# Patient Record
Sex: FEMALE | Race: BLACK OR AFRICAN AMERICAN | NOT HISPANIC OR LATINO
[De-identification: names, ages, dates, MRNs, and addresses within clinical notes are randomized per-mention and may not be internally consistent; named-entity substitution may affect disease eponyms.]

---

## 2017-01-03 ENCOUNTER — APPOINTMENT (OUTPATIENT)
Dept: INTERNAL MEDICINE | Facility: CLINIC | Age: 42
End: 2017-01-03

## 2017-01-03 VITALS
SYSTOLIC BLOOD PRESSURE: 104 MMHG | OXYGEN SATURATION: 97 % | TEMPERATURE: 98.2 F | HEART RATE: 88 BPM | WEIGHT: 293 LBS | HEIGHT: 68 IN | BODY MASS INDEX: 44.41 KG/M2 | DIASTOLIC BLOOD PRESSURE: 72 MMHG

## 2017-01-12 ENCOUNTER — APPOINTMENT (OUTPATIENT)
Dept: PULMONOLOGY | Facility: CLINIC | Age: 42
End: 2017-01-12

## 2017-01-30 ENCOUNTER — APPOINTMENT (OUTPATIENT)
Dept: INTERNAL MEDICINE | Facility: CLINIC | Age: 42
End: 2017-01-30

## 2017-01-31 ENCOUNTER — APPOINTMENT (OUTPATIENT)
Dept: SURGERY | Facility: CLINIC | Age: 42
End: 2017-01-31

## 2017-01-31 VITALS
WEIGHT: 293 LBS | TEMPERATURE: 98.4 F | DIASTOLIC BLOOD PRESSURE: 82 MMHG | BODY MASS INDEX: 46.53 KG/M2 | HEIGHT: 66.5 IN | OXYGEN SATURATION: 89 % | SYSTOLIC BLOOD PRESSURE: 140 MMHG | HEART RATE: 102 BPM

## 2017-02-08 ENCOUNTER — EMERGENCY (EMERGENCY)
Facility: HOSPITAL | Age: 42
LOS: 1 days | Discharge: HOME | End: 2017-02-08
Attending: EMERGENCY MEDICINE | Admitting: EMERGENCY MEDICINE
Payer: COMMERCIAL

## 2017-02-08 VITALS
OXYGEN SATURATION: 95 % | SYSTOLIC BLOOD PRESSURE: 147 MMHG | HEIGHT: 66 IN | HEART RATE: 97 BPM | DIASTOLIC BLOOD PRESSURE: 87 MMHG | RESPIRATION RATE: 20 BRPM | WEIGHT: 293 LBS | TEMPERATURE: 98 F

## 2017-02-08 VITALS
DIASTOLIC BLOOD PRESSURE: 79 MMHG | SYSTOLIC BLOOD PRESSURE: 147 MMHG | RESPIRATION RATE: 18 BRPM | OXYGEN SATURATION: 95 % | HEART RATE: 96 BPM

## 2017-02-08 DIAGNOSIS — J45.909 UNSPECIFIED ASTHMA, UNCOMPLICATED: ICD-10-CM

## 2017-02-08 DIAGNOSIS — L30.9 DERMATITIS, UNSPECIFIED: ICD-10-CM

## 2017-02-08 DIAGNOSIS — R06.02 SHORTNESS OF BREATH: ICD-10-CM

## 2017-02-08 LAB
ALBUMIN SERPL ELPH-MCNC: 3.1 G/DL — LOW (ref 3.4–5)
ALP SERPL-CCNC: 76 U/L — SIGNIFICANT CHANGE UP (ref 40–120)
ALT FLD-CCNC: 21 U/L — SIGNIFICANT CHANGE UP (ref 12–42)
ANION GAP SERPL CALC-SCNC: 9 MMOL/L — SIGNIFICANT CHANGE UP (ref 9–16)
AST SERPL-CCNC: 25 U/L — SIGNIFICANT CHANGE UP (ref 15–37)
BASOPHILS NFR BLD AUTO: 0.1 % — SIGNIFICANT CHANGE UP (ref 0–2)
BILIRUB SERPL-MCNC: 0.5 MG/DL — SIGNIFICANT CHANGE UP (ref 0.2–1.2)
BUN SERPL-MCNC: 7 MG/DL — SIGNIFICANT CHANGE UP (ref 7–23)
CALCIUM SERPL-MCNC: 8.6 MG/DL — SIGNIFICANT CHANGE UP (ref 8.5–10.5)
CHLORIDE SERPL-SCNC: 98 MMOL/L — SIGNIFICANT CHANGE UP (ref 96–108)
CK MB CFR SERPL CALC: <1 NG/ML — SIGNIFICANT CHANGE UP (ref 0.5–3.6)
CK SERPL-CCNC: 45 U/L — SIGNIFICANT CHANGE UP (ref 26–192)
CO2 SERPL-SCNC: 33 MMOL/L — HIGH (ref 22–31)
CREAT SERPL-MCNC: 0.54 MG/DL — SIGNIFICANT CHANGE UP (ref 0.5–1.3)
EOSINOPHIL NFR BLD AUTO: 10.3 % — HIGH (ref 0–6)
GLUCOSE SERPL-MCNC: 136 MG/DL — HIGH (ref 70–99)
HCT VFR BLD CALC: 37 % — SIGNIFICANT CHANGE UP (ref 34.5–45)
HGB BLD-MCNC: 11.2 G/DL — LOW (ref 11.5–15.5)
LYMPHOCYTES # BLD AUTO: 20.6 % — SIGNIFICANT CHANGE UP (ref 13–44)
MCHC RBC-ENTMCNC: 27.4 PG — SIGNIFICANT CHANGE UP (ref 27–34)
MCHC RBC-ENTMCNC: 30.3 G/DL — LOW (ref 32–36)
MCV RBC AUTO: 90.5 FL — SIGNIFICANT CHANGE UP (ref 80–100)
MONOCYTES NFR BLD AUTO: 6.5 % — SIGNIFICANT CHANGE UP (ref 2–14)
NEUTROPHILS NFR BLD AUTO: 62.5 % — SIGNIFICANT CHANGE UP (ref 43–77)
NT-PROBNP SERPL-SCNC: 71 PG/ML — SIGNIFICANT CHANGE UP
PLATELET # BLD AUTO: 294 K/UL — SIGNIFICANT CHANGE UP (ref 150–400)
POTASSIUM SERPL-MCNC: 4 MMOL/L — SIGNIFICANT CHANGE UP (ref 3.5–5.3)
POTASSIUM SERPL-SCNC: 4 MMOL/L — SIGNIFICANT CHANGE UP (ref 3.5–5.3)
PROT SERPL-MCNC: 7.7 G/DL — SIGNIFICANT CHANGE UP (ref 6.4–8.2)
RBC # BLD: 4.09 M/UL — SIGNIFICANT CHANGE UP (ref 3.8–5.2)
RBC # FLD: 14.6 % — SIGNIFICANT CHANGE UP (ref 10.3–16.9)
SODIUM SERPL-SCNC: 140 MMOL/L — SIGNIFICANT CHANGE UP (ref 135–145)
TROPONIN I SERPL-MCNC: <0.015 NG/ML — SIGNIFICANT CHANGE UP (ref 0.01–0.04)
WBC # BLD: 9 K/UL — SIGNIFICANT CHANGE UP (ref 3.8–10.5)
WBC # FLD AUTO: 9 K/UL — SIGNIFICANT CHANGE UP (ref 3.8–10.5)

## 2017-02-08 PROCEDURE — 82550 ASSAY OF CK (CPK): CPT

## 2017-02-08 PROCEDURE — 71046 X-RAY EXAM CHEST 2 VIEWS: CPT

## 2017-02-08 PROCEDURE — 82553 CREATINE MB FRACTION: CPT

## 2017-02-08 PROCEDURE — 71020: CPT | Mod: NC

## 2017-02-08 PROCEDURE — 93010 ELECTROCARDIOGRAM REPORT: CPT | Mod: NC

## 2017-02-08 PROCEDURE — 36415 COLL VENOUS BLD VENIPUNCTURE: CPT

## 2017-02-08 PROCEDURE — 80053 COMPREHEN METABOLIC PANEL: CPT

## 2017-02-08 PROCEDURE — 71020: CPT | Mod: 26

## 2017-02-08 PROCEDURE — 93005 ELECTROCARDIOGRAM TRACING: CPT

## 2017-02-08 PROCEDURE — 85025 COMPLETE CBC W/AUTO DIFF WBC: CPT

## 2017-02-08 PROCEDURE — 99283 EMERGENCY DEPT VISIT LOW MDM: CPT | Mod: 25

## 2017-02-08 PROCEDURE — 83880 ASSAY OF NATRIURETIC PEPTIDE: CPT

## 2017-02-08 PROCEDURE — 84484 ASSAY OF TROPONIN QUANT: CPT

## 2017-02-08 PROCEDURE — 99285 EMERGENCY DEPT VISIT HI MDM: CPT | Mod: 25

## 2017-02-08 RX ORDER — DIPHENHYDRAMINE HCL 50 MG
25 CAPSULE ORAL ONCE
Qty: 0 | Refills: 0 | Status: COMPLETED | OUTPATIENT
Start: 2017-02-08 | End: 2017-02-08

## 2017-02-08 RX ORDER — DIPHENHYDRAMINE HCL 50 MG
1 CAPSULE ORAL
Qty: 20 | Refills: 0 | OUTPATIENT
Start: 2017-02-08

## 2017-02-08 RX ADMIN — Medication 25 MILLIGRAM(S): at 15:09

## 2017-02-08 RX ADMIN — Medication 60 MILLIGRAM(S): at 15:09

## 2017-02-08 NOTE — ED PROVIDER NOTE - CONSTITUTIONAL, MLM
normal... Well appearing, obese, awake, alert, oriented to person, place, time/situation and in no apparent distress.  Speaking full sentences.

## 2017-02-08 NOTE — ED PROVIDER NOTE - OBJECTIVE STATEMENT
Pt is 40yo F with a h/o diastolic CHF, HTN, morbid obesity, MANUEL, and DM2.  She presents today with rash and intermittent chest tightening.  She reports chronic SOB and chronic home O2 use 2/2 her CHF.  She reports that since yesterday she notes intermittent chest tightness and worsening of her CHF.  She also notes that sxs only come on after walking into her bedroom and then quickly resolve once she leaves her bedroom.  She thinks she may have mold in her bedroom that may be causing her sxs.  Rash is scaly and itchy and located over lower back, buttocks, and LEs - notes first noted about 1 week ago.

## 2017-02-08 NOTE — ED ADULT NURSE NOTE - DISCHARGE TEACHING
followup with primary doctor, take medications as prescribed. Return to ER immediately if symptoms continue or worsen

## 2017-02-08 NOTE — ED ADULT NURSE NOTE - CHPI ED SYMPTOMS NEG
no inflammation/no fever/no bruising/no vomiting/no pain/no petechia/no chills/no decreased eating/drinking

## 2017-02-08 NOTE — ED ADULT TRIAGE NOTE - CHIEF COMPLAINT QUOTE
Pt c/o shortness of breath since last night, worsens in her bedroom, feels ok in the living room, states "maybe there's mold". Pt also c/o generalized itchiness for the past week. Pt is on 2L-4L O2 via NC 24 hours for CHF. Pt speaking clearly, not in distress.

## 2017-02-08 NOTE — ED PROVIDER NOTE - CARE PLAN
Principal Discharge DX:	Shortness of breath Principal Discharge DX:	Shortness of breath  Secondary Diagnosis:	Reactive airway disease, unspecified asthma severity, uncomplicated  Secondary Diagnosis:	Eczema, unspecified type

## 2017-02-08 NOTE — ED PROVIDER NOTE - PROGRESS NOTE DETAILS
Case discussed with pt's pulmonologist, Dr. Luo.  He agrees with plan and will f/u with pt this week to re-evaluate her.

## 2017-02-08 NOTE — ED PROVIDER NOTE - MEDICAL DECISION MAKING DETAILS
Given hx and w/u pt is likely having a reaction to environmental allergy - possibly mold.  Pt will contact her landlord to evaluate her apartment.  Will give steroids and benadryl prn.  Will f/u with Dr. Luo tomorrow to set up a f/u evaluation.      I have discussed the discharge plan with the patient. The patient agrees with the plan, as discussed.  The patient understands Emergency Department diagnosis is a preliminary diagnosis often based on limited information and that the patient must adhere to the follow-up plan as discussed.  The patient understands that if the symptoms worsen or if prescribed medications do not have the desired/planned effect that the patient may return to the Emergency Department at any time for further evaluation and treatment.

## 2017-02-08 NOTE — ED PROVIDER NOTE - PMH
Chronic diastolic heart failure  Chronic diastolic CHF (congestive heart failure)  Disease of pericardium  Pericardial effusion  Edema  Anasarca  Essential hypertension  HTN (hypertension)  Morbid obesity  Morbid obesity  Obstructive sleep apnea syndrome  MANUEL on CPAP  Type 2 diabetes mellitus  Insulin Requiring

## 2017-02-08 NOTE — ED ADULT NURSE NOTE - OBJECTIVE STATEMENT
pt is a 42 y/o female c/o chronic SOB d/t CHF and rash for one week. pt on 2-4L O2 at all times at home. pt states SOB gets worse when she is in her bedroom " I think there is mold." denies any chest pain, numbness/tingling, headache, fever, chills, n/v/d. PMH HTN, CHF, DM type II. no acute distress, ambulatory, on 2L O2, RODRIGUEZ, VS stable, EKG done. resting comfortably in stretcher awaiting CXR. will continue to monitor.

## 2017-03-06 ENCOUNTER — APPOINTMENT (OUTPATIENT)
Dept: HEART AND VASCULAR | Facility: CLINIC | Age: 42
End: 2017-03-06

## 2017-03-10 ENCOUNTER — APPOINTMENT (OUTPATIENT)
Dept: INTERNAL MEDICINE | Facility: CLINIC | Age: 42
End: 2017-03-10

## 2017-03-14 ENCOUNTER — APPOINTMENT (OUTPATIENT)
Dept: PULMONOLOGY | Facility: CLINIC | Age: 42
End: 2017-03-14

## 2017-03-20 ENCOUNTER — APPOINTMENT (OUTPATIENT)
Dept: SURGERY | Facility: CLINIC | Age: 42
End: 2017-03-20

## 2017-03-21 ENCOUNTER — APPOINTMENT (OUTPATIENT)
Dept: SURGERY | Facility: CLINIC | Age: 42
End: 2017-03-21

## 2017-03-23 ENCOUNTER — APPOINTMENT (OUTPATIENT)
Dept: PULMONOLOGY | Facility: CLINIC | Age: 42
End: 2017-03-23

## 2017-03-24 ENCOUNTER — APPOINTMENT (OUTPATIENT)
Dept: INTERNAL MEDICINE | Facility: CLINIC | Age: 42
End: 2017-03-24

## 2017-04-18 ENCOUNTER — MEDICATION RENEWAL (OUTPATIENT)
Age: 42
End: 2017-04-18

## 2017-04-20 ENCOUNTER — APPOINTMENT (OUTPATIENT)
Dept: SURGERY | Facility: CLINIC | Age: 42
End: 2017-04-20

## 2017-04-21 ENCOUNTER — TRANSCRIPTION ENCOUNTER (OUTPATIENT)
Age: 42
End: 2017-04-21

## 2017-04-21 ENCOUNTER — INPATIENT (INPATIENT)
Facility: HOSPITAL | Age: 42
LOS: 3 days | Discharge: HOME | DRG: 292 | End: 2017-04-25
Attending: INTERNAL MEDICINE | Admitting: INTERNAL MEDICINE
Payer: COMMERCIAL

## 2017-04-21 VITALS
WEIGHT: 293 LBS | SYSTOLIC BLOOD PRESSURE: 121 MMHG | TEMPERATURE: 98 F | OXYGEN SATURATION: 100 % | HEART RATE: 97 BPM | HEIGHT: 67 IN | DIASTOLIC BLOOD PRESSURE: 95 MMHG | RESPIRATION RATE: 24 BRPM

## 2017-04-21 DIAGNOSIS — I50.32 CHRONIC DIASTOLIC (CONGESTIVE) HEART FAILURE: ICD-10-CM

## 2017-04-21 DIAGNOSIS — E11.9 TYPE 2 DIABETES MELLITUS WITHOUT COMPLICATIONS: ICD-10-CM

## 2017-04-21 DIAGNOSIS — R06.02 SHORTNESS OF BREATH: ICD-10-CM

## 2017-04-21 DIAGNOSIS — I10 ESSENTIAL (PRIMARY) HYPERTENSION: ICD-10-CM

## 2017-04-21 DIAGNOSIS — E66.01 MORBID (SEVERE) OBESITY DUE TO EXCESS CALORIES: ICD-10-CM

## 2017-04-21 DIAGNOSIS — Z29.9 ENCOUNTER FOR PROPHYLACTIC MEASURES, UNSPECIFIED: ICD-10-CM

## 2017-04-21 DIAGNOSIS — R63.8 OTHER SYMPTOMS AND SIGNS CONCERNING FOOD AND FLUID INTAKE: ICD-10-CM

## 2017-04-21 DIAGNOSIS — G47.33 OBSTRUCTIVE SLEEP APNEA (ADULT) (PEDIATRIC): ICD-10-CM

## 2017-04-21 LAB
ALBUMIN SERPL ELPH-MCNC: 3.2 G/DL — LOW (ref 3.4–5)
ALP SERPL-CCNC: 74 U/L — SIGNIFICANT CHANGE UP (ref 40–120)
ALT FLD-CCNC: 27 U/L — SIGNIFICANT CHANGE UP (ref 12–42)
ANION GAP SERPL CALC-SCNC: 2 MMOL/L — LOW (ref 9–16)
AST SERPL-CCNC: 45 U/L — HIGH (ref 15–37)
BASE EXCESS BLDV CALC-SCNC: 19.1 MMOL/L — SIGNIFICANT CHANGE UP
BASOPHILS NFR BLD AUTO: 0.2 % — SIGNIFICANT CHANGE UP (ref 0–2)
BILIRUB SERPL-MCNC: 0.8 MG/DL — SIGNIFICANT CHANGE UP (ref 0.2–1.2)
BUN SERPL-MCNC: 10 MG/DL — SIGNIFICANT CHANGE UP (ref 7–23)
CA-I SERPL-SCNC: 1.12 MMOL/L — SIGNIFICANT CHANGE UP (ref 1.1–1.3)
CALCIUM SERPL-MCNC: 8.8 MG/DL — SIGNIFICANT CHANGE UP (ref 8.5–10.5)
CHLORIDE SERPL-SCNC: 89 MMOL/L — LOW (ref 96–108)
CK MB CFR SERPL CALC: <1 NG/ML — SIGNIFICANT CHANGE UP (ref 0.5–3.6)
CK SERPL-CCNC: 99 U/L — SIGNIFICANT CHANGE UP (ref 26–192)
CO2 SERPL-SCNC: 43 MMOL/L — HIGH (ref 22–31)
CREAT SERPL-MCNC: 0.48 MG/DL — LOW (ref 0.5–1.3)
EOSINOPHIL NFR BLD AUTO: 1.5 % — SIGNIFICANT CHANGE UP (ref 0–6)
GAS PNL BLDV: 140 MMOL/L — SIGNIFICANT CHANGE UP (ref 138–146)
GAS PNL BLDV: SIGNIFICANT CHANGE UP
GAS PNL BLDV: SIGNIFICANT CHANGE UP
GLUCOSE SERPL-MCNC: 144 MG/DL — HIGH (ref 70–99)
HCO3 BLDV-SCNC: 50 MMOL/L — CRITICAL HIGH (ref 20–27)
HCT VFR BLD CALC: 40.9 % — SIGNIFICANT CHANGE UP (ref 34.5–45)
HGB BLD-MCNC: 11.8 G/DL — SIGNIFICANT CHANGE UP (ref 11.5–15.5)
LYMPHOCYTES # BLD AUTO: 19.8 % — SIGNIFICANT CHANGE UP (ref 13–44)
MCHC RBC-ENTMCNC: 26.5 PG — LOW (ref 27–34)
MCHC RBC-ENTMCNC: 28.9 G/DL — LOW (ref 32–36)
MCV RBC AUTO: 91.7 FL — SIGNIFICANT CHANGE UP (ref 80–100)
MONOCYTES NFR BLD AUTO: 8.4 % — SIGNIFICANT CHANGE UP (ref 2–14)
NEUTROPHILS NFR BLD AUTO: 70.1 % — SIGNIFICANT CHANGE UP (ref 43–77)
NT-PROBNP SERPL-SCNC: 60 PG/ML — SIGNIFICANT CHANGE UP
PCO2 BLDV: 94 MMHG — HIGH (ref 41–51)
PH BLDV: 7.35 — SIGNIFICANT CHANGE UP (ref 7.32–7.43)
PLATELET # BLD AUTO: 255 K/UL — SIGNIFICANT CHANGE UP (ref 150–400)
PO2 BLDV: 30 MMHG — SIGNIFICANT CHANGE UP
POTASSIUM BLDV-SCNC: 5.7 MMOL/L — HIGH (ref 3.5–4.9)
POTASSIUM SERPL-MCNC: 5.3 MMOL/L — SIGNIFICANT CHANGE UP (ref 3.5–5.3)
POTASSIUM SERPL-SCNC: 5.3 MMOL/L — SIGNIFICANT CHANGE UP (ref 3.5–5.3)
PROT SERPL-MCNC: 8.5 G/DL — HIGH (ref 6.4–8.2)
RBC # BLD: 4.46 M/UL — SIGNIFICANT CHANGE UP (ref 3.8–5.2)
RBC # FLD: 15.9 % — SIGNIFICANT CHANGE UP (ref 10.3–16.9)
SAO2 % BLDV: 50 % — SIGNIFICANT CHANGE UP
SODIUM SERPL-SCNC: 134 MMOL/L — LOW (ref 135–145)
TROPONIN I SERPL-MCNC: <0.015 NG/ML — SIGNIFICANT CHANGE UP (ref 0.01–0.04)
WBC # BLD: 8.6 K/UL — SIGNIFICANT CHANGE UP (ref 3.8–10.5)
WBC # FLD AUTO: 8.6 K/UL — SIGNIFICANT CHANGE UP (ref 3.8–10.5)

## 2017-04-21 PROCEDURE — 93010 ELECTROCARDIOGRAM REPORT: CPT | Mod: NC

## 2017-04-21 PROCEDURE — 71020: CPT | Mod: NC

## 2017-04-21 PROCEDURE — 99222 1ST HOSP IP/OBS MODERATE 55: CPT | Mod: GC

## 2017-04-21 PROCEDURE — 71020: CPT | Mod: 26

## 2017-04-21 PROCEDURE — 99285 EMERGENCY DEPT VISIT HI MDM: CPT | Mod: 25

## 2017-04-21 PROCEDURE — 99223 1ST HOSP IP/OBS HIGH 75: CPT

## 2017-04-21 PROCEDURE — 93306 TTE W/DOPPLER COMPLETE: CPT | Mod: 26

## 2017-04-21 RX ORDER — SODIUM CHLORIDE 9 MG/ML
3 INJECTION INTRAMUSCULAR; INTRAVENOUS; SUBCUTANEOUS ONCE
Qty: 0 | Refills: 0 | Status: COMPLETED | OUTPATIENT
Start: 2017-04-21 | End: 2017-04-21

## 2017-04-21 RX ORDER — SODIUM CHLORIDE 9 MG/ML
1000 INJECTION, SOLUTION INTRAVENOUS
Qty: 0 | Refills: 0 | Status: DISCONTINUED | OUTPATIENT
Start: 2017-04-21 | End: 2017-04-25

## 2017-04-21 RX ORDER — IPRATROPIUM/ALBUTEROL SULFATE 18-103MCG
3 AEROSOL WITH ADAPTER (GRAM) INHALATION ONCE
Qty: 0 | Refills: 0 | Status: COMPLETED | OUTPATIENT
Start: 2017-04-21 | End: 2017-04-21

## 2017-04-21 RX ORDER — AMLODIPINE BESYLATE 2.5 MG/1
5 TABLET ORAL DAILY
Qty: 0 | Refills: 0 | Status: DISCONTINUED | OUTPATIENT
Start: 2017-04-21 | End: 2017-04-25

## 2017-04-21 RX ORDER — INSULIN LISPRO 100/ML
VIAL (ML) SUBCUTANEOUS
Qty: 0 | Refills: 0 | Status: DISCONTINUED | OUTPATIENT
Start: 2017-04-21 | End: 2017-04-25

## 2017-04-21 RX ORDER — ASPIRIN/CALCIUM CARB/MAGNESIUM 324 MG
325 TABLET ORAL ONCE
Qty: 0 | Refills: 0 | Status: COMPLETED | OUTPATIENT
Start: 2017-04-21 | End: 2017-04-21

## 2017-04-21 RX ORDER — SENNA PLUS 8.6 MG/1
2 TABLET ORAL AT BEDTIME
Qty: 0 | Refills: 0 | Status: DISCONTINUED | OUTPATIENT
Start: 2017-04-21 | End: 2017-04-25

## 2017-04-21 RX ORDER — FUROSEMIDE 40 MG
60 TABLET ORAL
Qty: 0 | Refills: 0 | Status: DISCONTINUED | OUTPATIENT
Start: 2017-04-21 | End: 2017-04-24

## 2017-04-21 RX ORDER — DEXTROSE 50 % IN WATER 50 %
25 SYRINGE (ML) INTRAVENOUS ONCE
Qty: 0 | Refills: 0 | Status: DISCONTINUED | OUTPATIENT
Start: 2017-04-21 | End: 2017-04-25

## 2017-04-21 RX ORDER — IPRATROPIUM/ALBUTEROL SULFATE 18-103MCG
3 AEROSOL WITH ADAPTER (GRAM) INHALATION EVERY 4 HOURS
Qty: 0 | Refills: 0 | Status: DISCONTINUED | OUTPATIENT
Start: 2017-04-21 | End: 2017-04-25

## 2017-04-21 RX ORDER — FAMOTIDINE 10 MG/ML
20 INJECTION INTRAVENOUS
Qty: 0 | Refills: 0 | Status: DISCONTINUED | OUTPATIENT
Start: 2017-04-21 | End: 2017-04-25

## 2017-04-21 RX ORDER — FUROSEMIDE 40 MG
80 TABLET ORAL ONCE
Qty: 0 | Refills: 0 | Status: COMPLETED | OUTPATIENT
Start: 2017-04-21 | End: 2017-04-21

## 2017-04-21 RX ORDER — DEXTROSE 50 % IN WATER 50 %
1 SYRINGE (ML) INTRAVENOUS ONCE
Qty: 0 | Refills: 0 | Status: DISCONTINUED | OUTPATIENT
Start: 2017-04-21 | End: 2017-04-25

## 2017-04-21 RX ORDER — GLUCAGON INJECTION, SOLUTION 0.5 MG/.1ML
1 INJECTION, SOLUTION SUBCUTANEOUS ONCE
Qty: 0 | Refills: 0 | Status: DISCONTINUED | OUTPATIENT
Start: 2017-04-21 | End: 2017-04-25

## 2017-04-21 RX ORDER — HEPARIN SODIUM 5000 [USP'U]/ML
7500 INJECTION INTRAVENOUS; SUBCUTANEOUS EVERY 8 HOURS
Qty: 0 | Refills: 0 | Status: DISCONTINUED | OUTPATIENT
Start: 2017-04-21 | End: 2017-04-25

## 2017-04-21 RX ADMIN — SODIUM CHLORIDE 3 MILLILITER(S): 9 INJECTION INTRAMUSCULAR; INTRAVENOUS; SUBCUTANEOUS at 12:58

## 2017-04-21 RX ADMIN — HEPARIN SODIUM 7500 UNIT(S): 5000 INJECTION INTRAVENOUS; SUBCUTANEOUS at 21:43

## 2017-04-21 RX ADMIN — Medication 80 MILLIGRAM(S): at 13:09

## 2017-04-21 RX ADMIN — Medication 4: at 21:43

## 2017-04-21 RX ADMIN — Medication 3 MILLILITER(S): at 14:40

## 2017-04-21 RX ADMIN — Medication 3 MILLILITER(S): at 13:04

## 2017-04-21 RX ADMIN — Medication 60 MILLIGRAM(S): at 18:01

## 2017-04-21 RX ADMIN — AMLODIPINE BESYLATE 5 MILLIGRAM(S): 2.5 TABLET ORAL at 18:01

## 2017-04-21 RX ADMIN — Medication 325 MILLIGRAM(S): at 13:04

## 2017-04-21 NOTE — H&P ADULT - HISTORY OF PRESENT ILLNESS
41  year-old female PMH of DM2, MANUEL, bipap at night and 4L NC, morbidly obese, dCHF (EF: 55%) who presented to [facility] ED with SOB. Per patient, she has had long standing SOB and has been seeing Dr. Luo for management. Her SOB worsened over last month and she was increased to 4L. She has been eating less in an effort to lose weight and is feeling more fatigued. Patient has 4 pillow orthopnea and walks approximately 10 feet before feeling SOB. She uses a walker at home. Prior admission to [facility] in 8/16 for similar issues and was admitted to the ICU step down unit for dCHF exacerbation secondary to medication non-compliance. Is scheduled to get gastric bypass surgery with Dr. Mcnulty in the future. Currently compliant with her medications but noted lasix only temporarily works to reduce her leg edema. ROS notable for chills and chest tightness. Currently denies fevers, sick contacts, cough, palpitations, abdominal sx.     In ED: T(F): 98.6, Max: 98.9 (04-21 @ 12:15); HR: 92 (88 - 97); BP: 122/88 (121/95 - 128/93); RR: 18 (18 - 24); SpO2: 100% (100% - 100%). VBG: pH=7.35, pCO2=94; HCO3=50Given asa 325 x1, duonebs x2, lasix 80x2, ranitidine 150mg x1,

## 2017-04-21 NOTE — ED PROVIDER NOTE - MEDICAL DECISION MAKING DETAILS
Pt with hx of heart failure presents with increasing edema, orthopnea and RODRIGUEZ X months, but worsening over the past couple of weeks. Labs/ studies noted. Pt given IV lasix and alb nebs. Case discussed with pt's cardiologist and pt admitted for further w/up/ evaluation.

## 2017-04-21 NOTE — DISCHARGE NOTE ADULT - PLAN OF CARE
follow up with Dr. Luo Follow up with Dr. Oscar; continue medications as prescribed maintain normal blood pressure follow up with Dr. Mcnulty Maintain normal blood sugars You have a history of obstructive sleep apnea. Continue to use your bipap at night and follow up with Dr. Luo for continued management. You have a known history of congestive heart failure prior to your admission. To manage this you are on the following medications: ______________.  Congestive heart failure can cause make it harder for your heart to pump blood to the rest of your body. As a result, blood can get backed up into your lungs or into your legs. In order to avoid this, make sure to take all of your medications for heart failure as prescribed. This will keep your heart functioning well. If you notice increased difficulty with breathing, cough with bloody mucous, increased swelling in the legs, or chest pain be sure to contact your PCP or call 911 as you may need more treatment. Additionally be sure to follow up with your PCP on a regular basis to make sure no additional medications or medication changes need to be made. You have a history of high blood pressure. Please continue your medications as previously prescribed and follow up with Dr. Cancino for continued management You have a known history of congestive heart failure prior to your admission. To manage this you are on the following medications: lasix.  Congestive heart failure can cause make it harder for your heart to pump blood to the rest of your body. As a result, blood can get backed up into your lungs or into your legs. In order to avoid this, make sure to take all of your medications for heart failure as prescribed. This will keep your heart functioning well. If you notice increased difficulty with breathing, cough with bloody mucous, increased swelling in the legs, or chest pain be sure to contact your PCP or call 911 as you may need more treatment. Additionally be sure to follow up with your PCP on a regular basis to make sure no additional medications or medication changes need to be made. You have a known history of congestive heart failure prior to your admission. To manage this you are on the following medications: lasix.  Congestive heart failure can cause make it harder for your heart to pump blood to the rest of your body. As a result, blood can get backed up into your lungs or into your legs. In order to avoid this, make sure to take all of your medications for heart failure as prescribed. This will keep your heart functioning well. If you notice increased difficulty with breathing, cough with bloody mucous, increased swelling in the legs, or chest pain be sure to contact your PCP or call 911 as you may need more treatment. Additionally be sure to follow up with your PCP on a regular basis to make sure no additional medications or medication changes need to be made. You have an appointment with Dr. Oscar at 1140AM on Monday, May 1st.

## 2017-04-21 NOTE — H&P ADULT - NSHPLABSRESULTS_GEN_ALL_CORE
.  LABS:                         11.8   8.6   )-----------( 255      ( 21 Apr 2017 12:59 )             40.9     04-21    134<L>  |  89<L>  |  10  ----------------------------<  144<H>  5.3   |  43<H>  |  0.48<L>    Ca    8.8      21 Apr 2017 12:59    TPro  8.5<H>  /  Alb  3.2<L>  /  TBili  0.8  /  DBili  x   /  AST  45<H>  /  ALT  27  /  AlkPhos  74  04-21        CARDIAC MARKERS ( 21 Apr 2017 12:59 )  <0.015 ng/mL / x     / 99 U/L / x     / <1.0 ng/mL      Serum Pro-Brain Natriuretic Peptide: 60 pg/mL (04-21 @ 12:59)        RADIOLOGY, EKG & ADDITIONAL TESTS: Reviewed.

## 2017-04-21 NOTE — H&P ADULT - ATTENDING COMMENTS
History and Physical, History of Present Illness, Allergies/Medications, Patient History, Risk Assessment

## 2017-04-21 NOTE — DISCHARGE NOTE ADULT - CARE PROVIDER_API CALL
Ricardo Oscar), Internal Medicine  [address] [place] [address]  Phone: [phone]  Fax: [phone]    Sheridan Luo), Critical Care Medicine; Pulmonary Disease  [address] [place] [address]  Phone: [phone]  Fax: [phone]    Kang Avalos), Surgery  [address] [place] [address]  Phone: [phone]  Fax: [phone]

## 2017-04-21 NOTE — ED PROVIDER NOTE - OBJECTIVE STATEMENT
bhusri/ hardy / 4 liters nx cpap 42 yo female with hx of CHF, obesity, DM, HTN, sleep apnea, COPD presents with increasing SOB and "edema" X few weeks worsening over the past week. Pt described edema to b/l LE and in abdominal area. No pain. No N/V/D/ abd pain/ cough/ fevers/ chills. Pt is unable to sleep without 4 pillows and has severe RODRIGUEZ and orthopnea over the past several days associated with some chest tightness. Pt is prescribed 120 mg total daily lasix which she takes as prescribed but sxs are not improved (has not taken lasix today and yesterday pt only took 60 mg lasix as she felt "weak"). Pt is on 4 liters NC 24 hours a day (X 1 month per Dr. Luo). Is seen by Dr. Oscar for cardiology and Dr. Luo for pulmonology. Uses CPAP at night to sleep. Is scheduled to get gastric bypass surgery with Dr. Mcnulty in the future.

## 2017-04-21 NOTE — H&P ADULT - NSHPPHYSICALEXAM_GEN_ALL_CORE
.  VITAL SIGNS:  T(C): 37, Max: 37.2 (04-21 @ 12:15)  T(F): 98.6, Max: 98.9 (04-21 @ 12:15)  HR: 92 (88 - 97)  BP: 122/88 (121/95 - 128/93)  BP(mean): --  RR: 18 (18 - 24)  SpO2: 100% (100% - 100%)  Wt(kg): --    PHYSICAL EXAM:    Constitutional: obese female sitting comfortably in stretcher, pleasant, conversive  Head: NC/AT  Eyes: PERRL, EOMI, clear conjunctiva  Neck: supple; no JVD or thyromegaly. acanthosis migrans.  Respiratory: poor air movement. no crackles or wheezing appreciated. no accessory muscle use  Cardiac: +S1/S2; RRR; no M/R/G; PMI non-displaced  Gastrointestinal: obese abdomen. soft, NT/ND; no rebound or guarding; +BSx4  Back: spine midline, no bony tenderness or step-offs; no CVAT B/L  Extremities: WWP, no clubbing or cyanosis; 3+ pitting edema b/l to thighs  Musculoskeletal: NROM x4; no joint swelling, tenderness or erythema  Vascular: 2+ radial, femoral, DP/PT pulses B/L  Neurologic: AAOx3; CNII-XII grossly intact; no focal deficits  Psychiatric: affect and characteristics of appearance, verbalizations, behaviors are appropriate

## 2017-04-21 NOTE — DISCHARGE NOTE ADULT - NS AS ACTIVITY OBS
Bathing allowed/Walking-Outdoors allowed/Stairs allowed/Walking-Indoors allowed/Sex allowed/Return to Work/School allowed/No Heavy lifting/straining/Showering allowed

## 2017-04-21 NOTE — H&P ADULT - PROBLEM SELECTOR PLAN 2
Patient with history of MANUEL on Bipap at night and 4L NC at home.  #continue on BiPAP and 4L NC during day  -abran PRN  #Follows Dr. Luo; will contact for recommendations

## 2017-04-21 NOTE — H&P ADULT - PROBLEM SELECTOR PLAN 3
Patient with h/o dCHF, taking 60mg lasix BID, with subtherapeutic response. BNP=60  -c/w lasix 60 IV BID. Patient with h/o dCHF, taking 60mg lasix BID, with subtherapeutic response. BNP=60  -c/w lasix 60 IV BID.  -Daily weights, strict I&OS

## 2017-04-21 NOTE — DISCHARGE NOTE ADULT - CARE PROVIDERS DIRECT ADDRESSES
[email or web address]

## 2017-04-21 NOTE — CONSULT NOTE ADULT - SUBJECTIVE AND OBJECTIVE BOX
Patient is a 41y old  Female who presents with a chief complaint of SOB (21 Apr 2017 18:23)      HPI:  41  year-old female PMH of DM2, MANUEL, bipap at night and 4L NC, morbidly obese, dCHF (EF: 55%) who presented to [facility] ED with SOB. Per patient, she has had long standing SOB and has been seeing Dr. Luo for management. Her SOB worsened over last month and she was increased to 4L. She has been eating less in an effort to lose weight and is feeling more fatigued. Patient has 4 pillow orthopnea and walks approximately 10 feet before feeling SOB. She uses a walker at home. Prior admission to [facility] in 8/16 for similar issues and was admitted to the ICU step down unit for dCHF exacerbation secondary to medication non-compliance. Is scheduled to get gastric bypass surgery with Dr. Mcnulty in the future. Currently compliant with her medications but noted lasix only temporarily works to reduce her leg edema. ROS notable for chills and chest tightness. Currently denies fevers, sick contacts, cough, palpitations, abdominal sx.     In ED: T(F): 98.6, Max: 98.9 (04-21 @ 12:15); HR: 92 (88 - 97); BP: 122/88 (121/95 - 128/93); RR: 18 (18 - 24); SpO2: 100% (100% - 100%). VBG: pH=7.35, pCO2=94; HCO3=50Given asa 325 x1, duonebs x2, lasix 80x2, ranitidine 150mg x1, (21 Apr 2017 15:08)      PAST MEDICAL & SURGICAL HISTORY:  Edema: Anasarca  Chronic diastolic heart failure: Chronic diastolic CHF (congestive heart failure)  Essential hypertension: HTN (hypertension)  Type 2 diabetes mellitus: Insulin Requiring  Morbid obesity: Morbid obesity  Disease of pericardium: Pericardial effusion  Obstructive sleep apnea syndrome: MANUEL on CPAP  Cytomegalovirus infection not present: No significant past surgical history      FAMILY HISTORY:      SOCIAL HISTORY:  Smoking Status: [ ] Current, [ ] Former, [ ] Never  Pack Years:    MEDICATIONS:  Pulmonary:  ALBUTerol/ipratropium for Nebulization 3milliLiter(s) Nebulizer every 4 hours PRN    Antimicrobials:    Anticoagulants:  heparin  Injectable 7500Unit(s) SubCutaneous every 8 hours    Onc:    GI/:  senna 2Tablet(s) Oral at bedtime PRN  famotidine    Tablet 20milliGRAM(s) Oral two times a day PRN    Endocrine:  insulin lispro (HumaLOG) corrective regimen sliding scale  SubCutaneous Before meals and at bedtime  dextrose Gel 1Dose(s) Oral once PRN  dextrose 50% Injectable 25Gram(s) IV Push once  glucagon  Injectable 1milliGRAM(s) IntraMuscular once PRN    Cardiac:  amLODIPine   Tablet 5milliGRAM(s) Oral daily  furosemide   Injectable 60milliGRAM(s) IV Push two times a day    Other Medications:  dextrose 5%. 1000milliLiter(s) IV Continuous <Continuous>      Allergies    No Known Drug Allergies  shellfish (Anaphylaxis)    Intolerances        Vital Signs Last 24 Hrs  T(C): 36.7, Max: 37.2 (04-21 @ 12:15)  T(F): 98.1, Max: 98.9 (04-21 @ 12:15)  HR: 95 (68 - 100)  BP: 134/63 (87/52 - 174/79)  BP(mean): 88 (63 - 107)  RR: 18 (16 - 24)  SpO2: 93% (93% - 100%)    I & Os for current day (as of 04-21 @ 22:26)  =============================================  IN: 200 ml / OUT: 800 ml / NET: -600 ml        LABS:      CBC Full  -  ( 21 Apr 2017 12:59 )  WBC Count : 8.6 K/uL  Hemoglobin : 11.8 g/dL  Hematocrit : 40.9 %  Platelet Count - Automated : 255 K/uL  Mean Cell Volume : 91.7 fL  Mean Cell Hemoglobin : 26.5 pg  Mean Cell Hemoglobin Concentration : 28.9 g/dL  Auto Neutrophil # : x  Auto Lymphocyte # : x  Auto Monocyte # : x  Auto Eosinophil # : x  Auto Basophil # : x  Auto Neutrophil % : 70.1 %  Auto Lymphocyte % : 19.8 %  Auto Monocyte % : 8.4 %  Auto Eosinophil % : 1.5 %  Auto Basophil % : 0.2 %    04-21    134<L>  |  89<L>  |  10  ----------------------------<  144<H>  5.3   |  43<H>  |  0.48<L>    Ca    8.8      21 Apr 2017 12:59    TPro  8.5<H>  /  Alb  3.2<L>  /  TBili  0.8  /  DBili  x   /  AST  45<H>  /  ALT  27  /  AlkPhos  74  04-21      EXAM:  XR CHEST PA - LAT                          PROCEDURE DATE:  04/21/2017                     INTERPRETATION:  XR CHEST PA - LAT dated 4/21/2017 2:26 PM    CLINICAL INFORMATION: Female, 41 years old.  Chest Pain.    PRIOR STUDIES: 2/8/2017.    FINDINGS: Heart size, mediastinal and hilar contours are unchanged with   cardiomegaly and slight exacerbation pulmonary venous congestion which is   mild in extent. There may be newly developing atelectasis of the left   midlung zone. No jovita consolidation or large pleural effusions. No   pneumothorax. Soft tissues and osseous structures are unchanged.    IMPRESSION:  Cardiomegaly with worsening pulmonary venous congestion.                  RADIOLOGY & ADDITIONAL STUDIES (The following images were personally reviewed):

## 2017-04-21 NOTE — DISCHARGE NOTE ADULT - MEDICATION SUMMARY - MEDICATIONS TO TAKE
I will START or STAY ON the medications listed below when I get home from the hospital:    metFORMIN 1000 mg oral tablet  -- 1 tab(s) by mouth 2 times a day  -- Indication: For Type 2 diabetes mellitus    Levemir FlexPen 100 units/mL subcutaneous solution  -- 15 unit(s) subcutaneous once a day (at bedtime)  -- Indication: For Type 2 diabetes mellitus    amLODIPine 5 mg oral tablet  -- 1 tab(s) by mouth once a day  -- Indication: For Essential hypertension    furosemide 20 mg oral tablet  -- 3 tab(s) by mouth 2 times a day  -- Avoid prolonged or excessive exposure to direct and/or artificial sunlight while taking this medication.  It is very important that you take or use this exactly as directed.  Do not skip doses or discontinue unless directed by your doctor.  It may be advisable to drink a full glass orange juice or eat a banana daily while taking this medication.    -- Indication: For Chronic diastolic heart failure    Zantac 150 150 mg oral tablet  -- 1 tab(s) by mouth 2 times a day, As Needed  -- It is very important that you take or use this exactly as directed.  Do not skip doses or discontinue unless directed by your doctor.  Obtain medical advice before taking any non-prescription drugs as some may affect the action of this medication.    -- Indication: For Prophylactic measure I will START or STAY ON the medications listed below when I get home from the hospital:    metFORMIN 1000 mg oral tablet  -- 1 tab(s) by mouth 2 times a day  -- Indication: For Type 2 diabetes mellitus    Levemir FlexPen 100 units/mL subcutaneous solution  -- 15 unit(s) subcutaneous once a day (at bedtime)  -- Indication: For Type 2 diabetes mellitus    amLODIPine 5 mg oral tablet  -- 1 tab(s) by mouth once a day  -- Indication: For Essential hypertension    Lasix 80 mg oral tablet  -- 1 tab(s) by mouth every 12 hours  -- Indication: For Chronic diastolic heart failure    Zantac 150 150 mg oral tablet  -- 1 tab(s) by mouth 2 times a day, As Needed  -- It is very important that you take or use this exactly as directed.  Do not skip doses or discontinue unless directed by your doctor.  Obtain medical advice before taking any non-prescription drugs as some may affect the action of this medication.    -- Indication: For Prophylactic measure    potassium chloride 20 mEq oral tablet, extended release  -- 1 tab(s) by mouth once a day  -- Indication: For Nutrition, metabolism, and development symptoms

## 2017-04-21 NOTE — H&P ADULT - PROBLEM SELECTOR PLAN 1
Patient with history of MANUEL (on bipap at night of 4L NC during the day), morbid obesity and dCHF presenting with exacerbation of her chronic SOB. Likely 2/2 MANUEL versus acute dCHF exacerbation. CXR without effusions but with decreased aeration. Chest exam noted for decreased air movement.   [facility]   manage MANUEL and dCHF as below

## 2017-04-21 NOTE — ED ADULT TRIAGE NOTE - CHIEF COMPLAINT QUOTE
Pt c/o bilateral leg swelling and SOB since last week, also chest pressure, pt states she's been taking Lasix 120mg daily with no relief. On supplemental O2 4L.

## 2017-04-21 NOTE — CONSULT NOTE ADULT - ATTENDING COMMENTS
Temperature more consistent with by ventricular failure. Patient has the and water retention. The chest x-ray was consistent CHF. The echocardiogram was reviewed. Increase diuresis. Will follow

## 2017-04-21 NOTE — ED ADULT NURSE NOTE - OBJECTIVE STATEMENT
Pt w/ PMH of CHF, HTN and DM presents to ED today c/o 5 days of increased dyspnea and swelling of lower extremities.  Pt states intermittent nausea, anorexia and constipation with the same timescale.  Pt denies pain.  Pt states the symptoms are consistent with exacerbations of her CHF, but states her regular regimen has lost its usual effect.  Pt states noncompliance with her insulin administration.  Pt denies productive cough, recent subjective illness, CP, vomiting or recent sick contacts or foreign travel.  Pt on monitor awaiting lab results and pending radiology.  Mother at bedside.

## 2017-04-21 NOTE — DISCHARGE NOTE ADULT - MEDICATION SUMMARY - MEDICATIONS TO CHANGE
I will SWITCH the dose or number of times a day I take the medications listed below when I get home from the hospital:  None I will SWITCH the dose or number of times a day I take the medications listed below when I get home from the hospital:    furosemide 20 mg oral tablet  -- 3 tab(s) by mouth 2 times a day  -- Avoid prolonged or excessive exposure to direct and/or artificial sunlight while taking this medication.  It is very important that you take or use this exactly as directed.  Do not skip doses or discontinue unless directed by your doctor.  It may be advisable to drink a full glass orange juice or eat a banana daily while taking this medication.

## 2017-04-21 NOTE — H&P ADULT - ASSESSMENT
41  year-old female PMH of DM2, MANUEL, bipap at night and 4L NC, morbidly obese, dCHF (EF: 55%) who presented to [facility] ED with SOB and in respiratory distress.

## 2017-04-21 NOTE — DISCHARGE NOTE ADULT - CARE PLAN
Principal Discharge DX:	Obstructive sleep apnea syndrome  Goal:	follow up with Dr. Luo  Secondary Diagnosis:	Chronic diastolic heart failure  Goal:	Follow up with Dr. Oscar; continue medications as prescribed  Secondary Diagnosis:	Essential hypertension  Goal:	maintain normal blood pressure  Secondary Diagnosis:	Morbid obesity  Goal:	follow up with Dr. Mcnulty  Secondary Diagnosis:	Type 2 diabetes mellitus  Goal:	Maintain normal blood sugars Principal Discharge DX:	Obstructive sleep apnea syndrome  Goal:	follow up with Dr. Luo  Instructions for follow-up, activity and diet:	You have a history of obstructive sleep apnea. Continue to use your bipap at night and follow up with Dr. Luo for continued management.  Secondary Diagnosis:	Chronic diastolic heart failure  Goal:	Follow up with Dr. Oscar; continue medications as prescribed  Instructions for follow-up, activity and diet:	You have a known history of congestive heart failure prior to your admission. To manage this you are on the following medications: ______________.  Congestive heart failure can cause make it harder for your heart to pump blood to the rest of your body. As a result, blood can get backed up into your lungs or into your legs. In order to avoid this, make sure to take all of your medications for heart failure as prescribed. This will keep your heart functioning well. If you notice increased difficulty with breathing, cough with bloody mucous, increased swelling in the legs, or chest pain be sure to contact your PCP or call 911 as you may need more treatment. Additionally be sure to follow up with your PCP on a regular basis to make sure no additional medications or medication changes need to be made.  Secondary Diagnosis:	Essential hypertension  Goal:	maintain normal blood pressure  Secondary Diagnosis:	Morbid obesity  Goal:	follow up with Dr. Mcnulty  Secondary Diagnosis:	Type 2 diabetes mellitus  Goal:	Maintain normal blood sugars Principal Discharge DX:	Obstructive sleep apnea syndrome  Goal:	follow up with Dr. Luo  Instructions for follow-up, activity and diet:	You have a history of obstructive sleep apnea. Continue to use your bipap at night and follow up with Dr. Luo for continued management.  Secondary Diagnosis:	Chronic diastolic heart failure  Goal:	Follow up with Dr. Oscar; continue medications as prescribed  Instructions for follow-up, activity and diet:	You have a known history of congestive heart failure prior to your admission. To manage this you are on the following medications: lasix.  Congestive heart failure can cause make it harder for your heart to pump blood to the rest of your body. As a result, blood can get backed up into your lungs or into your legs. In order to avoid this, make sure to take all of your medications for heart failure as prescribed. This will keep your heart functioning well. If you notice increased difficulty with breathing, cough with bloody mucous, increased swelling in the legs, or chest pain be sure to contact your PCP or call 911 as you may need more treatment. Additionally be sure to follow up with your PCP on a regular basis to make sure no additional medications or medication changes need to be made.  Secondary Diagnosis:	Essential hypertension  Goal:	maintain normal blood pressure  Instructions for follow-up, activity and diet:	You have a history of high blood pressure. Please continue your medications as previously prescribed and follow up with Dr. Cancino for continued management  Secondary Diagnosis:	Morbid obesity  Goal:	follow up with Dr. Mcnulty  Secondary Diagnosis:	Type 2 diabetes mellitus  Goal:	Maintain normal blood sugars Principal Discharge DX:	Obstructive sleep apnea syndrome  Goal:	follow up with Dr. Luo  Instructions for follow-up, activity and diet:	You have a history of obstructive sleep apnea. Continue to use your bipap at night and follow up with Dr. Luo for continued management.  Secondary Diagnosis:	Chronic diastolic heart failure  Goal:	Follow up with Dr. Oscar; continue medications as prescribed  Instructions for follow-up, activity and diet:	You have a known history of congestive heart failure prior to your admission. To manage this you are on the following medications: lasix.  Congestive heart failure can cause make it harder for your heart to pump blood to the rest of your body. As a result, blood can get backed up into your lungs or into your legs. In order to avoid this, make sure to take all of your medications for heart failure as prescribed. This will keep your heart functioning well. If you notice increased difficulty with breathing, cough with bloody mucous, increased swelling in the legs, or chest pain be sure to contact your PCP or call 911 as you may need more treatment. Additionally be sure to follow up with your PCP on a regular basis to make sure no additional medications or medication changes need to be made. You have an appointment with Dr. Oscar at 1140AM on Monday, May 1st.  Secondary Diagnosis:	Essential hypertension  Goal:	maintain normal blood pressure  Instructions for follow-up, activity and diet:	You have a history of high blood pressure. Please continue your medications as previously prescribed and follow up with Dr. Cancino for continued management  Secondary Diagnosis:	Morbid obesity  Goal:	follow up with Dr. Mcnulty  Secondary Diagnosis:	Type 2 diabetes mellitus  Goal:	Maintain normal blood sugars

## 2017-04-21 NOTE — DISCHARGE NOTE ADULT - HOSPITAL COURSE
41  year-old female PMH of DM2, MANUEL, bipap at night and 4L NC, morbidly obese, dCHF (EF: 55%) who presented to [facility] ED with SOB.Her SOB worsened over last month and she was increased to 4L. ROS notable for chills and chest tightness. Currently denies fevers, sick contacts, cough, palpitations, abdominal sx. In ED: T(F): 98.6, Max: 98.9 (04-21 @ 12:15); HR: 92 (88 - 97); BP: 122/88 (121/95 - 128/93); RR: 18 (18 - 24); SpO2: 100% (100% - 100%). VBG: pH=7.35, pCO2=94; HCO3=50Given asa 325 x1, duonebs x2, lasix 80x2, ranitidine 150mg x1, Echo showing Left ventricular hypertrophy present. EF= 55%. A moderate pericardial effusion noted behind the left heart.  Put on Bipap except for meals. CXR: Cardiomegaly with worsening pulmonary venous congestion. started on IV lasix. 41  year-old female PMH of DM2, MANUEL, bipap at night and 4L NC, morbidly obese, dCHF (EF: 55%) who presented to [facility] ED with SOB.Her SOB worsened over last month and she was increased to 4L. ROS notable for chills and chest tightness. Currently denies fevers, sick contacts, cough, palpitations, abdominal sx. In ED: T(F): 98.6, Max: 98.9 (04-21 @ 12:15); HR: 92 (88 - 97); BP: 122/88 (121/95 - 128/93); RR: 18 (18 - 24); SpO2: 100% (100% - 100%). VBG: pH=7.35, pCO2=94; HCO3=50Given asa 325 x1, duonebs x2, lasix 80x2, ranitidine 150mg x1, Echo showing Left ventricular hypertrophy present. EF= 55%. A moderate pericardial effusion noted behind the left heart.  Put on Bipap except for meals. CXR: Cardiomegaly with worsening pulmonary venous congestion. started on IV lasix and responded well with approximately 6L diuresed and with improvement in breathing. Patient remained otherwise stable and will be discharged home to continue her home 4L NC and will follow up with Dr. Luo, Dr. Oscar and Dr. Minaya as an outpatient. 41  year-old female PMH of DM2, MANUEL, bipap at night and 4L NC, morbidly obese, dCHF (EF: 55%) who presented to [facility] ED with SOB.Her SOB worsened over last month and she was increased to 4L. ROS notable for chills and chest tightness. Currently denies fevers, sick contacts, cough, palpitations, abdominal sx. In ED: T(F): 98.6, Max: 98.9 (04-21 @ 12:15); HR: 92 (88 - 97); BP: 122/88 (121/95 - 128/93); RR: 18 (18 - 24); SpO2: 100% (100% - 100%). VBG: pH=7.35, pCO2=94; HCO3=50Given asa 325 x1, duonebs x2, lasix 80x2, ranitidine 150mg x1, Echo showing Left ventricular hypertrophy present. EF= 55%. A moderate pericardial effusion noted behind the left heart.  Put on Bipap except for meals. CXR: Cardiomegaly with worsening pulmonary venous congestion. started on IV lasix and responded well with approximately 8L diuresed and with improvement in breathing. Patient remained otherwise stable and will be discharged home on 80 PO BID lasix, 20PO daily potassium, will continue her home 4L NC and will follow up with Dr. Luo, Dr. Oscar (in one week) and Dr. Minaya as an outpatient.

## 2017-04-21 NOTE — DISCHARGE NOTE ADULT - PATIENT PORTAL LINK FT
“You can access the [employer] Patient Portal, offered by [facility], by registering with the following website: [email or web address]”

## 2017-04-22 LAB
ANION GAP SERPL CALC-SCNC: 8 MMOL/L — LOW (ref 9–16)
BUN SERPL-MCNC: 11 MG/DL — SIGNIFICANT CHANGE UP (ref 7–23)
CALCIUM SERPL-MCNC: 9.2 MG/DL — SIGNIFICANT CHANGE UP (ref 8.5–10.5)
CHLORIDE SERPL-SCNC: 86 MMOL/L — LOW (ref 96–108)
CO2 SERPL-SCNC: 42 MMOL/L — HIGH (ref 22–31)
CREAT SERPL-MCNC: 0.61 MG/DL — SIGNIFICANT CHANGE UP (ref 0.5–1.3)
GLUCOSE SERPL-MCNC: 143 MG/DL — HIGH (ref 70–99)
HBA1C BLD-MCNC: 8.6 % — HIGH (ref 4.8–5.6)
HCT VFR BLD CALC: 40.9 % — SIGNIFICANT CHANGE UP (ref 34.5–45)
HGB BLD-MCNC: 11.7 G/DL — SIGNIFICANT CHANGE UP (ref 11.5–15.5)
LIDOCAIN IGE QN: 103 U/L — SIGNIFICANT CHANGE UP (ref 73–393)
MAGNESIUM SERPL-MCNC: 1.7 MG/DL — SIGNIFICANT CHANGE UP (ref 1.6–2.4)
MCHC RBC-ENTMCNC: 26.5 PG — LOW (ref 27–34)
MCHC RBC-ENTMCNC: 28.6 G/DL — LOW (ref 32–36)
MCV RBC AUTO: 92.7 FL — SIGNIFICANT CHANGE UP (ref 80–100)
PHOSPHATE SERPL-MCNC: 3.4 MG/DL — SIGNIFICANT CHANGE UP (ref 2.5–4.5)
PLATELET # BLD AUTO: 271 K/UL — SIGNIFICANT CHANGE UP (ref 150–400)
POTASSIUM SERPL-MCNC: 3.2 MMOL/L — LOW (ref 3.5–5.3)
POTASSIUM SERPL-SCNC: 3.2 MMOL/L — LOW (ref 3.5–5.3)
RBC # BLD: 4.41 M/UL — SIGNIFICANT CHANGE UP (ref 3.8–5.2)
RBC # FLD: 15.9 % — SIGNIFICANT CHANGE UP (ref 10.3–16.9)
SODIUM SERPL-SCNC: 136 MMOL/L — SIGNIFICANT CHANGE UP (ref 135–145)
WBC # BLD: 8.3 K/UL — SIGNIFICANT CHANGE UP (ref 3.8–10.5)
WBC # FLD AUTO: 8.3 K/UL — SIGNIFICANT CHANGE UP (ref 3.8–10.5)

## 2017-04-22 RX ORDER — MAGNESIUM SULFATE 500 MG/ML
2 VIAL (ML) INJECTION ONCE
Qty: 0 | Refills: 0 | Status: COMPLETED | OUTPATIENT
Start: 2017-04-22 | End: 2017-04-22

## 2017-04-22 RX ORDER — POTASSIUM CHLORIDE 20 MEQ
40 PACKET (EA) ORAL EVERY 4 HOURS
Qty: 0 | Refills: 0 | Status: COMPLETED | OUTPATIENT
Start: 2017-04-22 | End: 2017-04-22

## 2017-04-22 RX ORDER — BENZOCAINE AND MENTHOL 5; 1 G/100ML; G/100ML
1 LIQUID ORAL THREE TIMES A DAY
Qty: 0 | Refills: 0 | Status: DISCONTINUED | OUTPATIENT
Start: 2017-04-22 | End: 2017-04-25

## 2017-04-22 RX ORDER — ACETAMINOPHEN 500 MG
650 TABLET ORAL EVERY 6 HOURS
Qty: 0 | Refills: 0 | Status: DISCONTINUED | OUTPATIENT
Start: 2017-04-22 | End: 2017-04-25

## 2017-04-22 RX ADMIN — Medication 650 MILLIGRAM(S): at 09:49

## 2017-04-22 RX ADMIN — Medication 50 GRAM(S): at 14:55

## 2017-04-22 RX ADMIN — Medication 40 MILLIEQUIVALENT(S): at 09:49

## 2017-04-22 RX ADMIN — Medication 60 MILLIGRAM(S): at 06:42

## 2017-04-22 RX ADMIN — Medication 2: at 07:47

## 2017-04-22 RX ADMIN — AMLODIPINE BESYLATE 5 MILLIGRAM(S): 2.5 TABLET ORAL at 06:43

## 2017-04-22 RX ADMIN — BENZOCAINE AND MENTHOL 1 LOZENGE: 5; 1 LIQUID ORAL at 21:24

## 2017-04-22 RX ADMIN — Medication 40 MILLIEQUIVALENT(S): at 14:55

## 2017-04-22 RX ADMIN — HEPARIN SODIUM 7500 UNIT(S): 5000 INJECTION INTRAVENOUS; SUBCUTANEOUS at 06:42

## 2017-04-22 RX ADMIN — Medication 60 MILLIGRAM(S): at 18:01

## 2017-04-22 RX ADMIN — HEPARIN SODIUM 7500 UNIT(S): 5000 INJECTION INTRAVENOUS; SUBCUTANEOUS at 21:24

## 2017-04-22 RX ADMIN — Medication 50 GRAM(S): at 09:49

## 2017-04-22 RX ADMIN — Medication 2: at 11:32

## 2017-04-22 RX ADMIN — Medication 650 MILLIGRAM(S): at 10:45

## 2017-04-22 RX ADMIN — HEPARIN SODIUM 7500 UNIT(S): 5000 INJECTION INTRAVENOUS; SUBCUTANEOUS at 14:55

## 2017-04-22 NOTE — PROGRESS NOTE ADULT - PROBLEM SELECTOR PLAN 3
Patient with h/o dCHF, taking 60mg lasix BID, with subtherapeutic response. BNP=60  -c/w lasix 60 IV BID.  -Daily weights, strict I&OS

## 2017-04-22 NOTE — PROGRESS NOTE ADULT - SUBJECTIVE AND OBJECTIVE BOX
CARDIOLOGY PGY-1 PROGRESS NOTE    O/N: Was on BIPAP overnight. Patient lost IV access around 6:10AM. ADDY.   	    Vitals:  T(C): 36.7, Max: 37.2 (04-21 @ 12:15)  HR: 110 (68 - 110)  BP: 137/82 (87/52 - 174/79)  RR: 22 (16 - 29)  SpO2: 100% (93% - 100%)  Wt(kg): --  I&O's Summary    I & Os for current day (as of 22 Apr 2017 06:33)  =============================================  IN: 200 ml / OUT: 800 ml / NET: -600 ml    Height (cm): 170.2 (04-21 @ 15:31)  Weight (kg): 186 (04-21 @ 15:31)  BMI (kg/m2): 64.2 (04-21 @ 15:31)  BSA (m2): 2.74 (04-21 @ 15:31)    PHYSICAL EXAM:  Constitutional: obese female sitting comfortably in stretcher, pleasant, conversive  Head: NC/AT  Eyes: PERRL, EOMI, clear conjunctiva  Neck: supple; no JVD or thyromegaly. acanthosis migrans.  Respiratory: poor air movement. no crackles or wheezing appreciated. no accessory muscle use  Cardiac: +S1/S2; RRR; no M/R/G; PMI non-displaced  Gastrointestinal: obese abdomen. soft, NT/ND; no rebound or guarding; +BSx4  Back: spine midline, no bony tenderness or step-offs; no CVAT B/L  Extremities: WWP, no clubbing or cyanosis; 3+ pitting edema b/l to thighs  Musculoskeletal: NROM x4; no joint swelling, tenderness or erythema  Vascular: 2+ radial, femoral, DP/PT pulses B/L  Neurologic: AAOx3; CNII-XII grossly intact; no focal deficits    TELEMETRY: 	    ECG:  	      DIAGNOSTIC TESTING:  [ ] Echocardiogram:  [ ]  Catheterization:  [ ] Stress Test:    OTHER: 	      CURRENT MEDICATIONS:  amLODIPine   Tablet 5milliGRAM(s) Oral daily  furosemide   Injectable 60milliGRAM(s) IV Push two times a day      ALBUTerol/ipratropium for Nebulization 3milliLiter(s) Nebulizer every 4 hours PRN      senna 2Tablet(s) Oral at bedtime PRN  famotidine    Tablet 20milliGRAM(s) Oral two times a day PRN    insulin lispro (HumaLOG) corrective regimen sliding scale  SubCutaneous Before meals and at bedtime  dextrose Gel 1Dose(s) Oral once PRN  dextrose 50% Injectable 25Gram(s) IV Push once  glucagon  Injectable 1milliGRAM(s) IntraMuscular once PRN    heparin  Injectable 7500Unit(s) SubCutaneous every 8 hours  dextrose 5%. 1000milliLiter(s) IV Continuous <Continuous>      LABS:	 	    CARDIAC MARKERS:  Troponin I, Serum: <0.015 ng/mL (04-21 @ 12:59)                                  11.8   8.6   )-----------( 255      ( 21 Apr 2017 12:59 )             40.9     04-21    134<L>  |  89<L>  |  10  ----------------------------<  144<H>  5.3   |  43<H>  |  0.48<L>    Ca    8.8      21 Apr 2017 12:59    TPro  8.5<H>  /  Alb  3.2<L>  /  TBili  0.8  /  DBili  x   /  AST  45<H>  /  ALT  27  /  AlkPhos  74  04-21    proBNP: Serum Pro-Brain Natriuretic Peptide: 60 pg/mL (04-21 @ 12:59)    Lipid Profile:   HgA1c:   TSH: CARDIOLOGY PGY-1 PROGRESS NOTE    O/N: Was on BIPAP overnight. Patient lost IV access around 6:10AM. ADDY.   	    Vitals:  T(C): 36.7, Max: 37.2 (04-21 @ 12:15)  HR: 110 (68 - 110)  BP: 137/82 (87/52 - 174/79)  RR: 22 (16 - 29)  SpO2: 100% (93% - 100%)  Wt(kg): --  I&O's Summary    I & Os for current day (as of 22 Apr 2017 06:33)  =============================================  IN: 200 ml / OUT: 800 ml / NET: -600 ml    Height (cm): 170.2 (04-21 @ 15:31)  Weight (kg): 186 (04-21 @ 15:31)  BMI (kg/m2): 64.2 (04-21 @ 15:31)  BSA (m2): 2.74 (04-21 @ 15:31)    PHYSICAL EXAM:  Constitutional: obese female sitting comfortably in stretcher, pleasant, conversive  Head: NC/AT  Eyes: PERRL, EOMI, clear conjunctiva  Neck: supple; no JVD or thyromegaly. acanthosis migrans.  Respiratory: CTAB; improved air movement from yesterday   Cardiac: +S1/S2; RRR; no M/R/G; PMI non-displaced  Gastrointestinal: obese abdomen. soft, NT/ND; no rebound or guarding; +BSx4  Back: spine midline, no bony tenderness or step-offs; no CVAT B/L  Extremities: WWP, no clubbing or cyanosis; 3+ pitting edema b/l to thighs  Musculoskeletal: NROM x4; no joint swelling, tenderness or erythema  Vascular: 2+ radial, femoral, DP/PT pulses B/L  Neurologic: AAOx3; CNII-XII grossly intact; no focal deficits    TELEMETRY: 	    ECG:  	      DIAGNOSTIC TESTING:  [ ] Echocardiogram:  [ ]  Catheterization:  [ ] Stress Test:    OTHER: 	      CURRENT MEDICATIONS:  amLODIPine   Tablet 5milliGRAM(s) Oral daily  furosemide   Injectable 60milliGRAM(s) IV Push two times a day      ALBUTerol/ipratropium for Nebulization 3milliLiter(s) Nebulizer every 4 hours PRN      senna 2Tablet(s) Oral at bedtime PRN  famotidine    Tablet 20milliGRAM(s) Oral two times a day PRN    insulin lispro (HumaLOG) corrective regimen sliding scale  SubCutaneous Before meals and at bedtime  dextrose Gel 1Dose(s) Oral once PRN  dextrose 50% Injectable 25Gram(s) IV Push once  glucagon  Injectable 1milliGRAM(s) IntraMuscular once PRN    heparin  Injectable 7500Unit(s) SubCutaneous every 8 hours  dextrose 5%. 1000milliLiter(s) IV Continuous <Continuous>      LABS:	 	    CARDIAC MARKERS:  Troponin I, Serum: <0.015 ng/mL (04-21 @ 12:59)                                  11.8   8.6   )-----------( 255      ( 21 Apr 2017 12:59 )             40.9     04-21    134<L>  |  89<L>  |  10  ----------------------------<  144<H>  5.3   |  43<H>  |  0.48<L>    Ca    8.8      21 Apr 2017 12:59    TPro  8.5<H>  /  Alb  3.2<L>  /  TBili  0.8  /  DBili  x   /  AST  45<H>  /  ALT  27  /  AlkPhos  74  04-21    proBNP: Serum Pro-Brain Natriuretic Peptide: 60 pg/mL (04-21 @ 12:59)    Lipid Profile:   HgA1c:   TSH:

## 2017-04-22 NOTE — PROGRESS NOTE ADULT - PROBLEM SELECTOR PLAN 1
Multifactorial from Acute diastolic CHF with baseline MANUEL/OHS. She is on BIPAP at night and on nasal cannula 4 Liters during the day. CXR showed bilateral congestion suggestive of pulmonary edema.   She is responding well to diuretics. Continue volume optimization with lasix/fluid restriction  -Continue BIPAP at night , and supplemental o2 during the day

## 2017-04-22 NOTE — PHYSICAL THERAPY INITIAL EVALUATION ADULT - PERTINENT HX OF CURRENT PROBLEM, REHAB EVAL
42 yo female admitted for complaints of SOB admitted for respiratory distress requiring BiPAP now on nasal canula seen for PT evaluation hospital day # 2

## 2017-04-22 NOTE — PROGRESS NOTE ADULT - ASSESSMENT
41  year-old female PMH of DM2, MANUEL, bipap at night and 4L NC, morbidly obese, dCHF (EF: 55%) who presented to [facility] ED with SOB and in respiratory distress.

## 2017-04-22 NOTE — PHYSICAL THERAPY INITIAL EVALUATION ADULT - CRITERIA FOR SKILLED THERAPEUTIC INTERVENTIONS
impairments found/therapy frequency/rehab potential/anticipated discharge recommendation/functional limitations in following categories/anticipated equipment needs at discharge

## 2017-04-22 NOTE — PHYSICAL THERAPY INITIAL EVALUATION ADULT - MODALITIES TREATMENT COMMENTS
Breathing exercises: diaphragmatic breathing, inspiration/expiration ratio, sniffs and puffs, pursed lip breathing, nose-mouth sequencing

## 2017-04-22 NOTE — PHYSICAL THERAPY INITIAL EVALUATION ADULT - ADDITIONAL COMMENTS
lives with family has a ramp to enter home no reported falls, no equipment at home tolerates ~10 feet before requiring break, on 4 L nc at home.

## 2017-04-22 NOTE — PHYSICAL THERAPY INITIAL EVALUATION ADULT - DIAGNOSIS, PT EVAL
6E: Impaired Ventilation and Respiration/Gas Exchange Associated with Ventilatory Pump Dysfunction or Failure

## 2017-04-22 NOTE — PROGRESS NOTE ADULT - SUBJECTIVE AND OBJECTIVE BOX
Interval Events:  Patient seen and examined at bedside. Breathing better now and she is on her baseline home O2 requirement. No cough. No chest pain or palpitation. Denies fever or chills.    REVIEW OF SYSTEMS:  Constitutional: No fever, weight loss or fatigue  Respiratory: As per subjective  Cardiovascular: No chest pain, palpitations, dizziness or leg swelling  Gastrointestinal: No abdominal or epigastric pain. No nausea, vomiting or hematemesis; No diarrhea or constipation. No melena or hematochezia.  Neurological: No headaches, memory loss, loss of strength, numbness or tremors  Skin: No itching, burning, rashes or lesions     MEDICATIONS:  Pulmonary:  ALBUTerol/ipratropium for Nebulization 3milliLiter(s) Nebulizer every 4 hours PRN    Antimicrobials:    Anticoagulants:  heparin  Injectable 7500Unit(s) SubCutaneous every 8 hours    Cardiac:  amLODIPine   Tablet 5milliGRAM(s) Oral daily  furosemide   Injectable 60milliGRAM(s) IV Push two times a day      Allergies    No Known Drug Allergies  shellfish (Anaphylaxis)    Intolerances        Vital Signs Last 24 Hrs  T(C): 36.5, Max: 36.7 (04-21 @ 21:12)  T(F): 97.7, Max: 98.1 (04-21 @ 21:12)  HR: 90 (68 - 110)  BP: 102/81 (96/79 - 174/79)  BP(mean): 88 (88 - 107)  RR: 20 (16 - 29)  SpO2: 100% (88% - 100%)  I & Os for 24h ending 04-22 @ 07:00  =============================================  IN: 200 ml / OUT: 800 ml / NET: -600 ml    I & Os for current day (as of 04-22 @ 16:23)  =============================================  IN: 720 ml / OUT: 1050 ml / NET: -330 ml        PHYSICAL EXAM:    General: Morbidly obese lady.  Well developed; well nourished; in no acute distress  Eyes: PERRL, EOM intact; conjunctiva and sclera clear  ENMT: No nasal discharge; airway clear  Neck: Supple; non tender; no masses  Respiratory: Clear to auscultation bilaterally without wheezing, rhonchi or rales  Cardiovascular: Regular rate and rhythm. S1 and S2 Normal; No murmurs, gallops or rubs  Gastrointestinal: Soft non-tender non-distended; Normal bowel sounds  Extremities: Normal range of motion, No clubbing, cyanosis . trace LE edema  Neurological: Alert and oriented x3  Skin: Warm and dry. No obvious rash    LABS:      CBC Full  -  ( 22 Apr 2017 08:50 )  WBC Count : 8.3 K/uL  Hemoglobin : 11.7 g/dL  Hematocrit : 40.9 %  Platelet Count - Automated : 271 K/uL  Mean Cell Volume : 92.7 fL  Mean Cell Hemoglobin : 26.5 pg  Mean Cell Hemoglobin Concentration : 28.6 g/dL      04-22    136  |  86<L>  |  11  ----------------------------<  143<H>  3.2<L>   |  42<H>  |  0.61    Ca    9.2      22 Apr 2017 08:50  Phos  3.4     04-22  Mg     1.7     04-22    TPro  8.5<H>  /  Alb  3.2<L>  /  TBili  0.8  /  DBili  x   /  AST  45<H>  /  ALT  27  /  AlkPhos  74  04-21      RADIOLOGY & ADDITIONAL STUDIES (The following images were personally reviewed):  CXR; 4/24/2017:  Cardiomegaly with worsening pulmonary venous congestion.

## 2017-04-22 NOTE — PHYSICAL THERAPY INITIAL EVALUATION ADULT - ASSISTIVE DEVICE FOR TRANSFER: GAIT, REHAB EVAL
ambulated to bathroom with walker; no assistive device for returning to bedside chair/rolling walker

## 2017-04-22 NOTE — PROGRESS NOTE ADULT - PROBLEM SELECTOR PLAN 1
Patient with history of MANUEL (on bipap at night of 4L NC during the day), morbid obesity and dCHF presenting with exacerbation of her chronic SOB. Likely 2/2 MANUEL versus acute dCHF exacerbation. CXR without effusions but with decreased aeration. Chest exam noted for decreased air movement.   #[facility]   manage MANUEL and dCHF as below

## 2017-04-22 NOTE — PROGRESS NOTE ADULT - PROBLEM SELECTOR PLAN 3
)n Lasix with improved symptoms. She may have pulmonary HTN, but PA pressure couldn't be measured on Echo.  Diuretics, with close monitoring of I/O.

## 2017-04-23 LAB
ANION GAP SERPL CALC-SCNC: 4 MMOL/L — LOW (ref 9–16)
BUN SERPL-MCNC: 15 MG/DL — SIGNIFICANT CHANGE UP (ref 7–23)
CALCIUM SERPL-MCNC: 9 MG/DL — SIGNIFICANT CHANGE UP (ref 8.5–10.5)
CHLORIDE SERPL-SCNC: 90 MMOL/L — LOW (ref 96–108)
CO2 SERPL-SCNC: 45 MMOL/L — CRITICAL HIGH (ref 22–31)
CREAT SERPL-MCNC: 0.55 MG/DL — SIGNIFICANT CHANGE UP (ref 0.5–1.3)
GLUCOSE SERPL-MCNC: 146 MG/DL — HIGH (ref 70–99)
HCT VFR BLD CALC: 38.9 % — SIGNIFICANT CHANGE UP (ref 34.5–45)
HGB BLD-MCNC: 10.7 G/DL — LOW (ref 11.5–15.5)
MAGNESIUM SERPL-MCNC: 2.1 MG/DL — SIGNIFICANT CHANGE UP (ref 1.6–2.4)
MCHC RBC-ENTMCNC: 25.6 PG — LOW (ref 27–34)
MCHC RBC-ENTMCNC: 27.5 G/DL — LOW (ref 32–36)
MCV RBC AUTO: 93.1 FL — SIGNIFICANT CHANGE UP (ref 80–100)
PLATELET # BLD AUTO: 278 K/UL — SIGNIFICANT CHANGE UP (ref 150–400)
POTASSIUM SERPL-MCNC: 3.7 MMOL/L — SIGNIFICANT CHANGE UP (ref 3.5–5.3)
POTASSIUM SERPL-SCNC: 3.7 MMOL/L — SIGNIFICANT CHANGE UP (ref 3.5–5.3)
RBC # BLD: 4.18 M/UL — SIGNIFICANT CHANGE UP (ref 3.8–5.2)
RBC # FLD: 15.9 % — SIGNIFICANT CHANGE UP (ref 10.3–16.9)
SODIUM SERPL-SCNC: 139 MMOL/L — SIGNIFICANT CHANGE UP (ref 135–145)
WBC # BLD: 7 K/UL — SIGNIFICANT CHANGE UP (ref 3.8–10.5)
WBC # FLD AUTO: 7 K/UL — SIGNIFICANT CHANGE UP (ref 3.8–10.5)

## 2017-04-23 PROCEDURE — 99232 SBSQ HOSP IP/OBS MODERATE 35: CPT

## 2017-04-23 PROCEDURE — 71010: CPT | Mod: 26

## 2017-04-23 RX ORDER — POTASSIUM CHLORIDE 20 MEQ
40 PACKET (EA) ORAL ONCE
Qty: 0 | Refills: 0 | Status: COMPLETED | OUTPATIENT
Start: 2017-04-23 | End: 2017-04-23

## 2017-04-23 RX ADMIN — HEPARIN SODIUM 7500 UNIT(S): 5000 INJECTION INTRAVENOUS; SUBCUTANEOUS at 21:22

## 2017-04-23 RX ADMIN — Medication 2: at 11:24

## 2017-04-23 RX ADMIN — HEPARIN SODIUM 7500 UNIT(S): 5000 INJECTION INTRAVENOUS; SUBCUTANEOUS at 13:37

## 2017-04-23 RX ADMIN — Medication 60 MILLIGRAM(S): at 06:24

## 2017-04-23 RX ADMIN — HEPARIN SODIUM 7500 UNIT(S): 5000 INJECTION INTRAVENOUS; SUBCUTANEOUS at 06:24

## 2017-04-23 RX ADMIN — AMLODIPINE BESYLATE 5 MILLIGRAM(S): 2.5 TABLET ORAL at 06:24

## 2017-04-23 RX ADMIN — Medication 2: at 21:22

## 2017-04-23 RX ADMIN — Medication 2: at 07:36

## 2017-04-23 RX ADMIN — Medication 40 MILLIEQUIVALENT(S): at 08:22

## 2017-04-23 RX ADMIN — Medication 60 MILLIGRAM(S): at 18:18

## 2017-04-23 NOTE — PROGRESS NOTE ADULT - PROBLEM SELECTOR PLAN 1
Improved significantly and her O2 requirement is 4 L/min which is her baseline.   Her symptoms are multifactorial, from Acute diastolic CHF with baseline MANUEL/OHS. She is on BIPAP at night and on nasal cannula 4 Liters during the day. Admission CXR showed bilateral congestion suggestive of pulmonary edema.   She is responding well to diuretics.  - Continue volume optimization with lasix/fluid restriction  -Continue BIPAP at night , and supplemental 4 L/min o2 during the day  -OOB, PT/OT, Incentive spirometry, DVT PPX

## 2017-04-23 NOTE — PROGRESS NOTE ADULT - PROBLEM SELECTOR PLAN 3
)n Lasix with improved symptoms. She may have pulmonary HTN, but PA pressure couldn't be measured on Echo.  Diuretics, with close monitoring of I/O. Minimize salt intake

## 2017-04-23 NOTE — PROGRESS NOTE ADULT - PROBLEM SELECTOR PLAN 1
Patient with history of MANUEL (on bipap at night of 4L NC during the day), morbid obesity and dCHF presenting with exacerbation of her chronic SOB. Likely 2/2 MANUEL versus acute dCHF exacerbation. CXR without effusions but with decreased aeration. Chest exam noted for decreased air movement.   manage MANUEL and dCHF as below  -Repeat chest X-Ray

## 2017-04-23 NOTE — PROVIDER CONTACT NOTE (CRITICAL VALUE NOTIFICATION) - ACTION/TREATMENT ORDERED:
Dr. Grace notified pt sitting at edge of bed, self AM care, A & O x4, will consult with day team regarding setting of Bipap

## 2017-04-23 NOTE — PROGRESS NOTE ADULT - SUBJECTIVE AND OBJECTIVE BOX
PGY-3 CARDIOLOGY NOTE    INTERVAL HPI/OVERNIGHT EVENTS: Patient feels overall improved, but continues to have some SOB    VITAL SIGNS:  T(F): 98.7  HR: 86  BP: 111/70  RR: 16  SpO2: 96%  Wt(kg): --    PHYSICAL EXAM:    Constitutional: obese female sitting comfortably in stretcher, pleasant, conversive  Head: NC/AT  Eyes: PERRL, EOMI, clear conjunctiva  Neck: supple; no JVD or thyromegaly. acanthosis migrans.  Respiratory: CTAB; improved air movement from yesterday   Cardiac: +S1/S2; RRR; no M/R/G; PMI non-displaced  Gastrointestinal: obese abdomen. soft, NT/ND; no rebound or guarding; +BSx4  Back: spine midline, no bony tenderness or step-offs; no CVAT B/L  Extremities: WWP, no clubbing or cyanosis; 3+ pitting edema b/l to thighs  Musculoskeletal: NROM x4; no joint swelling, tenderness or erythema  Vascular: 2+ radial, femoral, DP/PT pulses B/L  Neurologic: AAOx3; CNII-XII grossly intact; no focal deficits  MEDICATIONS  (STANDING):  heparin  Injectable 7500Unit(s) SubCutaneous every 8 hours  insulin lispro (HumaLOG) corrective regimen sliding scale  SubCutaneous Before meals and at bedtime  dextrose 5%. 1000milliLiter(s) IV Continuous <Continuous>  dextrose 50% Injectable 25Gram(s) IV Push once  amLODIPine   Tablet 5milliGRAM(s) Oral daily  furosemide   Injectable 60milliGRAM(s) IV Push two times a day    MEDICATIONS  (PRN):  senna 2Tablet(s) Oral at bedtime PRN Constipation  dextrose Gel 1Dose(s) Oral once PRN Blood Glucose LESS THAN 70 milliGRAM(s)/deciliter  glucagon  Injectable 1milliGRAM(s) IntraMuscular once PRN Glucose LESS THAN 70 milligrams/deciliter  famotidine    Tablet 20milliGRAM(s) Oral two times a day PRN GERD symptoms  ALBUTerol/ipratropium for Nebulization 3milliLiter(s) Nebulizer every 4 hours PRN Shortness of Breath and/or Wheezing  acetaminophen   Tablet. 650milliGRAM(s) Oral every 6 hours PRN Moderate Pain (4 - 6)  benzocaine 15 mG/menthol 3.6 mG Lozenge 1Lozenge Oral three times a day PRN Sore Throat      Allergies    No Known Drug Allergies  shellfish (Anaphylaxis)    Intolerances            LABS:                        10.7   7.0   )-----------( 278      ( 23 Apr 2017 06:27 )             38.9     04-23    139  |  90<L>  |  15  ----------------------------<  146<H>  3.7   |  45<HH>  |  0.55    Ca    9.0      23 Apr 2017 06:27  Phos  3.4     04-22  Mg     2.1     04-23    TPro  8.5<H>  /  Alb  3.2<L>  /  TBili  0.8  /  DBili  x   /  AST  45<H>  /  ALT  27  /  AlkPhos  74  04-21          RADIOLOGY & ADDITIONAL TESTS:

## 2017-04-23 NOTE — PROGRESS NOTE ADULT - ASSESSMENT
41  year-old female PMH of DM2, MANUEL, bipap at night and 4L NC, morbidly obese, dCHF (EF: 55%) who presented to [facility] ED with SOB and in respiratory distress.

## 2017-04-23 NOTE — PROGRESS NOTE ADULT - SUBJECTIVE AND OBJECTIVE BOX
Interval Events:  Patient seen and examined at bedside. Breathing much improved and she "feels great" No chest pain or palpitation. No fever, chills, cough or hemoptysis.    REVIEW OF SYSTEMS:  Constitutional: No fever, weight loss or fatigue  Respiratory: As per subjective  Cardiovascular: No chest pain, palpitations, dizziness or leg swelling  Gastrointestinal: No abdominal or epigastric pain. No nausea, vomiting or hematemesis; No diarrhea or constipation. No melena or hematochezia.  Neurological: No headaches, memory loss, loss of strength, numbness or tremors  Skin: No itching, burning, rashes or lesions     MEDICATIONS:  Pulmonary:  ALBUTerol/ipratropium for Nebulization 3milliLiter(s) Nebulizer every 4 hours PRN    Antimicrobials:    Anticoagulants:  heparin  Injectable 7500Unit(s) SubCutaneous every 8 hours    Cardiac:  amLODIPine   Tablet 5milliGRAM(s) Oral daily  furosemide   Injectable 60milliGRAM(s) IV Push two times a day      Allergies    No Known Drug Allergies  shellfish (Anaphylaxis)    Intolerances        Vital Signs Last 24 Hrs  T(C): 37.1, Max: 37.1 (04-22 @ 16:44)  T(F): 98.7, Max: 98.7 (04-22 @ 16:44)  HR: 94 (90 - 104)  BP: 126/65 (101/74 - 146/70)  BP(mean): 92 (87 - 101)  RR: 16 (16 - 26)  SpO2: 94% (88% - 100%)  I & Os for 24h ending 04-23 @ 07:00  =============================================  IN: 1150 ml / OUT: 3925 ml / NET: -2775 ml    I & Os for current day (as of 04-23 @ 11:20)  =============================================  IN: 360 ml / OUT: 350 ml / NET: 10 ml        PHYSICAL EXAM:    General: Morbidly obese; well nourished; in no acute distress  Eyes: PERRL, EOM intact; conjunctiva and sclera clear  ENMT: No nasal discharge; airway clear  Neck: Supple; non tender; no masses  Respiratory: Clear to auscultation bilaterally without wheezing, rhonchi or rales  Cardiovascular: Regular rate and rhythm. S1 and S2 Normal; No murmurs, gallops or rubs  Gastrointestinal: Soft non-tender non-distended; Normal bowel sounds  Extremities: bilateral pitting edema 3+. Normal range of motion, No clubbing, cyanosis   Neurological: Alert and oriented x3  Skin: Warm and dry. No obvious rash    LABS:      CBC Full  -  ( 23 Apr 2017 06:27 )  WBC Count : 7.0 K/uL  Hemoglobin : 10.7 g/dL  Hematocrit : 38.9 %  Platelet Count - Automated : 278 K/uL  Mean Cell Volume : 93.1 fL  Mean Cell Hemoglobin : 25.6 pg  Mean Cell Hemoglobin Concentration : 27.5 g/dL    04-23    139  |  90<L>  |  15  ----------------------------<  146<H>  3.7   |  45<HH>  |  0.55    Ca    9.0      23 Apr 2017 06:27  Phos  3.4     04-22  Mg     2.1     04-23    TPro  8.5<H>  /  Alb  3.2<L>  /  TBili  0.8  /  DBili  x   /  AST  45<H>  /  ALT  27  /  AlkPhos  74  04-21      RADIOLOGY & ADDITIONAL STUDIES (The following images were personally reviewed):CXR: Bilateral congestion.

## 2017-04-23 NOTE — PROGRESS NOTE ADULT - ASSESSMENT
41  year-old female PMH of DM2, MANUEL, bipap at night and 4L NC, morbidly obese, dCHF (EF: 55%) who presented to [facility] ED with SOB and in respiratory distress.

## 2017-04-23 NOTE — PROGRESS NOTE ADULT - PROBLEM SELECTOR PLAN 3
Patient with h/o dCHF, taking 60mg lasix BID, with subtherapeutic response. Patient still appears volume overloaded.  -c/w lasix 60 IV BID.  -Daily weights, strict I&OS

## 2017-04-24 ENCOUNTER — APPOINTMENT (OUTPATIENT)
Dept: PULMONOLOGY | Facility: CLINIC | Age: 42
End: 2017-04-24

## 2017-04-24 LAB
ANION GAP SERPL CALC-SCNC: 3 MMOL/L — LOW (ref 9–16)
BUN SERPL-MCNC: 15 MG/DL — SIGNIFICANT CHANGE UP (ref 7–23)
CALCIUM SERPL-MCNC: 8.9 MG/DL — SIGNIFICANT CHANGE UP (ref 8.5–10.5)
CHLORIDE SERPL-SCNC: 90 MMOL/L — LOW (ref 96–108)
CO2 SERPL-SCNC: 45 MMOL/L — CRITICAL HIGH (ref 22–31)
CREAT SERPL-MCNC: 0.6 MG/DL — SIGNIFICANT CHANGE UP (ref 0.5–1.3)
GLUCOSE SERPL-MCNC: 150 MG/DL — HIGH (ref 70–99)
HCT VFR BLD CALC: 38.9 % — SIGNIFICANT CHANGE UP (ref 34.5–45)
HGB BLD-MCNC: 11 G/DL — LOW (ref 11.5–15.5)
MAGNESIUM SERPL-MCNC: 2.1 MG/DL — SIGNIFICANT CHANGE UP (ref 1.6–2.4)
MCHC RBC-ENTMCNC: 26.3 PG — LOW (ref 27–34)
MCHC RBC-ENTMCNC: 28.3 G/DL — LOW (ref 32–36)
MCV RBC AUTO: 93.1 FL — SIGNIFICANT CHANGE UP (ref 80–100)
PLATELET # BLD AUTO: 249 K/UL — SIGNIFICANT CHANGE UP (ref 150–400)
POTASSIUM SERPL-MCNC: 3.6 MMOL/L — SIGNIFICANT CHANGE UP (ref 3.5–5.3)
POTASSIUM SERPL-SCNC: 3.6 MMOL/L — SIGNIFICANT CHANGE UP (ref 3.5–5.3)
RBC # BLD: 4.18 M/UL — SIGNIFICANT CHANGE UP (ref 3.8–5.2)
RBC # FLD: 16 % — SIGNIFICANT CHANGE UP (ref 10.3–16.9)
SODIUM SERPL-SCNC: 138 MMOL/L — SIGNIFICANT CHANGE UP (ref 135–145)
WBC # BLD: 7.1 K/UL — SIGNIFICANT CHANGE UP (ref 3.8–10.5)
WBC # FLD AUTO: 7.1 K/UL — SIGNIFICANT CHANGE UP (ref 3.8–10.5)

## 2017-04-24 PROCEDURE — 99232 SBSQ HOSP IP/OBS MODERATE 35: CPT | Mod: GC

## 2017-04-24 PROCEDURE — 99233 SBSQ HOSP IP/OBS HIGH 50: CPT

## 2017-04-24 RX ORDER — FUROSEMIDE 40 MG
80 TABLET ORAL EVERY 12 HOURS
Qty: 0 | Refills: 0 | Status: DISCONTINUED | OUTPATIENT
Start: 2017-04-24 | End: 2017-04-25

## 2017-04-24 RX ORDER — POTASSIUM CHLORIDE 20 MEQ
40 PACKET (EA) ORAL ONCE
Qty: 0 | Refills: 0 | Status: COMPLETED | OUTPATIENT
Start: 2017-04-24 | End: 2017-04-24

## 2017-04-24 RX ADMIN — Medication 60 MILLIGRAM(S): at 06:33

## 2017-04-24 RX ADMIN — Medication 40 MILLIEQUIVALENT(S): at 06:48

## 2017-04-24 RX ADMIN — SENNA PLUS 2 TABLET(S): 8.6 TABLET ORAL at 21:42

## 2017-04-24 RX ADMIN — Medication 80 MILLIGRAM(S): at 18:37

## 2017-04-24 RX ADMIN — HEPARIN SODIUM 7500 UNIT(S): 5000 INJECTION INTRAVENOUS; SUBCUTANEOUS at 14:24

## 2017-04-24 RX ADMIN — HEPARIN SODIUM 7500 UNIT(S): 5000 INJECTION INTRAVENOUS; SUBCUTANEOUS at 06:33

## 2017-04-24 RX ADMIN — Medication 2: at 16:30

## 2017-04-24 RX ADMIN — Medication 4: at 11:41

## 2017-04-24 RX ADMIN — AMLODIPINE BESYLATE 5 MILLIGRAM(S): 2.5 TABLET ORAL at 06:33

## 2017-04-24 RX ADMIN — Medication 2: at 21:39

## 2017-04-24 RX ADMIN — Medication 2: at 07:32

## 2017-04-24 RX ADMIN — Medication 3 MILLILITER(S): at 14:29

## 2017-04-24 RX ADMIN — HEPARIN SODIUM 7500 UNIT(S): 5000 INJECTION INTRAVENOUS; SUBCUTANEOUS at 21:39

## 2017-04-24 NOTE — PROGRESS NOTE ADULT - PROBLEM SELECTOR PLAN 1
Patient with history of MANUEL (on bipap at night of 4L NC during the day), morbid obesity and dCHF presenting with exacerbation of her chronic SOB. Likely 2/2 MANUEL versus acute dCHF exacerbation. CXR without effusions but with decreased aeration. Chest exam noted for decreased air movement. currently improving   manage MANUEL and dCHF as below  -Repeat chest X-Ray

## 2017-04-24 NOTE — PROGRESS NOTE ADULT - SUBJECTIVE AND OBJECTIVE BOX
CARDIOLOGY PGY-1 PROGRESS NOTE    O/N: Was on BiPAP overnight. Net negative of over 1L.   	    Vitals:  T(C): 36.6, Max: 37.1 (04-23 @ 09:53)  HR: 96 (86 - 99)  BP: 121/57 (105/64 - 131/79)  RR: 20 (16 - 20)  SpO2: 98% (92% - 98%)  Wt(kg): --  I&O's Summary  I & Os for 24h ending 23 Apr 2017 07:00  =============================================  IN: 1150 ml / OUT: 3925 ml / NET: -2775 ml    I & Os for current day (as of 24 Apr 2017 06:47)  =============================================  IN: 1230 ml / OUT: 2225 ml / NET: -995 ml        PHYSICAL EXAM:  Constitutional: obese female sitting comfortably in stretcher, pleasant, conversive  Head: NC/AT  Eyes: PERRL, EOMI, clear conjunctiva  Neck: supple; no JVD or thyromegaly. acanthosis migrans.  Respiratory: CTAB; improved air movement from yesterday   Cardiac: +S1/S2; RRR; no M/R/G; PMI non-displaced  Gastrointestinal: obese abdomen. soft, NT/ND; no rebound or guarding; +BSx4  Back: spine midline, no bony tenderness or step-offs; no CVAT B/L  Extremities: WWP, no clubbing or cyanosis; 3+ pitting edema b/l to thighs  Musculoskeletal: NROM x4; no joint swelling, tenderness or erythema  Vascular: 2+ radial, femoral, DP/PT pulses B/L  Neurologic: AAOx3; CNII-XII grossly intact; no focal deficits      TELEMETRY: 	    ECG:  	      DIAGNOSTIC TESTING:  [ ] Echocardiogram:  [ ]  Catheterization:  [ ] Stress Test:    OTHER: 	      CURRENT MEDICATIONS:  amLODIPine   Tablet 5milliGRAM(s) Oral daily  furosemide   Injectable 60milliGRAM(s) IV Push two times a day      ALBUTerol/ipratropium for Nebulization 3milliLiter(s) Nebulizer every 4 hours PRN    acetaminophen   Tablet. 650milliGRAM(s) Oral every 6 hours PRN    senna 2Tablet(s) Oral at bedtime PRN  famotidine    Tablet 20milliGRAM(s) Oral two times a day PRN    insulin lispro (HumaLOG) corrective regimen sliding scale  SubCutaneous Before meals and at bedtime  dextrose Gel 1Dose(s) Oral once PRN  dextrose 50% Injectable 25Gram(s) IV Push once  glucagon  Injectable 1milliGRAM(s) IntraMuscular once PRN    heparin  Injectable 7500Unit(s) SubCutaneous every 8 hours  dextrose 5%. 1000milliLiter(s) IV Continuous <Continuous>  benzocaine 15 mG/menthol 3.6 mG Lozenge 1Lozenge Oral three times a day PRN  potassium chloride    Tablet ER 40milliEquivalent(s) Oral once      LABS:	 	    CARDIAC MARKERS:                                  11.0   7.1   )-----------( 249      ( 24 Apr 2017 06:13 )             38.9     04-24    138  |  90<L>  |  15  ----------------------------<  150<H>  3.6   |  45<HH>  |  0.60    Ca    8.9      24 Apr 2017 06:12  Phos  3.4     04-22  Mg     2.1     04-24      proBNP:   Lipid Profile:   HgA1c:   TSH: CARDIOLOGY PGY-1 PROGRESS NOTE    O/N: Was on BiPAP overnight. Net negative of over 1L.   	    Vitals:  T(C): 36.6, Max: 37.1 (04-23 @ 09:53)  HR: 96 (86 - 99)  BP: 121/57 (105/64 - 131/79)  RR: 20 (16 - 20)  SpO2: 98% (92% - 98%)  Wt(kg): --  I&O's Summary  I & Os for 24h ending 23 Apr 2017 07:00  =============================================  IN: 1150 ml / OUT: 3925 ml / NET: -2775 ml    I & Os for current day (as of 24 Apr 2017 06:47)  =============================================  IN: 1230 ml / OUT: 2225 ml / NET: -995 ml        PHYSICAL EXAM:  Constitutional: obese female sitting comfortably in stretcher, pleasant, conversive  Head: NC/AT  Eyes: PERRL, EOMI, clear conjunctiva  Neck: supple; no JVD or thyromegaly. acanthosis migrans.  Respiratory: CTAB; but with decreased breath sounds at bases  Cardiac: +S1/S2; RRR; no M/R/G; PMI non-displaced  Gastrointestinal: obese abdomen. soft, NT/ND; no rebound or guarding; +BSx4  Back: spine midline, no bony tenderness or step-offs; no CVAT B/L  Extremities: WWP, no clubbing or cyanosis; 3+ pitting edema b/l to thighs  Musculoskeletal: NROM x4; no joint swelling, tenderness or erythema  Vascular: 2+ radial, femoral, DP/PT pulses B/L  Neurologic: AAOx3; CNII-XII grossly intact; no focal deficits      TELEMETRY: 	    ECG:  	      DIAGNOSTIC TESTING:  [ ] Echocardiogram:  [ ]  Catheterization:  [ ] Stress Test:    OTHER: 	      CURRENT MEDICATIONS:  amLODIPine   Tablet 5milliGRAM(s) Oral daily  furosemide   Injectable 60milliGRAM(s) IV Push two times a day      ALBUTerol/ipratropium for Nebulization 3milliLiter(s) Nebulizer every 4 hours PRN    acetaminophen   Tablet. 650milliGRAM(s) Oral every 6 hours PRN    senna 2Tablet(s) Oral at bedtime PRN  famotidine    Tablet 20milliGRAM(s) Oral two times a day PRN    insulin lispro (HumaLOG) corrective regimen sliding scale  SubCutaneous Before meals and at bedtime  dextrose Gel 1Dose(s) Oral once PRN  dextrose 50% Injectable 25Gram(s) IV Push once  glucagon  Injectable 1milliGRAM(s) IntraMuscular once PRN    heparin  Injectable 7500Unit(s) SubCutaneous every 8 hours  dextrose 5%. 1000milliLiter(s) IV Continuous <Continuous>  benzocaine 15 mG/menthol 3.6 mG Lozenge 1Lozenge Oral three times a day PRN  potassium chloride    Tablet ER 40milliEquivalent(s) Oral once      LABS:	 	    CARDIAC MARKERS:                                  11.0   7.1   )-----------( 249      ( 24 Apr 2017 06:13 )             38.9     04-24    138  |  90<L>  |  15  ----------------------------<  150<H>  3.6   |  45<HH>  |  0.60    Ca    8.9      24 Apr 2017 06:12  Phos  3.4     04-22  Mg     2.1     04-24      proBNP:   Lipid Profile:   HgA1c:   TSH:

## 2017-04-24 NOTE — PROGRESS NOTE ADULT - SUBJECTIVE AND OBJECTIVE BOX
Interval Events:  Patient seen and examined at bedside. Breathing stable at baseline. No cough, chest pain or palpitation. No fever or chills.    REVIEW OF SYSTEMS:  Constitutional: No fever, weight loss or fatigue  Respiratory: As per subjective  Cardiovascular: No chest pain, palpitations, dizziness or leg swelling  Gastrointestinal: No abdominal or epigastric pain. No nausea, vomiting or hematemesis; No diarrhea or constipation. No melena or hematochezia.  Neurological: No headaches, memory loss, loss of strength, numbness or tremors  Skin: No itching, burning, rashes or lesions     MEDICATIONS:  Pulmonary:  ALBUTerol/ipratropium for Nebulization 3milliLiter(s) Nebulizer every 4 hours PRN    Antimicrobials:    Anticoagulants:  heparin  Injectable 7500Unit(s) SubCutaneous every 8 hours    Cardiac:  amLODIPine   Tablet 5milliGRAM(s) Oral daily  furosemide    Tablet 80milliGRAM(s) Oral every 12 hours      Allergies    No Known Drug Allergies  shellfish (Anaphylaxis)    Intolerances        Vital Signs Last 24 Hrs  T(C): 36.6, Max: 37.1 (04-23 @ 09:53)  T(F): 97.9, Max: 98.8 (04-23 @ 18:00)  HR: 96 (86 - 99)  BP: 121/57 (105/64 - 131/79)  BP(mean): 81 (75 - 97)  RR: 20 (16 - 20)  SpO2: 98% (92% - 98%)  I & Os for 24h ending 04-24 @ 07:00  =============================================  IN: 1230 ml / OUT: 2225 ml / NET: -995 ml    I & Os for current day (as of 04-24 @ 08:51)  =============================================  IN: 0 ml / OUT: 550 ml / NET: -550 ml        PHYSICAL EXAM:    General: Well developed; well nourished; in no acute distress  Eyes: PERRL, EOM intact; conjunctiva and sclera clear  ENMT: No nasal discharge; airway clear  Neck: Supple; non tender; no masses  Respiratory: bibasilar decreased breath sound ,likely from her body habitus. Clear to auscultation bilaterally without wheezing, rhonchi or rales  Cardiovascular: Regular rate and rhythm. S1 and S2 Normal; No murmurs, gallops or rubs  Gastrointestinal: Soft non-tender non-distended; Normal bowel sounds  Extremities: Normal range of motion, No clubbing, cyanosis or edema  Neurological: Alert and oriented x3  Skin: Warm and dry. No obvious rash    LABS:      CBC Full  -  ( 24 Apr 2017 06:13 )  WBC Count : 7.1 K/uL  Hemoglobin : 11.0 g/dL  Hematocrit : 38.9 %  Platelet Count - Automated : 249 K/uL  Mean Cell Volume : 93.1 fL  Mean Cell Hemoglobin : 26.3 pg  Mean Cell Hemoglobin Concentration : 28.3 g/dL    Auto Basophil % : x    04-24    138  |  90<L>  |  15  ----------------------------<  150<H>  3.6   |  45<HH>  |  0.60    Ca    8.9      24 Apr 2017 06:12  Mg     2.1     04-24        RADIOLOGY & ADDITIONAL STUDIES (The following images were personally reviewed):

## 2017-04-24 NOTE — PROGRESS NOTE ADULT - ASSESSMENT
41  year-old female PMH of DM2, MANUEL, bipap at night and 4L NC, morbidly obese, dCHF (EF: 55%) who presented to [facility] ED with SOB and in respiratory distress.

## 2017-04-24 NOTE — PROGRESS NOTE ADULT - ASSESSMENT
41  year-old female PMH of DM2, MANUEL, bipap at night and 4L NC, morbidly obese, dCHF (EF: 55%) who presented to [facility] ED with SOB and in respiratory distress.

## 2017-04-24 NOTE — PROGRESS NOTE ADULT - PROBLEM SELECTOR PLAN 3
Patient with h/o dCHF, taking 60mg lasix BID, with subtherapeutic response. Patient still appears volume overloaded.  -c/w lasix 60 IV BID.  -Daily weights, strict I&OS Patient with h/o dCHF, taking 60mg lasix BID, with subtherapeutic response. Approximately 6L removed during admission.   -c/w lasix 60 IV BID.  -Daily weights, strict I&OS

## 2017-04-24 NOTE — PROGRESS NOTE ADULT - PROBLEM SELECTOR PLAN 1
Improved and at baseline. Symptoms are multifactorial, from Acute diastolic CHF with baseline MANUEL/OHS. She is on BIPAP at night and on nasal cannula 4 Liters during the day. Admission CXR showed bilateral congestion suggestive of pulmonary edema.   She is responding well to diuretics.  - Continue volume optimization with lasix/fluid restriction  -Continue BIPAP at night , and supplemental 4 L/min o2 during the day  -OOB, PT/OT, Incentive spirometry, DVT PPX

## 2017-04-25 VITALS — TEMPERATURE: 98 F

## 2017-04-25 LAB
ANION GAP SERPL CALC-SCNC: 6 MMOL/L — LOW (ref 9–16)
BUN SERPL-MCNC: 12 MG/DL — SIGNIFICANT CHANGE UP (ref 7–23)
CALCIUM SERPL-MCNC: 8.9 MG/DL — SIGNIFICANT CHANGE UP (ref 8.5–10.5)
CHLORIDE SERPL-SCNC: 92 MMOL/L — LOW (ref 96–108)
CO2 SERPL-SCNC: 41 MMOL/L — HIGH (ref 22–31)
CREAT SERPL-MCNC: 0.61 MG/DL — SIGNIFICANT CHANGE UP (ref 0.5–1.3)
GLUCOSE SERPL-MCNC: 159 MG/DL — HIGH (ref 70–99)
HCT VFR BLD CALC: 40.3 % — SIGNIFICANT CHANGE UP (ref 34.5–45)
HGB BLD-MCNC: 11.4 G/DL — LOW (ref 11.5–15.5)
MAGNESIUM SERPL-MCNC: 2.1 MG/DL — SIGNIFICANT CHANGE UP (ref 1.6–2.4)
MCHC RBC-ENTMCNC: 25.9 PG — LOW (ref 27–34)
MCHC RBC-ENTMCNC: 28.3 G/DL — LOW (ref 32–36)
MCV RBC AUTO: 91.6 FL — SIGNIFICANT CHANGE UP (ref 80–100)
PLATELET # BLD AUTO: 247 K/UL — SIGNIFICANT CHANGE UP (ref 150–400)
POTASSIUM SERPL-MCNC: 3.3 MMOL/L — LOW (ref 3.5–5.3)
POTASSIUM SERPL-SCNC: 3.3 MMOL/L — LOW (ref 3.5–5.3)
RBC # BLD: 4.4 M/UL — SIGNIFICANT CHANGE UP (ref 3.8–5.2)
RBC # FLD: 16.1 % — SIGNIFICANT CHANGE UP (ref 10.3–16.9)
SODIUM SERPL-SCNC: 139 MMOL/L — SIGNIFICANT CHANGE UP (ref 135–145)
WBC # BLD: 7.5 K/UL — SIGNIFICANT CHANGE UP (ref 3.8–10.5)
WBC # FLD AUTO: 7.5 K/UL — SIGNIFICANT CHANGE UP (ref 3.8–10.5)

## 2017-04-25 PROCEDURE — 84484 ASSAY OF TROPONIN QUANT: CPT

## 2017-04-25 PROCEDURE — 80053 COMPREHEN METABOLIC PANEL: CPT

## 2017-04-25 PROCEDURE — 83735 ASSAY OF MAGNESIUM: CPT

## 2017-04-25 PROCEDURE — 97116 GAIT TRAINING THERAPY: CPT

## 2017-04-25 PROCEDURE — 84132 ASSAY OF SERUM POTASSIUM: CPT

## 2017-04-25 PROCEDURE — 84295 ASSAY OF SERUM SODIUM: CPT

## 2017-04-25 PROCEDURE — 82330 ASSAY OF CALCIUM: CPT

## 2017-04-25 PROCEDURE — 82803 BLOOD GASES ANY COMBINATION: CPT

## 2017-04-25 PROCEDURE — 99285 EMERGENCY DEPT VISIT HI MDM: CPT | Mod: 25

## 2017-04-25 PROCEDURE — 99238 HOSP IP/OBS DSCHRG MGMT 30/<: CPT

## 2017-04-25 PROCEDURE — 83690 ASSAY OF LIPASE: CPT

## 2017-04-25 PROCEDURE — 71045 X-RAY EXAM CHEST 1 VIEW: CPT

## 2017-04-25 PROCEDURE — 83880 ASSAY OF NATRIURETIC PEPTIDE: CPT

## 2017-04-25 PROCEDURE — 82553 CREATINE MB FRACTION: CPT

## 2017-04-25 PROCEDURE — 93306 TTE W/DOPPLER COMPLETE: CPT

## 2017-04-25 PROCEDURE — 85027 COMPLETE CBC AUTOMATED: CPT

## 2017-04-25 PROCEDURE — 93005 ELECTROCARDIOGRAM TRACING: CPT

## 2017-04-25 PROCEDURE — 84100 ASSAY OF PHOSPHORUS: CPT

## 2017-04-25 PROCEDURE — 94660 CPAP INITIATION&MGMT: CPT

## 2017-04-25 PROCEDURE — 36415 COLL VENOUS BLD VENIPUNCTURE: CPT

## 2017-04-25 PROCEDURE — 97161 PT EVAL LOW COMPLEX 20 MIN: CPT

## 2017-04-25 PROCEDURE — 96374 THER/PROPH/DIAG INJ IV PUSH: CPT

## 2017-04-25 PROCEDURE — 82550 ASSAY OF CK (CPK): CPT

## 2017-04-25 PROCEDURE — 83036 HEMOGLOBIN GLYCOSYLATED A1C: CPT

## 2017-04-25 PROCEDURE — 71046 X-RAY EXAM CHEST 2 VIEWS: CPT

## 2017-04-25 PROCEDURE — 85025 COMPLETE CBC W/AUTO DIFF WBC: CPT

## 2017-04-25 PROCEDURE — 80048 BASIC METABOLIC PNL TOTAL CA: CPT

## 2017-04-25 PROCEDURE — 94640 AIRWAY INHALATION TREATMENT: CPT

## 2017-04-25 RX ORDER — POTASSIUM CHLORIDE 20 MEQ
40 PACKET (EA) ORAL EVERY 4 HOURS
Qty: 0 | Refills: 0 | Status: COMPLETED | OUTPATIENT
Start: 2017-04-25 | End: 2017-04-25

## 2017-04-25 RX ORDER — POTASSIUM CHLORIDE 20 MEQ
1 PACKET (EA) ORAL
Qty: 30 | Refills: 0 | OUTPATIENT
Start: 2017-04-25 | End: 2017-05-25

## 2017-04-25 RX ORDER — FUROSEMIDE 40 MG
1 TABLET ORAL
Qty: 60 | Refills: 0
Start: 2017-04-25 | End: 2017-05-25

## 2017-04-25 RX ADMIN — Medication 3 MILLILITER(S): at 03:12

## 2017-04-25 RX ADMIN — HEPARIN SODIUM 7500 UNIT(S): 5000 INJECTION INTRAVENOUS; SUBCUTANEOUS at 08:01

## 2017-04-25 RX ADMIN — Medication 2: at 08:01

## 2017-04-25 RX ADMIN — Medication 40 MILLIEQUIVALENT(S): at 08:01

## 2017-04-25 RX ADMIN — AMLODIPINE BESYLATE 5 MILLIGRAM(S): 2.5 TABLET ORAL at 08:00

## 2017-04-25 RX ADMIN — Medication 40 MILLIEQUIVALENT(S): at 12:41

## 2017-04-25 RX ADMIN — Medication 80 MILLIGRAM(S): at 08:00

## 2017-04-25 NOTE — PROGRESS NOTE ADULT - ASSESSMENT
41  year-old female PMH of DM2, MANUEL, bipap at night and 4L NC, morbidly obese, dCHF (EF: 55%) who presented to [facility] ED with SOB and in respiratory distress.

## 2017-04-25 NOTE — CONSULT NOTE ADULT - SUBJECTIVE AND OBJECTIVE BOX
HPI:  41  year-old female PMH of DM2, MANUEL, bipap at night and 4L NC, morbidly obese, dCHF (EF: 55%) who presented to [facility] ED with SOB. Per patient, she has had long standing SOB and has been seeing Dr. Luo for management. Her SOB worsened over last month and she was increased to 4L. She has been eating less in an effort to lose weight and is feeling more fatigued. Patient has 4 pillow orthopnea and walks approximately 10 feet before feeling SOB. She uses a walker at home. Prior admission to [facility] in 8/16 for similar issues and was admitted to the ICU step down unit for dCHF exacerbation secondary to medication non-compliance. Is scheduled to get gastric bypass surgery with Dr. Mcnulty in the future. Currently compliant with her medications but noted lasix only temporarily works to reduce her leg edema. ROS notable for chills and chest tightness. Currently denies fevers, sick contacts, cough, palpitations, abdominal sx.     In ED: T(F): 98.6, Max: 98.9 (04-21 @ 12:15); HR: 92 (88 - 97); BP: 122/88 (121/95 - 128/93); RR: 18 (18 - 24); SpO2: 100% (100% - 100%). VBG: pH=7.35, pCO2=94; HCO3=50Given asa 325 x1, duonebs x2, lasix 80x2, ranitidine 150mg x1, (21 Apr 2017 15:08)    Dr Avalos consulted to discuss bariatric surgery plan with patient.       PAST MEDICAL & SURGICAL HISTORY:  Edema: Anasarca  Chronic diastolic heart failure: Chronic diastolic CHF (congestive heart failure)  Essential hypertension: HTN (hypertension)  Type 2 diabetes mellitus: Insulin Requiring  Morbid obesity: Morbid obesity  Disease of pericardium: Pericardial effusion  Obstructive sleep apnea syndrome: MANUEL on CPAP  Cytomegalovirus infection not present: No significant past surgical history      REVIEW OF SYSTEMS    MEDICATIONS  (STANDING):  heparin  Injectable 7500Unit(s) SubCutaneous every 8 hours  insulin lispro (HumaLOG) corrective regimen sliding scale  SubCutaneous Before meals and at bedtime  dextrose 5%. 1000milliLiter(s) IV Continuous <Continuous>  dextrose 50% Injectable 25Gram(s) IV Push once  amLODIPine   Tablet 5milliGRAM(s) Oral daily  furosemide    Tablet 80milliGRAM(s) Oral every 12 hours  potassium chloride    Tablet ER 40milliEquivalent(s) Oral every 4 hours    MEDICATIONS  (PRN):  senna 2Tablet(s) Oral at bedtime PRN Constipation  dextrose Gel 1Dose(s) Oral once PRN Blood Glucose LESS THAN 70 milliGRAM(s)/deciliter  glucagon  Injectable 1milliGRAM(s) IntraMuscular once PRN Glucose LESS THAN 70 milligrams/deciliter  famotidine    Tablet 20milliGRAM(s) Oral two times a day PRN GERD symptoms  ALBUTerol/ipratropium for Nebulization 3milliLiter(s) Nebulizer every 4 hours PRN Shortness of Breath and/or Wheezing  acetaminophen   Tablet. 650milliGRAM(s) Oral every 6 hours PRN Moderate Pain (4 - 6)  benzocaine 15 mG/menthol 3.6 mG Lozenge 1Lozenge Oral three times a day PRN Sore Throat      Allergies    No Known Drug Allergies  shellfish (Anaphylaxis)    Intolerances    Vital Signs Last 24 Hrs  T(C): 36.9, Max: 36.9 (04-25 @ 05:32)  T(F): 98.5, Max: 98.5 (04-25 @ 05:32)  HR: 90 (88 - 98)  BP: 130/66 (96/51 - 130/66)  BP(mean): 92 (62 - 100)  RR: 16 (16 - 27)  SpO2: 95% (93% - 100%)    PHYSICAL EXAM:      Constitutional: AAAOX3, NAD    Respiratory: On 4L NC, no respiratory distress    Cardiovascular: RRR    Gastrointestinal: morbidly obese, soft, NT, ND    Genitourinary: voids    Extremities: +edema          LABS:                        11.4   7.5   )-----------( 247      ( 25 Apr 2017 06:26 )             40.3     04-25    139  |  92<L>  |  12  ----------------------------<  159<H>  3.3<L>   |  41<H>  |  0.61    Ca    8.9      25 Apr 2017 06:30  Mg     2.1     04-25            RADIOLOGY & ADDITIONAL STUDIES:

## 2017-04-25 NOTE — PROGRESS NOTE ADULT - SUBJECTIVE AND OBJECTIVE BOX
CARDIOLOGY PGY-1 PROGRESS NOTE    O/N: ADDY. BIPAP overnight  	  Vitals:  T(C): 36.9, Max: 36.9 (04-25 @ 05:32)  HR: 90 (88 - 98)  BP: 130/66 (96/51 - 130/66)  RR: 16 (16 - 27)  SpO2: 95% (93% - 100%)  Wt(kg): --  I&O's Summary  I & Os for 24h ending 24 Apr 2017 07:00  =============================================  IN: 1230 ml / OUT: 2225 ml / NET: -995 ml    I & Os for current day (as of 25 Apr 2017 06:58)  =============================================  IN: 240 ml / OUT: 2150 ml / NET: -1910 ml        PHYSICAL EXAM:  Constitutional: obese female sitting comfortably in stretcher, pleasant, conversive  Head: NC/AT  Eyes: PERRL, EOMI, clear conjunctiva  Neck: supple; no JVD or thyromegaly. acanthosis migrans.  Respiratory: CTAB; but with decreased breath sounds at bases  Cardiac: +S1/S2; RRR; no M/R/G; PMI non-displaced  Gastrointestinal: obese abdomen. soft, NT/ND; no rebound or guarding; +BSx4  Back: spine midline, no bony tenderness or step-offs; no CVAT B/L  Extremities: WWP, no clubbing or cyanosis; 3+ pitting edema b/l to thighs  Musculoskeletal: NROM x4; no joint swelling, tenderness or erythema  Vascular: 2+ radial, femoral, DP/PT pulses B/L  Neurologic: AAOx3; CNII-XII grossly intact; no focal deficits    TELEMETRY: 	    ECG:  	      DIAGNOSTIC TESTING:  [ ] Echocardiogram:  [ ]  Catheterization:  [ ] Stress Test:    OTHER: 	      CURRENT MEDICATIONS:  amLODIPine   Tablet 5milliGRAM(s) Oral daily  furosemide    Tablet 80milliGRAM(s) Oral every 12 hours      ALBUTerol/ipratropium for Nebulization 3milliLiter(s) Nebulizer every 4 hours PRN    acetaminophen   Tablet. 650milliGRAM(s) Oral every 6 hours PRN    senna 2Tablet(s) Oral at bedtime PRN  famotidine    Tablet 20milliGRAM(s) Oral two times a day PRN    insulin lispro (HumaLOG) corrective regimen sliding scale  SubCutaneous Before meals and at bedtime  dextrose Gel 1Dose(s) Oral once PRN  dextrose 50% Injectable 25Gram(s) IV Push once  glucagon  Injectable 1milliGRAM(s) IntraMuscular once PRN    heparin  Injectable 7500Unit(s) SubCutaneous every 8 hours  dextrose 5%. 1000milliLiter(s) IV Continuous <Continuous>  benzocaine 15 mG/menthol 3.6 mG Lozenge 1Lozenge Oral three times a day PRN      LABS:	 	    CARDIAC MARKERS:                                  11.4   7.5   )-----------( 247      ( 25 Apr 2017 06:26 )             40.3     04-24    138  |  90<L>  |  15  ----------------------------<  150<H>  3.6   |  45<HH>  |  0.60    Ca    8.9      24 Apr 2017 06:12  Mg     2.1     04-24      proBNP:   Lipid Profile:   HgA1c:   TSH:

## 2017-04-25 NOTE — PROGRESS NOTE ADULT - ATTENDING COMMENTS
I discussed the case in details with the patient and the month I discussed the case with the reference. I discussed the case with now. Patient is to see the cardiologist next Monday forpreoperative. The patient phone pulmonary point of view is optimized for surgery.  I discussed with the patient and the mother the pro cons of morbid obesity and effect on morbidity and mortality versus surgery the benefits and risk. Definitely there's a benefit which ways the risk regarding her general and quality-of-life patient would be seen as an outpatient for clearance
Patient seen and examined with house-staff during bedside rounds.  Resident note read, including vitals, physical findings, laboratory data, and radiological reports.   Revisions included below.  Direct personal management at bed side and extensive interpretation of the data.  Plan was outlined and discussed in details with the housestaff.  Decision making of high complexity  I discussed the case with the productive surgery and fellow. Patient slowly improving. The patient is compliant on BiPAP.
Agree with Progress Note, Subjective and Objective, Assessment and Plan
Agree with Progress Note, Subjective and Objective, Assessment and Plan.

## 2017-04-25 NOTE — CONSULT NOTE ADULT - ASSESSMENT
41  year-old female PMH of DM2, MANUEL, bipap at night and 4L NC, morbidly obese, dCHF (EF: 55%) currently admitted to medicine for SOB and in respiratory distress. Patient should be medically optimized and lose about 50 lbs prior to laparoscopic sleeve gastrectomy. Discussed possibility of performing a gastric gastropexy during this admission to aid weight loss. Will discuss with primary team,    Plan  -Continue care as per primary team  -Discuss bariatric surgery plan  - F/u with Dr Luo and Dr Oscar  - Please contact Team 2 [phone] with questions or concerns 41  year-old female PMH of DM2, MANUEL, bipap at night and 4L NC, morbidly obese, dCHF (EF: 55%) currently admitted to medicine for SOB and respiratory distress. Patient should be medically optimized and lose about 50 lbs prior to laparoscopic sleeve gastrectomy. Discussed possibility of performing a laparoscopic gastropexy during this admission to aid weight loss. Will discuss with primary team.    Plan  -Continue care as per primary team  -Discuss bariatric surgery plan  - F/u with Dr Luo and Dr Oscar  - Please contact Team 2 [phone] with questions or concerns

## 2017-04-25 NOTE — PROGRESS NOTE ADULT - ASSESSMENT
41  year-old female PMH of DM2, MANUEL, bipap at night and 4L NC, morbidly obese, dCHF (EF: 55%) who presented to [facility] ED with SOB and in respiratory distress.

## 2017-04-27 DIAGNOSIS — I50.33 ACUTE ON CHRONIC DIASTOLIC (CONGESTIVE) HEART FAILURE: ICD-10-CM

## 2017-04-27 DIAGNOSIS — R06.02 SHORTNESS OF BREATH: ICD-10-CM

## 2017-04-27 DIAGNOSIS — G47.33 OBSTRUCTIVE SLEEP APNEA (ADULT) (PEDIATRIC): ICD-10-CM

## 2017-04-27 DIAGNOSIS — Z99.81 DEPENDENCE ON SUPPLEMENTAL OXYGEN: ICD-10-CM

## 2017-04-27 DIAGNOSIS — I11.0 HYPERTENSIVE HEART DISEASE WITH HEART FAILURE: ICD-10-CM

## 2017-04-27 DIAGNOSIS — Z79.84 LONG TERM (CURRENT) USE OF ORAL HYPOGLYCEMIC DRUGS: ICD-10-CM

## 2017-04-27 DIAGNOSIS — E11.9 TYPE 2 DIABETES MELLITUS WITHOUT COMPLICATIONS: ICD-10-CM

## 2017-04-27 DIAGNOSIS — Z79.4 LONG TERM (CURRENT) USE OF INSULIN: ICD-10-CM

## 2017-04-27 DIAGNOSIS — E66.01 MORBID (SEVERE) OBESITY DUE TO EXCESS CALORIES: ICD-10-CM

## 2017-05-01 ENCOUNTER — APPOINTMENT (OUTPATIENT)
Dept: SURGERY | Facility: CLINIC | Age: 42
End: 2017-05-01

## 2017-05-01 ENCOUNTER — APPOINTMENT (OUTPATIENT)
Dept: HEART AND VASCULAR | Facility: CLINIC | Age: 42
End: 2017-05-01

## 2017-05-01 VITALS — HEIGHT: 66.5 IN | BODY MASS INDEX: 46.53 KG/M2 | WEIGHT: 293 LBS

## 2017-05-01 VITALS
OXYGEN SATURATION: 95 % | HEIGHT: 66.5 IN | HEART RATE: 93 BPM | DIASTOLIC BLOOD PRESSURE: 72 MMHG | SYSTOLIC BLOOD PRESSURE: 131 MMHG | WEIGHT: 293 LBS | BODY MASS INDEX: 46.53 KG/M2

## 2017-05-09 ENCOUNTER — APPOINTMENT (OUTPATIENT)
Dept: INTERNAL MEDICINE | Facility: CLINIC | Age: 42
End: 2017-05-09

## 2017-05-19 ENCOUNTER — APPOINTMENT (OUTPATIENT)
Dept: SURGERY | Facility: CLINIC | Age: 42
End: 2017-05-19

## 2017-05-22 ENCOUNTER — APPOINTMENT (OUTPATIENT)
Dept: INTERNAL MEDICINE | Facility: CLINIC | Age: 42
End: 2017-05-22

## 2017-05-22 VITALS
HEIGHT: 66.5 IN | SYSTOLIC BLOOD PRESSURE: 115 MMHG | OXYGEN SATURATION: 98 % | DIASTOLIC BLOOD PRESSURE: 70 MMHG | HEART RATE: 91 BPM | BODY MASS INDEX: 46.53 KG/M2 | TEMPERATURE: 98.4 F | WEIGHT: 293 LBS

## 2017-05-22 DIAGNOSIS — Z56.0 UNEMPLOYMENT, UNSPECIFIED: ICD-10-CM

## 2017-05-22 SDOH — ECONOMIC STABILITY - INCOME SECURITY: UNEMPLOYMENT, UNSPECIFIED: Z56.0

## 2017-05-23 PROBLEM — Z56.0 UNEMPLOYED: Status: ACTIVE | Noted: 2017-05-23

## 2017-05-23 LAB
ANION GAP SERPL CALC-SCNC: 16 MMOL/L
BUN SERPL-MCNC: 8 MG/DL
CALCIUM SERPL-MCNC: 9.6 MG/DL
CHLORIDE SERPL-SCNC: 98 MMOL/L
CO2 SERPL-SCNC: 30 MMOL/L
CREAT SERPL-MCNC: 0.66 MG/DL
GLUCOSE SERPL-MCNC: 192 MG/DL
HBA1C MFR BLD HPLC: 8.5 %
POTASSIUM SERPL-SCNC: 4.7 MMOL/L
SODIUM SERPL-SCNC: 144 MMOL/L

## 2017-05-31 ENCOUNTER — APPOINTMENT (OUTPATIENT)
Dept: ENDOCRINOLOGY | Facility: CLINIC | Age: 42
End: 2017-05-31

## 2017-06-06 ENCOUNTER — APPOINTMENT (OUTPATIENT)
Dept: SURGERY | Facility: CLINIC | Age: 42
End: 2017-06-06

## 2017-07-18 ENCOUNTER — APPOINTMENT (OUTPATIENT)
Dept: PULMONOLOGY | Facility: CLINIC | Age: 42
End: 2017-07-18
Payer: COMMERCIAL

## 2017-07-18 PROCEDURE — 99214 OFFICE O/P EST MOD 30 MIN: CPT | Mod: 25

## 2017-08-10 ENCOUNTER — RX RENEWAL (OUTPATIENT)
Age: 42
End: 2017-08-10

## 2017-09-01 NOTE — PROGRESS NOTE ADULT - PROBLEM/PLAN-2
Dysfunctional Uterine Bleeding    Dysfunctional uterine bleeding is a condition in which bleeding is abnormal and occurs at unexpected times of the month. This happens due to changes in the hormones that help control a woman’s menstrual cycle each month.  The bleeding may be heavier or lighter than normal. If you have heavy bleeding often, this can lead to a problem called anemia. With anemia, your red blood cell count is too low. Red blood cells are needed because they help carry oxygen throughout your body. Severe anemia may cause you to look pale and feel very weak or tired. You might also become short of breath easily.  To treat dysfunctional uterine bleeding, medicines are often tried first. If these don’t help, further testing and treatments may be needed. Discuss all of your options with your provider.  Home care  Medicines  If you’re prescribed medicines, be sure to take them as directed. Some of the more common medicines you may be prescribed include:  · Hormone therapy (Options include most methods of hormonal birth control such as pills, shots, or a hormone-releasing IUD)  · Nonsteroidal anti-inflammatory drugs (NSAIDs), such as ibuprofen  · Iron supplements, if you have anemia     General care  · Get plenty of rest if you tire easily. Avoid heavy exertion.  · To help relieve pain or cramping that may occur with bleeding, try using a heating pad on the lower belly or back. A warm bath may also help.  Follow-up care  Follow up with your healthcare provider as directed.  When to seek medical advice  Call your healthcare provider right away if:  · Bleeding becomes heavy (soaking 1 pad or tampon every hour for 3 hours)  · Increased abdominal pain  · Irregular bleeding worsens or does not get better even with treatment  · Fever of 100.4ºF (38ºC) or higher, or as directed by your provider  · Signs of anemia, such as pale skin, extreme fatigue or weakness, or shortness of breath  · Dizziness or fainting      ©  3793-1756 [employer]. [address] [place] [address], [place] [address]. All rights reserved. This information is not intended as a substitute for professional medical care. Always follow your healthcare professional's instructions.         DISPLAY PLAN FREE TEXT

## 2017-09-04 ENCOUNTER — INPATIENT (INPATIENT)
Facility: HOSPITAL | Age: 42
LOS: 7 days | Discharge: HOME HEALTH | DRG: 292 | End: 2017-09-12
Payer: COMMERCIAL

## 2017-09-04 VITALS
OXYGEN SATURATION: 92 % | HEART RATE: 101 BPM | SYSTOLIC BLOOD PRESSURE: 150 MMHG | RESPIRATION RATE: 26 BRPM | DIASTOLIC BLOOD PRESSURE: 78 MMHG | TEMPERATURE: 99 F | WEIGHT: 293 LBS

## 2017-09-04 DIAGNOSIS — G47.33 OBSTRUCTIVE SLEEP APNEA (ADULT) (PEDIATRIC): ICD-10-CM

## 2017-09-04 DIAGNOSIS — R63.8 OTHER SYMPTOMS AND SIGNS CONCERNING FOOD AND FLUID INTAKE: ICD-10-CM

## 2017-09-04 DIAGNOSIS — I50.1 LEFT VENTRICULAR FAILURE, UNSPECIFIED: ICD-10-CM

## 2017-09-04 DIAGNOSIS — D64.9 ANEMIA, UNSPECIFIED: ICD-10-CM

## 2017-09-04 DIAGNOSIS — E11.9 TYPE 2 DIABETES MELLITUS WITHOUT COMPLICATIONS: ICD-10-CM

## 2017-09-04 DIAGNOSIS — Z29.9 ENCOUNTER FOR PROPHYLACTIC MEASURES, UNSPECIFIED: ICD-10-CM

## 2017-09-04 DIAGNOSIS — E66.2 MORBID (SEVERE) OBESITY WITH ALVEOLAR HYPOVENTILATION: ICD-10-CM

## 2017-09-04 DIAGNOSIS — I50.32 CHRONIC DIASTOLIC (CONGESTIVE) HEART FAILURE: ICD-10-CM

## 2017-09-04 DIAGNOSIS — I10 ESSENTIAL (PRIMARY) HYPERTENSION: ICD-10-CM

## 2017-09-04 LAB
ALBUMIN SERPL ELPH-MCNC: 3.8 G/DL — SIGNIFICANT CHANGE UP (ref 3.3–5)
ALP SERPL-CCNC: 68 U/L — SIGNIFICANT CHANGE UP (ref 40–120)
ALT FLD-CCNC: 23 U/L — SIGNIFICANT CHANGE UP (ref 10–45)
ANION GAP SERPL CALC-SCNC: 9 MMOL/L — SIGNIFICANT CHANGE UP (ref 5–17)
AST SERPL-CCNC: 16 U/L — SIGNIFICANT CHANGE UP (ref 10–40)
BASE EXCESS BLDV CALC-SCNC: 15 MMOL/L — SIGNIFICANT CHANGE UP
BASOPHILS NFR BLD AUTO: 0.1 % — SIGNIFICANT CHANGE UP (ref 0–2)
BILIRUB SERPL-MCNC: 0.6 MG/DL — SIGNIFICANT CHANGE UP (ref 0.2–1.2)
BUN SERPL-MCNC: 10 MG/DL — SIGNIFICANT CHANGE UP (ref 7–23)
CALCIUM SERPL-MCNC: 9.7 MG/DL — SIGNIFICANT CHANGE UP (ref 8.4–10.5)
CHLORIDE SERPL-SCNC: 95 MMOL/L — LOW (ref 96–108)
CK MB CFR SERPL CALC: <1 NG/ML — SIGNIFICANT CHANGE UP (ref 0–6.7)
CO2 SERPL-SCNC: 39 MMOL/L — HIGH (ref 22–31)
CREAT SERPL-MCNC: 0.6 MG/DL — SIGNIFICANT CHANGE UP (ref 0.5–1.3)
EOSINOPHIL NFR BLD AUTO: 0.3 % — SIGNIFICANT CHANGE UP (ref 0–6)
GAS PNL BLDV: SIGNIFICANT CHANGE UP
GLUCOSE SERPL-MCNC: 141 MG/DL — HIGH (ref 70–99)
HCO3 BLDV-SCNC: 47 MMOL/L — CRITICAL HIGH (ref 20–27)
HCT VFR BLD CALC: 38.2 % — SIGNIFICANT CHANGE UP (ref 34.5–45)
HGB BLD-MCNC: 10.6 G/DL — LOW (ref 11.5–15.5)
LYMPHOCYTES # BLD AUTO: 15.1 % — SIGNIFICANT CHANGE UP (ref 13–44)
MCHC RBC-ENTMCNC: 26.8 PG — LOW (ref 27–34)
MCHC RBC-ENTMCNC: 27.7 G/DL — LOW (ref 32–36)
MCV RBC AUTO: 96.5 FL — SIGNIFICANT CHANGE UP (ref 80–100)
MONOCYTES NFR BLD AUTO: 6.1 % — SIGNIFICANT CHANGE UP (ref 2–14)
NEUTROPHILS NFR BLD AUTO: 78.4 % — HIGH (ref 43–77)
NT-PROBNP SERPL-SCNC: 96 PG/ML — SIGNIFICANT CHANGE UP (ref 0–300)
PCO2 BLDV: 102 MMHG — HIGH (ref 41–51)
PH BLDV: 7.28 — LOW (ref 7.32–7.43)
PLATELET # BLD AUTO: 246 K/UL — SIGNIFICANT CHANGE UP (ref 150–400)
PO2 BLDV: 27 MMHG — SIGNIFICANT CHANGE UP
POTASSIUM SERPL-MCNC: 4.6 MMOL/L — SIGNIFICANT CHANGE UP (ref 3.5–5.3)
POTASSIUM SERPL-SCNC: 4.6 MMOL/L — SIGNIFICANT CHANGE UP (ref 3.5–5.3)
PROT SERPL-MCNC: 8.1 G/DL — SIGNIFICANT CHANGE UP (ref 6–8.3)
RBC # BLD: 3.96 M/UL — SIGNIFICANT CHANGE UP (ref 3.8–5.2)
RBC # FLD: 15.4 % — SIGNIFICANT CHANGE UP (ref 10.3–16.9)
SAO2 % BLDV: 40 % — SIGNIFICANT CHANGE UP
SODIUM SERPL-SCNC: 143 MMOL/L — SIGNIFICANT CHANGE UP (ref 135–145)
TROPONIN T SERPL-MCNC: <0.01 NG/ML — SIGNIFICANT CHANGE UP (ref 0–0.01)
WBC # BLD: 9.2 K/UL — SIGNIFICANT CHANGE UP (ref 3.8–10.5)
WBC # FLD AUTO: 9.2 K/UL — SIGNIFICANT CHANGE UP (ref 3.8–10.5)

## 2017-09-04 PROCEDURE — 99291 CRITICAL CARE FIRST HOUR: CPT | Mod: 25

## 2017-09-04 PROCEDURE — 93010 ELECTROCARDIOGRAM REPORT: CPT

## 2017-09-04 PROCEDURE — 71010: CPT | Mod: 26

## 2017-09-04 RX ORDER — ASPIRIN/CALCIUM CARB/MAGNESIUM 324 MG
325 TABLET ORAL DAILY
Qty: 0 | Refills: 0 | Status: DISCONTINUED | OUTPATIENT
Start: 2017-09-04 | End: 2017-09-04

## 2017-09-04 RX ORDER — INSULIN LISPRO 100/ML
VIAL (ML) SUBCUTANEOUS
Qty: 0 | Refills: 0 | Status: DISCONTINUED | OUTPATIENT
Start: 2017-09-04 | End: 2017-09-12

## 2017-09-04 RX ORDER — FUROSEMIDE 40 MG
80 TABLET ORAL ONCE
Qty: 0 | Refills: 0 | Status: COMPLETED | OUTPATIENT
Start: 2017-09-04 | End: 2017-09-04

## 2017-09-04 RX ORDER — LOSARTAN POTASSIUM 100 MG/1
25 TABLET, FILM COATED ORAL DAILY
Qty: 0 | Refills: 0 | Status: DISCONTINUED | OUTPATIENT
Start: 2017-09-04 | End: 2017-09-12

## 2017-09-04 RX ORDER — SODIUM CHLORIDE 9 MG/ML
1000 INJECTION, SOLUTION INTRAVENOUS
Qty: 0 | Refills: 0 | Status: DISCONTINUED | OUTPATIENT
Start: 2017-09-04 | End: 2017-09-12

## 2017-09-04 RX ORDER — FUROSEMIDE 40 MG
40 TABLET ORAL ONCE
Qty: 0 | Refills: 0 | Status: DISCONTINUED | OUTPATIENT
Start: 2017-09-04 | End: 2017-09-05

## 2017-09-04 RX ORDER — AMLODIPINE BESYLATE 2.5 MG/1
5 TABLET ORAL DAILY
Qty: 0 | Refills: 0 | Status: DISCONTINUED | OUTPATIENT
Start: 2017-09-04 | End: 2017-09-04

## 2017-09-04 RX ORDER — FUROSEMIDE 40 MG
80 TABLET ORAL EVERY 12 HOURS
Qty: 0 | Refills: 0 | Status: DISCONTINUED | OUTPATIENT
Start: 2017-09-05 | End: 2017-09-07

## 2017-09-04 RX ORDER — FUROSEMIDE 40 MG
80 TABLET ORAL EVERY 12 HOURS
Qty: 0 | Refills: 0 | Status: DISCONTINUED | OUTPATIENT
Start: 2017-09-04 | End: 2017-09-04

## 2017-09-04 RX ORDER — GLUCAGON INJECTION, SOLUTION 0.5 MG/.1ML
1 INJECTION, SOLUTION SUBCUTANEOUS ONCE
Qty: 0 | Refills: 0 | Status: DISCONTINUED | OUTPATIENT
Start: 2017-09-04 | End: 2017-09-12

## 2017-09-04 RX ORDER — POTASSIUM CHLORIDE 20 MEQ
20 PACKET (EA) ORAL DAILY
Qty: 0 | Refills: 0 | Status: DISCONTINUED | OUTPATIENT
Start: 2017-09-04 | End: 2017-09-12

## 2017-09-04 RX ORDER — AMLODIPINE BESYLATE 2.5 MG/1
10 TABLET ORAL ONCE
Qty: 0 | Refills: 0 | Status: COMPLETED | OUTPATIENT
Start: 2017-09-04 | End: 2017-09-04

## 2017-09-04 RX ORDER — SODIUM CHLORIDE 9 MG/ML
3 INJECTION INTRAMUSCULAR; INTRAVENOUS; SUBCUTANEOUS ONCE
Qty: 0 | Refills: 0 | Status: COMPLETED | OUTPATIENT
Start: 2017-09-04 | End: 2017-09-04

## 2017-09-04 RX ORDER — DEXTROSE 50 % IN WATER 50 %
1 SYRINGE (ML) INTRAVENOUS ONCE
Qty: 0 | Refills: 0 | Status: DISCONTINUED | OUTPATIENT
Start: 2017-09-04 | End: 2017-09-12

## 2017-09-04 RX ORDER — DEXTROSE 50 % IN WATER 50 %
25 SYRINGE (ML) INTRAVENOUS ONCE
Qty: 0 | Refills: 0 | Status: DISCONTINUED | OUTPATIENT
Start: 2017-09-04 | End: 2017-09-12

## 2017-09-04 RX ORDER — AMLODIPINE BESYLATE 2.5 MG/1
5 TABLET ORAL DAILY
Qty: 0 | Refills: 0 | Status: DISCONTINUED | OUTPATIENT
Start: 2017-09-05 | End: 2017-09-12

## 2017-09-04 RX ORDER — DEXTROSE 50 % IN WATER 50 %
12.5 SYRINGE (ML) INTRAVENOUS ONCE
Qty: 0 | Refills: 0 | Status: DISCONTINUED | OUTPATIENT
Start: 2017-09-04 | End: 2017-09-12

## 2017-09-04 RX ORDER — HEPARIN SODIUM 5000 [USP'U]/ML
7500 INJECTION INTRAVENOUS; SUBCUTANEOUS EVERY 8 HOURS
Qty: 0 | Refills: 0 | Status: DISCONTINUED | OUTPATIENT
Start: 2017-09-04 | End: 2017-09-12

## 2017-09-04 RX ORDER — INSULIN GLARGINE 100 [IU]/ML
10 INJECTION, SOLUTION SUBCUTANEOUS AT BEDTIME
Qty: 0 | Refills: 0 | Status: DISCONTINUED | OUTPATIENT
Start: 2017-09-04 | End: 2017-09-12

## 2017-09-04 RX ADMIN — SODIUM CHLORIDE 3 MILLILITER(S): 9 INJECTION INTRAMUSCULAR; INTRAVENOUS; SUBCUTANEOUS at 11:08

## 2017-09-04 RX ADMIN — Medication 325 MILLIGRAM(S): at 12:06

## 2017-09-04 RX ADMIN — AMLODIPINE BESYLATE 10 MILLIGRAM(S): 2.5 TABLET ORAL at 14:58

## 2017-09-04 RX ADMIN — Medication 80 MILLIGRAM(S): at 12:05

## 2017-09-04 RX ADMIN — INSULIN GLARGINE 10 UNIT(S): 100 INJECTION, SOLUTION SUBCUTANEOUS at 21:59

## 2017-09-04 RX ADMIN — HEPARIN SODIUM 7500 UNIT(S): 5000 INJECTION INTRAVENOUS; SUBCUTANEOUS at 21:59

## 2017-09-04 NOTE — H&P ADULT - PROBLEM SELECTOR PLAN 4
Patient with chronic normocytic anemia, stable from previous admission in April. Hb this admission 10.6  -No signs or symptoms suggestive of bleeding.  -Likely 2/2 anemia of chronic disease vs iron deficiency, f/u iron studies.

## 2017-09-04 NOTE — ED PROVIDER NOTE - PHYSICAL EXAMINATION
CONSTITUTIONAL: Well-appearing; well-nourished; high BMI  HEAD: Normocephalic; atraumatic.   EYES: PERRL; EOM intact; conjunctiva and sclera clear  ENT: normal nose; no rhinorrhea; normal pharynx with no erythema or lesions.   NECK: Supple; non-tender;   CARDIOVASCULAR: Normal S1, S2; no murmurs, rubs, or gallops. Regular rate and rhythm.   RESPIRATORY: breathing non labored, on supplemental oxygen, speaking full sentences.  difficult exam due to habitus, but decreased air entry at bases.  ?rales at bases.   no retractions  GI: Soft; non-distended; non-tender; no palpable organomegaly.   EXT: No cyanosis +4 pitting bilateral edema; N/V intact, no warmth  SKIN: Normal for age and race; warm; dry; good turgor; no apparent lesions or rash.   NEURO: A & O x 3; face symmetric; grossly unremarkable. ambulatory with walker (baseline)  PSYCHOLOGICAL: The patient’s mood and manner are appropriate.

## 2017-09-04 NOTE — H&P ADULT - NSHPLABSRESULTS_GEN_ALL_CORE
.  LABS:                         10.6   9.2   )-----------( 246      ( 04 Sep 2017 11:16 )             38.2     09-04    143  |  95<L>  |  10  ----------------------------<  141<H>  4.6   |  39<H>  |  0.60    Ca    9.7      04 Sep 2017 11:16    TPro  8.1  /  Alb  3.8  /  TBili  0.6  /  DBili  x   /  AST  16  /  ALT  23  /  AlkPhos  68  09-04        CARDIAC MARKERS ( 04 Sep 2017 11:16 )  x     / <0.01 ng/mL / 21 U/L / x     / <1.0 ng/mL      Serum Pro-Brain Natriuretic Peptide: 96 pg/mL (09-04 @ 11:16)    VBG: pH 7.28, PCO2 102, HCO3 47    RADIOLOGY, EKG & ADDITIONAL TESTS: reviewed     CXR PORTABLE IMPRESSION:   Enlarged cardiac silhouette, without evidence of focal airspace disease,   overt congestive failure or sizable pleural effusions.

## 2017-09-04 NOTE — H&P ADULT - PROBLEM SELECTOR PLAN 5
Patient now presenting with pulm edema 2/2 medication and lifestyle non-adherence. LVEF from 4/2017 showed left ventricular hypertrophy, abnormal septal motion, LVEF 55%. Moderate pericardial effusion noted behind the left heart  - f/u repeat echo  - c/w home dose lasix 80 mg PO bid  --Strict I&O's  -Daily weights  -BiPAP for now, then resume home regimen of 4 L NC during the day and BiPAP at night  -EKG, monitoring on telemetry

## 2017-09-04 NOTE — ED PROVIDER NOTE - MEDICAL DECISION MAKING DETAILS
patient given lasix with resultant diuresis, approximately one liter.  she complains of worsening sob, and asking for bipap.  more comfortable on bipap.   case discussed with dr Luo, will admit

## 2017-09-04 NOTE — ED ADULT NURSE NOTE - OBJECTIVE STATEMENT
42 year old female presents for gradually worsening shortness of breath, acutely worsening over the past two days.  no fevers, no chills. +RODRIGUEZ, sleeps with four pillows at night.  she states this is similar to her previous episodes of CHF.  States increased size bilateral LE, denies pain.  Admits to non compliance with her lasix regimen (80 BID) as she has to be in the immediate vicinity of a restroom and therefore cannot leave her home.  Also admits to increased PO intake of fluids. She is oxygen dependant, uses 3L NC 24 hrs per day and Bipap at night.

## 2017-09-04 NOTE — H&P ADULT - PROBLEM SELECTOR PLAN 3
Patient with longstanding history of morbid obesity. On previous admission the possibility of bariatric surgery for weight loss was discussed.   -MANUEL/OHS management as above.  -Discuss options for weight loss.

## 2017-09-04 NOTE — H&P ADULT - NSHPSOCIALHISTORY_GEN_ALL_CORE
Lives at home with two children. Has HHA 7 days a week for 5 hours a day. 5 pack year smoking history, quit >20 years ago. No  current smoking, no alcohol, no drugs.

## 2017-09-04 NOTE — H&P ADULT - HISTORY OF PRESENT ILLNESS
43 yo F w/PMH dCHF (EF 55% 4/2017), on home O2 4L during day and bipap at night, MNAUEL, morbid obesity, HTN, DMII, presented with increasing SOB on minimal exertion progressing over the past month. She states she can no longer bathe by herself without becoming extremely SOB, even with her home NC up to 4 L. This is accompanied by a sizable increase in her abdominal girth and worsening bilateral leg edema over the past 4 weeks. She also has four pillow orthopnea which has increased from her baseline two pillow orthopnea four weeks ago.  The patient states over the past month she has stopped taking her 80 mg PO lasix bid as prescribed since it causes her to urinate so frequently that it interferes with her daily activities. She admits to taking the medication sporadically and never twice a day as prescribed as well as increasing her daily fluid intake since she loves to drink water. She has not been wearing her bipap at night because she falls asleep with her nasal cannula on and does not wake up to put on the bipap. She denies any CP, palpitations, cough or recent upper respiratory symptoms.  In ED vitals: T 98.8 F, , /78, RR 26, O2 92% RA. Given Lasix 80 mg IV and put out 1 L of urine,  mg, amlodipine 10 mg PO once, VBG showed chronic compensated resp acidosis w/ pH, CXR: Enlarged cardiac silhouette with bilateral congestion. 43 yo F w/PMH dCHF (EF 55% 4/2017), on home O2 4L during day and bipap at night, MANUEL, morbid obesity, HTN, DMII, presented with increasing SOB on minimal exertion progressing over the past month. She states she can no longer bathe by herself without becoming extremely SOB, even with her home NC up to 4 L. This is accompanied by a sizable increase in her abdominal girth and worsening bilateral leg edema over the past 4 weeks. She also has four pillow orthopnea which has increased from her baseline two pillow orthopnea four weeks ago.  The patient states over the past month she has stopped taking her 80 mg PO lasix bid as prescribed since it causes her to urinate so frequently that it interferes with her daily activities. She admits to taking the medication sporadically and never twice a day as prescribed as well as increasing her daily fluid intake since she loves to drink water. She has not been wearing her bipap at night because she falls asleep with her nasal cannula on and does not wake up to put on the bipap. She denies any CP, palpitations, cough or recent upper respiratory symptoms.  In ED vitals: T 98.8 F, , /78, RR 26, O2 92% RA. Placed on Bipap, given Lasix 80 mg IV and put out 1 L of urine,  mg, amlodipine 10 mg PO once, VBG showed chronic compensated resp acidosis w/ pH, CXR: Enlarged cardiac silhouette with bilateral congestion.

## 2017-09-04 NOTE — H&P ADULT - NSHPPHYSICALEXAM_GEN_ALL_CORE
.  VITAL SIGNS:  T(F): 97.9 (09-04-17 @ 16:28), Max: 98.9 (09-04-17 @ 10:26)  HR: 85 (09-04-17 @ 17:00) (85 - 101)  BP: 123/86 (09-04-17 @ 17:00) (123/86 - 171/93)  BP(mean): --  RR: 16 (09-04-17 @ 17:00) (16 - 26)  SpO2: 96% (09-04-17 @ 17:00) (92% - 100%)    PHYSICAL EXAM:  Constitutional: Obese, WDWN resting comfortably in bed with BiPAP in place; NAD  HEENT: NC/AT, PERRL, EOMI, anicteric sclera, no nasal discharge; uvula midline, no oropharyngeal erythema or exudates; MMM  Neck: Obese, and full with acanthosis nigricans. Difficult to appreciate neck veins to assess for JVD.   Respiratory: Distant breath sounds 2/2 body habitus but bilateral breath sounds appreciated decreased at the bases. Difficult to discern rales/ronchi/wheezing over BiPAP and with distant breath sounds.   Cardiac: Distant heart sounds, +S1/S2; RRR; no M/R/G  Gastrointestinal: Obese, soft, NT; distended, no rebound or guarding; +BSx4  Back: spine midline, no bony tenderness or step-offs; no CVAT B/L  Extremities: WWP, Bilateral 2 + pitting edema extending up to the knee.   Dermatologic: skin warm, dry and intact; no rashes, wounds, or scars  Neurologic: AAOx3; CNII-XII grossly intact; no focal deficits  Psychiatric: affect and characteristics of appearance, verbalizations, behaviors are appropriate

## 2017-09-04 NOTE — ED PROVIDER NOTE - OBJECTIVE STATEMENT
42 year old female presents for gradually worsening shortness of breath, acutely worsening over the past two days.  no fevers, no chills. +RODRIGUEZ, sleeps with four pillows at night.  she states this is similar to her previous episodes fo CHF.  States increased size bilateral LE, denies pain.  Admits to non compliance with her lasix regimen (80 BID) as she has to be in the immediate vicinity of a restroom and therefore cannot leave her home.  Also admits to increased PO intake of fluids.  Her last known EF is 55%. She is oxygen dependant, uses 3L NC 24 hrs per day and Bipap at night.

## 2017-09-04 NOTE — H&P ADULT - ASSESSMENT
41 yo F w/PMH dCHF (EF 55% 4/2017), on home O2 4L during day and bipap at night, MANUEL, morbid obesity, HTN, DMII, presented with increasing SOB on minimal exertion progressing over the past month. Admits to non-adherence to bipap at night as well as lasix 80 mg PO bid. CXR showing pulm edema, VBG suggestive of CO2 retention and chronic respiratory acidosis. Diuresed with 80 mg IV lasix in ED and put out over 1 L urine, placed on BiPap and admitted to [facility] for telemetry monitoring and management of CHF exacerbation.

## 2017-09-04 NOTE — H&P ADULT - PROBLEM SELECTOR PLAN 6
Patient takes amlodipine 5 mg Po daily, losartan 25 mg PO daily in addition to 80 mg PO bid at home.  -c/w home medications and monitor BP.

## 2017-09-04 NOTE — H&P ADULT - PROBLEM SELECTOR PLAN 7
Patient takes 1,000 mg bid metformin, levemir 15 units subq bedtime.  -levemir 10 units sub q with MISS while in the hospital.

## 2017-09-04 NOTE — H&P ADULT - PROBLEM SELECTOR PLAN 1
Patient presents with CXR findings consistent with pulm edema 2/2 CHF exacerbation due to medication and lifestyle non-adherence.  S/p 80 mg IV lasix in ED and put out 1 L urine. Patient is on home O2 4 L with BiPAP at night.   -c/w home lasix 80 mg PO bid   -Strict I&O's  -Daily weights  -BiPAP for now, then resume home regimen of 4 L NC during the day and BiPAP at night  -LVEF from 4/2017 showed left ventricular hypertrophy, abnormal septal motion, LVEF 55%. Moderate pericardial effusion noted behind the left heart Patient presents with CXR findings consistent with pulm edema 2/2 CHF exacerbation due to medication and lifestyle non-adherence.  S/p 80 mg IV lasix in ED and put out 1 L urine. Patient is on home O2 4 L with BiPAP at night.   - Admit to [facility] for telemetry monitoring  -c/w home lasix 80 mg PO bid   -Strict I&O's  -Daily weights, limit fluid intake   -BiPAP for now, then resume home regimen of 4 L NC during the day and BiPAP at night  -LVEF from 4/2017 showed left ventricular hypertrophy, abnormal septal motion, LVEF 55%. Moderate pericardial effusion noted behind the left heart  - Will order echo to assess.

## 2017-09-04 NOTE — H&P ADULT - PROBLEM SELECTOR PLAN 2
Patient with a history of MANUEL, also morbidly obese likely with a component of OHS. VBG pH 7.28, PCO2 102, HCO3 47, consistent with chronic compensated respiratory acidosis. Patient admits to non-adherence to nightly BiPAP  -c/w BiPAP at night, 4 L NC during the day.   -Ctm O2 sat.

## 2017-09-04 NOTE — H&P ADULT - PROBLEM SELECTOR PLAN 9
DVT ppx: sub subq, SCD's  CODE STATUS:  DISPO: 7 [facility] DVT ppx: sub subq, SCD's  CODE STATUS: FULL CODE   DISPO: 7 [facility]

## 2017-09-05 LAB
ANION GAP SERPL CALC-SCNC: 8 MMOL/L — SIGNIFICANT CHANGE UP (ref 5–17)
BUN SERPL-MCNC: 10 MG/DL — SIGNIFICANT CHANGE UP (ref 7–23)
CALCIUM SERPL-MCNC: 9.5 MG/DL — SIGNIFICANT CHANGE UP (ref 8.4–10.5)
CHLORIDE SERPL-SCNC: 93 MMOL/L — LOW (ref 96–108)
CO2 SERPL-SCNC: 42 MMOL/L — HIGH (ref 22–31)
CREAT SERPL-MCNC: 0.6 MG/DL — SIGNIFICANT CHANGE UP (ref 0.5–1.3)
FERRITIN SERPL-MCNC: 99.1 NG/ML — SIGNIFICANT CHANGE UP (ref 15–150)
GLUCOSE SERPL-MCNC: 130 MG/DL — HIGH (ref 70–99)
HBA1C BLD-MCNC: 6.8 % — HIGH (ref 4–5.6)
HCT VFR BLD CALC: 37 % — SIGNIFICANT CHANGE UP (ref 34.5–45)
HGB BLD-MCNC: 10.1 G/DL — LOW (ref 11.5–15.5)
IRON SATN MFR SERPL: 18 % — SIGNIFICANT CHANGE UP (ref 14–50)
IRON SATN MFR SERPL: 54 UG/DL — SIGNIFICANT CHANGE UP (ref 30–160)
MAGNESIUM SERPL-MCNC: 1.6 MG/DL — SIGNIFICANT CHANGE UP (ref 1.6–2.6)
MCHC RBC-ENTMCNC: 26.6 PG — LOW (ref 27–34)
MCHC RBC-ENTMCNC: 27.3 G/DL — LOW (ref 32–36)
MCV RBC AUTO: 97.6 FL — SIGNIFICANT CHANGE UP (ref 80–100)
PLATELET # BLD AUTO: 251 K/UL — SIGNIFICANT CHANGE UP (ref 150–400)
POTASSIUM SERPL-MCNC: 4.3 MMOL/L — SIGNIFICANT CHANGE UP (ref 3.5–5.3)
POTASSIUM SERPL-SCNC: 4.3 MMOL/L — SIGNIFICANT CHANGE UP (ref 3.5–5.3)
RBC # BLD: 3.79 M/UL — LOW (ref 3.8–5.2)
RBC # FLD: 15.7 % — SIGNIFICANT CHANGE UP (ref 10.3–16.9)
SODIUM SERPL-SCNC: 143 MMOL/L — SIGNIFICANT CHANGE UP (ref 135–145)
TIBC SERPL-MCNC: 298 UG/DL — SIGNIFICANT CHANGE UP (ref 220–430)
TRANSFERRIN SERPL-MCNC: 241 MG/DL — SIGNIFICANT CHANGE UP (ref 200–360)
UIBC SERPL-MCNC: 244 UG/DL — SIGNIFICANT CHANGE UP (ref 110–370)
WBC # BLD: 7.3 K/UL — SIGNIFICANT CHANGE UP (ref 3.8–10.5)
WBC # FLD AUTO: 7.3 K/UL — SIGNIFICANT CHANGE UP (ref 3.8–10.5)

## 2017-09-05 PROCEDURE — 93306 TTE W/DOPPLER COMPLETE: CPT | Mod: 26

## 2017-09-05 PROCEDURE — 99223 1ST HOSP IP/OBS HIGH 75: CPT | Mod: GC

## 2017-09-05 PROCEDURE — 93010 ELECTROCARDIOGRAM REPORT: CPT

## 2017-09-05 RX ORDER — MAGNESIUM SULFATE 500 MG/ML
1 VIAL (ML) INJECTION ONCE
Qty: 0 | Refills: 0 | Status: COMPLETED | OUTPATIENT
Start: 2017-09-05 | End: 2017-09-05

## 2017-09-05 RX ORDER — FUROSEMIDE 40 MG
80 TABLET ORAL ONCE
Qty: 0 | Refills: 0 | Status: COMPLETED | OUTPATIENT
Start: 2017-09-05 | End: 2017-09-05

## 2017-09-05 RX ADMIN — AMLODIPINE BESYLATE 5 MILLIGRAM(S): 2.5 TABLET ORAL at 11:52

## 2017-09-05 RX ADMIN — Medication 80 MILLIGRAM(S): at 11:53

## 2017-09-05 RX ADMIN — LOSARTAN POTASSIUM 25 MILLIGRAM(S): 100 TABLET, FILM COATED ORAL at 11:58

## 2017-09-05 RX ADMIN — HEPARIN SODIUM 7500 UNIT(S): 5000 INJECTION INTRAVENOUS; SUBCUTANEOUS at 15:37

## 2017-09-05 RX ADMIN — Medication 20 MILLIEQUIVALENT(S): at 11:53

## 2017-09-05 RX ADMIN — Medication 2: at 21:45

## 2017-09-05 RX ADMIN — Medication 100 GRAM(S): at 12:10

## 2017-09-05 RX ADMIN — INSULIN GLARGINE 10 UNIT(S): 100 INJECTION, SOLUTION SUBCUTANEOUS at 21:45

## 2017-09-05 RX ADMIN — Medication 80 MILLIGRAM(S): at 23:41

## 2017-09-05 RX ADMIN — HEPARIN SODIUM 7500 UNIT(S): 5000 INJECTION INTRAVENOUS; SUBCUTANEOUS at 06:17

## 2017-09-05 RX ADMIN — HEPARIN SODIUM 7500 UNIT(S): 5000 INJECTION INTRAVENOUS; SUBCUTANEOUS at 22:12

## 2017-09-05 NOTE — PROGRESS NOTE ADULT - ASSESSMENT
41 yo F w/PMH dCHF (EF 55% 4/2017), on home O2 4L during day and bipap at night, MANUEL, morbid obesity, HTN, DMII, presented with increasing SOB on minimal exertion progressing over the past month. Admits to non-adherence to bipap at night as well as lasix 80 mg PO bid. CXR showing pulm edema, VBG suggestive of CO2 retention and chronic respiratory acidosis. Diuresed with 80 mg IV lasix in ED and put out over 1 L urine, placed on BiPap and admitted to [facility] for telemetry monitoring and management of CHF exacerbation.

## 2017-09-05 NOTE — PROGRESS NOTE ADULT - PROBLEM SELECTOR PLAN 5
Patient now presenting with pulm edema 2/2 medication and lifestyle non-adherence. LVEF from 4/2017 showed left ventricular hypertrophy, abnormal septal motion, LVEF 55%. Moderate pericardial effusion noted behind the left heart  - f/u repeat echo  - c/w home dose lasix 80 mg PO bid  --Strict I&O's as feasible   -Daily weights  -Resume home regimen of 4 L NC during the day and BiPAP at night  -EKG showed  Normal sinus rhythm, Rightward axis. Low voltage QRS, Cannot rule out Septal infarct , age undetermined, Nonspecific ST - T abnormalities

## 2017-09-05 NOTE — PROGRESS NOTE ADULT - PROBLEM SELECTOR PLAN 4
Patient with chronic normocytic anemia, stable from previous admission in April. Hb this admission 10.6  -No signs or symptoms suggestive of bleeding.  -Iron studies within normal limits.   -Likely 2/2 anemia of chronic disease vs iron deficiency, f/u iron studies.  -Ctm Hb, transfuse if Hb <7

## 2017-09-05 NOTE — PHYSICAL THERAPY INITIAL EVALUATION ADULT - CRITERIA FOR SKILLED THERAPEUTIC INTERVENTIONS
therapy frequency/impairments found/rehab potential/functional limitations in following categories/risk reduction/prevention/anticipated discharge recommendation

## 2017-09-05 NOTE — PHYSICAL THERAPY INITIAL EVALUATION ADULT - LEVEL OF INDEPENDENCE: SIT/SUPINE, REHAB EVAL
not observed, patient left sitting in bedside chair with +call bell, all lines intact, VSS, in no apparent distress, RN (Callie) aware

## 2017-09-05 NOTE — PROGRESS NOTE ADULT - PROBLEM SELECTOR PLAN 7
Patient takes 1,000 mg bid metformin, levemir 15 units subq bedtime.  -[id number] units sub q with MISS while in the hospital.

## 2017-09-05 NOTE — PROGRESS NOTE ADULT - PROBLEM SELECTOR PLAN 2
Patient with a history of MANUEL, also morbidly obese likely with a component of OHS. VBG on admission showed pH 7.28, PCO2 102, HCO3 47, consistent with chronic compensated respiratory acidosis. Patient admits to non-adherence to nightly BiPAP  -c/w BiPAP at night, 4 L NC during the day.   -Ctm O2 sat.

## 2017-09-05 NOTE — PHYSICAL THERAPY INITIAL EVALUATION ADULT - PERTINENT HX OF CURRENT PROBLEM, REHAB EVAL
Patient is a 42 year old who presents with increasing SOB on minimal exertion progressing over the past month. Relevant PMH includes dCHF (EF 55% 4/2017), on home O2 4L during day and bipap at night, MANUEL, morbid obesity, HTN, DMII

## 2017-09-05 NOTE — PHYSICAL THERAPY INITIAL EVALUATION ADULT - GENERAL OBSERVATIONS, REHAB EVAL
Received semi-supine in bed with +tele, +pulse ox, on 4LPM O2 via NC, +hep lock, in no apparent distress. Patient denies pain or shortness of breath at rest

## 2017-09-05 NOTE — PROGRESS NOTE ADULT - SUBJECTIVE AND OBJECTIVE BOX
O/N Events: No acute events. Patient slept comfortably on BiPAP.   Subjective: Patient seen and examined at bedside. Reports improvement in dyspnea and peripheral edema as well as decreased abdominal girth. Has been soiling herself instead of collecting urine for monitoring since she states it is so difficult to get up.     VITALS  Vital Signs Last 24 Hrs  T(C): 37.1 (05 Sep 2017 13:20), Max: 37.1 (05 Sep 2017 06:33)  T(F): 98.7 (05 Sep 2017 13:20), Max: 98.7 (05 Sep 2017 06:33)  HR: 94 (05 Sep 2017 16:02) (74 - 98)  BP: 130/79 (05 Sep 2017 15:35) (123/58 - 160/75)  BP(mean): 100 (05 Sep 2017 15:35) (84 - 108)  RR: 20 (05 Sep 2017 15:35) (16 - 24)  SpO2: 91% (05 Sep 2017 16:02) (87% - 100%)    I&O's Summary    04 Sep 2017 07:01  -  05 Sep 2017 07:00  --------------------------------------------------------  IN: 0 mL / OUT: 300 mL / NET: -300 mL    05 Sep 2017 07:01  -  05 Sep 2017 16:38  --------------------------------------------------------  IN: 0 mL / OUT: 600 mL / NET: -600 mL        CAPILLARY BLOOD GLUCOSE  129 (05 Sep 2017 11:27)  122 (04 Sep 2017 21:43)          PHYSICAL EXAM  Constitutional: Obese, WDWN resting comfortably in bed with BiPAP in place; NAD  HEENT: NC/AT, PERRL, EOMI, anicteric sclera, no nasal discharge; uvula midline, no oropharyngeal erythema or exudates; MMM  Neck: Obese, and full with acanthosis nigricans. Difficult to appreciate neck veins to assess for JVD.   Respiratory: Distant breath sounds 2/2 body habitus but bilateral breath sounds appreciated decreased at the bases. Difficult to discern rales/ronchi/wheezing over BiPAP and with distant breath sounds.   Cardiac: Distant heart sounds, +S1/S2; RRR; no M/R/G  Gastrointestinal: Obese, soft, NT; distended, no rebound or guarding; +BSx4  Back: spine midline, no bony tenderness or step-offs; no CVAT B/L  Extremities: WWP, Bilateral 2 + pitting edema extending up to the knee.   Dermatologic: skin warm, dry and intact; no rashes, wounds, or scars  Neurologic: AAOx3; CNII-XII grossly intact; no focal deficits  Psychiatric: Affect and characteristics of appearance, verbalizations, behaviors are appropriate      MEDICATIONS  (STANDING):  heparin  Injectable 7500 Unit(s) SubCutaneous every 8 hours  insulin glargine Injectable (LANTUS) 10 Unit(s) SubCutaneous at bedtime  insulin lispro (HumaLOG) corrective regimen sliding scale   SubCutaneous Before meals and at bedtime  dextrose 5%. 1000 milliLiter(s) (50 mL/Hr) IV Continuous <Continuous>  dextrose 50% Injectable 12.5 Gram(s) IV Push once  dextrose 50% Injectable 25 Gram(s) IV Push once  dextrose 50% Injectable 25 Gram(s) IV Push once  losartan 25 milliGRAM(s) Oral daily  potassium chloride    Tablet ER 20 milliEquivalent(s) Oral daily  furosemide    Tablet 80 milliGRAM(s) Oral every 12 hours  amLODIPine   Tablet 5 milliGRAM(s) Oral daily    MEDICATIONS  (PRN):  dextrose Gel 1 Dose(s) Oral once PRN Blood Glucose LESS THAN 70 milliGRAM(s)/deciliter  glucagon  Injectable 1 milliGRAM(s) IntraMuscular once PRN Glucose LESS THAN 70 milligrams/deciliter      LABS                        10.1   7.3   )-----------( 251      ( 05 Sep 2017 05:20 )             37.0     09-05    143  |  93<L>  |  10  ----------------------------<  130<H>  4.3   |  42<H>  |  0.60    Ca    9.5      05 Sep 2017 05:20  Mg     1.6     09-05    TPro  8.1  /  Alb  3.8  /  TBili  0.6  /  DBili  x   /  AST  16  /  ALT  23  /  AlkPhos  68  09-04    LIVER FUNCTIONS - ( 04 Sep 2017 11:16 )  Alb: 3.8 g/dL / Pro: 8.1 g/dL / ALK PHOS: 68 U/L / ALT: 23 U/L / AST: 16 U/L / GGT: x               CARDIAC MARKERS ( 04 Sep 2017 11:16 )  x     / <0.01 ng/mL / 21 U/L / x     / <1.0 ng/mL        IMAGING/EKG/ETC: reviewed

## 2017-09-05 NOTE — PHYSICAL THERAPY INITIAL EVALUATION ADULT - ADDITIONAL COMMENTS
Patient lives with son in an elevator apartment without steps to enter. Prior to admission, patient was independent for all functional mobility and used a rollator for community ambulation and no assistive device for household ambulation. Patient has a home health aide 5 days a week, 5 hours a day. Uses 4LPM O2 via NC, and uses BiPaP at night

## 2017-09-05 NOTE — PHYSICAL THERAPY INITIAL EVALUATION ADULT - PLANNED THERAPY INTERVENTIONS, PT EVAL
postural re-education/gait training/bed mobility training/transfer training/strengthening/balance training

## 2017-09-05 NOTE — PROGRESS NOTE ADULT - PROBLEM SELECTOR PLAN 1
Patient presented with CXR findings consistent with pulm edema 2/2 CHF exacerbation due to medication and lifestyle non-adherence.  Has significantly improved after resuming home dose of lasix and w/BiPAP overnight.   - c/w home lasix 80 mg PO bid   -Strict I&O's- difficult since patient soils herself and does not ring call bell when she has to urinate.   -Daily weights, limit fluid intake   -Resume home regimen of 4 L NC during the day and BiPAP at night  -LVEF from 4/2017 showed left ventricular hypertrophy, abnormal septal motion, LVEF 55%. Moderate pericardial effusion noted behind the left heart  - Echo ordered to re-assess LVEF, f/u results.

## 2017-09-06 LAB
ANION GAP SERPL CALC-SCNC: 8 MMOL/L — SIGNIFICANT CHANGE UP (ref 5–17)
BUN SERPL-MCNC: 14 MG/DL — SIGNIFICANT CHANGE UP (ref 7–23)
CALCIUM SERPL-MCNC: 9.4 MG/DL — SIGNIFICANT CHANGE UP (ref 8.4–10.5)
CHLORIDE SERPL-SCNC: 89 MMOL/L — LOW (ref 96–108)
CO2 SERPL-SCNC: 43 MMOL/L — HIGH (ref 22–31)
CREAT SERPL-MCNC: 0.6 MG/DL — SIGNIFICANT CHANGE UP (ref 0.5–1.3)
GLUCOSE SERPL-MCNC: 111 MG/DL — HIGH (ref 70–99)
HCT VFR BLD CALC: 41.1 % — SIGNIFICANT CHANGE UP (ref 34.5–45)
HGB BLD-MCNC: 11.2 G/DL — LOW (ref 11.5–15.5)
MAGNESIUM SERPL-MCNC: 1.8 MG/DL — SIGNIFICANT CHANGE UP (ref 1.6–2.6)
MCHC RBC-ENTMCNC: 26 PG — LOW (ref 27–34)
MCHC RBC-ENTMCNC: 27.3 G/DL — LOW (ref 32–36)
MCV RBC AUTO: 95.4 FL — SIGNIFICANT CHANGE UP (ref 80–100)
PLATELET # BLD AUTO: 266 K/UL — SIGNIFICANT CHANGE UP (ref 150–400)
POTASSIUM SERPL-MCNC: 4 MMOL/L — SIGNIFICANT CHANGE UP (ref 3.5–5.3)
POTASSIUM SERPL-SCNC: 4 MMOL/L — SIGNIFICANT CHANGE UP (ref 3.5–5.3)
RBC # BLD: 4.31 M/UL — SIGNIFICANT CHANGE UP (ref 3.8–5.2)
RBC # FLD: 15.9 % — SIGNIFICANT CHANGE UP (ref 10.3–16.9)
SODIUM SERPL-SCNC: 140 MMOL/L — SIGNIFICANT CHANGE UP (ref 135–145)
WBC # BLD: 8 K/UL — SIGNIFICANT CHANGE UP (ref 3.8–10.5)
WBC # FLD AUTO: 8 K/UL — SIGNIFICANT CHANGE UP (ref 3.8–10.5)

## 2017-09-06 PROCEDURE — 99233 SBSQ HOSP IP/OBS HIGH 50: CPT | Mod: GC

## 2017-09-06 RX ORDER — MAGNESIUM SULFATE 500 MG/ML
1 VIAL (ML) INJECTION ONCE
Qty: 0 | Refills: 0 | Status: COMPLETED | OUTPATIENT
Start: 2017-09-06 | End: 2017-09-06

## 2017-09-06 RX ADMIN — HEPARIN SODIUM 7500 UNIT(S): 5000 INJECTION INTRAVENOUS; SUBCUTANEOUS at 13:51

## 2017-09-06 RX ADMIN — Medication 2: at 22:26

## 2017-09-06 RX ADMIN — HEPARIN SODIUM 7500 UNIT(S): 5000 INJECTION INTRAVENOUS; SUBCUTANEOUS at 22:26

## 2017-09-06 RX ADMIN — HEPARIN SODIUM 7500 UNIT(S): 5000 INJECTION INTRAVENOUS; SUBCUTANEOUS at 06:40

## 2017-09-06 RX ADMIN — AMLODIPINE BESYLATE 5 MILLIGRAM(S): 2.5 TABLET ORAL at 11:07

## 2017-09-06 RX ADMIN — LOSARTAN POTASSIUM 25 MILLIGRAM(S): 100 TABLET, FILM COATED ORAL at 06:44

## 2017-09-06 RX ADMIN — INSULIN GLARGINE 10 UNIT(S): 100 INJECTION, SOLUTION SUBCUTANEOUS at 22:27

## 2017-09-06 RX ADMIN — Medication 100 GRAM(S): at 11:06

## 2017-09-06 RX ADMIN — Medication 80 MILLIGRAM(S): at 22:27

## 2017-09-06 RX ADMIN — Medication 100 GRAM(S): at 22:30

## 2017-09-06 RX ADMIN — Medication 80 MILLIGRAM(S): at 11:07

## 2017-09-06 RX ADMIN — Medication 20 MILLIEQUIVALENT(S): at 11:07

## 2017-09-06 NOTE — PROGRESS NOTE ADULT - PROBLEM SELECTOR PLAN 3
Patient with longstanding history of morbid obesity. On previous admission the possibility of bariatric surgery for weight loss was discussed.   -MANUEL/OHS management as above.  -Discuss options for weight loss. Patient with a history of MANUEL, also morbidly obese likely with a component of OHS. VBG on admission showed pH 7.28, PCO2 102, HCO3 47, consistent with chronic respiratory acidosis. Patient admits to non-adherence to nightly BiPAP  -c/w BiPAP at night, 4 L NC during the day.   -Monitor O2 sat with goal >92%

## 2017-09-06 NOTE — PROGRESS NOTE ADULT - ASSESSMENT
43 yo F w/PMH dCHF (EF 55% 4/2017), on home O2 4L during day and bipap at night, MANUEL, morbid obesity, HTN, DMII, presented with increasing SOB on minimal exertion progressing over the past month. Admits to non-adherence to bipap at night as well as lasix 80 mg PO bid. CXR showing pulm edema, VBG suggestive of CO2 retention and chronic respiratory acidosis. Diuresed with 80 mg IV lasix in ED and put out over 1 L urine, placed on BiPap and admitted to [facility] for telemetry monitoring and management of CHF exacerbation. 41 yo F w/PMH dCHF (EF 55% 9/2017), on home O2 4L during day and bipap at night, MANUEL, morbid obesity, HTN, DMII, presented with increasing SOB on minimal exertion progressing over the past month. Admits to non-adherence to bipap at night as well as lasix 80 mg PO bid. CXR showing pulm edema, VBG suggestive of CO2 retention and chronic respiratory acidosis. Diuresed with 80 mg IV lasix in ED and put out over 1 L urine, placed on BiPap and admitted to [facility] for telemetry monitoring and management of CHF exacerbation.  Now significantly improved after restarting home dose of Lasix and resuming BiPaP at night.

## 2017-09-06 NOTE — PROGRESS NOTE ADULT - PROBLEM SELECTOR PLAN 9
DVT ppx: sub subq, SCD's  CODE STATUS: FULL CODE   DISPO: [facility] DVT ppx: Hep subq, SCD's  CODE STATUS: FULL CODE   DISPO: pending compensation of HFpEF

## 2017-09-06 NOTE — PROGRESS NOTE ADULT - ASSESSMENT
43 yo F w/PMH dCHF (EF 55% 9/2017), on home O2 4L during day and bipap at night, MANUEL, morbid obesity, HTN, DMII, presented with increasing SOB on minimal exertion progressing over the past month. Admits to non-adherence to bipap at night as well as lasix 80 mg PO bid. CXR showing pulm edema, VBG suggestive of CO2 retention and chronic respiratory acidosis. Diuresed with 80 mg IV lasix in ED and put out over 1 L urine, placed on BiPap and admitted to [facility] for telemetry monitoring and management of CHF exacerbation.  Now significantly improved after restarting home dose of Lasix and resuming BiPaP at night. 41 yo F w/PMH dCHF (EF 55% 9/2017), on home O2 4L during day and bipap at night, MANUEL, morbid obesity, HTN, DMII, presented with increasing SOB on minimal exertion progressing over the past month in the setting of medication non-compliance, admitted for decompensated diastolic HFpEF.

## 2017-09-06 NOTE — PROGRESS NOTE ADULT - PROBLEM SELECTOR PLAN 5
Patient now presenting with pulm edema 2/2 medication and lifestyle non-adherence. LVEF from 4/2017 showed left ventricular hypertrophy, abnormal septal motion, LVEF 55%. Moderate pericardial effusion noted behind the left heart. Repeat echo unchanged.   - c/w home dose lasix 80 mg PO bid  --Strict I&O's as feasible   -Daily weights  -Resume home regimen of 4 L NC during the day and BiPAP at night  -EKG showed  Normal sinus rhythm, Rightward axis. Low voltage QRS, Cannot rule out Septal infarct , age undetermined, Nonspecific ST - T abnormalities Patient takes amlodipine 5 mg Po daily, losartan 25 mg PO daily in addition to 80 mg PO bid at home.  -c/w home medications and monitor BP.

## 2017-09-06 NOTE — PROGRESS NOTE ADULT - PROBLEM SELECTOR PLAN 6
Patient takes amlodipine 5 mg Po daily, losartan 25 mg PO daily in addition to 80 mg PO bid at home.  -c/w home medications and monitor BP. Patient takes 1,000 mg bid metformin, levemir 15 units subq bedtime. FSG show adequate control with Lantus 10.  -[id number] units sub q with MISS

## 2017-09-06 NOTE — PROGRESS NOTE ADULT - PROBLEM SELECTOR PLAN 2
Patient with a history of MANUEL, also morbidly obese likely with a component of OHS. VBG on admission showed pH 7.28, PCO2 102, HCO3 47, consistent with chronic compensated respiratory acidosis. Patient admits to non-adherence to nightly BiPAP  -c/w BiPAP at night, 4 L NC during the day.   -Ctm O2 sat. Echo from 4/2017 showed left ventricular hypertrophy, abnormal septal motion, LVEF 55%. Moderate pericardial effusion noted. Repeat echo unchanged.   -c/w home dose lasix 80 mg PO bid  -Strict I&O's as feasible   -Daily weights  -Resume home regimen of 4 L NC during the day and BiPAP at night  -EKG showed  Normal sinus rhythm, Rightward axis. Low voltage QRS, Cannot rule out Septal infarct , age undetermined, Nonspecific ST - T abnormalities

## 2017-09-06 NOTE — PROGRESS NOTE ADULT - PROBLEM SELECTOR PLAN 7
Patient takes 1,000 mg bid metformin, levemir 15 units subq bedtime.  -[id number] units sub q with MISS while in the hospital.

## 2017-09-06 NOTE — PROGRESS NOTE ADULT - SUBJECTIVE AND OBJECTIVE BOX
PGY-1 Transfer Acceptance Note:  Hospital Course  43 yo F w/PMH dCHF (EF 55%), on home O2 4L during day and bipap at night, MANUEL, morbid obesity, HTN, DMII admitted for pulmonary edema 2/2 acute on chronic diastolic HFpEF in the setting of medication non-compliance. She was diuresed with 80mg IV Lasix in ED, then restarted on her home regimen of Lasix 80mg PO BID and placed on BiPAP overnight. Repeat echocardiogram showed stable EF and pericardial effusion. She improved clinically and maintained O2 sats on 4L NC during the day.    SUBJECTIVE: Patient seen and examined at bedside.     OBJECTIVE:    VITAL SIGNS:  ICU Vital Signs Last 24 Hrs  T(C): 36.7 (06 Sep 2017 02:00), Max: 37.1 (05 Sep 2017 13:20)  T(F): 98 (06 Sep 2017 02:00), Max: 98.7 (05 Sep 2017 13:20)  HR: 94 (06 Sep 2017 05:57) (82 - 112)  BP: 124/80 (06 Sep 2017 05:05) (124/80 - 160/75)  BP(mean): 98 (06 Sep 2017 05:05) (91 - 108)  ABP: --  ABP(mean): --  RR: 18 (06 Sep 2017 05:57) (18 - 20)  SpO2: 93% (06 Sep 2017 05:57) (87% - 100%)      09-05 @ 07:01  -  09-06 @ 07:00  --------------------------------------------------------  IN: 0 mL / OUT: 1025 mL / NET: -1025 mL      CAPILLARY BLOOD GLUCOSE  184 (05 Sep 2017 21:39)      PHYSICAL EXAM:    General: NAD  HEENT: NC/AT; EOMI,   Neck: supple  Respiratory: CTA b/l  Cardiovascular: +S1/S2; RRR  Abdomen: soft, NT/ND; +BS x4  Extremities: WWP, 2+ edema to ankles  Skin: normal color and turgor; no rash      MEDICATIONS:  MEDICATIONS  (STANDING):  heparin  Injectable 7500 Unit(s) SubCutaneous every 8 hours  insulin glargine Injectable (LANTUS) 10 Unit(s) SubCutaneous at bedtime  insulin lispro (HumaLOG) corrective regimen sliding scale   SubCutaneous Before meals and at bedtime  dextrose 5%. 1000 milliLiter(s) (50 mL/Hr) IV Continuous <Continuous>  dextrose 50% Injectable 12.5 Gram(s) IV Push once  dextrose 50% Injectable 25 Gram(s) IV Push once  dextrose 50% Injectable 25 Gram(s) IV Push once  losartan 25 milliGRAM(s) Oral daily  potassium chloride    Tablet ER 20 milliEquivalent(s) Oral daily  furosemide    Tablet 80 milliGRAM(s) Oral every 12 hours  amLODIPine   Tablet 5 milliGRAM(s) Oral daily    MEDICATIONS  (PRN):  dextrose Gel 1 Dose(s) Oral once PRN Blood Glucose LESS THAN 70 milliGRAM(s)/deciliter  glucagon  Injectable 1 milliGRAM(s) IntraMuscular once PRN Glucose LESS THAN 70 milligrams/deciliter      ALLERGIES:  Allergies    No Known Drug Allergies  shellfish (Anaphylaxis)    Intolerances        LABS:                        10.1   7.3   )-----------( 251      ( 05 Sep 2017 05:20 )             37.0     09-05    143  |  93<L>  |  10  ----------------------------<  130<H>  4.3   |  42<H>  |  0.60    Ca    9.5      05 Sep 2017 05:20  Mg     1.6     09-05    TPro  8.1  /  Alb  3.8  /  TBili  0.6  /  DBili  x   /  AST  16  /  ALT  23  /  AlkPhos  68  09-04      RADIOLOGY & ADDITIONAL TESTS: Reviewed.    < from: Echocardiogram (09.05.17 @ 15:09) >  Normal left ventricular size and wall thickness.Abnormal septal motion   noted.The left ventricular ejection fraction is normal.The left   ventricular   ejection fraction is 55%.The right ventricle is normal in size and   function.The left atrial size is normal.Right atrial size is normal.No   evidence for any hemodynamically significant valvular disease.There was   insufficient TR detected from which to calculate pulmonary artery   systolic   pressure. Moderate circumferential pericardial effusion, more prominent   posterior to the heart and measuring up to 1.5 cm. Diastolic septal   bounce   noted, which can be consistent with elevated right sided end - diastolic   pressure. Constrictive effusivepericarditis cannot be ruled out with   certainty on this echo. No echocardiographic signs of tamponade.    Compared to   the echo performed on 4/21/17, there has been no change in size of the   pericardial effusion.    < end of copied text > PGY-1 Transfer Acceptance Note:  Hospital Course  43 yo F w/PMH dCHF (EF 55%), on home O2 4L during day and bipap at night, MANUEL, morbid obesity, HTN, DMII admitted for pulmonary edema 2/2 acute on chronic diastolic HFpEF in the setting of medication non-compliance. She was diuresed with 80mg IV Lasix in ED, then restarted on her home regimen of Lasix 80mg PO BID and placed on BiPAP overnight. Repeat echocardiogram showed stable EF and pericardial effusion. She improved clinically and maintained O2 sats on 4L NC during the day.    SUBJECTIVE: Patient seen and examined at bedside.     OBJECTIVE:    VITAL SIGNS:  ICU Vital Signs Last 24 Hrs  T(C): 36.7 (06 Sep 2017 02:00), Max: 37.1 (05 Sep 2017 13:20)  T(F): 98 (06 Sep 2017 02:00), Max: 98.7 (05 Sep 2017 13:20)  HR: 94 (06 Sep 2017 05:57) (82 - 112)  BP: 124/80 (06 Sep 2017 05:05) (124/80 - 160/75)  BP(mean): 98 (06 Sep 2017 05:05) (91 - 108)  ABP: --  ABP(mean): --  RR: 18 (06 Sep 2017 05:57) (18 - 20)  SpO2: 93% (06 Sep 2017 05:57) (87% - 100%)      09-05 @ 07:01  -  09-06 @ 07:00  --------------------------------------------------------  IN: 0 mL / OUT: 1025 mL / NET: -1025 mL      CAPILLARY BLOOD GLUCOSE  184 (05 Sep 2017 21:39)      PHYSICAL EXAM:    General: morbidly obese female, NAD, mild dyspnea when speaking  HEENT: NC/AT; EOMI, LEEROY, MMM  Neck: JVD to 3 cm above clavicle  Respiratory: decreased breath sounds b/l, crackles at right lung base  Cardiovascular: +S1/S2; RRR no MRG  Abdomen: soft, NT/ND; +BS x4  Extremities: WWP, 2+ edema to thighs      MEDICATIONS:  MEDICATIONS  (STANDING):  heparin  Injectable 7500 Unit(s) SubCutaneous every 8 hours  insulin glargine Injectable (LANTUS) 10 Unit(s) SubCutaneous at bedtime  insulin lispro (HumaLOG) corrective regimen sliding scale   SubCutaneous Before meals and at bedtime  dextrose 5%. 1000 milliLiter(s) (50 mL/Hr) IV Continuous <Continuous>  dextrose 50% Injectable 12.5 Gram(s) IV Push once  dextrose 50% Injectable 25 Gram(s) IV Push once  dextrose 50% Injectable 25 Gram(s) IV Push once  losartan 25 milliGRAM(s) Oral daily  potassium chloride    Tablet ER 20 milliEquivalent(s) Oral daily  furosemide    Tablet 80 milliGRAM(s) Oral every 12 hours  amLODIPine   Tablet 5 milliGRAM(s) Oral daily    MEDICATIONS  (PRN):  dextrose Gel 1 Dose(s) Oral once PRN Blood Glucose LESS THAN 70 milliGRAM(s)/deciliter  glucagon  Injectable 1 milliGRAM(s) IntraMuscular once PRN Glucose LESS THAN 70 milligrams/deciliter      ALLERGIES:  Allergies    No Known Drug Allergies  shellfish (Anaphylaxis)    Intolerances    LABS:                        10.1   7.3   )-----------( 251      ( 05 Sep 2017 05:20 )             37.0     09-05    143  |  93<L>  |  10  ----------------------------<  130<H>  4.3   |  42<H>  |  0.60    Ca    9.5      05 Sep 2017 05:20  Mg     1.6     09-05    TPro  8.1  /  Alb  3.8  /  TBili  0.6  /  DBili  x   /  AST  16  /  ALT  23  /  AlkPhos  68  09-04      RADIOLOGY & ADDITIONAL TESTS: Reviewed.    < from: Echocardiogram (09.05.17 @ 15:09) >  Normal left ventricular size and wall thickness.Abnormal septal motion   noted.The left ventricular ejection fraction is normal.The left   ventricular   ejection fraction is 55%.The right ventricle is normal in size and   function.The left atrial size is normal.Right atrial size is normal.No   evidence for any hemodynamically significant valvular disease.There was   insufficient TR detected from which to calculate pulmonary artery   systolic   pressure. Moderate circumferential pericardial effusion, more prominent   posterior to the heart and measuring up to 1.5 cm. Diastolic septal   bounce   noted, which can be consistent with elevated right sided end - diastolic   pressure. Constrictive effusivepericarditis cannot be ruled out with   certainty on this echo. No echocardiographic signs of tamponade.    Compared to   the echo performed on 4/21/17, there has been no change in size of the   pericardial effusion.    < end of copied text >

## 2017-09-06 NOTE — PROGRESS NOTE ADULT - PROBLEM SELECTOR PLAN 1
Patient presented with CXR findings consistent with pulm edema 2/2 CHF exacerbation due to medication and lifestyle non-adherence.  Has significantly improved after resuming home dose of lasix and w/BiPAP overnight. Repeat echo shows unchanged LVEF of 55% and moderate pericardial effusion, unchanged from echo 4/17.   - c/w home lasix 80 mg PO bid   -Strict I&O's- difficult since patient soils herself and does not ring call bell when she has to urinate.   -Daily weights, limit fluid intake   -Resume home regimen of 4 L NC during the day and BiPAP at night Patient presented with CXR findings consistent with pulm edema 2/2 CHF exacerbation due to treatment non-adherence.  Has significantly improved after resuming home dose of lasix and w/BiPAP overnight, still not at baseline in terms of RODRIGUEZ. Repeat echo shows unchanged LVEF of 55% and moderate pericardial effusion, unchanged from echo 4/17.   - c/w home lasix 80 mg PO bid   - Strict I&O's  -Daily weights, limit fluid intake   -Resume home regimen of 4 L NC during the day and BiPAP at night

## 2017-09-06 NOTE — PROGRESS NOTE ADULT - PROBLEM SELECTOR PLAN 4
Patient with chronic normocytic anemia, stable from previous admission in April. Hb this admission 10.6  -No signs or symptoms suggestive of bleeding.  -Iron studies within normal limits.   -Likely 2/2 anemia of chronic disease vs iron deficiency, f/u iron studies.  -Ctm Hb, transfuse if Hb <7 Patient with chronic normocytic anemia, stable from previous admission in April. Hb this admission 10.6. Iron studies within normal limits.   -No signs or symptoms suggestive of bleeding.  -Ctm Hb, transfuse if Hb <7

## 2017-09-06 NOTE — PROGRESS NOTE ADULT - PROBLEM SELECTOR PROBLEM 7
Type 2 diabetes mellitus without complication, with long-term current use of insulin Anemia, unspecified type

## 2017-09-06 NOTE — PROGRESS NOTE ADULT - SUBJECTIVE AND OBJECTIVE BOX
INTERVAL HPI/OVERNIGHT EVENTS: Gave 80mg IV lasix one time, as patient was on BiPAP.     SUBJECTIVE: Patient seen and examined at bedside.  Denies cough, SOB, CP, or abdominal pain. No BM since first day of admission.     OBJECTIVE:    VITAL SIGNS:  ICU Vital Signs Last 24 Hrs  T(C): 36.7 (06 Sep 2017 02:00), Max: 37.1 (05 Sep 2017 13:20)  T(F): 98 (06 Sep 2017 02:00), Max: 98.7 (05 Sep 2017 13:20)  HR: 94 (06 Sep 2017 05:57) (82 - 112)  BP: 124/80 (06 Sep 2017 05:05) (124/80 - 160/75)  BP(mean): 98 (06 Sep 2017 05:05) (91 - 108)  ABP: --  ABP(mean): --  RR: 18 (06 Sep 2017 05:57) (18 - 20)  SpO2: 93% (06 Sep 2017 05:57) (87% - 100%)        09-05 @ 07:01  -  09-06 @ 07:00  --------------------------------------------------------  IN: 0 mL / OUT: 1025 mL / NET: -1025 mL      CAPILLARY BLOOD GLUCOSE  184 (05 Sep 2017 21:39)      PHYSICAL EXAM:    General: NAD  HEENT: NC/AT; EOMI,   Neck: supple  Respiratory: CTA b/l  Cardiovascular: +S1/S2; RRR  Abdomen: soft, NT/ND; +BS x4  Extremities: WWP, 2+ edema to ankles  Skin: normal color and turgor; no rash      MEDICATIONS:  MEDICATIONS  (STANDING):  heparin  Injectable 7500 Unit(s) SubCutaneous every 8 hours  insulin glargine Injectable (LANTUS) 10 Unit(s) SubCutaneous at bedtime  insulin lispro (HumaLOG) corrective regimen sliding scale   SubCutaneous Before meals and at bedtime  dextrose 5%. 1000 milliLiter(s) (50 mL/Hr) IV Continuous <Continuous>  dextrose 50% Injectable 12.5 Gram(s) IV Push once  dextrose 50% Injectable 25 Gram(s) IV Push once  dextrose 50% Injectable 25 Gram(s) IV Push once  losartan 25 milliGRAM(s) Oral daily  potassium chloride    Tablet ER 20 milliEquivalent(s) Oral daily  furosemide    Tablet 80 milliGRAM(s) Oral every 12 hours  amLODIPine   Tablet 5 milliGRAM(s) Oral daily    MEDICATIONS  (PRN):  dextrose Gel 1 Dose(s) Oral once PRN Blood Glucose LESS THAN 70 milliGRAM(s)/deciliter  glucagon  Injectable 1 milliGRAM(s) IntraMuscular once PRN Glucose LESS THAN 70 milligrams/deciliter      ALLERGIES:  Allergies    No Known Drug Allergies  shellfish (Anaphylaxis)    Intolerances        LABS:                        10.1   7.3   )-----------( 251      ( 05 Sep 2017 05:20 )             37.0     09-05    143  |  93<L>  |  10  ----------------------------<  130<H>  4.3   |  42<H>  |  0.60    Ca    9.5      05 Sep 2017 05:20  Mg     1.6     09-05    TPro  8.1  /  Alb  3.8  /  TBili  0.6  /  DBili  x   /  AST  16  /  ALT  23  /  AlkPhos  68  09-04          RADIOLOGY & ADDITIONAL TESTS: Reviewed.    ASSESSMENT:     PLAN:    FEN - no IVF; replete lytes prn; NPO    PPx - HSQ    CODE - FULL.    DISPO - continue telemetry monitoring in ICU-step down level of care on [facility]. INTERVAL HPI/OVERNIGHT EVENTS: Gave 80mg IV lasix one time, as patient was on BiPAP.     SUBJECTIVE: Patient seen and examined at bedside.  Denies cough, SOB, CP, or abdominal pain. No BM for 2 days.    OBJECTIVE:    VITAL SIGNS:  ICU Vital Signs Last 24 Hrs  T(C): 36.7 (06 Sep 2017 02:00), Max: 37.1 (05 Sep 2017 13:20)  T(F): 98 (06 Sep 2017 02:00), Max: 98.7 (05 Sep 2017 13:20)  HR: 94 (06 Sep 2017 05:57) (82 - 112)  BP: 124/80 (06 Sep 2017 05:05) (124/80 - 160/75)  BP(mean): 98 (06 Sep 2017 05:05) (91 - 108)  ABP: --  ABP(mean): --  RR: 18 (06 Sep 2017 05:57) (18 - 20)  SpO2: 93% (06 Sep 2017 05:57) (87% - 100%)        09-05 @ 07:01  -  09-06 @ 07:00  --------------------------------------------------------  IN: 0 mL / OUT: 1025 mL / NET: -1025 mL      CAPILLARY BLOOD GLUCOSE  184 (05 Sep 2017 21:39)      PHYSICAL EXAM:    General: NAD  HEENT: NC/AT; EOMI,   Neck: supple  Respiratory: CTA b/l  Cardiovascular: +S1/S2; RRR  Abdomen: soft, NT/ND; +BS x4  Extremities: WWP, 2+ edema to ankles  Skin: normal color and turgor; no rash      MEDICATIONS:  MEDICATIONS  (STANDING):  heparin  Injectable 7500 Unit(s) SubCutaneous every 8 hours  insulin glargine Injectable (LANTUS) 10 Unit(s) SubCutaneous at bedtime  insulin lispro (HumaLOG) corrective regimen sliding scale   SubCutaneous Before meals and at bedtime  dextrose 5%. 1000 milliLiter(s) (50 mL/Hr) IV Continuous <Continuous>  dextrose 50% Injectable 12.5 Gram(s) IV Push once  dextrose 50% Injectable 25 Gram(s) IV Push once  dextrose 50% Injectable 25 Gram(s) IV Push once  losartan 25 milliGRAM(s) Oral daily  potassium chloride    Tablet ER 20 milliEquivalent(s) Oral daily  furosemide    Tablet 80 milliGRAM(s) Oral every 12 hours  amLODIPine   Tablet 5 milliGRAM(s) Oral daily    MEDICATIONS  (PRN):  dextrose Gel 1 Dose(s) Oral once PRN Blood Glucose LESS THAN 70 milliGRAM(s)/deciliter  glucagon  Injectable 1 milliGRAM(s) IntraMuscular once PRN Glucose LESS THAN 70 milligrams/deciliter      ALLERGIES:  Allergies    No Known Drug Allergies  shellfish (Anaphylaxis)    Intolerances        LABS:                        10.1   7.3   )-----------( 251      ( 05 Sep 2017 05:20 )             37.0     09-05    143  |  93<L>  |  10  ----------------------------<  130<H>  4.3   |  42<H>  |  0.60    Ca    9.5      05 Sep 2017 05:20  Mg     1.6     09-05    TPro  8.1  /  Alb  3.8  /  TBili  0.6  /  DBili  x   /  AST  16  /  ALT  23  /  AlkPhos  68  09-04          RADIOLOGY & ADDITIONAL TESTS: Reviewed.    < from: Echocardiogram (09.05.17 @ 15:09) >  Normal left ventricular size and wall thickness.Abnormal septal motion   noted.The left ventricular ejection fraction is normal.The left   ventricular   ejection fraction is 55%.The right ventricle is normal in size and   function.The left atrial size is normal.Right atrial size is normal.No   evidence for any hemodynamically significant valvular disease.There was   insufficient TR detected from which to calculate pulmonary artery   systolic   pressure. Moderate circumferential pericardial effusion, more prominent   posterior to the heart and measuring up to 1.5 cm. Diastolic septal   bounce   noted, which can be consistent with elevated right sided end - diastolic   pressure. Constrictive effusivepericarditis cannot be ruled out with   certainty on this echo. No echocardiographic signs of tamponade.    Compared to   the echo performed on 4/21/17, there has been no change in size of the   pericardial effusion.    < end of copied text > PGY-1 Transfer Note- [facility] to [facility]    Hospital Course: 43 yo F w/PMH dCHF (EF 55%), on home O2 4L during day and bipap at night, MANUEL, morbid obesity, HTN, DMII admitted to pulmonary edema 2/2 acute on chronic diastolic HFpEF in the setting of medication non-compliance. She was diuresed with 80mg IV Lasix in ED, then restarted on her home regimen of Lasix 80mg PO BID and placed on BiPap overnight. Repeat echocardiogram showed stable EF and pericardial effusion. She improved clinically and maintained O2 sats on 4L NC during the day. She remained hemodynamically stable and ready for transfer to [facility].     INTERVAL HPI/OVERNIGHT EVENTS: Gave 80mg IV lasix one time, as patient was on BiPAP.     SUBJECTIVE: Patient seen and examined at bedside.  Denies cough, SOB, CP, or abdominal pain. No BM for 2 days.    OBJECTIVE:    VITAL SIGNS:  ICU Vital Signs Last 24 Hrs  T(C): 36.7 (06 Sep 2017 02:00), Max: 37.1 (05 Sep 2017 13:20)  T(F): 98 (06 Sep 2017 02:00), Max: 98.7 (05 Sep 2017 13:20)  HR: 94 (06 Sep 2017 05:57) (82 - 112)  BP: 124/80 (06 Sep 2017 05:05) (124/80 - 160/75)  BP(mean): 98 (06 Sep 2017 05:05) (91 - 108)  ABP: --  ABP(mean): --  RR: 18 (06 Sep 2017 05:57) (18 - 20)  SpO2: 93% (06 Sep 2017 05:57) (87% - 100%)        09-05 @ 07:01  -  09-06 @ 07:00  --------------------------------------------------------  IN: 0 mL / OUT: 1025 mL / NET: -1025 mL      CAPILLARY BLOOD GLUCOSE  184 (05 Sep 2017 21:39)      PHYSICAL EXAM:    General: NAD  HEENT: NC/AT; EOMI,   Neck: supple  Respiratory: CTA b/l  Cardiovascular: +S1/S2; RRR  Abdomen: soft, NT/ND; +BS x4  Extremities: WWP, 2+ edema to ankles  Skin: normal color and turgor; no rash      MEDICATIONS:  MEDICATIONS  (STANDING):  heparin  Injectable 7500 Unit(s) SubCutaneous every 8 hours  insulin glargine Injectable (LANTUS) 10 Unit(s) SubCutaneous at bedtime  insulin lispro (HumaLOG) corrective regimen sliding scale   SubCutaneous Before meals and at bedtime  dextrose 5%. 1000 milliLiter(s) (50 mL/Hr) IV Continuous <Continuous>  dextrose 50% Injectable 12.5 Gram(s) IV Push once  dextrose 50% Injectable 25 Gram(s) IV Push once  dextrose 50% Injectable 25 Gram(s) IV Push once  losartan 25 milliGRAM(s) Oral daily  potassium chloride    Tablet ER 20 milliEquivalent(s) Oral daily  furosemide    Tablet 80 milliGRAM(s) Oral every 12 hours  amLODIPine   Tablet 5 milliGRAM(s) Oral daily    MEDICATIONS  (PRN):  dextrose Gel 1 Dose(s) Oral once PRN Blood Glucose LESS THAN 70 milliGRAM(s)/deciliter  glucagon  Injectable 1 milliGRAM(s) IntraMuscular once PRN Glucose LESS THAN 70 milligrams/deciliter      ALLERGIES:  Allergies    No Known Drug Allergies  shellfish (Anaphylaxis)    Intolerances        LABS:                        10.1   7.3   )-----------( 251      ( 05 Sep 2017 05:20 )             37.0     09-05    143  |  93<L>  |  10  ----------------------------<  130<H>  4.3   |  42<H>  |  0.60    Ca    9.5      05 Sep 2017 05:20  Mg     1.6     09-05    TPro  8.1  /  Alb  3.8  /  TBili  0.6  /  DBili  x   /  AST  16  /  ALT  23  /  AlkPhos  68  09-04          RADIOLOGY & ADDITIONAL TESTS: Reviewed.    < from: Echocardiogram (09.05.17 @ 15:09) >  Normal left ventricular size and wall thickness.Abnormal septal motion   noted.The left ventricular ejection fraction is normal.The left   ventricular   ejection fraction is 55%.The right ventricle is normal in size and   function.The left atrial size is normal.Right atrial size is normal.No   evidence for any hemodynamically significant valvular disease.There was   insufficient TR detected from which to calculate pulmonary artery   systolic   pressure. Moderate circumferential pericardial effusion, more prominent   posterior to the heart and measuring up to 1.5 cm. Diastolic septal   bounce   noted, which can be consistent with elevated right sided end - diastolic   pressure. Constrictive effusivepericarditis cannot be ruled out with   certainty on this echo. No echocardiographic signs of tamponade.    Compared to   the echo performed on 4/21/17, there has been no change in size of the   pericardial effusion.    < end of copied text >

## 2017-09-06 NOTE — PROGRESS NOTE ADULT - PROBLEM SELECTOR PROBLEM 6
Essential hypertension Type 2 diabetes mellitus without complication, with long-term current use of insulin

## 2017-09-06 NOTE — PROGRESS NOTE ADULT - PROBLEM SELECTOR PLAN 9
DVT ppx: sub subq, SCD's  CODE STATUS: FULL CODE   DISPO: [facility] DVT ppx: sub subq, SCD's  CODE STATUS: FULL CODE   DISPO: [facility]

## 2017-09-06 NOTE — PROGRESS NOTE ADULT - PROBLEM SELECTOR PLAN 5
Patient now presenting with pulm edema 2/2 medication and lifestyle non-adherence. LVEF from 4/2017 showed left ventricular hypertrophy, abnormal septal motion, LVEF 55%. Moderate pericardial effusion noted behind the left heart  - f/u repeat echo  - c/w home dose lasix 80 mg PO bid  --Strict I&O's as feasible   -Daily weights  -Resume home regimen of 4 L NC during the day and BiPAP at night  -EKG showed  Normal sinus rhythm, Rightward axis. Low voltage QRS, Cannot rule out Septal infarct , age undetermined, Nonspecific ST - T abnormalities Patient now presenting with pulm edema 2/2 medication and lifestyle non-adherence. LVEF from 4/2017 showed left ventricular hypertrophy, abnormal septal motion, LVEF 55%. Moderate pericardial effusion noted behind the left heart. Repeat echo unchanged.   - c/w home dose lasix 80 mg PO bid  --Strict I&O's as feasible   -Daily weights  -Resume home regimen of 4 L NC during the day and BiPAP at night  -EKG showed  Normal sinus rhythm, Rightward axis. Low voltage QRS, Cannot rule out Septal infarct , age undetermined, Nonspecific ST - T abnormalities

## 2017-09-06 NOTE — PROGRESS NOTE ADULT - PROBLEM SELECTOR PLAN 4
Patient with chronic normocytic anemia, stable from previous admission in April. Hb this admission 10.6. Iron studies within normal limits.   -No signs or symptoms suggestive of bleeding.  -Ctm Hb, transfuse if Hb <7 Patient with longstanding history of morbid obesity. On previous admission the possibility of bariatric surgery for weight loss was discussed.   -MANUEL/OHS management as above.  -Discuss options for weight loss.

## 2017-09-06 NOTE — PROGRESS NOTE ADULT - PROBLEM SELECTOR PLAN 1
Patient presented with CXR findings consistent with pulm edema 2/2 CHF exacerbation due to medication and lifestyle non-adherence.  Has significantly improved after resuming home dose of lasix and w/BiPAP overnight.   - c/w home lasix 80 mg PO bid   -Strict I&O's- difficult since patient soils herself and does not ring call bell when she has to urinate.   -Daily weights, limit fluid intake   -Resume home regimen of 4 L NC during the day and BiPAP at night  -LVEF from 4/2017 showed left ventricular hypertrophy, abnormal septal motion, LVEF 55%. Moderate pericardial effusion noted behind the left heart  - Echo ordered to re-assess LVEF, f/u results. Patient presented with CXR findings consistent with pulm edema 2/2 CHF exacerbation due to medication and lifestyle non-adherence.  Has significantly improved after resuming home dose of lasix and w/BiPAP overnight. Repeat echo shows unchanged LVEF of 55% and moderate pericardial effusion, unchanged from echo 4/17.   - c/w home lasix 80 mg PO bid   -Strict I&O's- difficult since patient soils herself and does not ring call bell when she has to urinate.   -Daily weights, limit fluid intake   -Resume home regimen of 4 L NC during the day and BiPAP at night

## 2017-09-06 NOTE — PROGRESS NOTE ADULT - PROBLEM SELECTOR PLAN 7
Patient takes 1,000 mg bid metformin, levemir 15 units subq bedtime.  -[id number] units sub q with MISS while in the hospital. Patient with chronic normocytic anemia, stable from previous admission in April. Hb this admission 10.6. Iron studies within normal limits.   -No signs or symptoms suggestive of bleeding.  -Monitor Hgb, transfuse if Hb <7

## 2017-09-07 ENCOUNTER — TRANSCRIPTION ENCOUNTER (OUTPATIENT)
Age: 42
End: 2017-09-07

## 2017-09-07 LAB
ANION GAP SERPL CALC-SCNC: 7 MMOL/L — SIGNIFICANT CHANGE UP (ref 5–17)
BUN SERPL-MCNC: 12 MG/DL — SIGNIFICANT CHANGE UP (ref 7–23)
CALCIUM SERPL-MCNC: 9.6 MG/DL — SIGNIFICANT CHANGE UP (ref 8.4–10.5)
CHLORIDE SERPL-SCNC: 87 MMOL/L — LOW (ref 96–108)
CO2 SERPL-SCNC: 43 MMOL/L — HIGH (ref 22–31)
CREAT SERPL-MCNC: 0.6 MG/DL — SIGNIFICANT CHANGE UP (ref 0.5–1.3)
FERRITIN SERPL-MCNC: 127.1 NG/ML — SIGNIFICANT CHANGE UP (ref 15–150)
GLUCOSE SERPL-MCNC: 152 MG/DL — HIGH (ref 70–99)
HCT VFR BLD CALC: 40.5 % — SIGNIFICANT CHANGE UP (ref 34.5–45)
HGB BLD-MCNC: 11.3 G/DL — LOW (ref 11.5–15.5)
IRON SATN MFR SERPL: 11 % — LOW (ref 14–50)
IRON SATN MFR SERPL: 32 UG/DL — SIGNIFICANT CHANGE UP (ref 30–160)
MAGNESIUM SERPL-MCNC: 1.9 MG/DL — SIGNIFICANT CHANGE UP (ref 1.6–2.6)
MCHC RBC-ENTMCNC: 26.3 PG — LOW (ref 27–34)
MCHC RBC-ENTMCNC: 27.9 G/DL — LOW (ref 32–36)
MCV RBC AUTO: 94.4 FL — SIGNIFICANT CHANGE UP (ref 80–100)
PLATELET # BLD AUTO: 261 K/UL — SIGNIFICANT CHANGE UP (ref 150–400)
POTASSIUM SERPL-MCNC: 4.2 MMOL/L — SIGNIFICANT CHANGE UP (ref 3.5–5.3)
POTASSIUM SERPL-SCNC: 4.2 MMOL/L — SIGNIFICANT CHANGE UP (ref 3.5–5.3)
RBC # BLD: 4.29 M/UL — SIGNIFICANT CHANGE UP (ref 3.8–5.2)
RBC # FLD: 15.6 % — SIGNIFICANT CHANGE UP (ref 10.3–16.9)
SODIUM SERPL-SCNC: 137 MMOL/L — SIGNIFICANT CHANGE UP (ref 135–145)
TIBC SERPL-MCNC: 300 UG/DL — SIGNIFICANT CHANGE UP (ref 220–430)
UIBC SERPL-MCNC: 268 UG/DL — SIGNIFICANT CHANGE UP (ref 110–370)
WBC # BLD: 7.4 K/UL — SIGNIFICANT CHANGE UP (ref 3.8–10.5)
WBC # FLD AUTO: 7.4 K/UL — SIGNIFICANT CHANGE UP (ref 3.8–10.5)

## 2017-09-07 PROCEDURE — 71010: CPT | Mod: 26

## 2017-09-07 PROCEDURE — 99233 SBSQ HOSP IP/OBS HIGH 50: CPT | Mod: GC

## 2017-09-07 RX ORDER — MAGNESIUM SULFATE 500 MG/ML
2 VIAL (ML) INJECTION ONCE
Qty: 0 | Refills: 0 | Status: COMPLETED | OUTPATIENT
Start: 2017-09-07 | End: 2017-09-07

## 2017-09-07 RX ORDER — FUROSEMIDE 40 MG
40 TABLET ORAL ONCE
Qty: 0 | Refills: 0 | Status: COMPLETED | OUTPATIENT
Start: 2017-09-07 | End: 2017-09-07

## 2017-09-07 RX ORDER — FUROSEMIDE 40 MG
80 TABLET ORAL EVERY 12 HOURS
Qty: 0 | Refills: 0 | Status: DISCONTINUED | OUTPATIENT
Start: 2017-09-07 | End: 2017-09-12

## 2017-09-07 RX ADMIN — LOSARTAN POTASSIUM 25 MILLIGRAM(S): 100 TABLET, FILM COATED ORAL at 05:41

## 2017-09-07 RX ADMIN — INSULIN GLARGINE 10 UNIT(S): 100 INJECTION, SOLUTION SUBCUTANEOUS at 21:24

## 2017-09-07 RX ADMIN — Medication 80 MILLIGRAM(S): at 17:37

## 2017-09-07 RX ADMIN — HEPARIN SODIUM 7500 UNIT(S): 5000 INJECTION INTRAVENOUS; SUBCUTANEOUS at 13:54

## 2017-09-07 RX ADMIN — HEPARIN SODIUM 7500 UNIT(S): 5000 INJECTION INTRAVENOUS; SUBCUTANEOUS at 21:24

## 2017-09-07 RX ADMIN — HEPARIN SODIUM 7500 UNIT(S): 5000 INJECTION INTRAVENOUS; SUBCUTANEOUS at 05:41

## 2017-09-07 RX ADMIN — Medication 20 MILLIEQUIVALENT(S): at 12:01

## 2017-09-07 RX ADMIN — Medication 50 GRAM(S): at 12:19

## 2017-09-07 RX ADMIN — AMLODIPINE BESYLATE 5 MILLIGRAM(S): 2.5 TABLET ORAL at 12:01

## 2017-09-07 RX ADMIN — Medication 40 MILLIGRAM(S): at 12:19

## 2017-09-07 NOTE — PROGRESS NOTE ADULT - PROBLEM SELECTOR PLAN 3
Patient with a history of MANUEL, also morbidly obese likely with a component of OHS. VBG on admission showed pH 7.28, PCO2 102, HCO3 47, consistent with chronic respiratory acidosis. Patient admits to non-adherence to nightly BiPAP  -c/w BiPAP at night, 4 L NC during the day.   -Monitor O2 sat with goal >92% Patient with a history of MANUEL, also morbidly obese likely with a component of OHS. VBG on admission showed pH 7.28, PCO2 102, HCO3 47, consistent with chronic respiratory acidosis. Patient admits to non-adherence to nightly BiPAP.  O2 requirements higher than baseline O/N.  -c/w BiPAP at night, 4 L NC during the day.   -Monitor O2 sat with goal >92%

## 2017-09-07 NOTE — PROGRESS NOTE ADULT - PROBLEM SELECTOR PLAN 5
Patient takes amlodipine 5 mg Po daily, losartan 25 mg PO daily in addition to 80 mg PO bid at home.  -c/w home medications and monitor BP. Patient takes amlodipine 5 mg Po daily, losartan 25 mg PO daily in addition to Lasix 80 mg PO bid at home.  -c/w home medications and monitor BP.

## 2017-09-07 NOTE — DISCHARGE NOTE ADULT - CARE PLAN
Principal Discharge DX:	Pulmonary edema cardiac cause  Goal:	Removal of fluid from the lungs  Instructions for follow-up, activity and diet:	You presented to the hospital because you were short of breath.  You were found to have fluid in your lungs.  This is a complication of heart failure and can be avoided by taking your medications as prescribed.  Please be sure to take your Lasix and other medications as prescribed to prevent excess fluid from building up in your lungs.  Secondary Diagnosis:	Chronic diastolic heart failure  Goal:	Prevent complications of heart failure  Instructions for follow-up, activity and diet:	You were previously diagnosed with diastolic heart failure, which means that your heart does not pump as well as it should.  Please follow up with your PMD for further surveillance and management of your Heart Failure.  Secondary Diagnosis:	Morbid obesity with alveolar hypoventilation  Goal:	Weight reduction  Instructions for follow-up, activity and diet:	You are not able to breathe well because of excess body weight.  This can lead to a buildup of waste product in blood and poor oxygen saturation.  You can improve this problem by losing weight.  Please follow up with your primary doctor for help with weight loss.  Secondary Diagnosis:	Obstructive sleep apnea syndrome  Goal:	Use BiPAP consistently  Instructions for follow-up, activity and diet:	You were previously diagnosed with MANUEL, which means that your breathing is compromised when you sleep.  This leads to a worsening of the buildup of waste product in your blood and poor oxygen saturation.  You can improve this by wearing your BiPAP as directed.  Please follow-up with your Pulmonologist for your MANUEL.  Secondary Diagnosis:	Type 2 diabetes mellitus without complication, with long-term current use of insulin  Goal:	Blood sugar control  Instructions for follow-up, activity and diet:	You were previously diagnosed with Diabetes. Please continue on your home medications at this time and follow up with your PMD for further surveillance and management of your Diabetes.  Secondary Diagnosis:	Essential hypertension  Goal:	Blood pressure control  Instructions for follow-up, activity and diet:	You were previously diagnosed with high blood pressure. Please continue on your home medications at this time and follow up with your PMD for further surveillance and management of your high blood pressure.  Secondary Diagnosis:	Disease of pericardium  Goal:	Improvement of fluid around the heart  Instructions for follow-up, activity and diet:	You were found to have fluid around your heart (Pericardial Effusion) that is the same as on your last hospital admission.  Please follow-up with your primary doctor for surveillance of the fluid around your heart.

## 2017-09-07 NOTE — DISCHARGE NOTE ADULT - CONDITIONS AT DISCHARGE
the patient was seen and examined. Weight is 183.1 kg from 191 kg on admission. She is ambulating with no difficulty

## 2017-09-07 NOTE — PROGRESS NOTE ADULT - PROBLEM SELECTOR PLAN 9
DVT ppx: Hep subq, SCD's  CODE STATUS: FULL CODE   DISPO: pending compensation of HFpEF DVT ppx: Hep subq, SCD's  CODE STATUS: FULL CODE   DISPO: pending resolution of HFpEF exacerbation

## 2017-09-07 NOTE — PROGRESS NOTE ADULT - PROBLEM SELECTOR PLAN 6
Patient takes 1,000 mg bid metformin, levemir 15 units subq bedtime. FSG show adequate control with Lantus 10.  -[id number] units sub q with MISS

## 2017-09-07 NOTE — DIETITIAN INITIAL EVALUATION ADULT. - ENERGY NEEDS
BMI:66.4/IBW:135lbs+/-10%,313% of IBW.Skinintact.No N/V./D or pain.Eating %.Decrease kcal needs due to morbid obesity.Needs addition of fluid restriction.Fluids per MD discretion

## 2017-09-07 NOTE — DISCHARGE NOTE ADULT - MEDICATION SUMMARY - MEDICATIONS TO TAKE
I will START or STAY ON the medications listed below when I get home from the hospital:    losartan 25 mg oral tablet  -- 1 tab(s) by mouth once a day  -- Indication: For Essential hypertension    metFORMIN 1000 mg oral tablet  -- 1 tab(s) by mouth 2 times a day  -- Indication: For Diabetes    Levemir FlexPen 100 units/mL subcutaneous solution  -- 15 unit(s) subcutaneous once a day (at bedtime)  -- Indication: For Diabetes    amLODIPine 5 mg oral tablet  -- 1 tab(s) by mouth once a day  -- Indication: For htn    Lasix 80 mg oral tablet  -- 1 tab(s) by mouth every 12 hours  -- Indication: For Chf    Zantac 150 150 mg oral tablet  -- 1 tab(s) by mouth 2 times a day, As Needed  -- It is very important that you take or use this exactly as directed.  Do not skip doses or discontinue unless directed by your doctor.  Obtain medical advice before taking any non-prescription drugs as some may affect the action of this medication.    -- Indication: For gerd    Vitamin D3 50,000 intl units oral capsule  --  by mouth once a week  -- Indication: For vitamin d deficiency

## 2017-09-07 NOTE — PROGRESS NOTE ADULT - PROBLEM SELECTOR PLAN 7
Patient with chronic normocytic anemia, stable from previous admission in April. Hb this admission 10.6. Iron studies within normal limits.   -No signs or symptoms suggestive of bleeding.  -Monitor Hgb, transfuse if Hb <7

## 2017-09-07 NOTE — PROGRESS NOTE ADULT - PROBLEM SELECTOR PLAN 2
Echo from 4/2017 showed left ventricular hypertrophy, abnormal septal motion, LVEF 55%. Moderate pericardial effusion noted. Repeat echo unchanged.   -c/w home dose lasix 80 mg PO bid  -Strict I&O's as feasible   -Daily weights  -Resume home regimen of 4 L NC during the day and BiPAP at night  -EKG showed  Normal sinus rhythm, Rightward axis. Low voltage QRS, Cannot rule out Septal infarct , age undetermined, Nonspecific ST - T abnormalities Echo from 4/2017 showed left ventricular hypertrophy, abnormal septal motion, LVEF 55%. Moderate pericardial effusion noted. Repeat echo unchanged. EKG showed  Normal sinus rhythm, Rightward axis. Low voltage QRS, Cannot rule out Septal infarct , age undetermined, Nonspecific ST - T abnormalities  -c/w home dose lasix 80 mg PO bid  -Strict I&O's as feasible   -Daily weights  -4 L NC during the day and BiPAP at night

## 2017-09-07 NOTE — DISCHARGE NOTE ADULT - PLAN OF CARE
Removal of fluid from the lungs You were previously diagnosed with Diabetes. Please continue on your home medications at this time and follow up with your PMD for further surveillance and management of your Diabetes. You presented to the hospital because you were short of breath.  You were found to have fluid in your lungs.  This is a complication of heart failure and can be avoided by taking your medications as prescribed.  Please be sure to take your Lasix and other medications as prescribed to prevent excess fluid from building up in your lungs. Prevent complications of heart failure You were previously diagnosed with diastolic heart failure, which means that your heart does not pump as well as it should.  Please follow up with your PMD for further surveillance and management of your Heart Failure. Weight reduction You are not able to breathe well because of excess body weight.  This can lead to a buildup of waste product in blood and poor oxygen saturation.  You can improve this problem by losing weight.  Please follow up with your primary doctor for help with weight loss. Use BiPAP consistently You were previously diagnosed with MANUEL, which means that your breathing is compromised when you sleep.  This leads to a worsening of the buildup of waste product in your blood and poor oxygen saturation.  You can improve this by wearing your BiPAP as directed.  Please follow-up with your Pulmonologist for your MANUEL. Blood sugar control You were previously diagnosed with high blood pressure. Please continue on your home medications at this time and follow up with your PMD for further surveillance and management of your high blood pressure. Blood pressure control You were found to have fluid around your heart (Pericardial Effusion) that is the same as on your last hospital admission.  Please follow-up with your primary doctor for surveillance of the fluid around your heart. Improvement of fluid around the heart

## 2017-09-07 NOTE — DISCHARGE NOTE ADULT - PATIENT PORTAL LINK FT
“You can access the [employer] Patient Portal, offered by [facility], by registering with the following website: [email or web address]”

## 2017-09-07 NOTE — DISCHARGE NOTE ADULT - INSTRUCTIONS
continue Lasix at 80 mg twice a day. She is to continue on bronchodilators, antihypertensive medication, BiPAP. Follow up in 2 weeks in the office.

## 2017-09-07 NOTE — PROGRESS NOTE ADULT - PROBLEM SELECTOR PLAN 1
Patient presented with CXR findings consistent with pulm edema 2/2 CHF exacerbation due to treatment non-adherence.  Has significantly improved after resuming home dose of lasix and w/BiPAP overnight, still not at baseline in terms of RODRIGUEZ. CXR today with mild interval improvement. Still significantly overloaded on exam.  - c/w home lasix 80 mg PO bid   - Strict I&O's  - Daily weights, limit fluid intake   - Resume home regimen of 4 L NC during the day and BiPAP at night Patient presented with CXR findings consistent with pulm edema 2/2 CHF exacerbation due to treatment non-adherence.  Pt states that she often skips her Lasix so that she does not need to look for public restrooms on days when she leaves the house and also in the evening so that she can sleep without awakening to urinate. Has significantly improved after resuming home dose of lasix and w/BiPAP overnight, still not at baseline in terms of RODRIGUEZ. CXR today with mild interval improvement. Would benefit from additional diuresis.  - Lasix 40mg IVP now  - c/w home lasix 80 mg PO bid   - Strict I&O's  - Daily weights, limit fluid intake   - Resume home regimen of 4 L NC during the day and BiPAP at night

## 2017-09-07 NOTE — DIETITIAN INITIAL EVALUATION ADULT. - OTHER INFO
weight change due to swollen legs of recent .Morbid obesity for years.Variable compliance in diet.Bariatric surgery has been addressed.Poor insight into into CHF mgt regarding sodium and fluid monitoring.

## 2017-09-07 NOTE — DIETITIAN INITIAL EVALUATION ADULT. - NS AS NUTRI INTERV MEALS SNACK
Fluid - modified diet/Carbohydrate - modified diet/Energy - modified diet/add 1 liter fluid to diet order/General/healthful diet

## 2017-09-07 NOTE — PROGRESS NOTE ADULT - SUBJECTIVE AND OBJECTIVE BOX
O/N Events:  Pt desaturating to high 80's on BiPAP while asleep.  BiPAP settings changed to 16/8 70% O2.    SUBJECTIVE: Patient seen and examined at bedside. No complaints. Still experiencing exertional dyspnea.  Was not aware of increased O2 requirement overnight.  States legs are less swollen. Remaining ROS unremarkable.    OBJECTIVE:    Vital Signs Last 24 Hrs  T(C): 36.9 (07 Sep 2017 05:12), Max: 37 (06 Sep 2017 16:10)  T(F): 98.4 (07 Sep 2017 05:12), Max: 98.6 (06 Sep 2017 16:10)  HR: 91 (07 Sep 2017 09:33) (90 - 96)  BP: 150/75 (07 Sep 2017 09:33) (108/71 - 150/75)  BP(mean): 89 (06 Sep 2017 15:15) (88 - 89)  RR: 18 (07 Sep 2017 09:33) (18 - 21)  SpO2: 94% (07 Sep 2017 09:33) (94% - 100%)      PHYSICAL EXAM:    General: morbidly obese female, NAD, mild dyspnea when speaking  HEENT: NC/AT; EOMI, LEEROY, MMM  Neck: JVD to 3 cm above clavicle  Respiratory: decreased breath sounds b/l  Cardiovascular: +S1/S2; RRR no MRG  Abdomen: soft, NT/ND; +BS x4  Extremities: WWP, 1+ edema to thighs      MEDICATIONS:  MEDICATIONS  (STANDING):  heparin  Injectable 7500 Unit(s) SubCutaneous every 8 hours  insulin glargine Injectable (LANTUS) 10 Unit(s) SubCutaneous at bedtime  insulin lispro (HumaLOG) corrective regimen sliding scale   SubCutaneous Before meals and at bedtime  dextrose 5%. 1000 milliLiter(s) (50 mL/Hr) IV Continuous <Continuous>  dextrose 50% Injectable 12.5 Gram(s) IV Push once  dextrose 50% Injectable 25 Gram(s) IV Push once  dextrose 50% Injectable 25 Gram(s) IV Push once  losartan 25 milliGRAM(s) Oral daily  potassium chloride    Tablet ER 20 milliEquivalent(s) Oral daily  furosemide    Tablet 80 milliGRAM(s) Oral every 12 hours  amLODIPine   Tablet 5 milliGRAM(s) Oral daily    MEDICATIONS  (PRN):  dextrose Gel 1 Dose(s) Oral once PRN Blood Glucose LESS THAN 70 milliGRAM(s)/deciliter  glucagon  Injectable 1 milliGRAM(s) IntraMuscular once PRN Glucose LESS THAN 70 milligrams/deciliter      ALLERGIES:  Allergies    No Known Drug Allergies  shellfish (Anaphylaxis)    Intolerances    LABS:                        10.1   7.3   )-----------( 251      ( 05 Sep 2017 05:20 )             37.0     09-05    143  |  93<L>  |  10  ----------------------------<  130<H>  4.3   |  42<H>  |  0.60    Ca    9.5      05 Sep 2017 05:20  Mg     1.6     09-05    TPro  8.1  /  Alb  3.8  /  TBili  0.6  /  DBili  x   /  AST  16  /  ALT  23  /  AlkPhos  68  09-04      RADIOLOGY & ADDITIONAL TESTS: Reviewed. O/N Events:  Pt desaturating to high 80's on BiPAP while asleep.  BiPAP settings changed to 16/8 70% O2.    SUBJECTIVE: Patient seen and examined at bedside. No complaints. Still experiencing exertional dyspnea.  Was not aware of increased O2 requirement overnight.  States legs are less swollen. Remaining ROS unremarkable.    OBJECTIVE:    Vital Signs Last 24 Hrs  T(C): 36.9 (07 Sep 2017 05:12), Max: 37 (06 Sep 2017 16:10)  T(F): 98.4 (07 Sep 2017 05:12), Max: 98.6 (06 Sep 2017 16:10)  HR: 91 (07 Sep 2017 09:33) (90 - 96)  BP: 150/75 (07 Sep 2017 09:33) (108/71 - 150/75)  BP(mean): 89 (06 Sep 2017 15:15) (88 - 89)  RR: 18 (07 Sep 2017 09:33) (18 - 21)  SpO2: 94% (07 Sep 2017 09:33) (94% - 100%)    CAPILLARY BLOOD GLUCOSE  138 (07 Sep 2017 11:31)  125 (07 Sep 2017 07:47)  171 (06 Sep 2017 21:33)  130 (06 Sep 2017 16:59)      PHYSICAL EXAM:    General: morbidly obese female on 4L NC, NAD, mild dyspnea when speaking  HEENT: NC/AT; EOMI, LEEROY, MMM  Neck: JVD to 3 cm above clavicle  Respiratory: decreased breath sounds b/l  Cardiovascular: +S1/S2; RRR no MRG  Abdomen: soft, NT/ND; +BS x4  Extremities: WWP, 1+ edema to thighs      MEDICATIONS:  MEDICATIONS  (STANDING):  heparin  Injectable 7500 Unit(s) SubCutaneous every 8 hours  insulin glargine Injectable (LANTUS) 10 Unit(s) SubCutaneous at bedtime  insulin lispro (HumaLOG) corrective regimen sliding scale   SubCutaneous Before meals and at bedtime  dextrose 5%. 1000 milliLiter(s) (50 mL/Hr) IV Continuous <Continuous>  dextrose 50% Injectable 12.5 Gram(s) IV Push once  dextrose 50% Injectable 25 Gram(s) IV Push once  dextrose 50% Injectable 25 Gram(s) IV Push once  losartan 25 milliGRAM(s) Oral daily  potassium chloride    Tablet ER 20 milliEquivalent(s) Oral daily  furosemide    Tablet 80 milliGRAM(s) Oral every 12 hours  amLODIPine   Tablet 5 milliGRAM(s) Oral daily    MEDICATIONS  (PRN):  dextrose Gel 1 Dose(s) Oral once PRN Blood Glucose LESS THAN 70 milliGRAM(s)/deciliter  glucagon  Injectable 1 milliGRAM(s) IntraMuscular once PRN Glucose LESS THAN 70 milligrams/deciliter      ALLERGIES:  Allergies    No Known Drug Allergies  shellfish (Anaphylaxis)    Intolerances    .  LABS:                         11.3   7.4   )-----------( 261      ( 07 Sep 2017 11:25 )             40.5     09-07    137  |  87<L>  |  12  ----------------------------<  152<H>  4.2   |  43<H>  |  0.60    Ca    9.6      07 Sep 2017 11:25  Mg     1.9     09-07      RADIOLOGY & ADDITIONAL TESTS: Reviewed.

## 2017-09-07 NOTE — PROGRESS NOTE ADULT - ASSESSMENT
41 yo F w/PMH dCHF (EF 55% 9/2017), on home O2 4L during day and bipap at night, MANUEL, morbid obesity, HTN, DMII, presented with increasing SOB on minimal exertion progressing over the past month in the setting of medication non-compliance, admitted for decompensated diastolic HFpEF. 43 yo F w/PMH dCHF (EF 55% 9/2017), on home O2 4L during day and bipap at night, MANUEL, morbid obesity, HTN, DMII, presented with increasing SOB on minimal exertion progressing over the past month in the setting of medication non-compliance, admitted for decompensated diastolic HFpEF, improving.

## 2017-09-07 NOTE — DISCHARGE NOTE ADULT - CARE PROVIDER_API CALL
Sheridan Luo), Critical Care Medicine; Pulmonary Disease  [address], [place] [address]  Phone: [phone]  Fax: [phone]

## 2017-09-07 NOTE — DISCHARGE NOTE ADULT - SECONDARY DIAGNOSIS.
Chronic diastolic heart failure Morbid obesity with alveolar hypoventilation Obstructive sleep apnea syndrome Type 2 diabetes mellitus without complication, with long-term current use of insulin Essential hypertension Disease of pericardium

## 2017-09-08 DIAGNOSIS — I31.3 PERICARDIAL EFFUSION (NONINFLAMMATORY): ICD-10-CM

## 2017-09-08 LAB
ALBUMIN SERPL ELPH-MCNC: 3.8 G/DL — SIGNIFICANT CHANGE UP (ref 3.3–5)
ALP SERPL-CCNC: 124 U/L — HIGH (ref 40–120)
ALT FLD-CCNC: 59 U/L — HIGH (ref 10–45)
ANION GAP SERPL CALC-SCNC: 11 MMOL/L — SIGNIFICANT CHANGE UP (ref 5–17)
ANION GAP SERPL CALC-SCNC: 9 MMOL/L — SIGNIFICANT CHANGE UP (ref 5–17)
AST SERPL-CCNC: 119 U/L — HIGH (ref 10–40)
BILIRUB SERPL-MCNC: 1.1 MG/DL — SIGNIFICANT CHANGE UP (ref 0.2–1.2)
BUN SERPL-MCNC: 14 MG/DL — SIGNIFICANT CHANGE UP (ref 7–23)
BUN SERPL-MCNC: 18 MG/DL — SIGNIFICANT CHANGE UP (ref 7–23)
CALCIUM SERPL-MCNC: 9.4 MG/DL — SIGNIFICANT CHANGE UP (ref 8.4–10.5)
CALCIUM SERPL-MCNC: 9.5 MG/DL — SIGNIFICANT CHANGE UP (ref 8.4–10.5)
CHLORIDE SERPL-SCNC: 88 MMOL/L — LOW (ref 96–108)
CHLORIDE SERPL-SCNC: 89 MMOL/L — LOW (ref 96–108)
CK MB CFR SERPL CALC: <1 NG/ML — SIGNIFICANT CHANGE UP (ref 0–6.7)
CK SERPL-CCNC: 53 U/L — SIGNIFICANT CHANGE UP (ref 25–170)
CO2 SERPL-SCNC: 39 MMOL/L — HIGH (ref 22–31)
CO2 SERPL-SCNC: 42 MMOL/L — HIGH (ref 22–31)
CREAT SERPL-MCNC: 0.7 MG/DL — SIGNIFICANT CHANGE UP (ref 0.5–1.3)
CREAT SERPL-MCNC: 0.7 MG/DL — SIGNIFICANT CHANGE UP (ref 0.5–1.3)
GLUCOSE SERPL-MCNC: 141 MG/DL — HIGH (ref 70–99)
GLUCOSE SERPL-MCNC: 208 MG/DL — HIGH (ref 70–99)
HCT VFR BLD CALC: 40 % — SIGNIFICANT CHANGE UP (ref 34.5–45)
HGB BLD-MCNC: 11.1 G/DL — LOW (ref 11.5–15.5)
LIDOCAIN IGE QN: 62 U/L — HIGH (ref 7–60)
MAGNESIUM SERPL-MCNC: 2.1 MG/DL — SIGNIFICANT CHANGE UP (ref 1.6–2.6)
MCHC RBC-ENTMCNC: 26.6 PG — LOW (ref 27–34)
MCHC RBC-ENTMCNC: 27.8 G/DL — LOW (ref 32–36)
MCV RBC AUTO: 95.7 FL — SIGNIFICANT CHANGE UP (ref 80–100)
PLATELET # BLD AUTO: 265 K/UL — SIGNIFICANT CHANGE UP (ref 150–400)
POTASSIUM SERPL-MCNC: 4.3 MMOL/L — SIGNIFICANT CHANGE UP (ref 3.5–5.3)
POTASSIUM SERPL-MCNC: 4.3 MMOL/L — SIGNIFICANT CHANGE UP (ref 3.5–5.3)
POTASSIUM SERPL-SCNC: 4.3 MMOL/L — SIGNIFICANT CHANGE UP (ref 3.5–5.3)
POTASSIUM SERPL-SCNC: 4.3 MMOL/L — SIGNIFICANT CHANGE UP (ref 3.5–5.3)
PROT SERPL-MCNC: 8.3 G/DL — SIGNIFICANT CHANGE UP (ref 6–8.3)
RBC # BLD: 4.18 M/UL — SIGNIFICANT CHANGE UP (ref 3.8–5.2)
RBC # FLD: 15.6 % — SIGNIFICANT CHANGE UP (ref 10.3–16.9)
SODIUM SERPL-SCNC: 138 MMOL/L — SIGNIFICANT CHANGE UP (ref 135–145)
SODIUM SERPL-SCNC: 140 MMOL/L — SIGNIFICANT CHANGE UP (ref 135–145)
TROPONIN T SERPL-MCNC: <0.01 NG/ML — SIGNIFICANT CHANGE UP (ref 0–0.01)
WBC # BLD: 6.9 K/UL — SIGNIFICANT CHANGE UP (ref 3.8–10.5)
WBC # FLD AUTO: 6.9 K/UL — SIGNIFICANT CHANGE UP (ref 3.8–10.5)

## 2017-09-08 PROCEDURE — 93010 ELECTROCARDIOGRAM REPORT: CPT

## 2017-09-08 PROCEDURE — 99233 SBSQ HOSP IP/OBS HIGH 50: CPT | Mod: GC

## 2017-09-08 PROCEDURE — 93306 TTE W/DOPPLER COMPLETE: CPT | Mod: 26

## 2017-09-08 PROCEDURE — 71010: CPT | Mod: 26

## 2017-09-08 RX ORDER — FUROSEMIDE 40 MG
40 TABLET ORAL ONCE
Qty: 0 | Refills: 0 | Status: COMPLETED | OUTPATIENT
Start: 2017-09-08 | End: 2017-09-08

## 2017-09-08 RX ORDER — ONDANSETRON 8 MG/1
4 TABLET, FILM COATED ORAL ONCE
Qty: 0 | Refills: 0 | Status: COMPLETED | OUTPATIENT
Start: 2017-09-08 | End: 2017-09-08

## 2017-09-08 RX ORDER — FUROSEMIDE 40 MG
40 TABLET ORAL ONCE
Qty: 0 | Refills: 0 | Status: DISCONTINUED | OUTPATIENT
Start: 2017-09-08 | End: 2017-09-08

## 2017-09-08 RX ORDER — ACETAMINOPHEN 500 MG
650 TABLET ORAL ONCE
Qty: 0 | Refills: 0 | Status: COMPLETED | OUTPATIENT
Start: 2017-09-08 | End: 2017-09-08

## 2017-09-08 RX ADMIN — Medication 30 MILLILITER(S): at 17:12

## 2017-09-08 RX ADMIN — Medication 4: at 21:53

## 2017-09-08 RX ADMIN — AMLODIPINE BESYLATE 5 MILLIGRAM(S): 2.5 TABLET ORAL at 12:01

## 2017-09-08 RX ADMIN — ONDANSETRON 4 MILLIGRAM(S): 8 TABLET, FILM COATED ORAL at 19:16

## 2017-09-08 RX ADMIN — INSULIN GLARGINE 10 UNIT(S): 100 INJECTION, SOLUTION SUBCUTANEOUS at 21:47

## 2017-09-08 RX ADMIN — HEPARIN SODIUM 7500 UNIT(S): 5000 INJECTION INTRAVENOUS; SUBCUTANEOUS at 13:34

## 2017-09-08 RX ADMIN — Medication 80 MILLIGRAM(S): at 21:47

## 2017-09-08 RX ADMIN — Medication 40 MILLIGRAM(S): at 17:12

## 2017-09-08 RX ADMIN — Medication 80 MILLIGRAM(S): at 06:22

## 2017-09-08 RX ADMIN — HEPARIN SODIUM 7500 UNIT(S): 5000 INJECTION INTRAVENOUS; SUBCUTANEOUS at 06:22

## 2017-09-08 RX ADMIN — Medication 650 MILLIGRAM(S): at 17:12

## 2017-09-08 RX ADMIN — HEPARIN SODIUM 7500 UNIT(S): 5000 INJECTION INTRAVENOUS; SUBCUTANEOUS at 21:46

## 2017-09-08 RX ADMIN — LOSARTAN POTASSIUM 25 MILLIGRAM(S): 100 TABLET, FILM COATED ORAL at 06:22

## 2017-09-08 RX ADMIN — Medication 20 MILLIEQUIVALENT(S): at 12:01

## 2017-09-08 NOTE — PROGRESS NOTE ADULT - PROBLEM SELECTOR PLAN 7
Patient takes 1,000 mg bid metformin, levemir 15 units subq bedtime. FSG show adequate control with Lantus 10.  -[id number] units sub q with MISS

## 2017-09-08 NOTE — PROGRESS NOTE ADULT - ASSESSMENT
43 yo F w/PMH dCHF (EF 55% 9/2017), on home O2 4L during day and bipap at night, MANUEL, morbid obesity, HTN, DMII, presented with increasing SOB on minimal exertion progressing over the past month in the setting of medication non-compliance, admitted for decompensated diastolic HFpEF.

## 2017-09-08 NOTE — PROGRESS NOTE ADULT - SUBJECTIVE AND OBJECTIVE BOX
O/N Events: ADDY    SUBJECTIVE: Patient seen and examined at bedside. No complaints. Still experiencing exertional dyspnea.  Remaining ROS unremarkable.    OBJECTIVE:    Vital Signs Last 24 Hrs  T(C): 36.6 (08 Sep 2017 09:26), Max: 37 (07 Sep 2017 20:33)  T(F): 97.8 (08 Sep 2017 09:26), Max: 98.6 (07 Sep 2017 20:33)  HR: 76 (08 Sep 2017 12:43) (76 - 89)  BP: 113/70 (08 Sep 2017 12:43) (113/70 - 126/80)  BP(mean): --  RR: 20 (08 Sep 2017 12:51) (20 - 24)  SpO2: 96% (08 Sep 2017 15:51) (86% - 99%)    CAPILLARY BLOOD GLUCOSE  129 (08 Sep 2017 12:00)  129 (08 Sep 2017 07:47)  146 (07 Sep 2017 21:16)  140 (07 Sep 2017 17:16)    PHYSICAL EXAM:    General: morbidly obese female on 4L NC, NAD  HEENT: NC/AT; EOMI, LEEROY, MMM  Neck: JVD to 3 cm above clavicle  Respiratory: decreased breath sounds b/l  Cardiovascular: +S1/S2; RRR no MRG  Abdomen: soft, NT/ND; +BS x4  Extremities: WWP, 1+ edema to thighs    MEDICATIONS:  MEDICATIONS  (STANDING):  heparin  Injectable 7500 Unit(s) SubCutaneous every 8 hours  insulin glargine Injectable (LANTUS) 10 Unit(s) SubCutaneous at bedtime  insulin lispro (HumaLOG) corrective regimen sliding scale   SubCutaneous Before meals and at bedtime  dextrose 5%. 1000 milliLiter(s) (50 mL/Hr) IV Continuous <Continuous>  dextrose 50% Injectable 12.5 Gram(s) IV Push once  dextrose 50% Injectable 25 Gram(s) IV Push once  dextrose 50% Injectable 25 Gram(s) IV Push once  losartan 25 milliGRAM(s) Oral daily  potassium chloride    Tablet ER 20 milliEquivalent(s) Oral daily  furosemide    Tablet 80 milliGRAM(s) Oral every 12 hours  amLODIPine   Tablet 5 milliGRAM(s) Oral daily    MEDICATIONS  (PRN):  dextrose Gel 1 Dose(s) Oral once PRN Blood Glucose LESS THAN 70 milliGRAM(s)/deciliter  glucagon  Injectable 1 milliGRAM(s) IntraMuscular once PRN Glucose LESS THAN 70 milligrams/deciliter    ALLERGIES:  Allergies    No Known Drug Allergies  shellfish (Anaphylaxis)    Intolerances      LABS:                         11.1   6.9   )-----------( 265      ( 08 Sep 2017 07:45 )             40.0     09-08    138  |  88<L>  |  14  ----------------------------<  141<H>  4.3   |  39<H>  |  0.70    Ca    9.5      08 Sep 2017 07:45  Mg     2.1     09-08      RADIOLOGY & ADDITIONAL TESTS: Reviewed.

## 2017-09-08 NOTE — PROGRESS NOTE ADULT - PROBLEM SELECTOR PLAN 2
Pericardial effusion of unknown etiology stable on admission from previous imaging. May be 2/2 CHF vs infiltrative disease, less likely malignancy or infectious etiology. May benefit from diagnostic/therapeutic pericardiocentesis.  - Repeat Echo to assess for progression of pericardial effusion/signs of tamponade  - Consult Dr Enciso Pericardial effusion of unknown etiology stable on admission from previous imaging. May be 2/2 CHF vs infiltrative disease, less likely malignancy or infectious etiology. May benefit from diagnostic/therapeutic pericardiocentesis.  - Repeat Echo to assess for progression of pericardial effusion/signs of tamponade  - Consult Dr Enciso  - F/u KELLY

## 2017-09-08 NOTE — PROGRESS NOTE ADULT - PROBLEM SELECTOR PLAN 1
Patient presented with CXR findings consistent with pulm edema 2/2 CHF exacerbation due to treatment non-adherence. Pt states that she often skips her Lasix so that she does not need to look for public restrooms on days when she leaves the house and also in the evening so that she can sleep without awakening to urinate. Has significantly improved after resuming home dose of lasix and w/BiPAP overnight, still not at baseline in terms of RODRIGUEZ. Pt still unable to tolerate walking without dasaturating to the mid-80s and becoming severely SOB. Would benefit from additional diuresis.  - Lasix 40mg IVP now  - c/w home lasix 80 mg PO bid   - LE Dopplers  - Strict I&O's  - Daily weights, limit fluid intake   - 4 L NC during the day and BiPAP at night

## 2017-09-08 NOTE — PROGRESS NOTE ADULT - PROBLEM SELECTOR PLAN 3
Echo from 4/2017 showed left ventricular hypertrophy, abnormal septal motion, LVEF 55%. Moderate pericardial effusion noted. Repeat echo unchanged. EKG showed  Normal sinus rhythm, Rightward axis. Low voltage QRS, Cannot rule out Septal infarct , age undetermined, Nonspecific ST - T abnormalities  - c/w home dose lasix 80 mg PO bid  - Strict I&O's as feasible   - Daily weights

## 2017-09-08 NOTE — PROGRESS NOTE ADULT - PROBLEM SELECTOR PLAN 6
Patient takes amlodipine 5 mg Po daily, losartan 25 mg PO daily in addition to Lasix 80 mg PO bid at home.  -c/w home medications and monitor BP.

## 2017-09-08 NOTE — PROGRESS NOTE ADULT - PROBLEM SELECTOR PLAN 4
Patient with a history of MANUEL, also morbidly obese likely with a component of OHS. VBG on admission showed pH 7.28, PCO2 102, HCO3 47, consistent with chronic respiratory acidosis. Patient admits to non-adherence to nightly BiPAP.  O2 requirements higher than baseline O/N.  -c/w BiPAP at night, 4 L NC during the day.   -Monitor O2 sat with goal >92%

## 2017-09-09 LAB
ALBUMIN SERPL ELPH-MCNC: 4.1 G/DL — SIGNIFICANT CHANGE UP (ref 3.3–5)
ALBUMIN SERPL ELPH-MCNC: 4.3 G/DL — SIGNIFICANT CHANGE UP (ref 3.3–5)
ALP SERPL-CCNC: 143 U/L — HIGH (ref 40–120)
ALP SERPL-CCNC: 159 U/L — HIGH (ref 40–120)
ALT FLD-CCNC: 180 U/L — HIGH (ref 10–45)
ALT FLD-CCNC: 200 U/L — HIGH (ref 10–45)
ANION GAP SERPL CALC-SCNC: 12 MMOL/L — SIGNIFICANT CHANGE UP (ref 5–17)
ANION GAP SERPL CALC-SCNC: 8 MMOL/L — SIGNIFICANT CHANGE UP (ref 5–17)
AST SERPL-CCNC: 263 U/L — HIGH (ref 10–40)
AST SERPL-CCNC: 291 U/L — HIGH (ref 10–40)
BILIRUB DIRECT SERPL-MCNC: 0.5 MG/DL — HIGH (ref 0–0.2)
BILIRUB INDIRECT FLD-MCNC: 0.8 MG/DL — SIGNIFICANT CHANGE UP (ref 0.2–1)
BILIRUB SERPL-MCNC: 1.3 MG/DL — HIGH (ref 0.2–1.2)
BILIRUB SERPL-MCNC: 1.8 MG/DL — HIGH (ref 0.2–1.2)
BUN SERPL-MCNC: 21 MG/DL — SIGNIFICANT CHANGE UP (ref 7–23)
BUN SERPL-MCNC: 22 MG/DL — SIGNIFICANT CHANGE UP (ref 7–23)
CALCIUM SERPL-MCNC: 9.5 MG/DL — SIGNIFICANT CHANGE UP (ref 8.4–10.5)
CALCIUM SERPL-MCNC: 9.7 MG/DL — SIGNIFICANT CHANGE UP (ref 8.4–10.5)
CHLORIDE SERPL-SCNC: 88 MMOL/L — LOW (ref 96–108)
CHLORIDE SERPL-SCNC: 88 MMOL/L — LOW (ref 96–108)
CO2 SERPL-SCNC: 41 MMOL/L — HIGH (ref 22–31)
CO2 SERPL-SCNC: 45 MMOL/L — CRITICAL HIGH (ref 22–31)
CREAT SERPL-MCNC: 1 MG/DL — SIGNIFICANT CHANGE UP (ref 0.5–1.3)
CREAT SERPL-MCNC: 1.2 MG/DL — SIGNIFICANT CHANGE UP (ref 0.5–1.3)
GLUCOSE SERPL-MCNC: 112 MG/DL — HIGH (ref 70–99)
GLUCOSE SERPL-MCNC: 143 MG/DL — HIGH (ref 70–99)
HCT VFR BLD CALC: 41.3 % — SIGNIFICANT CHANGE UP (ref 34.5–45)
HGB BLD-MCNC: 10.8 G/DL — LOW (ref 11.5–15.5)
MAGNESIUM SERPL-MCNC: 2.6 MG/DL — SIGNIFICANT CHANGE UP (ref 1.6–2.6)
MCHC RBC-ENTMCNC: 26 PG — LOW (ref 27–34)
MCHC RBC-ENTMCNC: 26.2 G/DL — LOW (ref 32–36)
MCV RBC AUTO: 99.5 FL — SIGNIFICANT CHANGE UP (ref 80–100)
PLATELET # BLD AUTO: 246 K/UL — SIGNIFICANT CHANGE UP (ref 150–400)
POTASSIUM SERPL-MCNC: 4.3 MMOL/L — SIGNIFICANT CHANGE UP (ref 3.5–5.3)
POTASSIUM SERPL-MCNC: 4.8 MMOL/L — SIGNIFICANT CHANGE UP (ref 3.5–5.3)
POTASSIUM SERPL-SCNC: 4.3 MMOL/L — SIGNIFICANT CHANGE UP (ref 3.5–5.3)
POTASSIUM SERPL-SCNC: 4.8 MMOL/L — SIGNIFICANT CHANGE UP (ref 3.5–5.3)
PROT SERPL-MCNC: 8.7 G/DL — HIGH (ref 6–8.3)
PROT SERPL-MCNC: 8.8 G/DL — HIGH (ref 6–8.3)
RBC # BLD: 4.15 M/UL — SIGNIFICANT CHANGE UP (ref 3.8–5.2)
RBC # FLD: 15.4 % — SIGNIFICANT CHANGE UP (ref 10.3–16.9)
SODIUM SERPL-SCNC: 141 MMOL/L — SIGNIFICANT CHANGE UP (ref 135–145)
SODIUM SERPL-SCNC: 141 MMOL/L — SIGNIFICANT CHANGE UP (ref 135–145)
WBC # BLD: 5.6 K/UL — SIGNIFICANT CHANGE UP (ref 3.8–10.5)
WBC # FLD AUTO: 5.6 K/UL — SIGNIFICANT CHANGE UP (ref 3.8–10.5)

## 2017-09-09 PROCEDURE — 99222 1ST HOSP IP/OBS MODERATE 55: CPT

## 2017-09-09 PROCEDURE — 99223 1ST HOSP IP/OBS HIGH 75: CPT

## 2017-09-09 PROCEDURE — 99232 SBSQ HOSP IP/OBS MODERATE 35: CPT | Mod: GC

## 2017-09-09 RX ADMIN — INSULIN GLARGINE 10 UNIT(S): 100 INJECTION, SOLUTION SUBCUTANEOUS at 21:49

## 2017-09-09 RX ADMIN — Medication 80 MILLIGRAM(S): at 05:51

## 2017-09-09 RX ADMIN — Medication 2: at 21:45

## 2017-09-09 RX ADMIN — Medication 20 MILLIEQUIVALENT(S): at 12:32

## 2017-09-09 RX ADMIN — AMLODIPINE BESYLATE 5 MILLIGRAM(S): 2.5 TABLET ORAL at 05:50

## 2017-09-09 RX ADMIN — HEPARIN SODIUM 7500 UNIT(S): 5000 INJECTION INTRAVENOUS; SUBCUTANEOUS at 21:48

## 2017-09-09 RX ADMIN — HEPARIN SODIUM 7500 UNIT(S): 5000 INJECTION INTRAVENOUS; SUBCUTANEOUS at 13:29

## 2017-09-09 RX ADMIN — LOSARTAN POTASSIUM 25 MILLIGRAM(S): 100 TABLET, FILM COATED ORAL at 05:49

## 2017-09-09 NOTE — PROGRESS NOTE ADULT - PROBLEM SELECTOR PLAN 1
Patient presented with CXR findings consistent with pulm edema 2/2 CHF exacerbation due to treatment non-adherence.  Pt states that she often skips her Lasix so that she does not need to look for public restrooms on days when she leaves the house and also in the evening so that she can sleep without awakening to urinate. Has significantly improved after resuming home dose of lasix and w/BiPAP overnight, still not at baseline in terms of RODRIGUEZ. CXR today with mild interval improvement. Would benefit from additional diuresis.  - Lasix 40mg IVP now  - c/w home lasix 80 mg PO bid   - Strict I&O's  - Daily weights, limit fluid intake   - Resume home regimen of 4 L NC during the day and BiPAP at night

## 2017-09-09 NOTE — PROGRESS NOTE ADULT - PROBLEM SELECTOR PLAN 6
Patient takes 1,000 mg bid metformin, levemir 15 units subq bedtime. FSG show adequate control with Lantus 10.  -[id number] units sub q with MISS

## 2017-09-09 NOTE — CONSULT NOTE ADULT - SUBJECTIVE AND OBJECTIVE BOX
Patient is a 42y old  Female who presents with a chief complaint of Shortness of breath with exertion (07 Sep 2017 14:20)      HPI:  43 yo F w/PMH dCHF (EF 55% 4/2017), on home O2 4L during day and bipap at night, MANUEL, morbid obesity, HTN, DMII, presented with increasing SOB on minimal exertion progressing over the past month. She states she can no longer bathe by herself without becoming extremely SOB, even with her home NC up to 4 L. This is accompanied by a sizable increase in her abdominal girth and worsening bilateral leg edema over the past 4 weeks. She also has four pillow orthopnea which has increased from her baseline two pillow orthopnea four weeks ago.  The patient states over the past month she has stopped taking her 80 mg PO lasix bid as prescribed since it causes her to urinate so frequently that it interferes with her daily activities. She admits to taking the medication sporadically and never twice a day as prescribed as well as increasing her daily fluid intake since she loves to drink water. She has not been wearing her bipap at night because she falls asleep with her nasal cannula on and does not wake up to put on the bipap. She denies any CP, palpitations, cough or recent upper respiratory symptoms.  In ED vitals: T 98.8 F, , /78, RR 26, O2 92% RA. Placed on Bipap, given Lasix 80 mg IV and put out 1 L of urine,  mg, amlodipine 10 mg PO once, VBG showed chronic compensated resp acidosis w/ pH, CXR: Enlarged cardiac silhouette with bilateral congestion. (04 Sep 2017 16:07)      PAST MEDICAL & SURGICAL HISTORY:  Chronic diastolic heart failure: Chronic diastolic CHF (congestive heart failure)  Edema: Anasarca  Type 2 diabetes mellitus: Insulin Requiring  Essential hypertension: HTN (hypertension)  Morbid obesity: Morbid obesity  Disease of pericardium: Pericardial effusion  Obstructive sleep apnea syndrome: MANUEL on CPAP  Cytomegalovirus infection not present: No significant past surgical history      MEDICATIONS  (STANDING):  heparin  Injectable 7500 Unit(s) SubCutaneous every 8 hours  insulin glargine Injectable (LANTUS) 10 Unit(s) SubCutaneous at bedtime  insulin lispro (HumaLOG) corrective regimen sliding scale   SubCutaneous Before meals and at bedtime  dextrose 5%. 1000 milliLiter(s) (50 mL/Hr) IV Continuous <Continuous>  dextrose 50% Injectable 12.5 Gram(s) IV Push once  dextrose 50% Injectable 25 Gram(s) IV Push once  dextrose 50% Injectable 25 Gram(s) IV Push once  losartan 25 milliGRAM(s) Oral daily  potassium chloride    Tablet ER 20 milliEquivalent(s) Oral daily  amLODIPine   Tablet 5 milliGRAM(s) Oral daily  furosemide    Tablet 80 milliGRAM(s) Oral every 12 hours    MEDICATIONS  (PRN):  dextrose Gel 1 Dose(s) Oral once PRN Blood Glucose LESS THAN 70 milliGRAM(s)/deciliter  glucagon  Injectable 1 milliGRAM(s) IntraMuscular once PRN Glucose LESS THAN 70 milligrams/deciliter  aluminum hydroxide/magnesium hydroxide/simethicone Suspension 30 milliLiter(s) Oral every 4 hours PRN Dyspepsia      CARDIAC DIAGNOSTIC TESTING:      TELEMETRY:    ECG:    ECHO  FINDINGS:    STRESS  FINDINGS:    CATHETERIZATION  FINDINGS:      FAMILY HISTORY:  No pertinent family history in first degree relatives      SOCIAL HISTORY:  - Smoking:  - Alcohol:  - Recreational drug use:  - Other:    REVIEW OF SYSTEMS:  CONSTITUTIONAL: No fever, weight loss, or fatigue  EYES: No eye pain, visual disturbances, or discharge  ENMT:  No difficulty hearing, tinnitus, vertigo; No sinus or throat pain  NECK: No pain or stiffness  RESPIRATORY: No cough, wheezing, chills or hemoptysis; No Shortness of Breath  CARDIOVASCULAR: No chest pain, palpitations, passing out, dizziness, or leg swelling  GASTROINTESTINAL: No abdominal or epigastric pain. No nausea, vomiting, or hematemesis; No diarrhea or constipation. No melena or hematochezia.  GENITOURINARY: No dysuria, frequency, hematuria, or incontinence  NEUROLOGICAL: No headaches, memory loss, loss of strength, numbness, or tremors  SKIN: No itching, burning, rashes, or lesions   LYMPH Nodes: No enlarged glands  ENDOCRINE: No heat or cold intolerance; No hair loss  MUSCULOSKELETAL: No joint pain or swelling; No muscle, back, or extremity pain  PSYCHIATRIC: No depression, anxiety, mood swings, or difficulty sleeping  HEME/LYMPH: No easy bruising, or bleeding gums  ALLERY AND IMMUNOLOGIC: No hives or eczema    Vitals past 24 Hours: T(C): 36.8 (09-09-17 @ 17:47), Max: 37.2 (09-08-17 @ 21:22)  HR: 82 (09-09-17 @ 17:47) (82 - 89)  BP: 123/82 (09-09-17 @ 17:47) (115/75 - 123/82)  RR: 22 (09-09-17 @ 17:47) (20 - 22)  SpO2: 94% (09-09-17 @ 17:47) (94% - 96%)	    PHYSICAL EXAM:  GEN: No acute respiratory distress, appears stated age	  HEENT: Anicteric sclera, PERRL, EOMI, no oropharyngeal erythema or discharge, trachea midline  Lymphatic: No cervical lymphadenopathy  Cardiovascular: S1 S2, No JVD, No murmurs, PMI  Respiratory: Lungs clear to auscultation, no rrw  Gastrointestinal:  BS+, soft, non distended, non tender, no HSM  Skin: No rashes, No ecchymoses, No cyanosis  Neurologic: Non-focal, AAO x 3, strength grossly normal in all extremeties  Extremities: Normal range of motion, No clubbing, cyanosis or edema  Vascular: Radial 2+, DP 2+      I&O's Detail    08 Sep 2017 07:01  -  09 Sep 2017 07:00  --------------------------------------------------------  IN:  Total IN: 0 mL    OUT:    Voided: 800 mL  Total OUT: 800 mL    Total NET: -800 mL          LABS:                        10.8   5.6   )-----------( 246      ( 09 Sep 2017 09:26 )             41.3     09-09    141  |  88<L>  |  22  ----------------------------<  143<H>  4.3   |  45<HH>  |  1.00    Ca    9.7      09 Sep 2017 15:38  Mg     2.6     09-09    TPro  8.7<H>  /  Alb  4.3  /  TBili  1.8<H>  /  DBili  x   /  AST  263<H>  /  ALT  200<H>  /  AlkPhos  159<H>  09-09    CARDIAC MARKERS ( 08 Sep 2017 19:37 )  x     / <0.01 ng/mL / 53 U/L / x     / <1.0 ng/mL          I&O's Summary    08 Sep 2017 07:01  -  09 Sep 2017 07:00  --------------------------------------------------------  IN: 0 mL / OUT: 800 mL / NET: -800 mL        RADIOLOGY & ADDITIONAL STUDIES: Patient is a 42y old  Female who presents with a chief complaint of Shortness of breath with exertion (07 Sep 2017 14:20)      HPI:  43 yo F w/PMH HFpEF (EF 55% 4/2017), on home O2 4L during day and bipap at night, MANUEL, morbid obesity, HTN, DMII, presented with increasing SOB on minimal exertion progressing over the past month. Admitted on 9/4 with worsening dyspnea and found to have a pericardial effusion with no tamponade physiology. She denies any previous history     In ED vitals: T 98.8 F, , /78, RR 26, O2 92% RA. Placed on Bipap, given Lasix 80 mg IV and put out 1 L of urine,  mg, amlodipine 10 mg PO once, VBG showed chronic compensated resp acidosis w/ pH, CXR: Enlarged cardiac silhouette with bilateral congestion. (04 Sep 2017 16:07)      PAST MEDICAL & SURGICAL HISTORY:  Chronic diastolic heart failure: Chronic diastolic CHF (congestive heart failure)  Edema: Anasarca  Type 2 diabetes mellitus: Insulin Requiring  Essential hypertension: HTN (hypertension)  Morbid obesity: Morbid obesity  Disease of pericardium: Pericardial effusion  Obstructive sleep apnea syndrome: MANUEL on CPAP  Cytomegalovirus infection not present: No significant past surgical history      MEDICATIONS  (STANDING):  heparin  Injectable 7500 Unit(s) SubCutaneous every 8 hours  insulin glargine Injectable (LANTUS) 10 Unit(s) SubCutaneous at bedtime  insulin lispro (HumaLOG) corrective regimen sliding scale   SubCutaneous Before meals and at bedtime  dextrose 5%. 1000 milliLiter(s) (50 mL/Hr) IV Continuous <Continuous>  dextrose 50% Injectable 12.5 Gram(s) IV Push once  dextrose 50% Injectable 25 Gram(s) IV Push once  dextrose 50% Injectable 25 Gram(s) IV Push once  losartan 25 milliGRAM(s) Oral daily  potassium chloride    Tablet ER 20 milliEquivalent(s) Oral daily  amLODIPine   Tablet 5 milliGRAM(s) Oral daily  furosemide    Tablet 80 milliGRAM(s) Oral every 12 hours    MEDICATIONS  (PRN):  dextrose Gel 1 Dose(s) Oral once PRN Blood Glucose LESS THAN 70 milliGRAM(s)/deciliter  glucagon  Injectable 1 milliGRAM(s) IntraMuscular once PRN Glucose LESS THAN 70 milligrams/deciliter  aluminum hydroxide/magnesium hydroxide/simethicone Suspension 30 milliLiter(s) Oral every 4 hours PRN Dyspepsia      CARDIAC DIAGNOSTIC TESTING:      TELEMETRY:    ECG:    ECHO  FINDINGS:    STRESS  FINDINGS:    CATHETERIZATION  FINDINGS:      FAMILY HISTORY:  No pertinent family history in first degree relatives      SOCIAL HISTORY:  - Smoking:  - Alcohol:  - Recreational drug use:  - Other:    REVIEW OF SYSTEMS:  CONSTITUTIONAL: No fever, weight loss, or fatigue  EYES: No eye pain, visual disturbances, or discharge  ENMT:  No difficulty hearing, tinnitus, vertigo; No sinus or throat pain  NECK: No pain or stiffness  RESPIRATORY: No cough, wheezing, chills or hemoptysis; No Shortness of Breath  CARDIOVASCULAR: No chest pain, palpitations, passing out, dizziness, or leg swelling  GASTROINTESTINAL: No abdominal or epigastric pain. No nausea, vomiting, or hematemesis; No diarrhea or constipation. No melena or hematochezia.  GENITOURINARY: No dysuria, frequency, hematuria, or incontinence  NEUROLOGICAL: No headaches, memory loss, loss of strength, numbness, or tremors  SKIN: No itching, burning, rashes, or lesions   LYMPH Nodes: No enlarged glands  ENDOCRINE: No heat or cold intolerance; No hair loss  MUSCULOSKELETAL: No joint pain or swelling; No muscle, back, or extremity pain  PSYCHIATRIC: No depression, anxiety, mood swings, or difficulty sleeping  HEME/LYMPH: No easy bruising, or bleeding gums  ALLERY AND IMMUNOLOGIC: No hives or eczema    Vitals past 24 Hours: T(C): 36.8 (09-09-17 @ 17:47), Max: 37.2 (09-08-17 @ 21:22)  HR: 82 (09-09-17 @ 17:47) (82 - 89)  BP: 123/82 (09-09-17 @ 17:47) (115/75 - 123/82)  RR: 22 (09-09-17 @ 17:47) (20 - 22)  SpO2: 94% (09-09-17 @ 17:47) (94% - 96%)	    PHYSICAL EXAM:  GEN: No acute respiratory distress, appears stated age	  HEENT: Anicteric sclera, PERRL, EOMI, no oropharyngeal erythema or discharge, trachea midline  Lymphatic: No cervical lymphadenopathy  Cardiovascular: S1 S2, No JVD, No murmurs, PMI  Respiratory: Lungs clear to auscultation, no rrw  Gastrointestinal:  BS+, soft, non distended, non tender, no HSM  Skin: No rashes, No ecchymoses, No cyanosis  Neurologic: Non-focal, AAO x 3, strength grossly normal in all extremeties  Extremities: Normal range of motion, No clubbing, cyanosis or edema  Vascular: Radial 2+, DP 2+      I&O's Detail    08 Sep 2017 07:01  -  09 Sep 2017 07:00  --------------------------------------------------------  IN:  Total IN: 0 mL    OUT:    Voided: 800 mL  Total OUT: 800 mL    Total NET: -800 mL          LABS:                        10.8   5.6   )-----------( 246      ( 09 Sep 2017 09:26 )             41.3     09-09    141  |  88<L>  |  22  ----------------------------<  143<H>  4.3   |  45<HH>  |  1.00    Ca    9.7      09 Sep 2017 15:38  Mg     2.6     09-09    TPro  8.7<H>  /  Alb  4.3  /  TBili  1.8<H>  /  DBili  x   /  AST  263<H>  /  ALT  200<H>  /  AlkPhos  159<H>  09-09    CARDIAC MARKERS ( 08 Sep 2017 19:37 )  x     / <0.01 ng/mL / 53 U/L / x     / <1.0 ng/mL          I&O's Summary    08 Sep 2017 07:01  -  09 Sep 2017 07:00  --------------------------------------------------------  IN: 0 mL / OUT: 800 mL / NET: -800 mL        RADIOLOGY & ADDITIONAL STUDIES: Patient is a 42y old  Female who presents with a chief complaint of Shortness of breath with exertion (07 Sep 2017 14:20)      HPI:  41 yo F w/PMH HFpEF (EF 55% 4/2017), on home O2 4L during day and bipap at night, MANUEL, morbid obesity, HTN, DMII, presented with increasing SOB on minimal exertion progressing over the past month. Admitted on 9/4 with worsening RODRIGUEZ and found to have a pericardial effusion with echo suggesting signs of tamponade, no tamponade physiology observed on HD. She follows with Dr. Oscar as an outpatient. Reports she has had similar symptoms and "fluid around her heart" 2 years ago, no intervention. She says this is because of history of heart failure.      PAST MEDICAL & SURGICAL HISTORY:  Chronic diastolic heart failure: Chronic diastolic CHF (congestive heart failure)  Edema: Anasarca  Type 2 diabetes mellitus: Insulin Requiring  Essential hypertension: HTN (hypertension)  Morbid obesity: Morbid obesity  Disease of pericardium: Pericardial effusion  Obstructive sleep apnea syndrome: MANUEL on CPAP  Cytomegalovirus infection not present: No significant past surgical history      MEDICATIONS  (STANDING):  heparin  Injectable 7500 Unit(s) SubCutaneous every 8 hours  insulin glargine Injectable (LANTUS) 10 Unit(s) SubCutaneous at bedtime  insulin lispro (HumaLOG) corrective regimen sliding scale   SubCutaneous Before meals and at bedtime  dextrose 5%. 1000 milliLiter(s) (50 mL/Hr) IV Continuous <Continuous>  dextrose 50% Injectable 12.5 Gram(s) IV Push once  dextrose 50% Injectable 25 Gram(s) IV Push once  dextrose 50% Injectable 25 Gram(s) IV Push once  losartan 25 milliGRAM(s) Oral daily  potassium chloride    Tablet ER 20 milliEquivalent(s) Oral daily  amLODIPine   Tablet 5 milliGRAM(s) Oral daily  furosemide    Tablet 80 milliGRAM(s) Oral every 12 hours    MEDICATIONS  (PRN):  dextrose Gel 1 Dose(s) Oral once PRN Blood Glucose LESS THAN 70 milliGRAM(s)/deciliter  glucagon  Injectable 1 milliGRAM(s) IntraMuscular once PRN Glucose LESS THAN 70 milligrams/deciliter  aluminum hydroxide/magnesium hydroxide/simethicone Suspension 30 milliLiter(s) Oral every 4 hours PRN Dyspepsia      CARDIAC DIAGNOSTIC TESTING:      ECG: NSR with PVCs    ECHO  FINDINGS:  The left ventricular ejection fraction is normal.The left ventricular   ejection   fraction is estimated to be 60-65%The left atrial size is normal.Right   atrial   size is normal.The right ventricle is dilated.The right ventricular   systolic   function is probably normal.No aortic regurgitation noted.No   hemodynamically   significant valvular aortic stenosis.No mitral regurgitation noted.There   was   insufficient TR detected from which to calculate pulmonary artery   systolic   pressure.  The inferior vena cava is normal in size (<2.1 cm) with normal   inspiratory collapse (>50%) consistent with normal right atrial   pressure.  A   moderate to large pericardial effusion noted.No chamber collapse   seen.  Significant inspiratory drop in mitral inflow velocities consistent   with   echocardiographic pulsus paradoxus.  Diastolic septal bounce again noted.   There is also expiratory diastolic flow reversal in the hepatic vein.    Taken   in totality, these findings may be suggestive of effusive constrictive   pericarditis.  Clinical correlation advised.  Procedure Details  Limited study performed for evaluation of pericardial effusion.      FAMILY HISTORY:  No pertinent family history in first degree relatives of rheumatological disease      SOCIAL HISTORY:  - No smoking  - No alcohol    REVIEW OF SYSTEMS:  No fatigue, fevers, history of URI  No chest pain, dyspnea improved  Good appetite  No syncope      Vitals past 24 Hours: T(C): 36.8 (09-09-17 @ 17:47), Max: 37.2 (09-08-17 @ 21:22)  HR: 82 (09-09-17 @ 17:47) (82 - 89)  BP: 123/82 (09-09-17 @ 17:47) (115/75 - 123/82)  RR: 22 (09-09-17 @ 17:47) (20 - 22)  SpO2: 94% (09-09-17 @ 17:47) (94% - 96%)	    PHYSICAL EXAM:  GEN: No acute respiratory distress, appears stated age, obese  HEENT: Anicteric sclera, PERRL, EOMI, no oropharyngeal erythema or discharge, trachea midline  Cardiovascular: S1 S2, systolic II/VI murmur, supple neck  Respiratory: Lungs clear to auscultation, no rrw  Gastrointestinal:  BS+, soft, non distended, non tender  Skin: No rashes, No ecchymoses, No cyanosis  Neurologic: Non-focal, AAO x 3, strength grossly normal in all extremeties  Extremities: Normal range of motion, No clubbing, cyanosis or edema  Vascular: Radial 2+, DP 2+      I&O's Detail    08 Sep 2017 07:01  -  09 Sep 2017 07:00  --------------------------------------------------------  IN:  Total IN: 0 mL    OUT:    Voided: 800 mL  Total OUT: 800 mL    Total NET: -800 mL          LABS:                        10.8   5.6   )-----------( 246      ( 09 Sep 2017 09:26 )             41.3     09-09    141  |  88<L>  |  22  ----------------------------<  143<H>  4.3   |  45<HH>  |  1.00    Ca    9.7      09 Sep 2017 15:38  Mg     2.6     09-09    TPro  8.7<H>  /  Alb  4.3  /  TBili  1.8<H>  /  DBili  x   /  AST  263<H>  /  ALT  200<H>  /  AlkPhos  159<H>  09-09    CARDIAC MARKERS ( 08 Sep 2017 19:37 )  x     / <0.01 ng/mL / 53 U/L / x     / <1.0 ng/mL          I&O's Summary    08 Sep 2017 07:01  -  09 Sep 2017 07:00  --------------------------------------------------------  IN: 0 mL / OUT: 800 mL / NET: -800 mL

## 2017-09-09 NOTE — PROGRESS NOTE ADULT - PROBLEM SELECTOR PLAN 2
Echo from 4/2017 showed left ventricular hypertrophy, abnormal septal motion, LVEF 55%. Moderate pericardial effusion noted. Repeat echo unchanged. EKG showed  Normal sinus rhythm, Rightward axis. Low voltage QRS, Cannot rule out Septal infarct , age undetermined, Nonspecific ST - T abnormalities  -c/w home dose lasix 80 mg PO bid  -Strict I&O's as feasible   -Daily weights  -4 L NC during the day and BiPAP at night

## 2017-09-09 NOTE — CONSULT NOTE ADULT - ASSESSMENT
43 yo F w/PMH HFpEF (EF 55% 4/2017), on home O2 4L during day and bipap at night, MANUEL, morbid obesity, HTN, DMII, presented with increasing SOB on minimal exertion progressing over the past month. Admitted on 9/4 with worsening RODRIGUEZ and found to have a pericardial effusion with no tamponade physiology.     - large cardiac silhouette, difficult to visualize secondary to body habitus  - consider cardiac MRI to evaluate effusion and further eval for myocarditis  - r/o other causes of effusion, KELLY, RF, ANCA, TSH  - agree with diuresis, close observation of HD parameters      Case d/w Dr Oscar

## 2017-09-09 NOTE — CONSULT NOTE ADULT - ASSESSMENT
Moderate pericardial effusion with no Tamponade physiology. Hemodynamically stable. No urgent indication for drainage. Echo guided drainage would be difficult secondary to body habitus. May need CT guided drainage for diagnosis purpose.     -Dr. Cancino- pt's cardiologist to see tomorrow- further rec's per him.   -May need cardiac MRI vs. RHC to evaluate for constrictive effusive pericarditis  -Work up to look for reasons for effusion including labs: CRP, Rheumatoid factor, KELLY, DS DNA, TSH 42y Female w/PMH dCHF (EF 55%), on home O2 4L during day and bipap at night, MANUEL, morbid obesity, HTN, DMII, presented with worsening CHF symptoms. TTE showed Moderate pericardial effusion with no Tamponade physiology. Hemodynamically stable. No urgent indication for drainage. Echo guided drainage would be difficult secondary to body habitus. May need CT guided drainage for diagnosis purposes.     -Dr. Cancino- pt's cardiologist to see tomorrow- further rec's per him.   -May need cardiac MRI vs. RHC to evaluate for constrictive effusive pericarditis  -Work up to look for reasons for effusion including labs: CRP, Rheumatoid factor, KELLY, DS DNA, TSH 42y Female w/PMH dCHF (EF 55%), on home O2 4L during day and bipap at night, MANUEL, morbid obesity, HTN, DMII, presented with worsening CHF symptoms. TTE showed Moderate pericardial effusion with no Tamponade physiology.     -Pericardial Effuson:    Hemodynamically stable. No urgent indication for drainage. Echo guided drainage would be difficult secondary to body habitus. May need CT guided drainage for diagnosis purposes.    Dr. Cancino- pt's cardiologist to see tomorrow- further rec's per him.    May need cardiac MRI vs. RHC to evaluate for constrictive effusive pericarditis   Work up to look for reasons for effusion including labs: CRP, Rheumatoid factor, KELLY, DS DNA, TSH    -CHF (chronic diastolic CHF):    Continue diuresis    -Pulm MANUEL: Cont to encourage BiPAP at night.

## 2017-09-09 NOTE — PROGRESS NOTE ADULT - ASSESSMENT
41 yo F w/PMH dCHF (EF 55% 9/2017), on home O2 4L during day and bipap at night, MANUEL, morbid obesity, HTN, DMII, presented with increasing SOB on minimal exertion progressing over the past month in the setting of medication non-compliance, admitted for decompensated diastolic HFpEF, improving.

## 2017-09-09 NOTE — CONSULT NOTE ADULT - SUBJECTIVE AND OBJECTIVE BOX
Structural Heart: Ashish Larry     Requesting Physician: SAW    HISTORY OF PRESENT ILLNESS:  42y Female    PAST MEDICAL & SURGICAL HISTORY:  Chronic diastolic heart failure: Chronic diastolic CHF (congestive heart failure)  Edema: Anasarca  Type 2 diabetes mellitus: Insulin Requiring  Essential hypertension: HTN (hypertension)  Morbid obesity: Morbid obesity  Disease of pericardium: Pericardial effusion  Obstructive sleep apnea syndrome: MANUEL on CPAP  Cytomegalovirus infection not present: No significant past surgical history      MEDICATIONS  (STANDING):  heparin  Injectable 7500 Unit(s) SubCutaneous every 8 hours  insulin glargine Injectable (LANTUS) 10 Unit(s) SubCutaneous at bedtime  insulin lispro (HumaLOG) corrective regimen sliding scale   SubCutaneous Before meals and at bedtime  dextrose 5%. 1000 milliLiter(s) (50 mL/Hr) IV Continuous <Continuous>  dextrose 50% Injectable 12.5 Gram(s) IV Push once  dextrose 50% Injectable 25 Gram(s) IV Push once  dextrose 50% Injectable 25 Gram(s) IV Push once  losartan 25 milliGRAM(s) Oral daily  potassium chloride    Tablet ER 20 milliEquivalent(s) Oral daily  amLODIPine   Tablet 5 milliGRAM(s) Oral daily  furosemide    Tablet 80 milliGRAM(s) Oral every 12 hours    MEDICATIONS  (PRN):  dextrose Gel 1 Dose(s) Oral once PRN Blood Glucose LESS THAN 70 milliGRAM(s)/deciliter  glucagon  Injectable 1 milliGRAM(s) IntraMuscular once PRN Glucose LESS THAN 70 milligrams/deciliter  aluminum hydroxide/magnesium hydroxide/simethicone Suspension 30 milliLiter(s) Oral every 4 hours PRN Dyspepsia      Allergies    No Known Drug Allergies  shellfish (Anaphylaxis)    Intolerances        SOCIAL HISTORY:  Smoker:  YES / NO        PACK YEARS:                         WHEN QUIT?  ETOH use:  YES / NO               FREQUENCY / QUANTITY:  Ilicit Drug use:  YES / NO  Occupation:  Assisted device use (Cane / Walker):  Live with:    FAMILY HISTORY:  No pertinent family history in first degree relatives      Review of Systems  CONSTITUTIONAL:  Fevers / chills, sweats, fatigue, weight loss, weight gain                                    NEGATIVE  NEURO:  parathesias, seizures, syncope, confusion                                                                               NEGATIVE  EYES:  Blurry vision, discharge, pain, loss of vision                                                                                  NEGATIVE  ENMT:  Difficulty hearing, vertigo, dysphagia, epistaxis, recent dental work                                     NEGATIVE  CV:  Chest pain, palpitations, RODRIGUEZ, orthopnea                                                                                         NEGATIVE  RESPIRATORY:  Wheezing, SOB, cough / sputum, hemoptysis                                                              NEGATIVE  GI:  Nausea, vommiting, diarrhea, constipation, melena                                                                        NEGATIVE  : Hematuria, dysuria, urgency, incontinence                                                                                       NEGATIVE  MUSKULOSKELETAL:  arthritis, joint swelling, muscle weakness                                                           NEGATIVE  SKIN/BREAST:  rash, itching, blanca loss, masses                                                                                            NEGATIVE  PSYCH:  depresion, anxiety, suicidal ideation                                                                                             NEGATIVE  HEME/LYMPH:  bruises easily, enlarged lymph nodes, tender lymph nodes                                        NEGATIVE  ENDOCRINE:  cold intolerance, heat intolerance, polydipsia                                                                   NEGATIVE    PHYSICAL EXAM  Vital Signs Last 24 Hrs  T(C): 36.7 (09 Sep 2017 08:34), Max: 37.2 (08 Sep 2017 21:22)  T(F): 98 (09 Sep 2017 08:34), Max: 98.9 (08 Sep 2017 21:22)  HR: 89 (09 Sep 2017 08:34) (88 - 89)  BP: 115/75 (09 Sep 2017 08:34) (114/77 - 116/72)  BP(mean): --  RR: 21 (09 Sep 2017 08:34) (20 - 22)  SpO2: 96% (09 Sep 2017 08:34) (91% - 96%)    CONSTITUTIONAL:                                                                          WNL  NEURO:                                                                                             WNL                      EYES:                                                                                                  WNL  ENMT:                                                                                                WNL  CV:                                                                                                      WNL  RESPIRATORY:                                                                                  WNL  GI:                                                                                                       WNL  : RIDER + / -                                                                                 WNL  MUSKULOSKELETAL:                                                                       WNL  SKIN / BREAST:                                                                                 WNL  Extremities  Vascular                                                          LABS:                        10.8   5.6   )-----------( 246      ( 09 Sep 2017 09:26 )             41.3     09-09    141  |  88<L>  |  21  ----------------------------<  112<H>  4.8   |  41<H>  |  1.20    Ca    9.5      09 Sep 2017 09:26  Mg     2.6     09-09    TPro  8.8<H>  /  Alb  4.1  /  TBili  1.3<H>  /  DBili  0.5<H>  /  AST  291<H>  /  ALT  180<H>  /  AlkPhos  143<H>  09-09        CARDIAC MARKERS ( 08 Sep 2017 19:37 )  x     / <0.01 ng/mL / 53 U/L / x     / <1.0 ng/mL        Hemoglobin A1C, Whole Blood: 6.8 % (09-05 @ 05:20)    Serum Pro-Brain Natriuretic Peptide: 96 pg/mL (09-04 @ 11:16)      RADIOLOGY & ADDITIONAL STUDIES:  CAROTID U/S:    CXR:    CT Scan:    EKG:    TTE / VI:    Cardiac Cath: Structural Heart: Ashish Larry     Requesting Physician: Valerio    HISTORY OF PRESENT ILLNESS:  42y Female w/PMH dCHF (EF 55%), on home O2 4L during day and bipap at night, MANUEL, morbid obesity, HTN, DMII, presented with increasing SOB on minimal exertion progressing over the past month. She states she can no longer bathe by herself without becoming extremely SOB, even with her home NC up to 4 L. This is accompanied by a sizable increase in her abdominal girth and worsening bilateral leg edema over the past 4 weeks. She also has four pillow orthopnea which has increased from her baseline two pillow orthopnea four weeks ago.  The patient states over the past month she has stopped taking her 80 mg PO lasix bid as prescribed since it causes her to urinate so frequently that it interferes with her daily activities. She admits to taking the medication sporadically and never twice a day as prescribed as well as increasing her daily fluid intake since she loves to drink water. She has not been wearing her bipap at night because she falls asleep with her nasal cannula on and does not wake up to put on the bipap. She denies any CP, palpitations, cough or recent upper respiratory symptoms.    Echocardiogram 9/8/17: LVEF 60-65%. RV probably dilated with normal function. No HD significant valvular disease noted. A moderate to large pericardial effusion noted. No chamber collapse seen. Significant inspiratory drop in mitral inflow velocities consistent with echocardiographic pulsus paradoxus.  Diastolic septal bounce again noted. There is also expiratory diastolic flow reversal in the hepatic vein. 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      Dr. Larry was consulted to evaluate pericardial effusion, if cardiac component ot SOB.       PAST MEDICAL & SURGICAL HISTORY:  Chronic diastolic heart failure: Chronic diastolic CHF (congestive heart failure)  Edema: Anasarca  Type 2 diabetes mellitus: Insulin Requiring  Essential hypertension: HTN (hypertension)  Morbid obesity: Morbid obesity  Disease of pericardium: Pericardial effusion  Obstructive sleep apnea syndrome: MANUEL on CPAP  Cytomegalovirus infection not present: No significant past surgical history      MEDICATIONS  (STANDING):  heparin  Injectable 7500 Unit(s) SubCutaneous every 8 hours  insulin glargine Injectable (LANTUS) 10 Unit(s) SubCutaneous at bedtime  insulin lispro (HumaLOG) corrective regimen sliding scale   SubCutaneous Before meals and at bedtime  losartan 25 milliGRAM(s) Oral daily  potassium chloride    Tablet ER 20 milliEquivalent(s) Oral daily  amLODIPine   Tablet 5 milliGRAM(s) Oral daily  furosemide    Tablet 80 milliGRAM(s) Oral every 12 hours    MEDICATIONS  (PRN):  dextrose Gel 1 Dose(s) Oral once PRN Blood Glucose LESS THAN 70 milliGRAM(s)/deciliter  glucagon  Injectable 1 milliGRAM(s) IntraMuscular once PRN Glucose LESS THAN 70 milligrams/deciliter  aluminum hydroxide/magnesium hydroxide/simethicone Suspension 30 milliLiter(s) Oral every 4 hours PRN Dyspepsia      Allergies  No Known Drug Allergies  shellfish (Anaphylaxis)        SOCIAL HISTORY: Lives at home with two children. Has HHA 7 days a week for 5 hours a day. 5 pack year smoking history, quit >20 years ago. No  current smoking, no alcohol, no drugs.    FAMILY HISTORY:  No pertinent family history in first degree relatives      Review of Systems  CONSTITUTIONAL:  +20 lb weight gain "recently" + fatigue. Denies Fevers / chills, sweats  NEURO:  denies parathesias, seizures, syncope, confusion                                                                                 EYES:  denies Blurry vision, discharge, pain, loss of vision                                                                                    ENMT:  denies difficulty hearing, vertigo, dysphagia, epistaxis  CV:  + orthopnea, PND, denies Chest pain, palpitations  RESPIRATORY:  +SOB, RODRIGUEZ. Denies cough / sputum, hemoptysis     GI:  +contipation. Denies Nausea, vomiting diarrhea, melena                                                                          : denies Hematuria, dysuria, urgency, incontinence                                                                                        MUSKULOSKELETAL:  + knee pain.                                                         SKIN/BREAST:  Denies rash, itching      PHYSICAL EXAM  Vital Signs Last 24 Hrs  T(C): 36.7 (09 Sep 2017 08:34), Max: 37.2 (08 Sep 2017 21:22)  T(F): 98 (09 Sep 2017 08:34), Max: 98.9 (08 Sep 2017 21:22)  HR: 89 (09 Sep 2017 08:34) (88 - 89)  BP: 115/75 (09 Sep 2017 08:34) (114/77 - 116/72)  BP(mean): --  RR: 21 (09 Sep 2017 08:34) (20 - 22)  SpO2: 96% (09 Sep 2017 08:34) (91% - 96%)    CONSTITUTIONAL:                                                                            NEURO:                                                                                                                  EYES:                                                                                                    ENMT:                                                                                                  CV:                                                                                                       RESPIRATORY:                                                                                    GI:                                                                                                     : RIDER + / -                                                                                MUSKULOSKELETAL:                                                                         SKIN / BREAST:                                                                                 Extremities  Vascular                                                          LABS:                        10.8   5.6   )-----------( 246      ( 09 Sep 2017 09:26 )             41.3     09-09    141  |  88<L>  |  21  ----------------------------<  112<H>  4.8   |  41<H>  |  1.20    Ca    9.5      09 Sep 2017 09:26  Mg     2.6     09-09    TPro  8.8<H>  /  Alb  4.1  /  TBili  1.3<H>  /  DBili  0.5<H>  /  AST  291<H>  /  ALT  180<H>  /  AlkPhos  143<H>  09-09        CARDIAC MARKERS ( 08 Sep 2017 19:37 )  x     / <0.01 ng/mL / 53 U/L / x     / <1.0 ng/mL        Hemoglobin A1C, Whole Blood: 6.8 % (09-05 @ 05:20)    Serum Pro-Brain Natriuretic Peptide: 96 pg/mL (09-04 @ 11:16)      RADIOLOGY & ADDITIONAL STUDIES:  CAROTID U/S:    CXR:    CT Scan:    EKG:    TTE / VI:    Cardiac Cath: Structural Heart: Ashish Larry     Requesting Physician: Valerio    HISTORY OF PRESENT ILLNESS:  42y Female w/PMH dCHF (EF 55%), on home O2 4L during day and bipap at night, MANUEL, morbid obesity, HTN, DMII, presented with increasing SOB on minimal exertion progressing over the past month. She states she can no longer bathe by herself without becoming extremely SOB, even with her home NC up to 4 L. This is accompanied by a sizable increase in her abdominal girth and worsening bilateral leg edema over the past 4 weeks. She also has four pillow orthopnea which has increased from her baseline two pillow orthopnea four weeks ago.  The patient states over the past month she has stopped taking her 80 mg PO lasix bid as prescribed since it causes her to urinate so frequently that it interferes with her daily activities. She admits to taking the medication sporadically and never twice a day as prescribed as well as increasing her daily fluid intake since she loves to drink water. She has not been wearing her bipap at night because she falls asleep with her nasal cannula on and does not wake up to put on the bipap. She denies any CP, palpitations, cough or recent upper respiratory symptoms.    Echocardiogram 9/8/17: LVEF 60-65%. RV probably dilated with normal function. No HD significant valvular disease noted. A moderate to large pericardial effusion noted. No chamber collapse seen. Significant inspiratory drop in mitral inflow velocities consistent with echocardiographic pulsus paradoxus.  Diastolic septal bounce again noted. There is also expiratory diastolic flow reversal in the hepatic vein. 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      Dr. Larry was consulted to evaluate pericardial effusion, if cardiac component ot SOB.       PAST MEDICAL & SURGICAL HISTORY:  Chronic diastolic heart failure: Chronic diastolic CHF (congestive heart failure)  Edema: Anasarca  Type 2 diabetes mellitus: Insulin Requiring  Essential hypertension: HTN (hypertension)  Morbid obesity: Morbid obesity  Disease of pericardium: Pericardial effusion  Obstructive sleep apnea syndrome: MANUEL on CPAP  Cytomegalovirus infection not present: No significant past surgical history      MEDICATIONS  (STANDING):  heparin  Injectable 7500 Unit(s) SubCutaneous every 8 hours  insulin glargine Injectable (LANTUS) 10 Unit(s) SubCutaneous at bedtime  insulin lispro (HumaLOG) corrective regimen sliding scale   SubCutaneous Before meals and at bedtime  losartan 25 milliGRAM(s) Oral daily  potassium chloride    Tablet ER 20 milliEquivalent(s) Oral daily  amLODIPine   Tablet 5 milliGRAM(s) Oral daily  furosemide    Tablet 80 milliGRAM(s) Oral every 12 hours    MEDICATIONS  (PRN):  dextrose Gel 1 Dose(s) Oral once PRN Blood Glucose LESS THAN 70 milliGRAM(s)/deciliter  glucagon  Injectable 1 milliGRAM(s) IntraMuscular once PRN Glucose LESS THAN 70 milligrams/deciliter  aluminum hydroxide/magnesium hydroxide/simethicone Suspension 30 milliLiter(s) Oral every 4 hours PRN Dyspepsia      Allergies  No Known Drug Allergies  shellfish (Anaphylaxis)        SOCIAL HISTORY: Lives at home with two children. Has HHA 7 days a week for 5 hours a day. 5 pack year smoking history, quit >20 years ago. No  current smoking, no alcohol, no drugs.    FAMILY HISTORY:  No pertinent family history in first degree relatives      Review of Systems  CONSTITUTIONAL:  +20 lb weight gain "recently" + fatigue. Denies Fevers / chills, sweats  NEURO:  denies parathesias, seizures, syncope, confusion                                                                                 EYES:  denies Blurry vision, discharge, pain, loss of vision                                                                                    ENMT:  denies difficulty hearing, vertigo, dysphagia, epistaxis  CV:  + orthopnea, PND, denies Chest pain, palpitations  RESPIRATORY:  +SOB, RODRIGUEZ. Denies cough / sputum, hemoptysis     GI:  +contipation. Denies Nausea, vomiting diarrhea, melena                                                                          : denies Hematuria, dysuria, urgency, incontinence                                                                                        MUSKULOSKELETAL:  + knee pain.                                                         SKIN/BREAST:  Denies rash, itching      PHYSICAL EXAM  Vital Signs Last 24 Hrs  T(C): 36.7 (09 Sep 2017 08:34), Max: 37.2 (08 Sep 2017 21:22)  T(F): 98 (09 Sep 2017 08:34), Max: 98.9 (08 Sep 2017 21:22)  HR: 89 (09 Sep 2017 08:34) (88 - 89)  BP: 115/75 (09 Sep 2017 08:34) (114/77 - 116/72)  BP(mean): --  RR: 21 (09 Sep 2017 08:34) (20 - 22)  SpO2: 96% (09 Sep 2017 08:34) (91% - 96%)    CONSTITUTIONAL: Morbid obese, sitting in bed, appears comfortable but become winded with moving and talking.                                                                            NEURO:  A&O x 3, non focal.                                                                                                                 EYES: EOMI, PERRL                                                                                                  ENMT: Hearing grossly intact, oropharynx nl                                                                                                 CV: RRR, no m/r/g                                                                                                      RESPIRATORY: Diminished at bases.                                                                                     GI: +BS, soft non tender.                                                                                                                                                                               MUSKULOSKELETAL: Move all extremities. 5/5                                                                        SKIN / BREAST: No rashes or lesions. Pendulous breast.                                                                                 Extremities: 2+ edema of bilateral LE with venous stasis changes. +Varicosities  Vascular: 2+ radial, carotid pulses. DP/PT pulses not palpable bilaterally.                                                           LABS:                        10.8   5.6   )-----------( 246      ( 09 Sep 2017 09:26 )             41.3     09-09    141  |  88<L>  |  21  ----------------------------<  112<H>  4.8   |  41<H>  |  1.20    Ca    9.5      09 Sep 2017 09:26  Mg     2.6     09-09    TPro  8.8<H>  /  Alb  4.1  /  TBili  1.3<H>  /  DBili  0.5<H>  /  AST  291<H>  /  ALT  180<H>  /  AlkPhos  143<H>  09-09    CARDIAC MARKERS ( 08 Sep 2017 19:37 )  x     / <0.01 ng/mL / 53 U/L / x     / <1.0 ng/mL      Hemoglobin A1C, Whole Blood: 6.8 % (09-05 @ 05:20)    Serum Pro-Brain Natriuretic Peptide: 96 pg/mL (09-04 @ 11:16)      RADIOLOGY & ADDITIONAL STUDIES:    CXR: < from: Xray Chest 1 View AP- PORTABLE-Urgent (09.08.17 @ 18:19) >  IMPRESSION:  Persistent mild pulmonary venous congestion with cardiomegaly.    < end of copied text >      EKG: < from: 12 Lead ECG (09.08.17 @ 17:17) >  Diagnosis Line Sinus rhythm with occasional premature ventricular complexes  Low voltage QRS    < end of copied text >

## 2017-09-09 NOTE — PROGRESS NOTE ADULT - SUBJECTIVE AND OBJECTIVE BOX
INTERVAL HPI/OVERNIGHT EVENTS:    Interim reviewed: No chief complaint:  Oxygen saturation stable on nasal Oxygen:    MEDICATIONS  (STANDING):  heparin  Injectable 7500 Unit(s) SubCutaneous every 8 hours  insulin glargine Injectable (LANTUS) 10 Unit(s) SubCutaneous at bedtime  insulin lispro (HumaLOG) corrective regimen sliding scale   SubCutaneous Before meals and at bedtime  dextrose 5%. 1000 milliLiter(s) (50 mL/Hr) IV Continuous <Continuous>  dextrose 50% Injectable 12.5 Gram(s) IV Push once  dextrose 50% Injectable 25 Gram(s) IV Push once  dextrose 50% Injectable 25 Gram(s) IV Push once  losartan 25 milliGRAM(s) Oral daily  potassium chloride    Tablet ER 20 milliEquivalent(s) Oral daily  amLODIPine   Tablet 5 milliGRAM(s) Oral daily  furosemide    Tablet 80 milliGRAM(s) Oral every 12 hours    MEDICATIONS  (PRN):  dextrose Gel 1 Dose(s) Oral once PRN Blood Glucose LESS THAN 70 milliGRAM(s)/deciliter  glucagon  Injectable 1 milliGRAM(s) IntraMuscular once PRN Glucose LESS THAN 70 milligrams/deciliter  aluminum hydroxide/magnesium hydroxide/simethicone Suspension 30 milliLiter(s) Oral every 4 hours PRN Dyspepsia      Allergies    No Known Drug Allergies  shellfish (Anaphylaxis)    Intolerances        Vital Signs Last 24 Hrs  T(C): 36.7 (09 Sep 2017 08:34), Max: 37.2 (08 Sep 2017 21:22)  T(F): 98 (09 Sep 2017 08:34), Max: 98.9 (08 Sep 2017 21:22)  HR: 89 (09 Sep 2017 08:34) (88 - 89)  BP: 115/75 (09 Sep 2017 08:34) (114/77 - 116/72)  BP(mean): --  RR: 21 (09 Sep 2017 08:34) (20 - 22)  SpO2: 96% (09 Sep 2017 08:34) (91% - 96%)          Constitutional: Awake    Eyes: RANDA    ENMT: Negative    Neck: Supple    Back:  no tenderness     Respiratory:   decreased breath sounds    Cardiovascular: S1 S2    Gastrointestinal: soft  Obese    Genitourinary:    Extremities:  edema    Vascular:    Neurological:    Skin:    Lymph Nodes:            09-08 @ 07:01  -  09-09 @ 07:00  --------------------------------------------------------  IN: 0 mL / OUT: 800 mL / NET: -800 mL      LABS:                        10.8   5.6   )-----------( 246      ( 09 Sep 2017 09:26 )             41.3     09-09    141  |  88<L>  |  22  ----------------------------<  143<H>  4.3   |  45<HH>  |  1.00    Ca    9.7      09 Sep 2017 15:38  Mg     2.6     09-09    TPro  8.7<H>  /  Alb  4.3  /  TBili  1.8<H>  /  DBili  x   /  AST  263<H>  /  ALT  200<H>  /  AlkPhos  159<H>  09-09          RADIOLOGY & ADDITIONAL TESTS:

## 2017-09-10 DIAGNOSIS — R74.0 NONSPECIFIC ELEVATION OF LEVELS OF TRANSAMINASE AND LACTIC ACID DEHYDROGENASE [LDH]: ICD-10-CM

## 2017-09-10 LAB
ALBUMIN SERPL ELPH-MCNC: 4 G/DL — SIGNIFICANT CHANGE UP (ref 3.3–5)
ALP SERPL-CCNC: 137 U/L — HIGH (ref 40–120)
ALT FLD-CCNC: 185 U/L — HIGH (ref 10–45)
ANA TITR SER: NEGATIVE — SIGNIFICANT CHANGE UP
ANION GAP SERPL CALC-SCNC: 7 MMOL/L — SIGNIFICANT CHANGE UP (ref 5–17)
AST SERPL-CCNC: 155 U/L — HIGH (ref 10–40)
BILIRUB SERPL-MCNC: 0.6 MG/DL — SIGNIFICANT CHANGE UP (ref 0.2–1.2)
BUN SERPL-MCNC: 23 MG/DL — SIGNIFICANT CHANGE UP (ref 7–23)
CALCIUM SERPL-MCNC: 9 MG/DL — SIGNIFICANT CHANGE UP (ref 8.4–10.5)
CHLORIDE SERPL-SCNC: 89 MMOL/L — LOW (ref 96–108)
CO2 SERPL-SCNC: 44 MMOL/L — HIGH (ref 22–31)
CREAT SERPL-MCNC: 0.9 MG/DL — SIGNIFICANT CHANGE UP (ref 0.5–1.3)
GLUCOSE SERPL-MCNC: 153 MG/DL — HIGH (ref 70–99)
HCT VFR BLD CALC: 37.8 % — SIGNIFICANT CHANGE UP (ref 34.5–45)
HGB BLD-MCNC: 10.1 G/DL — LOW (ref 11.5–15.5)
LIDOCAIN IGE QN: 54 U/L — SIGNIFICANT CHANGE UP (ref 7–60)
MCHC RBC-ENTMCNC: 26.5 PG — LOW (ref 27–34)
MCHC RBC-ENTMCNC: 26.7 G/DL — LOW (ref 32–36)
MCV RBC AUTO: 99.2 FL — SIGNIFICANT CHANGE UP (ref 80–100)
PLATELET # BLD AUTO: 276 K/UL — SIGNIFICANT CHANGE UP (ref 150–400)
POTASSIUM SERPL-MCNC: 4.2 MMOL/L — SIGNIFICANT CHANGE UP (ref 3.5–5.3)
POTASSIUM SERPL-SCNC: 4.2 MMOL/L — SIGNIFICANT CHANGE UP (ref 3.5–5.3)
PROT SERPL-MCNC: 8.1 G/DL — SIGNIFICANT CHANGE UP (ref 6–8.3)
RBC # BLD: 3.81 M/UL — SIGNIFICANT CHANGE UP (ref 3.8–5.2)
RBC # FLD: 15.6 % — SIGNIFICANT CHANGE UP (ref 10.3–16.9)
SODIUM SERPL-SCNC: 140 MMOL/L — SIGNIFICANT CHANGE UP (ref 135–145)
WBC # BLD: 6.9 K/UL — SIGNIFICANT CHANGE UP (ref 3.8–10.5)
WBC # FLD AUTO: 6.9 K/UL — SIGNIFICANT CHANGE UP (ref 3.8–10.5)

## 2017-09-10 PROCEDURE — 93970 EXTREMITY STUDY: CPT | Mod: 26

## 2017-09-10 PROCEDURE — 76705 ECHO EXAM OF ABDOMEN: CPT | Mod: 26

## 2017-09-10 PROCEDURE — 99233 SBSQ HOSP IP/OBS HIGH 50: CPT

## 2017-09-10 RX ADMIN — LOSARTAN POTASSIUM 25 MILLIGRAM(S): 100 TABLET, FILM COATED ORAL at 05:58

## 2017-09-10 RX ADMIN — Medication 80 MILLIGRAM(S): at 02:00

## 2017-09-10 RX ADMIN — Medication 80 MILLIGRAM(S): at 12:20

## 2017-09-10 RX ADMIN — Medication 2: at 22:56

## 2017-09-10 RX ADMIN — HEPARIN SODIUM 7500 UNIT(S): 5000 INJECTION INTRAVENOUS; SUBCUTANEOUS at 05:58

## 2017-09-10 RX ADMIN — HEPARIN SODIUM 7500 UNIT(S): 5000 INJECTION INTRAVENOUS; SUBCUTANEOUS at 22:50

## 2017-09-10 RX ADMIN — HEPARIN SODIUM 7500 UNIT(S): 5000 INJECTION INTRAVENOUS; SUBCUTANEOUS at 12:21

## 2017-09-10 RX ADMIN — INSULIN GLARGINE 10 UNIT(S): 100 INJECTION, SOLUTION SUBCUTANEOUS at 22:58

## 2017-09-10 RX ADMIN — AMLODIPINE BESYLATE 5 MILLIGRAM(S): 2.5 TABLET ORAL at 11:44

## 2017-09-10 RX ADMIN — Medication 20 MILLIEQUIVALENT(S): at 11:45

## 2017-09-10 NOTE — PROGRESS NOTE ADULT - SUBJECTIVE AND OBJECTIVE BOX
O/N Events: ADDY    SUBJECTIVE: Patient seen and examined at bedside. States that she feels that she has reached her baseline.  No longer experiencing any epigastric pain, nausea, vomiting.  No CP, SOB.  Flapping hand tremor noted on exam.  She states that she has had this in the past, it lasted a few weeks and it resolved without treatment.    OBJECTIVE:    Vital Signs Last 24 Hrs  T(C): 37.7 (10 Sep 2017 09:50), Max: 37.7 (10 Sep 2017 09:50)  T(F): 99.8 (10 Sep 2017 09:50), Max: 99.8 (10 Sep 2017 09:50)  HR: 81 (10 Sep 2017 09:50) (81 - 89)  BP: 111/70 (10 Sep 2017 09:50) (110/74 - 123/82)  BP(mean): --  RR: 22 (10 Sep 2017 09:50) (20 - 22)  SpO2: 92% (10 Sep 2017 09:50) (92% - 97%)    CAPILLARY BLOOD GLUCOSE  147 (10 Sep 2017 12:16)  107 (10 Sep 2017 07:47)  181 (09 Sep 2017 20:46)  140 (09 Sep 2017 16:50)    PHYSICAL EXAM:    General: morbidly obese female on 4L NC saturating 91%, NAD  HEENT: NC/AT; EOMI, LEEROY, MMM  Neck: JVD to 2 cm above clavicle  Respiratory: decreased breath sounds b/l, crackles heard on right lower lung  Cardiovascular: +S1/S2; RRR no MRG  Abdomen: soft, NT/ND; +BS x4  Extremities: WWP, 1+ edema to thighs, b/l flapping hand tremor right worse than left    MEDICATIONS  (STANDING):  heparin  Injectable 7500 Unit(s) SubCutaneous every 8 hours  insulin glargine Injectable (LANTUS) 10 Unit(s) SubCutaneous at bedtime  insulin lispro (HumaLOG) corrective regimen sliding scale   SubCutaneous Before meals and at bedtime  dextrose 5%. 1000 milliLiter(s) (50 mL/Hr) IV Continuous <Continuous>  dextrose 50% Injectable 12.5 Gram(s) IV Push once  dextrose 50% Injectable 25 Gram(s) IV Push once  dextrose 50% Injectable 25 Gram(s) IV Push once  losartan 25 milliGRAM(s) Oral daily  potassium chloride    Tablet ER 20 milliEquivalent(s) Oral daily  amLODIPine   Tablet 5 milliGRAM(s) Oral daily  furosemide    Tablet 80 milliGRAM(s) Oral every 12 hours    MEDICATIONS  (PRN):  dextrose Gel 1 Dose(s) Oral once PRN Blood Glucose LESS THAN 70 milliGRAM(s)/deciliter  glucagon  Injectable 1 milliGRAM(s) IntraMuscular once PRN Glucose LESS THAN 70 milligrams/deciliter  aluminum hydroxide/magnesium hydroxide/simethicone Suspension 30 milliLiter(s) Oral every 4 hours PRN Dyspepsia      ALLERGIES:  Allergies    No Known Drug Allergies  shellfish (Anaphylaxis)    Intolerances      LABS:              .  LABS:                         10.8   5.6   )-----------( 246      ( 09 Sep 2017 09:26 )             41.3     09-09    141  |  88<L>  |  22  ----------------------------<  143<H>  4.3   |  45<HH>  |  1.00    Ca    9.7      09 Sep 2017 15:38  Mg     2.6     09-09    TPro  8.7<H>  /  Alb  4.3  /  TBili  1.8<H>  /  DBili  x   /  AST  263<H>  /  ALT  200<H>  /  AlkPhos  159<H>  09-09    CARDIAC MARKERS ( 08 Sep 2017 19:37 )  x     / <0.01 ng/mL / 53 U/L / x     / <1.0 ng/mL      RADIOLOGY & ADDITIONAL TESTS: Reviewed. O/N Events: ADDY    SUBJECTIVE: Patient seen and examined at bedside. States that she feels that she has reached her baseline.  No longer experiencing any epigastric pain, nausea, vomiting.  No CP, SOB.  Flapping hand tremor noted on exam.  She states that she has had this in the past, it lasted a few weeks and it resolved without treatment.    OBJECTIVE:    Vital Signs Last 24 Hrs  T(C): 37.7 (10 Sep 2017 09:50), Max: 37.7 (10 Sep 2017 09:50)  T(F): 99.8 (10 Sep 2017 09:50), Max: 99.8 (10 Sep 2017 09:50)  HR: 81 (10 Sep 2017 09:50) (81 - 89)  BP: 111/70 (10 Sep 2017 09:50) (110/74 - 123/82)  BP(mean): --  RR: 22 (10 Sep 2017 09:50) (20 - 22)  SpO2: 92% (10 Sep 2017 09:50) (92% - 97%)    CAPILLARY BLOOD GLUCOSE  147 (10 Sep 2017 12:16)  107 (10 Sep 2017 07:47)  181 (09 Sep 2017 20:46)  140 (09 Sep 2017 16:50)    PHYSICAL EXAM:    General: morbidly obese female on 4L NC saturating 91%, NAD  HEENT: NC/AT; EOMI, LEEROY, MMM  Neck: JVD to 2 cm above clavicle  Respiratory: decreased breath sounds b/l, crackles heard on right lower lung  Cardiovascular: +S1/S2; RRR no MRG  Abdomen: soft, NT/ND; +BS x4  Extremities: WWP, 1+ edema to thighs, b/l flapping hand tremor right worse than left    MEDICATIONS  (STANDING):  heparin  Injectable 7500 Unit(s) SubCutaneous every 8 hours  insulin glargine Injectable (LANTUS) 10 Unit(s) SubCutaneous at bedtime  insulin lispro (HumaLOG) corrective regimen sliding scale   SubCutaneous Before meals and at bedtime  dextrose 5%. 1000 milliLiter(s) (50 mL/Hr) IV Continuous <Continuous>  dextrose 50% Injectable 12.5 Gram(s) IV Push once  dextrose 50% Injectable 25 Gram(s) IV Push once  dextrose 50% Injectable 25 Gram(s) IV Push once  losartan 25 milliGRAM(s) Oral daily  potassium chloride    Tablet ER 20 milliEquivalent(s) Oral daily  amLODIPine   Tablet 5 milliGRAM(s) Oral daily  furosemide    Tablet 80 milliGRAM(s) Oral every 12 hours    MEDICATIONS  (PRN):  dextrose Gel 1 Dose(s) Oral once PRN Blood Glucose LESS THAN 70 milliGRAM(s)/deciliter  glucagon  Injectable 1 milliGRAM(s) IntraMuscular once PRN Glucose LESS THAN 70 milligrams/deciliter  aluminum hydroxide/magnesium hydroxide/simethicone Suspension 30 milliLiter(s) Oral every 4 hours PRN Dyspepsia      ALLERGIES:  Allergies    No Known Drug Allergies  shellfish (Anaphylaxis)    Intolerances      LABS:                         10.1   6.9   )-----------( 276      ( 10 Sep 2017 15:23 )             37.8     09-10    140  |  89<L>  |  23  ----------------------------<  153<H>  4.2   |  44<H>  |  0.90    Ca    9.0      10 Sep 2017 15:23  Mg     2.6     09-09    TPro  8.1  /  Alb  4.0  /  TBili  0.6  /  DBili  x   /  AST  155<H>  /  ALT  185<H>  /  AlkPhos  137<H>  09-10    CARDIAC MARKERS ( 08 Sep 2017 19:37 )  x     / <0.01 ng/mL / 53 U/L / x     / <1.0 ng/mL      < from: US Duplex Venous Lower Ext Complete, Bilateral (09.10.17 @ 11:13) >  FINDINGS:    Thigh veins: The common femoral, femoral, popliteal, proximal greater   saphenous, and proximal deep femoral veins are patent and free of   thrombus bilaterally. The veins are normally compressible and have normal   phasic flow andaugmentation response.    Calf veins: The paired peroneal and posterior tibial calf veins are   patent bilaterally.      IMPRESSION:  No deep vein thrombosis seen.    < end of copied text >  < from: US Abdomen Limited (09.10.17 @ 10:45) >  Findings: The liver is normal in size. The liver is echogenic consistent   with fatty infiltration.. There are no focal hepatic lesions. There is no   intrahepatic biliary ductal dilatation. The common duct is normal in   caliber, measuring 0.5 cm. The gallbladder isunremarkable, without   evidence of gallstones, wall thickening, or pericholecystic fluid. The   pancreas is not visualized due to intervening bowel gas.. The right   kidney measures 12.3 cm in length. There is normal right renal   parenchymal thickness and echogenicity. There is no right hydronephrosis.   There is no ascites in the right upper quadrant. The proximal portions of   the inferior vena cava are unremarkable. The abdominal aorta is not   visualized due to intervening bowel gas.    IMPRESSION:   1. Fatty infiltration of the liver.  2. Pancreas and abdominal aorta not visualized.    < end of copied text >      RADIOLOGY & ADDITIONAL TESTS: Reviewed.

## 2017-09-10 NOTE — PROGRESS NOTE ADULT - ASSESSMENT
41 yo F w/PMH dCHF (EF 55% 9/2017), on home O2 4L during day and bipap at night, MANUEL, morbid obesity, HTN, DMII, presented with increasing SOB on minimal exertion progressing over the past month in the setting of medication non-compliance, admitted for decompensated diastolic HFpEF.

## 2017-09-10 NOTE — PROGRESS NOTE ADULT - PROBLEM SELECTOR PLAN 10
DIET: DASH/TLC w/consistent carbs  IVF: NONE  LYTES: replete K >4, Mg >2
DVT ppx: Hep subq, SCD's  CODE STATUS: FULL CODE   DISPO: pending resolution of HFpEF exacerbation

## 2017-09-10 NOTE — PROGRESS NOTE ADULT - PROBLEM SELECTOR PLAN 1
Patient presented with CXR findings consistent with pulm edema 2/2 CHF exacerbation due to treatment non-adherence. Pt states that she often skips her Lasix so that she does not need to look for public restrooms on days when she leaves the house and also in the evening so that she can sleep without awakening to urinate. Has significantly improved after resuming home dose of lasix and w/BiPAP overnight, still not at baseline in terms of RODRIGUEZ. Pt still unable to tolerate walking without dasaturating to the mid-80s and becoming severely SOB.  - Will check walking sats again today  - c/w home lasix 80 mg PO bid   - MARÍA Brown  - Strict I&O's  - Daily weights, limit fluid intake   - 4 L NC during the day and BiPAP at night Patient presented with CXR findings consistent with pulm edema 2/2 CHF exacerbation due to treatment non-adherence.  Pt states that she often skips her Lasix so that she does not need to look for public restrooms on days when she leaves the house and also in the evening so that she can sleep without awakening to urinate. Has significantly improved after resuming home dose of lasix and w/BiPAP overnight, still not at baseline in terms of RODRIGUEZ. Pt still unable to tolerate walking without dasaturating to the mid-80s and becoming severely SOB.  - Will check walking sats again today  - c/w home lasix 80 mg PO bid   - MARÍA Brown  - Strict I&O's  - Daily weights, limit fluid intake   - 4 L NC during the day and BiPAP at night

## 2017-09-10 NOTE — PROGRESS NOTE ADULT - PROBLEM SELECTOR PLAN 2
Pericardial effusion of unknown etiology stable on admission from previous imaging. May be 2/2 CHF vs infiltrative disease, less likely malignancy or infectious etiology. May benefit from diagnostic/therapeutic pericardiocentesis.  Dr Enciso consulted--NTD at present.  Pt c/o epigastric pressure like pain with nausea and vomiting 9/8, trops negative, EKG at the time unchanged, symptoms have resolved today.  - F/u KELLY

## 2017-09-10 NOTE — PROGRESS NOTE ADULT - SUBJECTIVE AND OBJECTIVE BOX
Patient discussed on morning rounds with       Operation / Date:     SUBJECTIVE ASSESSMENT:  42y Female         Vital Signs Last 24 Hrs  T(C): 37.1 (10 Sep 2017 15:33), Max: 37.7 (10 Sep 2017 09:50)  T(F): 98.8 (10 Sep 2017 15:33), Max: 99.8 (10 Sep 2017 09:50)  HR: 84 (10 Sep 2017 15:33) (81 - 89)  BP: 115/71 (10 Sep 2017 15:33) (110/74 - 115/71)  BP(mean): --  RR: 20 (10 Sep 2017 15:33) (20 - 22)  SpO2: 94% (10 Sep 2017 15:33) (92% - 97%)  I&O's Detail    09 Sep 2017 07:01  -  10 Sep 2017 07:00  --------------------------------------------------------  IN:  Total IN: 0 mL    OUT:    Voided: 200 mL  Total OUT: 200 mL    Total NET: -200 mL          CHEST TUBE:  Yes/No. AIR LEAKS: Yes/No. Suction / H2O SEAL.   JENNY DRAIN:  Yes/No.  EPICARDIAL WIRES: Yes/No.  TIE DOWNS: Yes/No.  RIDER: Yes/No.    PHYSICAL EXAM:    General:     Neurological:    Cardiovascular:    Respiratory:    Gastrointestinal:    Extremities:    Vascular:    Incision Sites:    LABS:                        10.1   6.9   )-----------( 276      ( 10 Sep 2017 15:23 )             37.8       COUMADIN:  Yes/No. REASON: .        09-10    140  |  89<L>  |  23  ----------------------------<  153<H>  4.2   |  44<H>  |  0.90    Ca    9.0      10 Sep 2017 15:23  Mg     2.6     09-09    TPro  8.1  /  Alb  4.0  /  TBili  0.6  /  DBili  x   /  AST  155<H>  /  ALT  185<H>  /  AlkPhos  137<H>  09-10          MEDICATIONS  (STANDING):  heparin  Injectable 7500 Unit(s) SubCutaneous every 8 hours  insulin glargine Injectable (LANTUS) 10 Unit(s) SubCutaneous at bedtime  insulin lispro (HumaLOG) corrective regimen sliding scale   SubCutaneous Before meals and at bedtime  dextrose 5%. 1000 milliLiter(s) (50 mL/Hr) IV Continuous <Continuous>  dextrose 50% Injectable 12.5 Gram(s) IV Push once  dextrose 50% Injectable 25 Gram(s) IV Push once  dextrose 50% Injectable 25 Gram(s) IV Push once  losartan 25 milliGRAM(s) Oral daily  potassium chloride    Tablet ER 20 milliEquivalent(s) Oral daily  amLODIPine   Tablet 5 milliGRAM(s) Oral daily  furosemide    Tablet 80 milliGRAM(s) Oral every 12 hours    MEDICATIONS  (PRN):  dextrose Gel 1 Dose(s) Oral once PRN Blood Glucose LESS THAN 70 milliGRAM(s)/deciliter  glucagon  Injectable 1 milliGRAM(s) IntraMuscular once PRN Glucose LESS THAN 70 milligrams/deciliter  aluminum hydroxide/magnesium hydroxide/simethicone Suspension 30 milliLiter(s) Oral every 4 hours PRN Dyspepsia        RADIOLOGY & ADDITIONAL TESTS: Cardiac structural Consult Note    SUBJECTIVE ASSESSMENT:  42y Female - reports walking to bathroom multiple times. Still getting winded- mildly improved.         Vital Signs Last 24 Hrs  T(C): 37.1 (10 Sep 2017 15:33), Max: 37.7 (10 Sep 2017 09:50)  T(F): 98.8 (10 Sep 2017 15:33), Max: 99.8 (10 Sep 2017 09:50)  HR: 84 (10 Sep 2017 15:33) (81 - 89)  BP: 115/71 (10 Sep 2017 15:33) (110/74 - 115/71)  BP(mean): --  RR: 20 (10 Sep 2017 15:33) (20 - 22)  SpO2: 94% (10 Sep 2017 15:33) (92% - 97%) 4L NC  I&O's Detail    09 Sep 2017 07:01  -  10 Sep 2017 07:00  --------------------------------------------------------  IN:  Total IN: 0 mL    OUT:    Voided: 200 mL  Total OUT: 200 mL    Total NET: -200 mL      PHYSICAL EXAM:    General: Sitting comfortably, NAD    Neurological: Alert and oriented, non focal    Cardiovascular: RRR no m/r/g    Respiratory: CTABL    Gastrointestinal: obese, soft non tender    Extremities: warm 1+ edema bilaterally      LABS:                        10.1   6.9   )-----------( 276      ( 10 Sep 2017 15:23 )             37.8      140  |  89<L>  |  23  ----------------------------<  153<H>  4.2   |  44<H>  |  0.90    Ca    9.0      10 Sep 2017 15:23  Mg     2.6     09-09    TPro  8.1  /  Alb  4.0  /  TBili  0.6  /  DBili  x   /  AST  155<H>  /  ALT  185<H>  /  AlkPhos  137<H>  09-10          MEDICATIONS  (STANDING):  heparin  Injectable 7500 Unit(s) SubCutaneous every 8 hours  insulin glargine Injectable (LANTUS) 10 Unit(s) SubCutaneous at bedtime  insulin lispro (HumaLOG) corrective regimen sliding scale   SubCutaneous Before meals and at bedtime  losartan 25 milliGRAM(s) Oral daily  potassium chloride    Tablet ER 20 milliEquivalent(s) Oral daily  amLODIPine   Tablet 5 milliGRAM(s) Oral daily  furosemide    Tablet 80 milliGRAM(s) Oral every 12 hours    MEDICATIONS  (PRN):  dextrose Gel 1 Dose(s) Oral once PRN Blood Glucose LESS THAN 70 milliGRAM(s)/deciliter  glucagon  Injectable 1 milliGRAM(s) IntraMuscular once PRN Glucose LESS THAN 70 milligrams/deciliter  aluminum hydroxide/magnesium hydroxide/simethicone Suspension 30 milliLiter(s) Oral every 4 hours PRN Dyspepsia        RADIOLOGY & ADDITIONAL TESTS:  LE dopplers 9/10: negative for DVT

## 2017-09-10 NOTE — PROGRESS NOTE ADULT - ASSESSMENT
42y Female w/PMH dCHF (EF 55%), on home O2 4L during day and bipap at night, MANUEL, morbid obesity, HTN, DMII, presented with worsening CHF symptoms. TTE showed Moderate pericardial effusion with no Tamponade physiology.     -Pericardial Effuson:    Hemodynamically stable. No urgent indication for drainage. Echo guided drainage would be difficult secondary to body habitus. May need CT guided drainage for diagnosis purposes.    Dr. Cancino- pt's cardiologist to see tomorrow- further rec's per him.    May need cardiac MRI vs. RHC to evaluate for constrictive effusive pericarditis   Work up to look for reasons for effusion including labs: CRP, Rheumatoid factor, KELLY, DS DNA, TSH    -CHF (chronic diastolic CHF):    Continue diuresis    -Pulm MANUEL: Cont to encourage BiPAP at night. 42y Female w/PMH dCHF (EF 55%), on home O2 4L during day and bipap at night, MANUEL, morbid obesity, HTN, DMII, presented with worsening CHF symptoms. TTE showed Moderate pericardial effusion with no Tamponade physiology.     -Pericardial Effuson:    Remains hemodynamically stable. No indication for drainage at this time. May need CT guided drainage for diagnosis purposes.    May need cardiac MRI vs. RHC to evaluate for constrictive effusive pericarditis   Work up to look for reasons for effusion including labs: CRP, Rheumatoid factor, KELLY, DS DNA, TSH    -CHF (chronic diastolic CHF):    Continue diuresis- CXR looks improved today.     -Pulm MANUEL: Cont to encourage BiPAP at night.     -Dispo per primary team. Please call with any questions or concerns.

## 2017-09-10 NOTE — PROGRESS NOTE ADULT - PROBLEM SELECTOR PLAN 6
Pt with nausea, vomiting and epigastric pain 2 days found to have new elevation in LFTs, uptrending.  -Continue to monitor LFTs  -F/u abdominal ultrasound Pt with nausea, vomiting and epigastric pain 2 days found to have new elevation in LFTs, downtrending. Abdominal ultrasound shows fatty infiltration of the liver.  -Continue to monitor LFTs

## 2017-09-11 LAB
ANION GAP SERPL CALC-SCNC: 8 MMOL/L — SIGNIFICANT CHANGE UP (ref 5–17)
BUN SERPL-MCNC: 20 MG/DL — SIGNIFICANT CHANGE UP (ref 7–23)
CALCIUM SERPL-MCNC: 9.4 MG/DL — SIGNIFICANT CHANGE UP (ref 8.4–10.5)
CHLORIDE SERPL-SCNC: 89 MMOL/L — LOW (ref 96–108)
CO2 SERPL-SCNC: 42 MMOL/L — HIGH (ref 22–31)
CREAT SERPL-MCNC: 0.8 MG/DL — SIGNIFICANT CHANGE UP (ref 0.5–1.3)
D DIMER BLD IA.RAPID-MCNC: 170 NG/ML DDU — SIGNIFICANT CHANGE UP
GLUCOSE SERPL-MCNC: 181 MG/DL — HIGH (ref 70–99)
POTASSIUM SERPL-MCNC: 4.3 MMOL/L — SIGNIFICANT CHANGE UP (ref 3.5–5.3)
POTASSIUM SERPL-SCNC: 4.3 MMOL/L — SIGNIFICANT CHANGE UP (ref 3.5–5.3)
SODIUM SERPL-SCNC: 139 MMOL/L — SIGNIFICANT CHANGE UP (ref 135–145)

## 2017-09-11 PROCEDURE — 99232 SBSQ HOSP IP/OBS MODERATE 35: CPT | Mod: GC

## 2017-09-11 PROCEDURE — 99232 SBSQ HOSP IP/OBS MODERATE 35: CPT

## 2017-09-11 RX ORDER — ACETAMINOPHEN 500 MG
650 TABLET ORAL EVERY 6 HOURS
Qty: 0 | Refills: 0 | Status: DISCONTINUED | OUTPATIENT
Start: 2017-09-11 | End: 2017-09-12

## 2017-09-11 RX ADMIN — LOSARTAN POTASSIUM 25 MILLIGRAM(S): 100 TABLET, FILM COATED ORAL at 06:02

## 2017-09-11 RX ADMIN — INSULIN GLARGINE 10 UNIT(S): 100 INJECTION, SOLUTION SUBCUTANEOUS at 23:04

## 2017-09-11 RX ADMIN — Medication 20 MILLIEQUIVALENT(S): at 12:34

## 2017-09-11 RX ADMIN — Medication 2: at 17:38

## 2017-09-11 RX ADMIN — HEPARIN SODIUM 7500 UNIT(S): 5000 INJECTION INTRAVENOUS; SUBCUTANEOUS at 22:07

## 2017-09-11 RX ADMIN — HEPARIN SODIUM 7500 UNIT(S): 5000 INJECTION INTRAVENOUS; SUBCUTANEOUS at 06:02

## 2017-09-11 RX ADMIN — HEPARIN SODIUM 7500 UNIT(S): 5000 INJECTION INTRAVENOUS; SUBCUTANEOUS at 14:39

## 2017-09-11 RX ADMIN — Medication 650 MILLIGRAM(S): at 23:04

## 2017-09-11 RX ADMIN — AMLODIPINE BESYLATE 5 MILLIGRAM(S): 2.5 TABLET ORAL at 14:39

## 2017-09-11 RX ADMIN — Medication 80 MILLIGRAM(S): at 06:01

## 2017-09-11 RX ADMIN — Medication 650 MILLIGRAM(S): at 23:35

## 2017-09-11 RX ADMIN — Medication 80 MILLIGRAM(S): at 18:07

## 2017-09-11 NOTE — PROGRESS NOTE ADULT - PROBLEM SELECTOR PROBLEM 1
Pulmonary edema cardiac cause

## 2017-09-11 NOTE — PROGRESS NOTE ADULT - PROBLEM SELECTOR PLAN 7
Patient takes 1,000 mg bid metformin, levemir 15 units subq bedtime. FSG show adequate control with Lantus 10.  -[id number] units sub q with MISS

## 2017-09-11 NOTE — PROGRESS NOTE ADULT - PROBLEM SELECTOR PLAN 1
Patient presented with CXR findings consistent with pulm edema 2/2 CHF exacerbation due to treatment non-adherence.  Pt states that she often skips her Lasix so that she does not need to look for public restrooms on days when she leaves the house and also in the evening so that she can sleep without awakening to urinate. Has significantly improved after resuming home dose of lasix and w/BiPAP overnight, still not at baseline in terms of RODRIGUEZ. Pt still unable to tolerate walking without dasaturating to the mid-80s and becoming severely SOB.  Cardiac pathology and OHS does not completely explain desat and pt increased O2 needs (5L).  DVT studies negative, will order Ddimer for persistent hypoxia.  - c/w home lasix 80 mg PO bid   - Strict I&O's  - Daily weights, limit fluid intake   - 4 L NC during the day and BiPAP at night

## 2017-09-11 NOTE — PROGRESS NOTE ADULT - PROBLEM SELECTOR PLAN 8
Patient with chronic normocytic anemia, stable from previous admission in April. Hb this admission 10.6. Iron studies within normal limits.   -No signs or symptoms suggestive of bleeding.  -Monitor Hgb, transfuse if Hb <7
DIET: DASH/TLC w/consistent carbs  IVF: NONE  LYTES: replete K >4, Mg >2
Patient takes 1,000 mg bid metformin, levemir 15 units subq bedtime. FSG show adequate control with Lantus 10.  -[id number] units sub q with MISS
Patient with chronic normocytic anemia, stable from previous admission in April. Hb this admission 10.6. Iron studies within normal limits.   -No signs or symptoms suggestive of bleeding.  -Monitor Hgb, transfuse if Hb <7

## 2017-09-11 NOTE — PROGRESS NOTE ADULT - ASSESSMENT
43 yo F w/PMH dCHF (EF 55% 9/2017), on home O2 4L during day and bipap at night, MANUEL, morbid obesity, HTN, DMII, presented with increasing SOB on minimal exertion progressing over the past month in the setting of medication non-compliance, admitted for decompensated diastolic HFpEF, improved but still with RODRIGUEZ and desat to 80s with mild exertion.

## 2017-09-11 NOTE — PROGRESS NOTE ADULT - PROBLEM SELECTOR PLAN 6
Patient takes 1,000 mg bid metformin, levemir 15 units subq bedtime. FSG show adequate control with Lantus 10.  -[id number] units sub q with MISS

## 2017-09-11 NOTE — PROGRESS NOTE ADULT - PROBLEM SELECTOR PLAN 2
Pericardial effusion of unknown etiology stable on admission from previous imaging. May be 2/2 CHF vs infiltrative disease, less likely malignancy or infectious etiology. May benefit from diagnostic/therapeutic pericardiocentesis.  Dr Enciso consulted--NTD at present.  Pt c/o epigastric pressure like pain with nausea and vomiting 9/8, trops negative, EKG at the time unchanged, symptoms have resolved today.

## 2017-09-11 NOTE — PROGRESS NOTE ADULT - PROBLEM SELECTOR PROBLEM 9
Nutrition, metabolism, and development symptoms
Anemia, unspecified type
Need for prophylactic measure
Nutrition, metabolism, and development symptoms

## 2017-09-11 NOTE — PROGRESS NOTE ADULT - SUBJECTIVE AND OBJECTIVE BOX
O/N Events: ADDY    SUBJECTIVE: Patient seen and examined at bedside. No complaints at present. Flapping hand tremor has resolved. Pt still detsats to mid-80s when walking with PT.OBJECTIVE:    Vital Signs Last 24 Hrs  T(C): 36.8 (11 Sep 2017 15:43), Max: 37.1 (10 Sep 2017 20:35)  T(F): 98.3 (11 Sep 2017 15:43), Max: 98.8 (10 Sep 2017 20:35)  HR: 80 (11 Sep 2017 15:58) (80 - 88)  BP: 134/67 (11 Sep 2017 15:43) (95/62 - 134/80)  BP(mean): --  RR: 20 (11 Sep 2017 15:58) (19 - 24)  SpO2: 97% (11 Sep 2017 15:58) (93% - 97%)    CAPILLARY BLOOD GLUCOSE  163 (11 Sep 2017 17:33)  110 (11 Sep 2017 07:58)  159 (10 Sep 2017 21:04)    PHYSICAL EXAM:    General: morbidly obese female on 5L NC saturating 91%, NAD  HEENT: NC/AT; EOMI, LEEROY, MMM  Neck: JVD to 2 cm above clavicle  Respiratory: decreased breath sounds b/l, crackles heard on right lower lung  Cardiovascular: +S1/S2; RRR no MRG  Abdomen: soft, NT/ND; +BS x4  Extremities: WWP, 1+ edema to thighs    MEDICATIONS  (STANDING):  heparin  Injectable 7500 Unit(s) SubCutaneous every 8 hours  insulin glargine Injectable (LANTUS) 10 Unit(s) SubCutaneous at bedtime  insulin lispro (HumaLOG) corrective regimen sliding scale   SubCutaneous Before meals and at bedtime  dextrose 5%. 1000 milliLiter(s) (50 mL/Hr) IV Continuous <Continuous>  dextrose 50% Injectable 12.5 Gram(s) IV Push once  dextrose 50% Injectable 25 Gram(s) IV Push once  dextrose 50% Injectable 25 Gram(s) IV Push once  losartan 25 milliGRAM(s) Oral daily  potassium chloride    Tablet ER 20 milliEquivalent(s) Oral daily  amLODIPine   Tablet 5 milliGRAM(s) Oral daily  furosemide    Tablet 80 milliGRAM(s) Oral every 12 hours    MEDICATIONS  (PRN):  dextrose Gel 1 Dose(s) Oral once PRN Blood Glucose LESS THAN 70 milliGRAM(s)/deciliter  glucagon  Injectable 1 milliGRAM(s) IntraMuscular once PRN Glucose LESS THAN 70 milligrams/deciliter  aluminum hydroxide/magnesium hydroxide/simethicone Suspension 30 milliLiter(s) Oral every 4 hours PRN Dyspepsia    ALLERGIES:  Allergies    No Known Drug Allergies  shellfish (Anaphylaxis)    Intolerances

## 2017-09-11 NOTE — PROGRESS NOTE ADULT - SUBJECTIVE AND OBJECTIVE BOX
INTERVAL HPI/OVERNIGHT EVENTS:  Without chief complaint: Ambulated and Sats okay; Comfortable sitting in chair      MEDICATIONS  (STANDING):  heparin  Injectable 7500 Unit(s) SubCutaneous every 8 hours  insulin glargine Injectable (LANTUS) 10 Unit(s) SubCutaneous at bedtime  insulin lispro (HumaLOG) corrective regimen sliding scale   SubCutaneous Before meals and at bedtime  dextrose 5%. 1000 milliLiter(s) (50 mL/Hr) IV Continuous <Continuous>  dextrose 50% Injectable 12.5 Gram(s) IV Push once  dextrose 50% Injectable 25 Gram(s) IV Push once  dextrose 50% Injectable 25 Gram(s) IV Push once  losartan 25 milliGRAM(s) Oral daily  potassium chloride    Tablet ER 20 milliEquivalent(s) Oral daily  amLODIPine   Tablet 5 milliGRAM(s) Oral daily  furosemide    Tablet 80 milliGRAM(s) Oral every 12 hours    MEDICATIONS  (PRN):  dextrose Gel 1 Dose(s) Oral once PRN Blood Glucose LESS THAN 70 milliGRAM(s)/deciliter  glucagon  Injectable 1 milliGRAM(s) IntraMuscular once PRN Glucose LESS THAN 70 milligrams/deciliter  aluminum hydroxide/magnesium hydroxide/simethicone Suspension 30 milliLiter(s) Oral every 4 hours PRN Dyspepsia      Allergies    No Known Drug Allergies  shellfish (Anaphylaxis)    Intolerances        Vital Signs Last 24 Hrs  T(C): 36.8 (11 Sep 2017 09:08), Max: 37.1 (10 Sep 2017 15:33)  T(F): 98.2 (11 Sep 2017 09:08), Max: 98.8 (10 Sep 2017 15:33)  HR: 83 (11 Sep 2017 09:08) (83 - 85)  BP: 108/69 (11 Sep 2017 09:08) (108/69 - 134/80)  BP(mean): --  RR: 20 (11 Sep 2017 09:08) (19 - 24)  SpO2: 93% (11 Sep 2017 09:08) (93% - 97%)          Constitutional: Awake    Eyes: RANDA    ENMT: Negative    Neck: Supple    Back:  no tenderness     Respiratory:  wheezes    Cardiovascular: S1 S2    Gastrointestinal: soft    Genitourinary:    Extremities: no edema    Vascular:    Neurological:    Skin:    Lymph Nodes:            LABS:                        10.1   6.9   )-----------( 276      ( 10 Sep 2017 15:23 )             37.8     09-10    140  |  89<L>  |  23  ----------------------------<  153<H>  4.2   |  44<H>  |  0.90    Ca    9.0      10 Sep 2017 15:23    TPro  8.1  /  Alb  4.0  /  TBili  0.6  /  DBili  x   /  AST  155<H>  /  ALT  185<H>  /  AlkPhos  137<H>  09-10          RADIOLOGY & ADDITIONAL TESTS:

## 2017-09-11 NOTE — PROGRESS NOTE ADULT - PROBLEM SELECTOR PLAN 3
Acute on chronic diastolic HFpEF.  Echo from 4/2017 showed left ventricular hypertrophy, abnormal septal motion, LVEF 55%. Moderate pericardial effusion noted. Repeat echo unchanged. EKG showed  Normal sinus rhythm, Rightward axis. Low voltage QRS, Cannot rule out Septal infarct , age undetermined, Nonspecific ST - T abnormalities  - c/w home dose lasix 80 mg PO bid  - Strict I&O's as feasible   - Daily weights

## 2017-09-11 NOTE — PROGRESS NOTE ADULT - PROBLEM SELECTOR PROBLEM 8
Anemia, unspecified type
Anemia, unspecified type
Nutrition, metabolism, and development symptoms
Type 2 diabetes mellitus without complication, with long-term current use of insulin

## 2017-09-11 NOTE — PROGRESS NOTE ADULT - PROBLEM SELECTOR PLAN 5
Patient with longstanding history of morbid obesity. Discussed weight loss surgery and patient states that main obstacle is finding a new surgeon as she is unhappy with the one previously seen, states PCP will help set up new referral.  -MANUEL/OHS management as above.

## 2017-09-11 NOTE — PROGRESS NOTE ADULT - SUBJECTIVE AND OBJECTIVE BOX
Chief Complaint/Reason for Consult: pericardial effusion  INTERVAL HPI: oob chair sob improved no clinical signs of cardiac tampanode physiology  	  MEDICATIONS:  losartan 25 milliGRAM(s) Oral daily  amLODIPine   Tablet 5 milliGRAM(s) Oral daily  furosemide    Tablet 80 milliGRAM(s) Oral every 12 hours          aluminum hydroxide/magnesium hydroxide/simethicone Suspension 30 milliLiter(s) Oral every 4 hours PRN    insulin glargine Injectable (LANTUS) 10 Unit(s) SubCutaneous at bedtime  insulin lispro (HumaLOG) corrective regimen sliding scale   SubCutaneous Before meals and at bedtime  dextrose Gel 1 Dose(s) Oral once PRN  dextrose 50% Injectable 12.5 Gram(s) IV Push once  dextrose 50% Injectable 25 Gram(s) IV Push once  dextrose 50% Injectable 25 Gram(s) IV Push once  glucagon  Injectable 1 milliGRAM(s) IntraMuscular once PRN    heparin  Injectable 7500 Unit(s) SubCutaneous every 8 hours  dextrose 5%. 1000 milliLiter(s) IV Continuous <Continuous>  potassium chloride    Tablet ER 20 milliEquivalent(s) Oral daily      REVIEW OF SYSTEMS:  [x] As per HPI  CONSTITUTIONAL: No fever, weight loss, or fatigue  RESPIRATORY: No cough, wheezing, chills or hemoptysis; No Shortness of Breath  CARDIOVASCULAR: No chest pain, palpitations, dizziness, or leg swelling  GASTROINTESTINAL: No abdominal or epigastric pain. No nausea, vomiting, or hematemesis; No diarrhea or constipation. No melena or hematochezia.  MUSCULOSKELETAL: No joint pain or swelling; No muscle, back, or extremity pain  [x] All others negative	  [ ] Unable to obtain    PHYSICAL EXAM:  T(C): 36.8 (09-11-17 @ 09:08), Max: 37.1 (09-10-17 @ 15:33)  HR: 86 (09-11-17 @ 13:12) (83 - 86)  BP: 95/62 (09-11-17 @ 13:12) (95/62 - 134/80)  RR: 20 (09-11-17 @ 09:08) (19 - 24)  SpO2: 95% (09-11-17 @ 13:12) (93% - 97%)  Wt(kg): --  I&O's Summary      Weight (kg): 182.6 (09-11 @ 07:11)    Appearance: Normal	  HEENT:   Normal oral mucosa  Cardiovascular: Normal S1 S2, No JVD, No murmurs, No edema  Respiratory: Lungs clear to auscultation	  Gastrointestinal:  Soft, Non-tender, + BS	  Extremities: Normal range of motion, No clubbing, cyanosis or edema  Vascular: Peripheral pulses palpable 2+ bilaterally    TELEMETRY: 	    ECG:   	  RADIOLOGY:   CXR:  CT:  US:    CARDIAC TESTING:  Echocardiogram:  Catheterization:  Stress Test:      LABS:	 	    CARDIAC MARKERS:                                  10.1   6.9   )-----------( 276      ( 10 Sep 2017 15:23 )             37.8     09-10    140  |  89<L>  |  23  ----------------------------<  153<H>  4.2   |  44<H>  |  0.90    Ca    9.0      10 Sep 2017 15:23    TPro  8.1  /  Alb  4.0  /  TBili  0.6  /  DBili  x   /  AST  155<H>  /  ALT  185<H>  /  AlkPhos  137<H>  09-10    proBNP:   Lipid Profile:   HgA1c:   TSH:     ASSESSMENT/PLAN: 	    - large cardiac silhouette, difficult to visualize secondary to body habitus  - consider cardiac MRI to evaluate effusion and further eval for myocarditis  - r/o other causes of effusion, KELLY, RF, ANCA, TSH  - agree with diuresis, close observation of HD parameters

## 2017-09-11 NOTE — PROGRESS NOTE ADULT - ATTENDING COMMENTS
Patient seen and examined : Looks stable: Would try to ambulate and follow sats.
Patient seen and examined with house-staff during bedside rounds.  Resident note read, including vitals, physical findings, laboratory data, and radiological reports.   Revisions included below.  Direct personal management at bed side and extensive interpretation of the data.  Plan was outlined and discussed in details with the housestaff.  Decision making of high complexity  Respiratory failure secondary to  biventricular failure, Right-sided failure and left diastolic dysfunction, with pulmonary hypertension secondary to morbid obesityand obstructive Sleep apnea.  Patient was noncompliant with diuretics. Patient improved with IV Lasix. Hold diuretics at this point  patient has acute on top chronic respiratory acidosis. She is to be restarted on BiPAP. DVT prophylaxis. She participated in physical therapy today. Weight was 109 kg
Patient seen and examined; Comfortable; Will aim for discharge tomorrow
Patient seen and examined with house-staff during bedside rounds.  Resident note read, including vitals, physical findings, laboratory data, and radiological reports.   Revisions included below.  Direct personal management at bed side and extensive interpretation of the data.  Plan was outlined and discussed in details with the housestaff.  Decision making of high complexity  The patient is slowly improving.  The chest x-ray revealed improvement in the fluid overload better. Patient is on PO Lasix. Patient is compliant with BiPAP.  There Is no clinical evidence of infection.  Continue antihypertensive medication. The blood sugars control. Discharge planning for tomorrow
Patient seen and examined with house-staff during bedside rounds.  Resident note read, including vitals, physical findings, laboratory data, and radiological reports.   Revisions included below.  Direct personal management at bed side and extensive interpretation of the data.  Plan was outlined and discussed in details with the housestaff.  Decision making of high complexity  the patient is clinically improving. I informed the resident to have daily weight on the patient to monitor her response to diuretics. Patient is compliant with the BiPAP. Continue bronchodilators. The blood pressure is stable.  D. echocardiographic finding were reviewed and will discuss with structural heart regarding possibility of underlying constrictive pericarditis. There is no clinical evidence nor echocardiographic evidence of cardiac temponade

## 2017-09-11 NOTE — PROGRESS NOTE ADULT - ASSESSMENT
43 yo F w/PMH dCHF (EF 55% 9/2017), on home O2 4L during day and bipap at night, MANUEL, morbid obesity, HTN, DMII, presented with increasing SOB on minimal exertion progressing over the past month in the setting of medication non-compliance, admitted for decompensated diastolic HFpEF, improving.

## 2017-09-12 VITALS
SYSTOLIC BLOOD PRESSURE: 101 MMHG | HEART RATE: 84 BPM | RESPIRATION RATE: 18 BRPM | OXYGEN SATURATION: 95 % | DIASTOLIC BLOOD PRESSURE: 67 MMHG

## 2017-09-12 PROCEDURE — 80053 COMPREHEN METABOLIC PANEL: CPT

## 2017-09-12 PROCEDURE — 76705 ECHO EXAM OF ABDOMEN: CPT

## 2017-09-12 PROCEDURE — 85027 COMPLETE CBC AUTOMATED: CPT

## 2017-09-12 PROCEDURE — 94660 CPAP INITIATION&MGMT: CPT

## 2017-09-12 PROCEDURE — 71045 X-RAY EXAM CHEST 1 VIEW: CPT

## 2017-09-12 PROCEDURE — 99291 CRITICAL CARE FIRST HOUR: CPT | Mod: 25

## 2017-09-12 PROCEDURE — 97116 GAIT TRAINING THERAPY: CPT

## 2017-09-12 PROCEDURE — 86038 ANTINUCLEAR ANTIBODIES: CPT

## 2017-09-12 PROCEDURE — 97162 PT EVAL MOD COMPLEX 30 MIN: CPT

## 2017-09-12 PROCEDURE — 36415 COLL VENOUS BLD VENIPUNCTURE: CPT

## 2017-09-12 PROCEDURE — 83880 ASSAY OF NATRIURETIC PEPTIDE: CPT

## 2017-09-12 PROCEDURE — 93005 ELECTROCARDIOGRAM TRACING: CPT

## 2017-09-12 PROCEDURE — 96374 THER/PROPH/DIAG INJ IV PUSH: CPT

## 2017-09-12 PROCEDURE — 82553 CREATINE MB FRACTION: CPT

## 2017-09-12 PROCEDURE — 82728 ASSAY OF FERRITIN: CPT

## 2017-09-12 PROCEDURE — 85379 FIBRIN DEGRADATION QUANT: CPT

## 2017-09-12 PROCEDURE — 83550 IRON BINDING TEST: CPT

## 2017-09-12 PROCEDURE — 83735 ASSAY OF MAGNESIUM: CPT

## 2017-09-12 PROCEDURE — 85025 COMPLETE CBC W/AUTO DIFF WBC: CPT

## 2017-09-12 PROCEDURE — 82550 ASSAY OF CK (CPK): CPT

## 2017-09-12 PROCEDURE — 99232 SBSQ HOSP IP/OBS MODERATE 35: CPT

## 2017-09-12 PROCEDURE — 84484 ASSAY OF TROPONIN QUANT: CPT

## 2017-09-12 PROCEDURE — 83036 HEMOGLOBIN GLYCOSYLATED A1C: CPT

## 2017-09-12 PROCEDURE — 93306 TTE W/DOPPLER COMPLETE: CPT

## 2017-09-12 PROCEDURE — 80076 HEPATIC FUNCTION PANEL: CPT

## 2017-09-12 PROCEDURE — 82803 BLOOD GASES ANY COMBINATION: CPT

## 2017-09-12 PROCEDURE — 84466 ASSAY OF TRANSFERRIN: CPT

## 2017-09-12 PROCEDURE — 93970 EXTREMITY STUDY: CPT

## 2017-09-12 PROCEDURE — 83690 ASSAY OF LIPASE: CPT

## 2017-09-12 PROCEDURE — 80048 BASIC METABOLIC PNL TOTAL CA: CPT

## 2017-09-12 RX ADMIN — Medication 80 MILLIGRAM(S): at 06:15

## 2017-09-12 RX ADMIN — Medication 2: at 13:35

## 2017-09-12 RX ADMIN — HEPARIN SODIUM 7500 UNIT(S): 5000 INJECTION INTRAVENOUS; SUBCUTANEOUS at 06:17

## 2017-09-12 RX ADMIN — HEPARIN SODIUM 7500 UNIT(S): 5000 INJECTION INTRAVENOUS; SUBCUTANEOUS at 13:35

## 2017-09-12 RX ADMIN — LOSARTAN POTASSIUM 25 MILLIGRAM(S): 100 TABLET, FILM COATED ORAL at 06:15

## 2017-09-12 RX ADMIN — Medication 20 MILLIEQUIVALENT(S): at 11:34

## 2017-09-12 NOTE — PROGRESS NOTE ADULT - SUBJECTIVE AND OBJECTIVE BOX
Chief Complaint/Reason for Consult: pericardial effusion  INTERVAL HPI: oob chair sob improved no clinical signs of cardiac tampanode physiology  	  MEDICATIONS:  losartan 25 milliGRAM(s) Oral daily  amLODIPine   Tablet 5 milliGRAM(s) Oral daily  furosemide    Tablet 80 milliGRAM(s) Oral every 12 hours        acetaminophen   Tablet. 650 milliGRAM(s) Oral every 6 hours PRN    aluminum hydroxide/magnesium hydroxide/simethicone Suspension 30 milliLiter(s) Oral every 4 hours PRN    insulin glargine Injectable (LANTUS) 10 Unit(s) SubCutaneous at bedtime  insulin lispro (HumaLOG) corrective regimen sliding scale   SubCutaneous Before meals and at bedtime  dextrose Gel 1 Dose(s) Oral once PRN  dextrose 50% Injectable 12.5 Gram(s) IV Push once  dextrose 50% Injectable 25 Gram(s) IV Push once  dextrose 50% Injectable 25 Gram(s) IV Push once  glucagon  Injectable 1 milliGRAM(s) IntraMuscular once PRN    heparin  Injectable 7500 Unit(s) SubCutaneous every 8 hours  dextrose 5%. 1000 milliLiter(s) IV Continuous <Continuous>  potassium chloride    Tablet ER 20 milliEquivalent(s) Oral daily      REVIEW OF SYSTEMS:  [x] As per HPI  CONSTITUTIONAL: No fever, weight loss, or fatigue  RESPIRATORY: No cough, wheezing, chills or hemoptysis; No Shortness of Breath  CARDIOVASCULAR: No chest pain, palpitations, dizziness, or leg swelling  GASTROINTESTINAL: No abdominal or epigastric pain. No nausea, vomiting, or hematemesis; No diarrhea or constipation. No melena or hematochezia.  MUSCULOSKELETAL: No joint pain or swelling; No muscle, back, or extremity pain  [x] All others negative	  [ ] Unable to obtain    PHYSICAL EXAM:  T(C): 36.8 (09-12-17 @ 09:24), Max: 37.2 (09-11-17 @ 20:25)  HR: 85 (09-12-17 @ 09:24) (75 - 94)  BP: 96/65 (09-12-17 @ 09:24) (95/62 - 152/72)  RR: 20 (09-12-17 @ 09:24) (18 - 23)  SpO2: 94% (09-12-17 @ 09:24) (90% - 99%)  Wt(kg): --  I&O's Summary      Weight (kg): 180.7 (09-12 @ 09:24)    Appearance: Normal	  HEENT:   Normal oral mucosa  Cardiovascular: Normal S1 S2, No JVD, No murmurs, No edema  Respiratory: Lungs clear to auscultation	  Gastrointestinal:  Soft, Non-tender, + BS	  Extremities: Normal range of motion, No clubbing, cyanosis or edema  Vascular: Peripheral pulses palpable 2+ bilaterally    TELEMETRY: 	    ECG:   	  RADIOLOGY:   CXR:  CT:  US:    CARDIAC TESTING:  Echocardiogram:  Catheterization:  Stress Test:      LABS:	 	    CARDIAC MARKERS:                                  10.1   6.9   )-----------( 276      ( 10 Sep 2017 15:23 )             37.8     09-11    139  |  89<L>  |  20  ----------------------------<  181<H>  4.3   |  42<H>  |  0.80    Ca    9.4      11 Sep 2017 18:41    TPro  8.1  /  Alb  4.0  /  TBili  0.6  /  DBili  x   /  AST  155<H>  /  ALT  185<H>  /  AlkPhos  137<H>  09-10    proBNP:   Lipid Profile:   HgA1c:   TSH:     ASSESSMENT/PLAN: 	  - large cardiac silhouette, difficult to visualize secondary to body habitus  - consider cardiac MRI to evaluate effusion and further eval for myocarditis  - r/o other causes of effusion, KELLY, RF, ANCA, TSH  - agree with diuresis, close observation of HD parameters  #CV Prevention -   q3mo Fasting Lipid Profile, Goal LDL<100, statin as tolerated.  q6week TSH check  q3mo 25-OHD Vitamin D Level, Goal 50, supplement as tolerated

## 2017-09-12 NOTE — CHART NOTE - NSCHARTNOTEFT_GEN_A_CORE
Discussed with Dr. Larry.  No plans for intervention for her chronic pericardial effusion.  Should the effusion get larger or cause hemodynamic compromise, we would recommend IR-CT guided drainage.  We will sign off on this case.  Thank you.
Patient seen this am.  She still reports getting SOB on ambulation (baseline); Uses oxygen at home.  States she denies any new complaints today.  States her peripheral edema is at baseline.  On exam, difficult to hear breath sounds posteriorly due to body habitus (morbidly obese), but no wheezing heard.  Legs are edematous 1+ bilaterally.  She understands that we are not planning to drain the fluid around her heart at this time due to the fact that her BP and HR remain stable, the effusion appears to be chronic (seen on serial echo's over the past couple of years) and there is no tamponade physiology on her recent echo.  There may be a role for CT guided drainage at some point for diagnostic purposes--> will defer to IR.  Any change in plans we will follow up with you.  Will follow along for now.
Upon Nutritional Assessment by the Registered Dietitian your patient was determined to meet criteria / has evidence of the following diagnosis/diagnoses:          [ ]  Mild Protein Calorie Malnutrition        [ ]  Moderate Protein Calorie Malnutrition        [ ] Severe Protein Calorie Malnutrition        [ ] Unspecified Protein Calorie Malnutrition        [ ] Underweight / BMI <19        [x ] Morbid Obesity / BMI > 40      Findings as based on:  •  Comprehensive nutrition assessment and consultation  •  Calorie counts (nutrient intake analysis)  •  Food acceptance and intake status from observations by staff  •  Follow up  •  Patient education  •  Intervention secondary to interdisciplinary rounds  •   concerns      Treatment:    The following diet has been recommended:add 1 liter fluid restriction to regimen      PROVIDER Section:     By signing this assessment you are acknowledging and agree with the diagnosis/diagnoses assigned by the Registered Dietitian    Comments:

## 2017-09-15 DIAGNOSIS — G47.33 OBSTRUCTIVE SLEEP APNEA (ADULT) (PEDIATRIC): ICD-10-CM

## 2017-09-15 DIAGNOSIS — Z91.14 PATIENT'S OTHER NONCOMPLIANCE WITH MEDICATION REGIMEN: ICD-10-CM

## 2017-09-15 DIAGNOSIS — E87.2 ACIDOSIS: ICD-10-CM

## 2017-09-15 DIAGNOSIS — I10 ESSENTIAL (PRIMARY) HYPERTENSION: ICD-10-CM

## 2017-09-15 DIAGNOSIS — E66.2 MORBID (SEVERE) OBESITY WITH ALVEOLAR HYPOVENTILATION: ICD-10-CM

## 2017-09-15 DIAGNOSIS — I11.0 HYPERTENSIVE HEART DISEASE WITH HEART FAILURE: ICD-10-CM

## 2017-09-15 DIAGNOSIS — Z79.4 LONG TERM (CURRENT) USE OF INSULIN: ICD-10-CM

## 2017-09-15 DIAGNOSIS — I50.33 ACUTE ON CHRONIC DIASTOLIC (CONGESTIVE) HEART FAILURE: ICD-10-CM

## 2017-09-15 DIAGNOSIS — E11.9 TYPE 2 DIABETES MELLITUS WITHOUT COMPLICATIONS: ICD-10-CM

## 2017-09-15 DIAGNOSIS — I31.3 PERICARDIAL EFFUSION (NONINFLAMMATORY): ICD-10-CM

## 2017-09-15 DIAGNOSIS — D64.9 ANEMIA, UNSPECIFIED: ICD-10-CM

## 2017-09-15 DIAGNOSIS — Z79.84 LONG TERM (CURRENT) USE OF ORAL HYPOGLYCEMIC DRUGS: ICD-10-CM

## 2017-09-15 DIAGNOSIS — Z99.81 DEPENDENCE ON SUPPLEMENTAL OXYGEN: ICD-10-CM

## 2017-09-18 ENCOUNTER — APPOINTMENT (OUTPATIENT)
Dept: PULMONOLOGY | Facility: CLINIC | Age: 42
End: 2017-09-18
Payer: MEDICAID

## 2017-09-18 PROCEDURE — 99214 OFFICE O/P EST MOD 30 MIN: CPT | Mod: 25

## 2017-09-22 ENCOUNTER — APPOINTMENT (OUTPATIENT)
Dept: INTERNAL MEDICINE | Facility: CLINIC | Age: 42
End: 2017-09-22
Payer: COMMERCIAL

## 2017-09-22 VITALS
WEIGHT: 293 LBS | SYSTOLIC BLOOD PRESSURE: 123 MMHG | TEMPERATURE: 98.4 F | BODY MASS INDEX: 60.42 KG/M2 | OXYGEN SATURATION: 79 % | HEART RATE: 102 BPM | DIASTOLIC BLOOD PRESSURE: 73 MMHG

## 2017-09-22 DIAGNOSIS — E55.9 VITAMIN D DEFICIENCY, UNSPECIFIED: ICD-10-CM

## 2017-09-22 PROCEDURE — 36415 COLL VENOUS BLD VENIPUNCTURE: CPT

## 2017-09-22 PROCEDURE — 99213 OFFICE O/P EST LOW 20 MIN: CPT | Mod: 25,GE

## 2017-09-22 PROCEDURE — G0008: CPT

## 2017-09-22 PROCEDURE — 90686 IIV4 VACC NO PRSV 0.5 ML IM: CPT

## 2017-09-22 RX ORDER — CLOTRIMAZOLE AND BETAMETHASONE DIPROPIONATE 10; .5 MG/G; MG/G
1-0.05 CREAM TOPICAL
Qty: 1 | Refills: 0 | Status: DISCONTINUED | COMMUNITY
Start: 2017-01-03 | End: 2017-09-22

## 2017-09-26 LAB
25(OH)D3 SERPL-MCNC: 29 NG/ML
ANION GAP SERPL CALC-SCNC: 15 MMOL/L
BUN SERPL-MCNC: 10 MG/DL
CALCIUM SERPL-MCNC: 9.7 MG/DL
CHLORIDE SERPL-SCNC: 92 MMOL/L
CO2 SERPL-SCNC: 37 MMOL/L
CREAT SERPL-MCNC: 0.65 MG/DL
GLUCOSE SERPL-MCNC: 145 MG/DL
HBA1C MFR BLD HPLC: 7 %
POTASSIUM SERPL-SCNC: 4 MMOL/L
SODIUM SERPL-SCNC: 144 MMOL/L

## 2017-09-28 ENCOUNTER — RX RENEWAL (OUTPATIENT)
Age: 42
End: 2017-09-28

## 2017-09-28 ENCOUNTER — OTHER (OUTPATIENT)
Age: 42
End: 2017-09-28

## 2017-09-28 NOTE — PATIENT PROFILE ADULT. - AS SC BRADEN NUTRITION
Called and left refill authorization on automated system at [facility] to refill Temazepam 15 mg #30. Script was faxed on 9/23/2017.   Tiago/shadi (3) adequate

## 2017-09-29 ENCOUNTER — RX RENEWAL (OUTPATIENT)
Age: 42
End: 2017-09-29

## 2017-09-29 ENCOUNTER — OTHER (OUTPATIENT)
Age: 42
End: 2017-09-29

## 2017-10-23 ENCOUNTER — RX RENEWAL (OUTPATIENT)
Age: 42
End: 2017-10-23

## 2017-10-24 ENCOUNTER — RX RENEWAL (OUTPATIENT)
Age: 42
End: 2017-10-24

## 2017-11-07 ENCOUNTER — RX RENEWAL (OUTPATIENT)
Age: 42
End: 2017-11-07

## 2017-11-13 ENCOUNTER — APPOINTMENT (OUTPATIENT)
Dept: INTERNAL MEDICINE | Facility: CLINIC | Age: 42
End: 2017-11-13
Payer: COMMERCIAL

## 2017-11-13 VITALS
HEART RATE: 91 BPM | SYSTOLIC BLOOD PRESSURE: 135 MMHG | HEIGHT: 66.5 IN | DIASTOLIC BLOOD PRESSURE: 70 MMHG | BODY MASS INDEX: 46.53 KG/M2 | TEMPERATURE: 98 F | OXYGEN SATURATION: 98 % | WEIGHT: 293 LBS

## 2017-11-13 PROCEDURE — 99214 OFFICE O/P EST MOD 30 MIN: CPT | Mod: 25,GC

## 2017-11-21 ENCOUNTER — RX RENEWAL (OUTPATIENT)
Age: 42
End: 2017-11-21

## 2017-11-21 ENCOUNTER — APPOINTMENT (OUTPATIENT)
Dept: INTERNAL MEDICINE | Facility: CLINIC | Age: 42
End: 2017-11-21
Payer: COMMERCIAL

## 2017-11-21 VITALS — HEIGHT: 66.5 IN | WEIGHT: 293 LBS | BODY MASS INDEX: 46.53 KG/M2

## 2017-11-21 DIAGNOSIS — Z86.39 PERSONAL HISTORY OF OTHER ENDOCRINE, NUTRITIONAL AND METABOLIC DISEASE: ICD-10-CM

## 2017-11-21 DIAGNOSIS — Z86.69 PERSONAL HISTORY OF OTHER DISEASES OF THE NERVOUS SYSTEM AND SENSE ORGANS: ICD-10-CM

## 2017-11-21 PROCEDURE — 97802 MEDICAL NUTRITION INDIV IN: CPT

## 2017-11-27 ENCOUNTER — APPOINTMENT (OUTPATIENT)
Dept: INTERNAL MEDICINE | Facility: CLINIC | Age: 42
End: 2017-11-27
Payer: COMMERCIAL

## 2017-11-27 VITALS
TEMPERATURE: 98 F | DIASTOLIC BLOOD PRESSURE: 83 MMHG | WEIGHT: 293 LBS | BODY MASS INDEX: 62.64 KG/M2 | SYSTOLIC BLOOD PRESSURE: 123 MMHG | OXYGEN SATURATION: 83 % | HEART RATE: 95 BPM

## 2017-11-27 VITALS — OXYGEN SATURATION: 96 %

## 2017-11-27 DIAGNOSIS — T50.1X5A: ICD-10-CM

## 2017-11-27 DIAGNOSIS — Z12.4 ENCOUNTER FOR SCREENING FOR MALIGNANT NEOPLASM OF CERVIX: ICD-10-CM

## 2017-11-27 PROCEDURE — 99214 OFFICE O/P EST MOD 30 MIN: CPT | Mod: 25,GC

## 2017-11-27 PROCEDURE — 36415 COLL VENOUS BLD VENIPUNCTURE: CPT

## 2017-11-28 LAB
ANION GAP SERPL CALC-SCNC: 18 MMOL/L
BUN SERPL-MCNC: 12 MG/DL
CALCIUM SERPL-MCNC: 10.1 MG/DL
CHLORIDE SERPL-SCNC: 96 MMOL/L
CO2 SERPL-SCNC: 29 MMOL/L
CREAT SERPL-MCNC: 0.66 MG/DL
CREAT SPEC-SCNC: 191 MG/DL
GLUCOSE SERPL-MCNC: 109 MG/DL
HBA1C MFR BLD HPLC: 6.9 %
MAGNESIUM SERPL-MCNC: 1.7 MG/DL
MICROALBUMIN 24H UR DL<=1MG/L-MCNC: 0.9 MG/DL
MICROALBUMIN/CREAT 24H UR-RTO: 5 MG/G
POTASSIUM SERPL-SCNC: 4.4 MMOL/L
SODIUM SERPL-SCNC: 143 MMOL/L

## 2017-12-15 ENCOUNTER — RX RENEWAL (OUTPATIENT)
Age: 42
End: 2017-12-15

## 2017-12-19 ENCOUNTER — APPOINTMENT (OUTPATIENT)
Dept: INTERNAL MEDICINE | Facility: CLINIC | Age: 42
End: 2017-12-19
Payer: COMMERCIAL

## 2017-12-19 ENCOUNTER — APPOINTMENT (OUTPATIENT)
Dept: INTERNAL MEDICINE | Facility: CLINIC | Age: 42
End: 2017-12-19
Payer: MEDICAID

## 2017-12-19 VITALS
BODY MASS INDEX: 64.07 KG/M2 | RESPIRATION RATE: 15 BRPM | SYSTOLIC BLOOD PRESSURE: 109 MMHG | DIASTOLIC BLOOD PRESSURE: 71 MMHG | OXYGEN SATURATION: 98 % | WEIGHT: 293 LBS | HEART RATE: 74 BPM | TEMPERATURE: 98.6 F

## 2017-12-19 PROCEDURE — 97803 MED NUTRITION INDIV SUBSEQ: CPT

## 2017-12-19 PROCEDURE — 99213 OFFICE O/P EST LOW 20 MIN: CPT | Mod: GE

## 2017-12-19 RX ORDER — PLASTIC BAGS
EACH MISCELLANEOUS
Qty: 1 | Refills: 0 | Status: ACTIVE | OUTPATIENT
Start: 2017-11-07

## 2017-12-26 ENCOUNTER — APPOINTMENT (OUTPATIENT)
Dept: PULMONOLOGY | Facility: CLINIC | Age: 42
End: 2017-12-26
Payer: MEDICAID

## 2017-12-26 PROCEDURE — 94729 DIFFUSING CAPACITY: CPT

## 2017-12-26 PROCEDURE — 94060 EVALUATION OF WHEEZING: CPT

## 2017-12-26 PROCEDURE — 94727 GAS DIL/WSHOT DETER LNG VOL: CPT

## 2017-12-26 PROCEDURE — ZZZZZ: CPT

## 2017-12-26 PROCEDURE — 99214 OFFICE O/P EST MOD 30 MIN: CPT | Mod: 25

## 2018-01-02 ENCOUNTER — RX RENEWAL (OUTPATIENT)
Age: 43
End: 2018-01-02

## 2018-01-22 ENCOUNTER — APPOINTMENT (OUTPATIENT)
Dept: INTERNAL MEDICINE | Facility: CLINIC | Age: 43
End: 2018-01-22
Payer: MEDICAID

## 2018-01-22 VITALS
SYSTOLIC BLOOD PRESSURE: 115 MMHG | RESPIRATION RATE: 14 BRPM | DIASTOLIC BLOOD PRESSURE: 77 MMHG | HEART RATE: 87 BPM | OXYGEN SATURATION: 93 %

## 2018-01-22 VITALS — HEIGHT: 66.5 IN

## 2018-01-22 VITALS — BODY MASS INDEX: 65.24 KG/M2 | WEIGHT: 293 LBS

## 2018-01-22 DIAGNOSIS — Z02.89 ENCOUNTER FOR OTHER ADMINISTRATIVE EXAMINATIONS: ICD-10-CM

## 2018-01-22 PROCEDURE — 99213 OFFICE O/P EST LOW 20 MIN: CPT | Mod: 25,GE

## 2018-01-23 ENCOUNTER — APPOINTMENT (OUTPATIENT)
Dept: INTERNAL MEDICINE | Facility: CLINIC | Age: 43
End: 2018-01-23

## 2018-01-30 ENCOUNTER — RX RENEWAL (OUTPATIENT)
Age: 43
End: 2018-01-30

## 2018-02-12 ENCOUNTER — OTHER (OUTPATIENT)
Age: 43
End: 2018-02-12

## 2018-02-20 ENCOUNTER — RX RENEWAL (OUTPATIENT)
Age: 43
End: 2018-02-20

## 2018-02-22 ENCOUNTER — APPOINTMENT (OUTPATIENT)
Dept: PULMONOLOGY | Facility: CLINIC | Age: 43
End: 2018-02-22

## 2018-03-02 ENCOUNTER — APPOINTMENT (OUTPATIENT)
Dept: INTERNAL MEDICINE | Facility: CLINIC | Age: 43
End: 2018-03-02
Payer: MEDICAID

## 2018-03-02 VITALS — HEART RATE: 88 BPM | RESPIRATION RATE: 14 BRPM | OXYGEN SATURATION: 94 %

## 2018-03-02 VITALS
TEMPERATURE: 99.4 F | DIASTOLIC BLOOD PRESSURE: 90 MMHG | WEIGHT: 293 LBS | SYSTOLIC BLOOD PRESSURE: 139 MMHG | BODY MASS INDEX: 66.14 KG/M2 | RESPIRATION RATE: 18 BRPM | HEART RATE: 100 BPM | OXYGEN SATURATION: 87 %

## 2018-03-02 PROCEDURE — 36415 COLL VENOUS BLD VENIPUNCTURE: CPT

## 2018-03-02 PROCEDURE — 99213 OFFICE O/P EST LOW 20 MIN: CPT | Mod: 25,GE

## 2018-03-05 ENCOUNTER — RX RENEWAL (OUTPATIENT)
Age: 43
End: 2018-03-05

## 2018-03-05 LAB
ANION GAP SERPL CALC-SCNC: 9 MMOL/L
BUN SERPL-MCNC: 7 MG/DL
CALCIUM SERPL-MCNC: 9.1 MG/DL
CHLORIDE SERPL-SCNC: 95 MMOL/L
CO2 SERPL-SCNC: 40 MMOL/L
CREAT SERPL-MCNC: 0.59 MG/DL
GLUCOSE SERPL-MCNC: 94 MG/DL
MAGNESIUM SERPL-MCNC: 1.6 MG/DL
POTASSIUM SERPL-SCNC: 5.1 MMOL/L
SODIUM SERPL-SCNC: 144 MMOL/L

## 2018-03-07 ENCOUNTER — OTHER (OUTPATIENT)
Age: 43
End: 2018-03-07

## 2018-03-07 LAB — HBA1C MFR BLD HPLC: 6.5 %

## 2018-03-08 ENCOUNTER — APPOINTMENT (OUTPATIENT)
Dept: INTERNAL MEDICINE | Facility: CLINIC | Age: 43
End: 2018-03-08
Payer: COMMERCIAL

## 2018-03-08 VITALS
DIASTOLIC BLOOD PRESSURE: 76 MMHG | TEMPERATURE: 98.1 F | OXYGEN SATURATION: 98 % | SYSTOLIC BLOOD PRESSURE: 115 MMHG | HEART RATE: 97 BPM | RESPIRATION RATE: 16 BRPM

## 2018-03-08 PROCEDURE — 99213 OFFICE O/P EST LOW 20 MIN: CPT | Mod: GE

## 2018-03-13 ENCOUNTER — APPOINTMENT (OUTPATIENT)
Dept: PULMONOLOGY | Facility: CLINIC | Age: 43
End: 2018-03-13
Payer: MEDICAID

## 2018-03-13 PROCEDURE — 99214 OFFICE O/P EST MOD 30 MIN: CPT | Mod: 25

## 2018-03-19 ENCOUNTER — RX RENEWAL (OUTPATIENT)
Age: 43
End: 2018-03-19

## 2018-03-27 ENCOUNTER — APPOINTMENT (OUTPATIENT)
Dept: HEART AND VASCULAR | Facility: CLINIC | Age: 43
End: 2018-03-27

## 2018-03-28 ENCOUNTER — RX RENEWAL (OUTPATIENT)
Age: 43
End: 2018-03-28

## 2018-04-10 NOTE — ED ADULT NURSE NOTE - NEURO SENSATION
Pt arrives to ED by private vehicle reporting chest pain and upper back pain that began \"earlier today\" but became worse in the last few hours. Pt stated the chest pain is midsternal and intermittent. Pt stated he has hx of MI and open heart surgery.    sensory intact

## 2018-04-16 ENCOUNTER — RX RENEWAL (OUTPATIENT)
Age: 43
End: 2018-04-16

## 2018-04-16 ENCOUNTER — APPOINTMENT (OUTPATIENT)
Dept: PULMONOLOGY | Facility: CLINIC | Age: 43
End: 2018-04-16

## 2018-04-17 ENCOUNTER — APPOINTMENT (OUTPATIENT)
Dept: PULMONOLOGY | Facility: CLINIC | Age: 43
End: 2018-04-17
Payer: MEDICAID

## 2018-04-17 ENCOUNTER — OTHER (OUTPATIENT)
Age: 43
End: 2018-04-17

## 2018-04-17 PROCEDURE — 94727 GAS DIL/WSHOT DETER LNG VOL: CPT

## 2018-04-17 PROCEDURE — 99214 OFFICE O/P EST MOD 30 MIN: CPT | Mod: 25

## 2018-04-17 PROCEDURE — 94729 DIFFUSING CAPACITY: CPT

## 2018-04-17 PROCEDURE — 94060 EVALUATION OF WHEEZING: CPT

## 2018-04-23 ENCOUNTER — APPOINTMENT (OUTPATIENT)
Dept: HEART AND VASCULAR | Facility: CLINIC | Age: 43
End: 2018-04-23
Payer: MEDICAID

## 2018-04-23 VITALS
DIASTOLIC BLOOD PRESSURE: 70 MMHG | OXYGEN SATURATION: 97 % | SYSTOLIC BLOOD PRESSURE: 128 MMHG | TEMPERATURE: 98 F | WEIGHT: 293 LBS | BODY MASS INDEX: 46.53 KG/M2 | HEIGHT: 66.5 IN | HEART RATE: 103 BPM

## 2018-04-23 PROCEDURE — 93000 ELECTROCARDIOGRAM COMPLETE: CPT

## 2018-04-23 PROCEDURE — 99214 OFFICE O/P EST MOD 30 MIN: CPT | Mod: 25

## 2018-04-27 ENCOUNTER — APPOINTMENT (OUTPATIENT)
Dept: HEART AND VASCULAR | Facility: CLINIC | Age: 43
End: 2018-04-27
Payer: MEDICAID

## 2018-04-27 PROCEDURE — 93306 TTE W/DOPPLER COMPLETE: CPT

## 2018-06-21 ENCOUNTER — APPOINTMENT (OUTPATIENT)
Dept: HEART AND VASCULAR | Facility: CLINIC | Age: 43
End: 2018-06-21

## 2018-06-25 ENCOUNTER — RX RENEWAL (OUTPATIENT)
Age: 43
End: 2018-06-25

## 2018-06-26 ENCOUNTER — RX RENEWAL (OUTPATIENT)
Age: 43
End: 2018-06-26

## 2018-07-06 ENCOUNTER — OTHER (OUTPATIENT)
Age: 43
End: 2018-07-06

## 2018-07-19 ENCOUNTER — OTHER (OUTPATIENT)
Age: 43
End: 2018-07-19

## 2018-07-31 ENCOUNTER — RX RENEWAL (OUTPATIENT)
Age: 43
End: 2018-07-31

## 2018-08-03 ENCOUNTER — RX RENEWAL (OUTPATIENT)
Age: 43
End: 2018-08-03

## 2018-08-13 ENCOUNTER — APPOINTMENT (OUTPATIENT)
Dept: INTERNAL MEDICINE | Facility: CLINIC | Age: 43
End: 2018-08-13

## 2018-08-13 ENCOUNTER — INPATIENT (INPATIENT)
Facility: HOSPITAL | Age: 43
LOS: 10 days | Discharge: NURSING, REHAB OR LONG-TERM CARE | DRG: 291 | End: 2018-08-24
Payer: MEDICAID

## 2018-08-13 VITALS
HEART RATE: 98 BPM | OXYGEN SATURATION: 97 % | RESPIRATION RATE: 17 BRPM | SYSTOLIC BLOOD PRESSURE: 136 MMHG | TEMPERATURE: 99 F | DIASTOLIC BLOOD PRESSURE: 83 MMHG

## 2018-08-13 DIAGNOSIS — R06.89 OTHER ABNORMALITIES OF BREATHING: ICD-10-CM

## 2018-08-13 DIAGNOSIS — R09.02 HYPOXEMIA: ICD-10-CM

## 2018-08-13 DIAGNOSIS — J96.20 ACUTE AND CHRONIC RESPIRATORY FAILURE, UNSPECIFIED WHETHER WITH HYPOXIA OR HYPERCAPNIA: ICD-10-CM

## 2018-08-13 DIAGNOSIS — E11.9 TYPE 2 DIABETES MELLITUS WITHOUT COMPLICATIONS: ICD-10-CM

## 2018-08-13 DIAGNOSIS — I10 ESSENTIAL (PRIMARY) HYPERTENSION: ICD-10-CM

## 2018-08-13 DIAGNOSIS — I50.9 HEART FAILURE, UNSPECIFIED: ICD-10-CM

## 2018-08-13 DIAGNOSIS — R63.8 OTHER SYMPTOMS AND SIGNS CONCERNING FOOD AND FLUID INTAKE: ICD-10-CM

## 2018-08-13 DIAGNOSIS — G47.33 OBSTRUCTIVE SLEEP APNEA (ADULT) (PEDIATRIC): ICD-10-CM

## 2018-08-13 DIAGNOSIS — Z91.89 OTHER SPECIFIED PERSONAL RISK FACTORS, NOT ELSEWHERE CLASSIFIED: ICD-10-CM

## 2018-08-13 DIAGNOSIS — I31.1 CHRONIC CONSTRICTIVE PERICARDITIS: ICD-10-CM

## 2018-08-13 LAB
ALBUMIN SERPL ELPH-MCNC: 3.5 G/DL — SIGNIFICANT CHANGE UP (ref 3.3–5)
ALBUMIN SERPL ELPH-MCNC: 3.9 G/DL — SIGNIFICANT CHANGE UP (ref 3.3–5)
ALP SERPL-CCNC: 52 U/L — SIGNIFICANT CHANGE UP (ref 40–120)
ALP SERPL-CCNC: 55 U/L — SIGNIFICANT CHANGE UP (ref 40–120)
ALT FLD-CCNC: 12 U/L — SIGNIFICANT CHANGE UP (ref 10–45)
ALT FLD-CCNC: 15 U/L — SIGNIFICANT CHANGE UP (ref 10–45)
ANION GAP SERPL CALC-SCNC: 11 MMOL/L — SIGNIFICANT CHANGE UP (ref 5–17)
ANION GAP SERPL CALC-SCNC: 6 MMOL/L — SIGNIFICANT CHANGE UP (ref 5–17)
AST SERPL-CCNC: 16 U/L — SIGNIFICANT CHANGE UP (ref 10–40)
AST SERPL-CCNC: 17 U/L — SIGNIFICANT CHANGE UP (ref 10–40)
BASE EXCESS BLDA CALC-SCNC: 17.2 MMOL/L — HIGH (ref -2–3)
BASOPHILS NFR BLD AUTO: 0.1 % — SIGNIFICANT CHANGE UP (ref 0–2)
BILIRUB SERPL-MCNC: 0.6 MG/DL — SIGNIFICANT CHANGE UP (ref 0.2–1.2)
BILIRUB SERPL-MCNC: 0.7 MG/DL — SIGNIFICANT CHANGE UP (ref 0.2–1.2)
BUN SERPL-MCNC: 13 MG/DL — SIGNIFICANT CHANGE UP (ref 7–23)
BUN SERPL-MCNC: 14 MG/DL — SIGNIFICANT CHANGE UP (ref 7–23)
CALCIUM SERPL-MCNC: 9.2 MG/DL — SIGNIFICANT CHANGE UP (ref 8.4–10.5)
CALCIUM SERPL-MCNC: 9.8 MG/DL — SIGNIFICANT CHANGE UP (ref 8.4–10.5)
CHLORIDE SERPL-SCNC: 92 MMOL/L — LOW (ref 96–108)
CHLORIDE SERPL-SCNC: 93 MMOL/L — LOW (ref 96–108)
CK MB CFR SERPL CALC: <1 NG/ML — SIGNIFICANT CHANGE UP (ref 0–6.7)
CK MB CFR SERPL CALC: <1 NG/ML — SIGNIFICANT CHANGE UP (ref 0–6.7)
CK SERPL-CCNC: 21 U/L — LOW (ref 25–170)
CK SERPL-CCNC: 26 U/L — SIGNIFICANT CHANGE UP (ref 25–170)
CO2 SERPL-SCNC: 37 MMOL/L — HIGH (ref 22–31)
CO2 SERPL-SCNC: 46 MMOL/L — CRITICAL HIGH (ref 22–31)
CREAT SERPL-MCNC: 0.78 MG/DL — SIGNIFICANT CHANGE UP (ref 0.5–1.3)
CREAT SERPL-MCNC: 0.83 MG/DL — SIGNIFICANT CHANGE UP (ref 0.5–1.3)
EOSINOPHIL NFR BLD AUTO: 1.1 % — SIGNIFICANT CHANGE UP (ref 0–6)
GAS PNL BLDA: SIGNIFICANT CHANGE UP
GLUCOSE BLDC GLUCOMTR-MCNC: 148 MG/DL — HIGH (ref 70–99)
GLUCOSE SERPL-MCNC: 110 MG/DL — HIGH (ref 70–99)
GLUCOSE SERPL-MCNC: 98 MG/DL — SIGNIFICANT CHANGE UP (ref 70–99)
HCG SERPL-ACNC: <.1 MIU/ML — SIGNIFICANT CHANGE UP
HCO3 BLDA-SCNC: 47 MMOL/L — CRITICAL HIGH (ref 21–28)
HCT VFR BLD CALC: 34.9 % — SIGNIFICANT CHANGE UP (ref 34.5–45)
HGB BLD-MCNC: 9.1 G/DL — LOW (ref 11.5–15.5)
LYMPHOCYTES # BLD AUTO: 14 % — SIGNIFICANT CHANGE UP (ref 13–44)
MCHC RBC-ENTMCNC: 24.7 PG — LOW (ref 27–34)
MCHC RBC-ENTMCNC: 26.1 G/DL — LOW (ref 32–36)
MCV RBC AUTO: 94.6 FL — SIGNIFICANT CHANGE UP (ref 80–100)
MONOCYTES NFR BLD AUTO: 7.4 % — SIGNIFICANT CHANGE UP (ref 2–14)
NEUTROPHILS NFR BLD AUTO: 77.4 % — HIGH (ref 43–77)
NT-PROBNP SERPL-SCNC: 391 PG/ML — HIGH (ref 0–300)
PCO2 BLDA: 100 MMHG — CRITICAL HIGH (ref 32–45)
PH BLDA: 7.29 — LOW (ref 7.35–7.45)
PLATELET # BLD AUTO: 265 K/UL — SIGNIFICANT CHANGE UP (ref 150–400)
PO2 BLDA: 82 MMHG — LOW (ref 83–108)
POTASSIUM SERPL-MCNC: 4.1 MMOL/L — SIGNIFICANT CHANGE UP (ref 3.5–5.3)
POTASSIUM SERPL-MCNC: 4.3 MMOL/L — SIGNIFICANT CHANGE UP (ref 3.5–5.3)
POTASSIUM SERPL-SCNC: 4.1 MMOL/L — SIGNIFICANT CHANGE UP (ref 3.5–5.3)
POTASSIUM SERPL-SCNC: 4.3 MMOL/L — SIGNIFICANT CHANGE UP (ref 3.5–5.3)
PROT SERPL-MCNC: 7.4 G/DL — SIGNIFICANT CHANGE UP (ref 6–8.3)
PROT SERPL-MCNC: 7.6 G/DL — SIGNIFICANT CHANGE UP (ref 6–8.3)
RBC # BLD: 3.69 M/UL — LOW (ref 3.8–5.2)
RBC # FLD: 17.7 % — HIGH (ref 10.3–16.9)
SAO2 % BLDA: 94 % — LOW (ref 95–100)
SODIUM SERPL-SCNC: 141 MMOL/L — SIGNIFICANT CHANGE UP (ref 135–145)
SODIUM SERPL-SCNC: 144 MMOL/L — SIGNIFICANT CHANGE UP (ref 135–145)
TROPONIN T SERPL-MCNC: <0.01 NG/ML — SIGNIFICANT CHANGE UP (ref 0–0.01)
TROPONIN T SERPL-MCNC: <0.01 NG/ML — SIGNIFICANT CHANGE UP (ref 0–0.01)
WBC # BLD: 7.6 K/UL — SIGNIFICANT CHANGE UP (ref 3.8–10.5)
WBC # FLD AUTO: 7.6 K/UL — SIGNIFICANT CHANGE UP (ref 3.8–10.5)

## 2018-08-13 PROCEDURE — 93010 ELECTROCARDIOGRAM REPORT: CPT

## 2018-08-13 PROCEDURE — 71275 CT ANGIOGRAPHY CHEST: CPT | Mod: 26

## 2018-08-13 PROCEDURE — 71045 X-RAY EXAM CHEST 1 VIEW: CPT | Mod: 26

## 2018-08-13 PROCEDURE — 99223 1ST HOSP IP/OBS HIGH 75: CPT | Mod: AI

## 2018-08-13 PROCEDURE — 99285 EMERGENCY DEPT VISIT HI MDM: CPT | Mod: 25

## 2018-08-13 PROCEDURE — 99233 SBSQ HOSP IP/OBS HIGH 50: CPT | Mod: GC

## 2018-08-13 RX ORDER — DEXTROSE 50 % IN WATER 50 %
25 SYRINGE (ML) INTRAVENOUS ONCE
Qty: 0 | Refills: 0 | Status: DISCONTINUED | OUTPATIENT
Start: 2018-08-13 | End: 2018-08-24

## 2018-08-13 RX ORDER — FUROSEMIDE 40 MG
80 TABLET ORAL ONCE
Qty: 0 | Refills: 0 | Status: COMPLETED | OUTPATIENT
Start: 2018-08-13 | End: 2018-08-13

## 2018-08-13 RX ORDER — ATORVASTATIN CALCIUM 80 MG/1
80 TABLET, FILM COATED ORAL AT BEDTIME
Qty: 0 | Refills: 0 | Status: DISCONTINUED | OUTPATIENT
Start: 2018-08-13 | End: 2018-08-24

## 2018-08-13 RX ORDER — AMLODIPINE BESYLATE 2.5 MG/1
1 TABLET ORAL
Qty: 0 | Refills: 0 | COMMUNITY

## 2018-08-13 RX ORDER — INSULIN LISPRO 100/ML
VIAL (ML) SUBCUTANEOUS AT BEDTIME
Qty: 0 | Refills: 0 | Status: DISCONTINUED | OUTPATIENT
Start: 2018-08-13 | End: 2018-08-24

## 2018-08-13 RX ORDER — DEXTROSE 50 % IN WATER 50 %
15 SYRINGE (ML) INTRAVENOUS ONCE
Qty: 0 | Refills: 0 | Status: DISCONTINUED | OUTPATIENT
Start: 2018-08-13 | End: 2018-08-24

## 2018-08-13 RX ORDER — HEPARIN SODIUM 5000 [USP'U]/ML
7500 INJECTION INTRAVENOUS; SUBCUTANEOUS EVERY 8 HOURS
Qty: 0 | Refills: 0 | Status: DISCONTINUED | OUTPATIENT
Start: 2018-08-13 | End: 2018-08-14

## 2018-08-13 RX ORDER — CHOLECALCIFEROL (VITAMIN D3) 125 MCG
1 CAPSULE ORAL
Qty: 0 | Refills: 0 | COMMUNITY

## 2018-08-13 RX ORDER — AMLODIPINE BESYLATE 2.5 MG/1
2.5 TABLET ORAL DAILY
Qty: 0 | Refills: 0 | Status: DISCONTINUED | OUTPATIENT
Start: 2018-08-13 | End: 2018-08-18

## 2018-08-13 RX ORDER — SODIUM CHLORIDE 9 MG/ML
1000 INJECTION, SOLUTION INTRAVENOUS
Qty: 0 | Refills: 0 | Status: DISCONTINUED | OUTPATIENT
Start: 2018-08-13 | End: 2018-08-24

## 2018-08-13 RX ORDER — LOSARTAN POTASSIUM 100 MG/1
25 TABLET, FILM COATED ORAL DAILY
Qty: 0 | Refills: 0 | Status: DISCONTINUED | OUTPATIENT
Start: 2018-08-13 | End: 2018-08-24

## 2018-08-13 RX ORDER — GLUCAGON INJECTION, SOLUTION 0.5 MG/.1ML
1 INJECTION, SOLUTION SUBCUTANEOUS ONCE
Qty: 0 | Refills: 0 | Status: DISCONTINUED | OUTPATIENT
Start: 2018-08-13 | End: 2018-08-24

## 2018-08-13 RX ORDER — INSULIN GLARGINE 100 [IU]/ML
13 INJECTION, SOLUTION SUBCUTANEOUS AT BEDTIME
Qty: 0 | Refills: 0 | Status: DISCONTINUED | OUTPATIENT
Start: 2018-08-13 | End: 2018-08-14

## 2018-08-13 RX ORDER — INSULIN LISPRO 100/ML
VIAL (ML) SUBCUTANEOUS
Qty: 0 | Refills: 0 | Status: DISCONTINUED | OUTPATIENT
Start: 2018-08-13 | End: 2018-08-24

## 2018-08-13 RX ORDER — DEXTROSE 50 % IN WATER 50 %
12.5 SYRINGE (ML) INTRAVENOUS ONCE
Qty: 0 | Refills: 0 | Status: DISCONTINUED | OUTPATIENT
Start: 2018-08-13 | End: 2018-08-24

## 2018-08-13 RX ADMIN — LOSARTAN POTASSIUM 25 MILLIGRAM(S): 100 TABLET, FILM COATED ORAL at 22:47

## 2018-08-13 RX ADMIN — INSULIN GLARGINE 13 UNIT(S): 100 INJECTION, SOLUTION SUBCUTANEOUS at 22:48

## 2018-08-13 RX ADMIN — AMLODIPINE BESYLATE 2.5 MILLIGRAM(S): 2.5 TABLET ORAL at 22:47

## 2018-08-13 RX ADMIN — ATORVASTATIN CALCIUM 80 MILLIGRAM(S): 80 TABLET, FILM COATED ORAL at 22:47

## 2018-08-13 RX ADMIN — HEPARIN SODIUM 7500 UNIT(S): 5000 INJECTION INTRAVENOUS; SUBCUTANEOUS at 22:48

## 2018-08-13 RX ADMIN — Medication 80 MILLIGRAM(S): at 22:48

## 2018-08-13 RX ADMIN — Medication 80 MILLIGRAM(S): at 11:53

## 2018-08-13 NOTE — H&P ADULT - PROBLEM SELECTOR PROBLEM 4
Type 2 diabetes mellitus without complication, with long-term current use of insulin Obstructive sleep apnea syndrome

## 2018-08-13 NOTE — ED PROVIDER NOTE - OBJECTIVE STATEMENT
pmh CHF, HTN, obesity, MANUEL, DM, p/w worsening dyspnea over past 2 weeks, taking all meds as directed is waiting to see her pmd.   no cp, no fever, no chills, no abd pain, no HA, no nausea, vomiting. pmh CHF, HTN, obesity, MANUEL, DM, p/w worsening dyspnea over past 2 weeks with increased ble leg swelling, taking all meds as directed is waiting to see her pmd. Called EMS today due to SOB, they noted hypoxia to 70% on NC at home, improved to 90% with NRB 15L en route. Pt states this feels like previous CHF exacerbation. No cp, no fever, no chills, no abd pain, no HA, no nausea, vomiting.

## 2018-08-13 NOTE — H&P ADULT - HISTORY OF PRESENT ILLNESS
Pt is a 43yoF with PMH of DM2, HTN, HLD, CHF, Severe morbid Obesity, MANUEL (On home 24/7 3-4L NC &  night CPAP 12/5) c/o SOB). Pt mentions for the last 3-4 months she has progressively been declining in relation to her SOB. Pt was initially having RODRIGUEZ walking to the restroom which has now progressed to SOB after 3 steps from her bed. Pt now bedridden the last couple of weeks requiring a commode at bedside d/t this SOB and severe obesity/LE swelling impeding her from ambulating.  Pt noted to be hypoxic to 70% on NC at home, at which point EMS was called. Pt states this feels like previous CHF exacerbation.  Pt endorses being noncompliant with medications the last couple of months. She attempts to eat a low sodium diet but is at times noncompliant. She denies any associated episodes of chest pain or paresthesias. Pt denies any recent infections, fever, chills, nausea, vomit, diarrhea, or urinary changes. En route pt, was placed on NRB 15L causing an improvement to 90%O2.     At ED: Vitals 98.6 , HR 98, /83 , RR17 97% on O2   Administered: IV Lasix 1x   Labs s/f: Pt is a 43yoF with PMH of DM2(insulin/metformin) , HTN(norvas, Losartsan), HLD, CHF (lasix), Severe morbid Obesity, MANUEL (On home 24/7 3-4L NC &  night CPAP 12/5) c/o SOB). Pt mentions for the last 3-4 months she has progressively been declining in relation to her SOB. Pt was initially having RODRIGUEZ walking to the restroom which has now progressed to SOB after 3 steps from her bed. Pt now bedridden the last couple of weeks requiring a commode at bedside d/t this SOB and severe obesity/LE swelling impeding her from ambulating.  Pt noted to be hypoxic to 70% on NC at home, at which point EMS was called. Pt states this feels like previous CHF exacerbation.  Pt endorses being noncompliant with medications the last couple of months. She attempts to eat a low sodium diet but is at times noncompliant. She denies any associated episodes of chest pain or paresthesias. Pt denies any recent infections, fever, chills, nausea, vomit, diarrhea, or urinary changes. En route pt, was placed on NRB 15L causing an improvement to 90%O2.     At ED: Vitals 98.6 , HR 98, /83 , RR17 97% on O2   Administered: IV Lasix 1x   Labs s/f: Hb9.1 (stable normocytic as in past), [id number]; ;   Images:  CXR- Inc lung markings/cardiomegaly;  CTA: Unchanged large pericardial effusion & Pulm HTN (unchanged from 2015), Diffuse mosaic pattern of lung;L>R subsegmental atelactasis in lower lobes; no PE; Pt is a 43yoF with PMH of DM2(insulin/metformin) , HTN(norvas, Losartsan), HLD, CHF (lasix), Severe morbid Obesity, MANUEL (On home 24/7 3-4L NC &  night CPAP 12/5) c/o SOB). Pt mentions for the last 3-4 months she has progressively been declining in relation to her SOB. Pt was initially having RODRIGUEZ walking to the restroom which has now progressed to SOB after 3 steps from her bed. Pt now bedridden the last couple of weeks requiring a commode at bedside d/t this SOB and severe obesity/LE swelling impeding her from ambulating.  Pt noted to be hypoxic to 70% on NC at home, at which point EMS was called. Pt states this feels like previous CHF exacerbation.  Pt endorses being noncompliant with medications the last couple of months. She attempts to eat a low sodium diet but is at times noncompliant. She denies any associated episodes of chest pain or paresthesias. Pt denies any recent infections, fever, chills, nausea, vomit, diarrhea, or urinary changes. En route pt, was placed on NRB 15L causing an improvement to 90%O2.     At ED: Vitals 98.6 , HR 98, /83 , RR17 97% on O2   Administered: IV Lasix 1x   Labs s/f: Hb9.1 (stable normocytic as in past), [id number]; ;   Images:  EKG: NSR/ low voltage HR 92   CXR- Inc lung markings/cardiomegaly;  CTA: Unchanged large pericardial effusion & Pulm HTN (unchanged from 2015), Diffuse mosaic pattern of lung;L>R subsegmental atelactasis in lower lobes; no PE; Pt is a 43yoF with PMH of DM2(insulin/metformin) , HTN(norvas, Losartsan), HLD, CHF (lasix), Severe morbid Obesity, MANUEL (On home 24/7 3-4L NC &  night CPAP 12/5) c/o SOB). Pt mentions for the last 3-4 months she has progressively been declining in relation to her SOB. Pt was initially having RODRIGUEZ walking to the restroom which has now progressed to SOB after 3 steps from her bed. Pt now bedridden the last couple of weeks requiring a commode at bedside d/t this SOB and severe obesity/LE swelling impeding her from ambulating.  Pt noted to be hypoxic to 70% on NC at home, at which point EMS was called. Pt states this feels like previous CHF exacerbation. Pt endorses being noncompliant with medications the last couple of months. She attempts to eat a low sodium diet but is at times noncompliant. She denies any associated episodes of chest pain or paresthesias. Pt denies any recent infections, fever, chills, nausea, vomit, diarrhea, or urinary changes. In relation to MANUEL, patient has required oxygen for years with increasing needs the past couple months from 2 to 3-4 L O2 NC 24/7. Pt required BIPAP 12/5 at night. Pt sees Dr. Luo.     En route pt, was placed on NRB 15L resulting in an improvement to 90%O2.     PCP: At [facility]  Pulmonologist: Dr. Luo     At ED: Vitals 98.6 , HR 98, /83 , RR17 97% on O2   Administered: IV Lasix 1x   Labs s/f: Hb9.1 (stable normocytic as in past), [id number]; ;   Images:  EKG: NSR/ low voltage HR 92   CXR- Inc lung markings/cardiomegaly;  CTA: Unchanged large pericardial effusion & Pulm HTN (unchanged from 2015), Diffuse mosaic pattern of lung;L>R subsegmental atelactasis in lower lobes; no PE;

## 2018-08-13 NOTE — PROGRESS NOTE ADULT - PROBLEM SELECTOR PLAN 2
Obstructive sleep apnea is most likely makes picture. Patient is on BiPAP and tolerated well. The ABG revealed acute respiratory on top of chronic respiratory acidosis. Continue on BiPAP. And what repeat ABG. The setting will be adjusted accordingly

## 2018-08-13 NOTE — PROGRESS NOTE ADULT - PROBLEM SELECTOR PLAN 3
Continue BiPAP. The patient is scheduled to have a titration sleep study as an outpatient and where awaiting insurance authorization

## 2018-08-13 NOTE — PATIENT PROFILE ADULT. - CAREGIVER ADDRESS
[address] [facility] [id number] [address] [place], [address]

## 2018-08-13 NOTE — H&P ADULT - ATTENDING COMMENTS
Assessment: Patient personally seen and examined myself during rounds with the Physician Assistant/House Staff/Nurse Practitioner   ON DATE 8/13/18  Physician Assistant/House Staff/Nurse Practitioner note read, including vitals, physical findings, laboratory data, and radiological reports.   Revisions included below.   Direct personal management at bed side and extensive interpretation of the data.    Plan was outlined and discussed in details with the Physician Assistant/House Staff/Nurse Practitioner.    Decision making of high complexity   Risk high of complications, morbidity, and/or mortality  Assessment and Action taken for acute disease activity to reflect the level of care provided:  -Hemodynamic evaluation and support  -ACS assessment and treatment as applicable  -Heart failure assessment and treatment as applicable  -Cardiac Telemetry reviewed  -Medication reconciliation  -Review laboratory data  -EKG reviewed - no stemi  -Echo ordered  -Interdisciplinary discussion with IC / EP / HF / CTS teams as needed  TIME SPENT in evaluation and management, reassessments, review and interpretation of labs and x-rays, and hemodynamic management, formulating a plan and coordinating care: ___70____ MIN.  Time does not include procedural time.    Ricardo Oscar MD  Cardiology    Mobile: [phone]  Office: [phone]  [address] [place], [place] [address]

## 2018-08-13 NOTE — ED ADULT TRIAGE NOTE - OTHER COMPLAINTS
per ems, pt c.o sob with b/l lower extremity swelling x 1 week. sating 80% on daily 2nc at home. presents to ed with 100% nrb in place, sating 97%. nad. hx CHF/ sleep apnea.

## 2018-08-13 NOTE — PROGRESS NOTE ADULT - PROBLEM SELECTOR PLAN 4
There's an element of right-sided heart failure. The patient was noncompliant with diuresis as per her mother. Patient also have excessive fluid intake. Continue diuresis. Continue BiPAP. Follow ABG. Patient need echocardiograms to evaluate the pericardial effusion

## 2018-08-13 NOTE — ED PROVIDER NOTE - MEDICAL DECISION MAKING DETAILS
hx of cardiomyopathya and CHF, DM, morbid obesity and MANUEL p/w worsening shortness of breath at rest and hypoxia upon arrival of EMS, requiring NRB both on transport and in ED. Pt taking all meds, and feels she is having worsening BLE edema despite therapy at home. No chest pain, only SOB. No cough or fever to suggest infectious origin. CXR noted for hx of cardiomyopathya and CHF, DM, morbid obesity and MANUEL p/w worsening shortness of breath at rest and hypoxia upon arrival of EMS, requiring NRB both on transport and in ED. Pt taking all meds, and feels she is having worsening BLE edema despite therapy at home. No chest pain, only SOB. No cough or fever to suggest infectious origin. CXR noted for pleural effusions bilaterally. Given hx and exam, less likely PE. Trop pending for ACS vs type II troponin elevation. hx of cardiomyopathya and CHF, DM, morbid obesity and MANUEL p/w worsening shortness of breath at rest and hypoxia upon arrival of EMS, requiring NRB both on transport and in ED. Pt taking all meds, and feels she is having worsening BLE edema despite therapy at home. No chest pain, only SOB. No cough or fever to suggest infectious origin. CXR noted for pleural effusions bilaterally. Given hx and exam, less likely PE. Trop negative for ACS. BNP not as elevated as would be expected given clinical picture, though lab result may be due to obesity. Given this, will further eval for PE with CTA chest.

## 2018-08-13 NOTE — H&P ADULT - NSHPLABSRESULTS_GEN_ALL_CORE
LABS:                         9.1    7.6   )-----------( 265      ( 13 Aug 2018 11:37 )             34.9     08-13    141  |  93<L>  |  14  ----------------------------<  98  4.1   |  37<H>  |  0.78    Ca    9.2      13 Aug 2018 11:37    TPro  7.4  /  Alb  3.5  /  TBili  0.6  /  DBili  x   /  AST  17  /  ALT  12  /  AlkPhos  52  08-13        CARDIAC MARKERS ( 13 Aug 2018 11:37 )  x     / <0.01 ng/mL / x     / x     / x          Serum Pro-Brain Natriuretic Peptide: 391 pg/mL (08-13 @ 11:37)    CXR 8/13: Inc lung markings/cardiomegaly     CTA: Unchanged large pericardial effusion & Pulm HTN (unchanged from 2015), Diffuse mosaic pattern of lung;L>R subsegmental atelactasis in lower lobes; no PE LABS:                         9.1    7.6   )-----------( 265      ( 13 Aug 2018 11:37 )             34.9     08-13    141  |  93<L>  |  14  ----------------------------<  98  4.1   |  37<H>  |  0.78    Ca    9.2      13 Aug 2018 11:37    TPro  7.4  /  Alb  3.5  /  TBili  0.6  /  DBili  x   /  AST  17  /  ALT  12  /  AlkPhos  52  08-13        CARDIAC MARKERS ( 13 Aug 2018 11:37 )  x     / <0.01 ng/mL / x     / x     / x          Serum Pro-Brain Natriuretic Peptide: 391 pg/mL (08-13 @ 11:37)    CXR 8/13: Inc lung markings/cardiomegaly     CTA 8/13: Unchanged large pericardial effusion & Pulm HTN (unchanged from 2015), Diffuse mosaic pattern of lung;L>R subsegmental atelactasis in lower lobes; no PE    Last cath in 2016 showed no evidence of ischemia; PA 63/14, PCWP 19;     Last Echo 9/2017: EF 60-65% mod-severe pericardial effusion, pulsus paradoxus, 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

## 2018-08-13 NOTE — ED ADULT NURSE NOTE - OBJECTIVE STATEMENT
42 y/o female BIBEMS c/o SOB and bilaterall LE swelling x1 wee. as per report pt is on 2L Oxygen at home 24hr. howerer when EMS arrived pt was sat 80% and placed on non re-breather.  upon arrival pt sat 98% with nrb mask, able to speak clearly and in full sentences, breath sounds bilaterally diminished pt reports being unable to walk without getting SOB. . pt denies fever, chills, cp.

## 2018-08-13 NOTE — ED PROVIDER NOTE - PHYSICAL EXAMINATION
Beside pt desat to 50's off O2, improved to 70's with NC, required NRB 15L for sat >90  VITAL SIGNS: I have reviewed nursing notes and confirm.   CONSTITUTIONAL: Well-developed; well-nourished; in mild distress, morbidly obese  SKIN: Skin is warm and dry, no acute rash.  HEAD: Normocephalic; atraumatic.  EYES: PERRL, EOM intact; conjunctiva and sclera clear.  ENT: No nasal discharge; airway clear.  NECK: Supple; non tender. Voluntary FROM  CARD: No murmurs, or rubs. Regular rate and rhythm.  RESP: Tachypneic, +accessory muscle usage, breath sounds diminished though exam limited due to body habitus  ABD: Soft; non-distended; non-tender; no rebound or guarding  EXT: Normal ROM. No cyanosis. 3+ bilateral pitting edema.  NEURO: Alert, oriented. Grossly unremarkable.  PSYCH: Cooperative, appropriate.

## 2018-08-13 NOTE — H&P ADULT - PROBLEM SELECTOR PROBLEM 2
Obstructive sleep apnea syndrome R/O Acute on chronic congestive heart failure, unspecified heart failure type

## 2018-08-13 NOTE — H&P ADULT - PROBLEM SELECTOR PLAN 4
Last A1C w/ BIPAP 12/5 at night; ABG illustrating a Compensated Respiratory Acidosis pH 7.29, pCO2 100, HCO3- 47.   -BIPAP ordered.

## 2018-08-13 NOTE — H&P ADULT - PROBLEM SELECTOR PLAN 2
Patient requiring home oxygen for years recently progressed from 2 to 3-4L NC requirement 24/7 w/ BIPAP 12/5 at night). Pt w/ hx of PulmHTN w/ continued evidence via CTangio.  -Continue w/ 4L NC & Night BIPAP   -ABG ordered.  -Patient sees Dr. Luo, Pulmonologist Consult tomorrow Pt with symptoms consistent w/ CHF exacerbation including progressive SOB/RODRIGUEZ/ b/l LE swelling,  (lower d/t obesity), although CXR not showing any pleural effusions, just cardiomegaly/increased lung markings. CTAngio showing unchanged large pericardial effusion as possible worsening cause of HF. Trigger is multifactorial including medication/diet noncompliance. Less likely infection being that no signs/symptoms of infection & afebrile, no leukocytosis, or Ischemia being that no sx of chest pain, Trops neg1x; Last cath in 2016 no evidence of ischemia. Pt on Home [id number], Norvasc2.5q, [id number], Atorvastatin 80qd )  -Diuresis: IV Lasix 80 2x given; will assess for 2L output in AM, and re-dose  -C/w [id number], Norvasc2.5 q, Atorvastatin 80qd   -CXR AM  -Echo   -Daily weight & strict I/Os

## 2018-08-13 NOTE — H&P ADULT - NSHPPHYSICALEXAM_GEN_ALL_CORE
VITAL SIGNS:  T(F): 98 (08-13-18 @ 15:29), Max: 98.6 (08-13-18 @ 11:00)  HR: 84 (08-13-18 @ 15:29) (84 - 98)  BP: 130/73 (08-13-18 @ 15:29) (118/96 - 136/83)  BP(mean): --  RR: 20 (08-13-18 @ 15:29) (17 - 20)  SpO2: 98% (08-13-18 @ 15:29) (97% - 98%)    PHYSICAL EXAM:    Constitutional: Severe morbid obese african american female on NRB in respiratory discomfort  HEENT: NC/AT, anicteric sclera, MMM  Neck: supple; JVD 7cm   Respiratory: globally decreased BS greater at bases   Cardiac: +S1/S2; RRR;  Gastrointestinal: morbidly obese abdomen NT +BSx4  Back: ; no CVAT B/L;   Extremities: 2+ PE b/l   Vascular: 2+ radial, femoral, DP/PT pulses B/L  Neurologic: AAOx3;

## 2018-08-13 NOTE — H&P ADULT - PROBLEM SELECTOR PLAN 1
Pt with symptoms consistent w/ CHF exacerbation including progressive SOB/RODRIGUEZ/ b/l LE swelling,  (lower d/t obesity), although CXR not showing any pleural effusions, just cardiomegaly/increased lung markings, CTAngio showing unchanged large pericardial effusion. Trigger is multifactorial including medication/diet noncompliance. Less likely infection being that no signs/symptoms of infection & afebrile, no leukocytosis, or Ischemia being that no sx of chest pain, Trops neg1x; Last cath in 2016 no evidence of ischemia. Pt on Home [id number], Norvasc2.5q, [id number])  -Diuresis: IV Lasix 80 2x given; will assess for 2L output in AM, and re-dose  -C/w [id number], Norvasc2.5q  -CXR AM  -Echo   -Daily weight & strict I/Os  -Fluid restriction 1L/day Pt w/ SaO2 70% at home on 4L NC going up to 90% on NRB. Patient requiring home oxygen for years recently progressed from 2 to 3-4L NC requirement 24/7 w/ BIPAP 12/5 at night). ABG illustrating a Compensated Respiratory Acidosis pH 7.29, pCO2 100, HCO3- 47. Presumed to be d/t MANUEL vs cardiac cause less likely being that CXR/CTAngio not illustrating pleural effusions.   -BIPAP ordered.   -ABG monitoring if decompensated.   -Patient sees Dr. Luo who will be contacted.

## 2018-08-13 NOTE — H&P ADULT - ASSESSMENT
43yoF with PMH of DM2(insulin/metformin) , HTN(Norvasc, Losartsan), HLD, CHF (lasix), Severe morbid Obesity, MANUEL (On home 24/7 3-4L NC &  night CPAP 12/5) c/o progressively worsening SOB with Oxygen desaturation 70% at home presumed to be d/t medication/diet noncompliance admitted [facility] for r/o CHF exacerbation. 43yoF with PMH of DM2(insulin/metformin) , HTN(Norvasc, Losartsan), HLD, CHF (lasix), Severe morbid Obesity, MANUEL (On home 24/7 3-4L NC &  night CPAP 12/5) c/o progressively worsening SOB with Oxygen desaturation 70% at home presumed to be d/t medication/diet noncompliance admitted to [facility] for r/o CHF exacerbation and management of hypoxia.

## 2018-08-13 NOTE — H&P ADULT - FAMILY HISTORY
Mother  Still living? Unknown  Family history of acute congestive heart failure, Age at diagnosis: Age Unknown

## 2018-08-13 NOTE — ED ADULT NURSE NOTE - DOES PATIENT HAVE ADVANCE DIRECTIVE
No 39 y/o F pt presents with dental abscess versus open socket syndrome. Currently packed with gauze. Will provide dental block, Abx's, and d/c home with dental f/u.

## 2018-08-13 NOTE — ED PROVIDER NOTE - PROGRESS NOTE DETAILS
pt tolerating NRB well, RR <20, no e/o tiring out or need for bipap at this time. Pt now down to 10L NRB sat >90%, tolerating well.

## 2018-08-13 NOTE — H&P ADULT - PROBLEM SELECTOR PLAN 3
[id number] controlled;   -Norvasc2.5q & [id number] Pt with unchanged large pericardial effusion on CTAngio when compared to previous Echo in Sept 2017.  -If patient's BP drops, contact fellow for concerning tamponade.

## 2018-08-14 LAB
ANION GAP SERPL CALC-SCNC: 3 MMOL/L — LOW (ref 5–17)
APTT BLD: 33.5 SEC — SIGNIFICANT CHANGE UP (ref 27.5–37.4)
BASE EXCESS BLDA CALC-SCNC: 19.4 MMOL/L — HIGH (ref -2–3)
BASE EXCESS BLDA CALC-SCNC: 20.4 MMOL/L — HIGH (ref -2–3)
BASE EXCESS BLDA CALC-SCNC: 22.5 MMOL/L — HIGH (ref -2–3)
BUN SERPL-MCNC: 13 MG/DL — SIGNIFICANT CHANGE UP (ref 7–23)
CALCIUM SERPL-MCNC: 9.2 MG/DL — SIGNIFICANT CHANGE UP (ref 8.4–10.5)
CHLORIDE SERPL-SCNC: 92 MMOL/L — LOW (ref 96–108)
CHOLEST SERPL-MCNC: 104 MG/DL — SIGNIFICANT CHANGE UP (ref 10–199)
CO2 SERPL-SCNC: 47 MMOL/L — CRITICAL HIGH (ref 22–31)
CREAT SERPL-MCNC: 0.8 MG/DL — SIGNIFICANT CHANGE UP (ref 0.5–1.3)
GAS PNL BLDA: SIGNIFICANT CHANGE UP
GLUCOSE BLDC GLUCOMTR-MCNC: 109 MG/DL — HIGH (ref 70–99)
GLUCOSE BLDC GLUCOMTR-MCNC: 118 MG/DL — HIGH (ref 70–99)
GLUCOSE BLDC GLUCOMTR-MCNC: 149 MG/DL — HIGH (ref 70–99)
GLUCOSE BLDC GLUCOMTR-MCNC: 88 MG/DL — SIGNIFICANT CHANGE UP (ref 70–99)
GLUCOSE SERPL-MCNC: 128 MG/DL — HIGH (ref 70–99)
HBA1C BLD-MCNC: 5.3 % — SIGNIFICANT CHANGE UP (ref 4–5.6)
HCO3 BLDA-SCNC: 50 MMOL/L — CRITICAL HIGH (ref 21–28)
HCO3 BLDA-SCNC: 50 MMOL/L — CRITICAL HIGH (ref 21–28)
HCO3 BLDA-SCNC: 52 MMOL/L — CRITICAL HIGH (ref 21–28)
HCT VFR BLD CALC: 33.4 % — LOW (ref 34.5–45)
HDLC SERPL-MCNC: 46 MG/DL — SIGNIFICANT CHANGE UP (ref 40–125)
HGB BLD-MCNC: 8.7 G/DL — LOW (ref 11.5–15.5)
INR BLD: 1.26 — HIGH (ref 0.88–1.16)
LIPID PNL WITH DIRECT LDL SERPL: 46 MG/DL — SIGNIFICANT CHANGE UP
MAGNESIUM SERPL-MCNC: 1.6 MG/DL — SIGNIFICANT CHANGE UP (ref 1.6–2.6)
MCHC RBC-ENTMCNC: 24.8 PG — LOW (ref 27–34)
MCHC RBC-ENTMCNC: 26 G/DL — LOW (ref 32–36)
MCV RBC AUTO: 95.2 FL — SIGNIFICANT CHANGE UP (ref 80–100)
PCO2 BLDA: 102 MMHG — CRITICAL HIGH (ref 32–45)
PCO2 BLDA: 108 MMHG — CRITICAL HIGH (ref 32–45)
PCO2 BLDA: 96 MMHG — CRITICAL HIGH (ref 32–45)
PH BLDA: 7.28 — LOW (ref 7.35–7.45)
PH BLDA: 7.31 — LOW (ref 7.35–7.45)
PH BLDA: 7.35 — SIGNIFICANT CHANGE UP (ref 7.35–7.45)
PHOSPHATE SERPL-MCNC: 4.7 MG/DL — HIGH (ref 2.5–4.5)
PLATELET # BLD AUTO: 248 K/UL — SIGNIFICANT CHANGE UP (ref 150–400)
PO2 BLDA: 58 MMHG — CRITICAL LOW (ref 83–108)
PO2 BLDA: 76 MMHG — LOW (ref 83–108)
PO2 BLDA: 92 MMHG — SIGNIFICANT CHANGE UP (ref 83–108)
POTASSIUM SERPL-MCNC: 4 MMOL/L — SIGNIFICANT CHANGE UP (ref 3.5–5.3)
POTASSIUM SERPL-SCNC: 4 MMOL/L — SIGNIFICANT CHANGE UP (ref 3.5–5.3)
PROTHROM AB SERPL-ACNC: 14 SEC — HIGH (ref 9.8–12.7)
RBC # BLD: 3.51 M/UL — LOW (ref 3.8–5.2)
RBC # FLD: 17.9 % — HIGH (ref 10.3–16.9)
SAO2 % BLDA: 83 % — LOW (ref 95–100)
SAO2 % BLDA: 94 % — LOW (ref 95–100)
SAO2 % BLDA: 96 % — SIGNIFICANT CHANGE UP (ref 95–100)
SODIUM SERPL-SCNC: 142 MMOL/L — SIGNIFICANT CHANGE UP (ref 135–145)
TOTAL CHOLESTEROL/HDL RATIO MEASUREMENT: 2.3 RATIO — LOW (ref 3.3–7.1)
TRIGL SERPL-MCNC: 58 MG/DL — SIGNIFICANT CHANGE UP (ref 10–149)
TSH SERPL-MCNC: 2.05 UIU/ML — SIGNIFICANT CHANGE UP (ref 0.35–4.94)
WBC # BLD: 6.3 K/UL — SIGNIFICANT CHANGE UP (ref 3.8–10.5)
WBC # FLD AUTO: 6.3 K/UL — SIGNIFICANT CHANGE UP (ref 3.8–10.5)

## 2018-08-14 PROCEDURE — 93010 ELECTROCARDIOGRAM REPORT: CPT

## 2018-08-14 PROCEDURE — 99233 SBSQ HOSP IP/OBS HIGH 50: CPT | Mod: GC

## 2018-08-14 PROCEDURE — 93306 TTE W/DOPPLER COMPLETE: CPT | Mod: 26

## 2018-08-14 PROCEDURE — 71045 X-RAY EXAM CHEST 1 VIEW: CPT | Mod: 26

## 2018-08-14 PROCEDURE — 99233 SBSQ HOSP IP/OBS HIGH 50: CPT

## 2018-08-14 RX ORDER — FUROSEMIDE 40 MG
80 TABLET ORAL EVERY 12 HOURS
Qty: 0 | Refills: 0 | Status: DISCONTINUED | OUTPATIENT
Start: 2018-08-14 | End: 2018-08-19

## 2018-08-14 RX ORDER — ENOXAPARIN SODIUM 100 MG/ML
60 INJECTION SUBCUTANEOUS ONCE
Qty: 0 | Refills: 0 | Status: COMPLETED | OUTPATIENT
Start: 2018-08-14 | End: 2018-08-14

## 2018-08-14 RX ORDER — ENOXAPARIN SODIUM 100 MG/ML
60 INJECTION SUBCUTANEOUS DAILY
Qty: 0 | Refills: 0 | Status: DISCONTINUED | OUTPATIENT
Start: 2018-08-15 | End: 2018-08-17

## 2018-08-14 RX ORDER — MAGNESIUM SULFATE 500 MG/ML
2 VIAL (ML) INJECTION ONCE
Qty: 0 | Refills: 0 | Status: COMPLETED | OUTPATIENT
Start: 2018-08-14 | End: 2018-08-14

## 2018-08-14 RX ORDER — ACETAMINOPHEN 500 MG
650 TABLET ORAL EVERY 6 HOURS
Qty: 0 | Refills: 0 | Status: DISCONTINUED | OUTPATIENT
Start: 2018-08-14 | End: 2018-08-24

## 2018-08-14 RX ADMIN — Medication 650 MILLIGRAM(S): at 17:10

## 2018-08-14 RX ADMIN — AMLODIPINE BESYLATE 2.5 MILLIGRAM(S): 2.5 TABLET ORAL at 05:47

## 2018-08-14 RX ADMIN — Medication 80 MILLIGRAM(S): at 10:17

## 2018-08-14 RX ADMIN — LOSARTAN POTASSIUM 25 MILLIGRAM(S): 100 TABLET, FILM COATED ORAL at 05:47

## 2018-08-14 RX ADMIN — HEPARIN SODIUM 7500 UNIT(S): 5000 INJECTION INTRAVENOUS; SUBCUTANEOUS at 05:47

## 2018-08-14 RX ADMIN — Medication 80 MILLIGRAM(S): at 22:07

## 2018-08-14 RX ADMIN — Medication 650 MILLIGRAM(S): at 16:37

## 2018-08-14 RX ADMIN — ENOXAPARIN SODIUM 60 MILLIGRAM(S): 100 INJECTION SUBCUTANEOUS at 13:32

## 2018-08-14 RX ADMIN — Medication 50 GRAM(S): at 12:19

## 2018-08-14 NOTE — PROGRESS NOTE ADULT - PROBLEM SELECTOR PLAN 4
BIPAP 12/5 at night; ABG illustrating a Respiratory Acidosis  -c/w BIPAP. Increase settings if needed

## 2018-08-14 NOTE — PROGRESS NOTE ADULT - PROBLEM SELECTOR PLAN 1
Pt w/ SaO2 70% at home on 4L NC going up to 90% on NRB. Patient requiring home oxygen for years recently progressed from 2 to 3-4L NC requirement 24/7 w/ BIPAP 12/5 at night). ABG illustrating a Compensated Respiratory Acidosis pH 7.29, pCO2 100, HCO3- 47. Presumed to be d/t MANUEL vs cardiac cause less likely being that CXR/CTAngio not illustrating pleural effusions.   -AM ABG hypoxemia/respiratory acidosis worsening/lethargy therefore transferred to MICU  -on BIPAP   -ABG monitoring

## 2018-08-14 NOTE — PROGRESS NOTE ADULT - PROBLEM SELECTOR PLAN 6
Pt on Metformin & 13U Lantus. HbA1C 5.3, currently well controlled.  -TSH  nL   -c/w ISS & 13U Lantus

## 2018-08-14 NOTE — PROGRESS NOTE ADULT - PROBLEM SELECTOR PLAN 1
Pt w/ SaO2 70% at home on 4L NC going up to 90% on NRB. Patient requiring home oxygen for years recently progressed from 2 to 3-4L NC requirement 24/7 w/ BIPAP 12/5 at night). ABG illustrating a Compensated Respiratory Acidosis pH 7.29, pCO2 100, HCO3- 47. Presumed to be d/t MANUEL vs cardiac cause less likely being that CXR/CTAngio not illustrating pleural effusions.   -AM ABG hypoxemia/respiratory acidosis worsening/lethargy. Patient with improving mental status on BiPAP and repeat ABG pH 7.35, pCO2 96, HCO3 52,   -c/w BIPAP througout day   -c/w ABG monitoring  -Dr. Valerio wellington, f/u recs

## 2018-08-14 NOTE — PROGRESS NOTE ADULT - ASSESSMENT
43yoF with PMH of DM2(insulin/metformin) , HTN(Norvasc, Losartsan), HLD, CHF (lasix), Severe morbid Obesity, MANUEL (On home 24/7 3-4L NC &  night CPAP 12/5) c/o progressively worsening SOB with Oxygen desaturation 70% at home presumed to be d/t medication/diet noncompliance admitted to [facility] for r/o CHF exacerbation and management of hypoxia, transferred [facility] for continued management of hypoxemia and hypercapnia on BiPAP

## 2018-08-14 NOTE — PROGRESS NOTE ADULT - ASSESSMENT
43yoF with PMH of DM2(insulin/metformin) , HTN(Norvasc, Losartsan), HLD, CHF (lasix), Severe morbid Obesity, MANUEL (On home 24/7 3-4L NC &  night CPAP 12/5) c/o progressively worsening SOB with Oxygen desaturation 70% at home presumed to be d/t medication/diet noncompliance admitted to [facility] for r/o CHF exacerbation and management of hypoxia, transferred to MICU for hypoxemia and respiratory decline.

## 2018-08-14 NOTE — PROGRESS NOTE ADULT - PROBLEM SELECTOR PLAN 2
Pt with symptoms consistent w/ CHF exacerbation including progressive SOB/RODRIGUEZ/ b/l LE swelling,  (lower d/t obesity), although CXR not showing any pleural effusions, just cardiomegaly/increased lung markings. CTAngio showing unchanged large pericardial effusion as possible worsening cause of HF. Trigger is multifactorial including medication/diet noncompliance. Less likely infection being that no signs/symptoms of infection & afebrile, no leukocytosis, or Ischemia being that no sx of chest pain, Trops neg2x; Last cath in 2016 no evidence of ischemia. Pt on Home [id number], Norvasc2.5q, [id number], Atorvastatin 80qd )  - echo 8/14 demonstrated known mod-large pericardial effusion, now with echo evidence of pulsus paradoxus  -c/w Diuresis IV Lasix 80 2x given; Lasix AM given; c/w monitoring   -C/w [id number], Norvasc2.5 q, Atorvastatin 80qd   -Daily weight & strict I/Os  -f/u Dr. Montemayor with echo findings  -f/u Dr. Larry

## 2018-08-14 NOTE — PROGRESS NOTE ADULT - PROBLEM SELECTOR PLAN 7
F: None  E: replete for K<4 Mag<2  N: DASH Diet     DVT ppx: HSQ  DISPO: [facility] F: None  E: replete for K<4 Mag<2  N: DASH Diet     DVT ppx: lovenox 60mg daily, adjusted for BMI  DISPO: [facility]

## 2018-08-14 NOTE — PROGRESS NOTE ADULT - SUBJECTIVE AND OBJECTIVE BOX
TRANSFER NOTE: TO MICU     HOSPITAL COURSE: Pt is a 43yoF with PMH of DM2(insulin/metformin) , HTN(Norvasc, Losartsan), HLD, CHF (Lasix), Severe morbid Obesity, MANUEL (On home 24/7 3-4L NC &  night CPAP 12/5) presenting on 8/13/18 with SOB. Pt mentioned for the last 3-4 months she had progressively been declining in relation to her SOB. Pt was initially having RODRIGUEZ walking to the restroom which has now progressed to SOB after 3 steps from her bed. Pt now bedridden the last couple of weeks requiring a commode at bedside d/t this SOB and severe obesity/LE swelling impeding her from ambulating.  Pt noted to be hypoxic to 70% on NC at home, at which point EMS was called. Pt stated thisfelt like previous CHF exacerbation. Pt endorsed being noncompliant with medications the last couple of months. She attempts to eat a low sodium diet but is at times noncompliant. She denied any associated episodes of chest pain or paresthesias. Pt denied any recent infections, fever, chills, nausea, vomit, diarrhea, or urinary changes. In relation to MANUEL, patient has required oxygen for years with increasing needs the past couple months from 2 to 3-4 L O2 NC 24/7. Pt required BIPAP 12/5 at night. Pt sees Dr. Luo. En route to ED pt, was placed on NRB 15L resulting in an improvement to 90%O2.     At ED: Vitals 98.6 , HR 98, /83 , RR17 97% on O2 They administered: IV Lasix40 1x , Labs s/f: Hb9.1 (stable normocytic as in past), Ujwvckv3p; ; Images:EKG: NSR/ low voltage HR 92 ; CXR- Inc lung markings/cardiomegaly; CTA: Unchanged large pericardial effusion & Pulm HTN (unchanged from 2015), Diffuse mosaic pattern of lung;L>R subsegmental atelactasis in lower lobes; no PE;     At [facility] pt's ABG was found to show Compensated Respiratory Acidosis w/ pH7.29, [id number], HCO3-47 w/ pO2 82. Being that patient had a history of MANUEL and Hypoventilation syndrome d/t body habitus, it was presumed these were contributing to the CO2 retention. Pt was then switched from NRB to BIPAP to allow for removal of CO2. Dr. Luo (pul) sees pt as outpatient and was called to assess pt recommending V/Q scan to assess for any deficits. For the CHF, pt was diuresed with IV Lasix, Echo was ordered and started on home meds including Losartan, Norvasc, Atorvastatin. Pt was found to be lethargic in the AM with continued SOB. AM ABG illustrated hypoxemia pO258 and continued respiratory acidosis while on BIPAP overnight. BIPAP settings were increased and ICU was consulted for Hypoxemia and continued respiratory decline.       VITAL SIGNS:  Vital Signs Last 24 Hrs  T(C): 36.6 (14 Aug 2018 05:18), Max: 37 (13 Aug 2018 11:00)  T(F): 97.9 (14 Aug 2018 05:18), Max: 98.6 (13 Aug 2018 11:00)  HR: 88 (14 Aug 2018 10:26) (84 - 101)  BP: 110/56 (14 Aug 2018 10:26) (103/54 - 157/64)  BP(mean): 70 (14 Aug 2018 10:26) (68 - 106)  RR: 28 (14 Aug 2018 10:26) (17 - 28)  SpO2: 93% (14 Aug 2018 10:26) (88% - 100%)    PHYSICAL EXAM:    Constitutional: Lethargic- Severe morbid obese african american female on BIPAP in respiratory discomfort  	HEENT: NC/AT, anicteric sclera, MMM  	Neck: JVD 7cm   	Respiratory: globally decreased BS greater at bases   	Cardiac: +S1/S2; RRR;  	Gastrointestinal: morbidly obese abdomen NT +BSx4   	Extremities: 2+ PE b/l   	Vascular: 2+ radial, femoral, DP/PT pulses B/L    Neurologic: AAOx3; lethargic     MEDICATIONS:  MEDICATIONS  (STANDING):  amLODIPine   Tablet 2.5 milliGRAM(s) Oral daily  atorvastatin 80 milliGRAM(s) Oral at bedtime  dextrose 5%. 1000 milliLiter(s) (50 mL/Hr) IV Continuous <Continuous>  dextrose 50% Injectable 12.5 Gram(s) IV Push once  dextrose 50% Injectable 25 Gram(s) IV Push once  dextrose 50% Injectable 25 Gram(s) IV Push once  furosemide   Injectable 80 milliGRAM(s) IV Push every 12 hours  heparin  Injectable 7500 Unit(s) SubCutaneous every 8 hours  insulin glargine Injectable (LANTUS) 13 Unit(s) SubCutaneous at bedtime  insulin lispro (HumaLOG) corrective regimen sliding scale   SubCutaneous three times a day before meals  insulin lispro (HumaLOG) corrective regimen sliding scale   SubCutaneous at bedtime  losartan 25 milliGRAM(s) Oral daily    MEDICATIONS  (PRN):  dextrose 40% Gel 15 Gram(s) Oral once PRN Blood Glucose LESS THAN 70 milliGRAM(s)/deciliter  glucagon  Injectable 1 milliGRAM(s) IntraMuscular once PRN Glucose LESS THAN 70 milligrams/deciliter      ALLERGIES:  Allergies    No Known Drug Allergies  shellfish (Anaphylaxis)    Intolerances        LABS:                        9.1    7.6   )-----------( 265      ( 13 Aug 2018 11:37 )             34.9     08-14    142  |  92<L>  |  13  ----------------------------<  128<H>  4.0   |  47<HH>  |  0.80    Ca    9.2      14 Aug 2018 07:16  Phos  4.7     08-14  Mg     1.6     08-14    TPro  7.6  /  Alb  3.9  /  TBili  0.7  /  DBili  x   /  AST  16  /  ALT  15  /  AlkPhos  55  08-13    PT/INR - ( 14 Aug 2018 10:27 )   PT: 14.0 sec;   INR: 1.26          PTT - ( 14 Aug 2018 10:27 )  PTT:33.5 sec    CAPILLARY BLOOD GLUCOSE      POCT Blood Glucose.: 149 mg/dL (14 Aug 2018 06:13)      CXR 8/13: Inc lung markings/cardiomegaly     	CTA 8/13: Unchanged large pericardial effusion & Pulm HTN (unchanged from 2015), Diffuse mosaic pattern of lung;L>R subsegmental atelactasis in lower lobes; no PE    	Last cath in 2016 showed no evidence of ischemia; PA 63/14, PCWP 19;     	Last Echo 9/2017: EF 60-65% mod-severe pericardial effusion, pulsus paradoxus, 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

## 2018-08-14 NOTE — CONSULT NOTE ADULT - ASSESSMENT
43F PMH DM2, HTN, HFpEF, MANUEL/Obesity Hypoventilation(home o2 3-4L nc and CPAP), Morbid Obesity, Pericardial Effusion who presented to [facility] with acute on chronic dyspnea who was admitted for acute on chronic hypoxic hypercapnic respiratory failure 2/2 heart failure exacerbation, ICU consulted for worsening respiratory failure    #Acute on Chronic Hypoxic Hypercapnic Respiratory Failure: multifactorial related to acute heart failure, MANUEL, obesity hypoventilation. Was being diuresed in 5L w/ Lasix 80mg IV BID with minimal urine output. On 3-4L NC at home and CPAP at night  -CTA negative for pulmonary embolism, CXR with significant pulmonary vascular congestion  -c/w BiPAP(18/8), repeat ABG to monitor for improvement of hypercapnia  -c/w Lasix 80mg IV BID, monitor UOP  -Bedside echo showed good LV function, persistent moderate pericardial effusion, no evidence of tamponade)  -Echocardiogram  -Patient w/ known pericardial effusion that was evaluated by cardiology and CT surg, no evidence of tamponade. Was supposed to obtained cardiac MRI which was not performed    Transfer for 7[facility](Medical Stepdown Unit) 43F PMH DM2, HTN, HFpEF, MANUEL/Obesity Hypoventilation(home o2 3-4L nc and CPAP), Morbid Obesity, Pericardial Effusion who presented to [facility] with acute on chronic dyspnea who was admitted for acute on chronic hypoxic hypercapnic respiratory failure 2/2 heart failure exacerbation, ICU consulted for worsening respiratory failure    #Acute on Chronic Hypoxic Hypercapnic Respiratory Failure: multifactorial related to acute heart failure, MANUEL, obesity hypoventilation. Was being diuresed in 5L w/ Lasix 80mg IV BID with minimal urine output. On 3-4L NC at home and CPAP at night. Low suspicion of ACS, patient asymptomatic with no typical symptoms.  -CTA negative for pulmonary embolism, CXR with significant pulmonary vascular congestion  -c/w BiPAP(18/8), repeat ABG to monitor for improvement of hypercapnia  -EKG  -c/w Lasix 80mg IV BID, monitor UOP  -Bedside echo showed good LV function, persistent moderate pericardial effusion, no evidence of tamponade)  -Echocardiogram  -Patient w/ known pericardial effusion that was evaluated by cardiology and CT surg, no evidence of tamponade. Was supposed to obtained cardiac MRI which was not performed    Transfer for 7L(Medical Stepdown Unit)

## 2018-08-14 NOTE — CONSULT NOTE ADULT - SUBJECTIVE AND OBJECTIVE BOX
43F PMH DM2, HTN, HFpEF, MANUEL/Obesity Hypoventilation(home o2 3-4L nc and CPAP), Morbid Obesity, Pericardial Effusion who presented to [facility] with acute on chronic dyspnea who was admitted for acute on chronic hypoxic hypercapnic respiratory failure 2/2 heart failure exacerbation. She was admitted to  where she was diuresed with Lasix 80mg IV and kept on NC through the day and BiPAP at night(12/5).     INTERVAL HPI/OVERNIGHT EVENTS:    Vital Signs Last 24 Hrs  T(C): 36.6 (14 Aug 2018 05:18), Max: 37 (13 Aug 2018 11:00)  T(F): 97.9 (14 Aug 2018 05:18), Max: 98.6 (13 Aug 2018 11:00)  HR: 88 (14 Aug 2018 08:05) (84 - 101)  BP: 114/63 (14 Aug 2018 08:05) (103/54 - 157/64)  BP(mean): 78 (14 Aug 2018 08:05) (68 - 106)  ABP: --  ABP(mean): --  RR: 27 (14 Aug 2018 08:05) (17 - 27)  SpO2: 88% (14 Aug 2018 08:05) (88% - 100%)      PHYSICAL EXAM:  Constitutional: NAD, non-toxic, lying in bed comfortably  HEENT: no eye discharge, no nasal discharge, no pharyngeal erythema, moist membranes  Neck: no lymphadenopathy, no JVD,  no carotid bruit  Cardiovascular: S1 and S2, RRR,  no M/R/G, non-displaced PMI  Respiratory: no wheezing, no rhonchi, no cough, no crackles   Gastrointestinal: BS+, soft, non-distended, non-tender, no ascites  Extremities: warm to touch, no edema  Vascular: 2+ peripheral pulses, normal capillary refill  Neurological: AAO x 4, PERRLA, EOMI, no nystagmus, 5/5 muscle strength, sensation intact   Psychiatric: normal mood, normal affect  Musculoskeletal: no joint swelling  Skin: No rashes, no skin breakdown      MEDICATIONS  (STANDING):  amLODIPine   Tablet 2.5 milliGRAM(s) Oral daily  atorvastatin 80 milliGRAM(s) Oral at bedtime  dextrose 5%. 1000 milliLiter(s) (50 mL/Hr) IV Continuous <Continuous>  dextrose 50% Injectable 12.5 Gram(s) IV Push once  dextrose 50% Injectable 25 Gram(s) IV Push once  dextrose 50% Injectable 25 Gram(s) IV Push once  furosemide   Injectable 80 milliGRAM(s) IV Push every 12 hours  heparin  Injectable 7500 Unit(s) SubCutaneous every 8 hours  insulin glargine Injectable (LANTUS) 13 Unit(s) SubCutaneous at bedtime  insulin lispro (HumaLOG) corrective regimen sliding scale   SubCutaneous three times a day before meals  insulin lispro (HumaLOG) corrective regimen sliding scale   SubCutaneous at bedtime  losartan 25 milliGRAM(s) Oral daily    MEDICATIONS  (PRN):  dextrose 40% Gel 15 Gram(s) Oral once PRN Blood Glucose LESS THAN 70 milliGRAM(s)/deciliter  glucagon  Injectable 1 milliGRAM(s) IntraMuscular once PRN Glucose LESS THAN 70 milligrams/deciliter    Allergies    No Known Drug Allergies  shellfish (Anaphylaxis)    Intolerances      LABS:                        9.1    7.6   )-----------( 265      ( 13 Aug 2018 11:37 )             34.9     08-14    142  |  92<L>  |  13  ----------------------------<  128<H>  4.0   |  47<HH>  |  0.80    Ca    9.2      14 Aug 2018 07:16  Phos  4.7     08-14  Mg     1.6     08-14    TPro  7.6  /  Alb  3.9  /  TBili  0.7  /  DBili  x   /  AST  16  /  ALT  15  /  AlkPhos  55  08-13        RADIOLOGY & ADDITIONAL TESTS: Reviewed 43F PMH DM2, HTN, HFpEF, MANUEL/Obesity Hypoventilation(home o2 3-4L nc and CPAP), Morbid Obesity, Pericardial Effusion who presented to [facility] with acute on chronic dyspnea who was admitted for acute on chronic hypoxic hypercapnic respiratory failure 2/2 heart failure exacerbation. She was admitted to  where she was diuresed with Lasix 80mg IV and kept on NC through the day and BiPAP at night(12/5). ICU team consulted as patient became acutely hypoxic this AM on BiPAP and ABG showed respiratory acidosis with PCO2 108. At the bedside patient on BiPAP, setting adjusted to 18/8 approximately 30 minutes prior. In no acute distress and appears comfortable     Vital Signs Last 24 Hrs  T(C): 36.6 (14 Aug 2018 05:18), Max: 37 (13 Aug 2018 11:00)  T(F): 97.9 (14 Aug 2018 05:18), Max: 98.6 (13 Aug 2018 11:00)  HR: 88 (14 Aug 2018 08:05) (84 - 101)  BP: 114/63 (14 Aug 2018 08:05) (103/54 - 157/64)  BP(mean): 78 (14 Aug 2018 08:05) (68 - 106)  ABP: --  ABP(mean): --  RR: 27 (14 Aug 2018 08:05) (17 - 27)  SpO2: 88% (14 Aug 2018 08:05) (88% - 100%)      PHYSICAL EXAM:  Constitutional: NAD, non-toxic, obese  HEENT: no eye discharge, no nasal discharge, no pharyngeal erythema, moist membranes  Neck: no lymphadenopathy, no JVD,  no carotid bruit  Cardiovascular: S1 and S2, RRR,  no M/R/G, non-displaced PMI  Respiratory: no wheezing, no rhonchi, no cough, no crackles   Gastrointestinal: BS+, soft, non-distended, non-tender, no ascites, obese  Extremities: warm to touch, tense edema in b/l LE  Vascular: 2+ peripheral pulses, normal capillary refill  Neurological: AAO x 4, PERRLA, EOMI, no nystagmus, 5/5 muscle strength, sensation intact   Psychiatric: normal mood, normal affect  Musculoskeletal: no joint swelling  Skin: No rashes, no skin breakdown      MEDICATIONS  (STANDING):  amLODIPine   Tablet 2.5 milliGRAM(s) Oral daily  atorvastatin 80 milliGRAM(s) Oral at bedtime  dextrose 5%. 1000 milliLiter(s) (50 mL/Hr) IV Continuous <Continuous>  dextrose 50% Injectable 12.5 Gram(s) IV Push once  dextrose 50% Injectable 25 Gram(s) IV Push once  dextrose 50% Injectable 25 Gram(s) IV Push once  furosemide   Injectable 80 milliGRAM(s) IV Push every 12 hours  heparin  Injectable 7500 Unit(s) SubCutaneous every 8 hours  insulin glargine Injectable (LANTUS) 13 Unit(s) SubCutaneous at bedtime  insulin lispro (HumaLOG) corrective regimen sliding scale   SubCutaneous three times a day before meals  insulin lispro (HumaLOG) corrective regimen sliding scale   SubCutaneous at bedtime  losartan 25 milliGRAM(s) Oral daily    MEDICATIONS  (PRN):  dextrose 40% Gel 15 Gram(s) Oral once PRN Blood Glucose LESS THAN 70 milliGRAM(s)/deciliter  glucagon  Injectable 1 milliGRAM(s) IntraMuscular once PRN Glucose LESS THAN 70 milligrams/deciliter    Allergies    No Known Drug Allergies  shellfish (Anaphylaxis)    Intolerances      LABS:                        9.1    7.6   )-----------( 265      ( 13 Aug 2018 11:37 )             34.9     08-14    142  |  92<L>  |  13  ----------------------------<  128<H>  4.0   |  47<HH>  |  0.80    Ca    9.2      14 Aug 2018 07:16  Phos  4.7     08-14  Mg     1.6     08-14    TPro  7.6  /  Alb  3.9  /  TBili  0.7  /  DBili  x   /  AST  16  /  ALT  15  /  AlkPhos  55  08-13        RADIOLOGY & ADDITIONAL TESTS: Reviewed

## 2018-08-14 NOTE — PROGRESS NOTE ADULT - PROBLEM SELECTOR PLAN 3
Pt with unchanged large pericardial effusion on CTAngio when compared to previous Echo in Sept 2017.  -If patient's BP drops, contact fellow for concerning tamponade.

## 2018-08-14 NOTE — PROGRESS NOTE ADULT - SUBJECTIVE AND OBJECTIVE BOX
PGY-1 Transfer Acceptance note [facility] to [facility]    HOSPITAL COURSE: Pt is a 43yoF with PMH of DM2(insulin/metformin) , HTN(Norvasc, Losartsan), HLD, CHF (Lasix), Severe morbid Obesity, MANUEL (On home 24/7 3-4L NC &  night CPAP 12/5) presenting on 8/13/18 with SOB. Pt mentioned for the last 3-4 months she had progressively been declining in relation to her SOB. Pt was initially having RODRIGUEZ walking to the restroom which has now progressed to SOB after 3 steps from her bed. Pt now bedridden the last couple of weeks requiring a commode at bedside d/t this SOB and severe obesity/LE swelling impeding her from ambulating.  Pt noted to be hypoxic to 70% on NC at home, at which point EMS was called. Pt stated thisfelt like previous CHF exacerbation. Pt endorsed being noncompliant with medications the last couple of months. She attempts to eat a low sodium diet but is at times noncompliant. She denied any associated episodes of chest pain or paresthesias. Pt denied any recent infections, fever, chills, nausea, vomit, diarrhea, or urinary changes. In relation to MANUEL, patient has required oxygen for years with increasing needs the past couple months from 2 to 3-4 L O2 NC 24/7. Pt required BIPAP 12/5 at night. Pt sees Dr. Luo. En route to ED pt, was placed on NRB 15L resulting in an improvement to 90%O2.   At ED: Vitals 98.6 , HR 98, /83 , RR17 97% on O2 They administered: IV Lasix40 1x , Labs s/f: Hb9.1 (stable normocytic as in past), Zysqedm2i; ; Images:EKG: NSR/ low voltage HR 92 ; CXR- Inc lung markings/cardiomegaly; CTA: Unchanged large pericardial effusion & Pulm HTN (unchanged from 2015), Diffuse mosaic pattern of lung;L>R subsegmental atelactasis in lower lobes; no PE;     At [facility] pt's ABG was found to show Compensated Respiratory Acidosis w/ pH7.29, [id number], HCO3-47 w/ pO2 82. Being that patient had a history of MANUEL and Hypoventilation syndrome d/t body habitus, it was presumed these were contributing to the CO2 retention. Pt was then switched from NRB to BIPAP to allow for removal of CO2. Dr. Luo (pul) sees pt as outpatient and was called to assess pt recommending V/Q scan to assess for any deficits. For the CHF, pt was diuresed with IV Lasix, Echo was ordered and started on home meds including Losartan, Norvasc, Atorvastatin. Pt was found to be lethargic in the AM with continued SOB. AM ABG illustrated hypoxemia pO258 and continued respiratory acidosis while on BIPAP overnight. BIPAP settings were increased and ICU was consulted for Hypoxemia and continued respiratory decline. Patient transferred to [facility] for continued respiratory monitoring.     OVERNIGHT EVENTS:    SUBJECTIVE / INTERVAL HPI: Patient seen and examined at bedside. Patient admits to SOB. Patient states she is feeling better than when she was admitted to hospital. Patient denies chest pain, fevers, chills, night sweats, nausea, vomiting, diarrhea, constipation.     VITAL SIGNS:  Vital Signs Last 24 Hrs  T(C): 37.2 (14 Aug 2018 19:55), Max: 37.5 (14 Aug 2018 14:00)  T(F): 98.9 (14 Aug 2018 19:55), Max: 99.5 (14 Aug 2018 14:00)  HR: 84 (14 Aug 2018 17:15) (84 - 101)  BP: 111/61 (14 Aug 2018 17:15) (103/54 - 157/64)  BP(mean): 79 (14 Aug 2018 17:15) (68 - 103)  RR: 26 (14 Aug 2018 17:15) (25 - 28)  SpO2: 92% (14 Aug 2018 17:15) (88% - 100%)    PHYSICAL EXAM:    Constitutional: Severe morbidly obese. On BIPAP, appears to be breathing more comfortably. NAD  HEENT: NC/AT, anicteric sclera, MMM  Neck: supple  Respiratory: Exam limited to body habitus. In no respiratory distress, not using accessory respiratory muscles for inhalation. Decreased breath sounds throughout.    Cardiac: +S1/S2; RRR  Gastrointestinal: morbidly obese abdomen, nontender. +BSx4  Extremities: b/l 2+ pitting edema in b/l lower extremities. No erythema, no cyanosis b/l  Vascular: 2+ radial pulses b/l, 1+ dorsalis pedis pulses b/l  Neurologic: AAOx3. Answers questions appropriately.     MEDICATIONS:  MEDICATIONS  (STANDING):  amLODIPine   Tablet 2.5 milliGRAM(s) Oral daily  atorvastatin 80 milliGRAM(s) Oral at bedtime  dextrose 5%. 1000 milliLiter(s) (50 mL/Hr) IV Continuous <Continuous>  dextrose 50% Injectable 12.5 Gram(s) IV Push once  dextrose 50% Injectable 25 Gram(s) IV Push once  dextrose 50% Injectable 25 Gram(s) IV Push once  furosemide   Injectable 80 milliGRAM(s) IV Push every 12 hours  insulin glargine Injectable (LANTUS) 13 Unit(s) SubCutaneous at bedtime  insulin lispro (HumaLOG) corrective regimen sliding scale   SubCutaneous three times a day before meals  insulin lispro (HumaLOG) corrective regimen sliding scale   SubCutaneous at bedtime  losartan 25 milliGRAM(s) Oral daily    MEDICATIONS  (PRN):  acetaminophen   Tablet. 650 milliGRAM(s) Oral every 6 hours PRN Mild Pain (1 - 3)  dextrose 40% Gel 15 Gram(s) Oral once PRN Blood Glucose LESS THAN 70 milliGRAM(s)/deciliter  glucagon  Injectable 1 milliGRAM(s) IntraMuscular once PRN Glucose LESS THAN 70 milligrams/deciliter      ALLERGIES:  Allergies    No Known Drug Allergies  shellfish (Anaphylaxis)    Intolerances        LABS:                        8.7    6.3   )-----------( 248      ( 14 Aug 2018 10:27 )             33.4     08-14    142  |  92<L>  |  13  ----------------------------<  128<H>  4.0   |  47<HH>  |  0.80    Ca    9.2      14 Aug 2018 07:16  Phos  4.7     08-14  Mg     1.6     08-14    TPro  7.6  /  Alb  3.9  /  TBili  0.7  /  DBili  x   /  AST  16  /  ALT  15  /  AlkPhos  55  08-13    PT/INR - ( 14 Aug 2018 10:27 )   PT: 14.0 sec;   INR: 1.26          PTT - ( 14 Aug 2018 10:27 )  PTT:33.5 sec    CAPILLARY BLOOD GLUCOSE      POCT Blood Glucose.: 88 mg/dL (14 Aug 2018 16:40)      RADIOLOGY & ADDITIONAL TESTS: Reviewed.    < from: CT Angio Chest PE Protocol w/ IV Cont (08.13.18 @ 14:37) >  IMPRESSION:   No central pulmonary embolism.    Since exam 6/14/2015, there is been no significant change in dilation of   main pulmonary artery, consistent pulmonary artery hypertension.    No change in large pericardial effusion. Cardiomegaly.    < from: Echocardiogram (08.14.18 @ 14:01) >  Interpretation Summary  Normal left ventricular size and wall thickness.The left ventricular wall   motion is normal.The left ventricular ejection fraction is normal.The   left   atrial size is normal.Right atrial size is normal.Structurally normal   pulmonic   valve.No evidence for any hemodynamically significant valvular disease.No   aortic root dilatation.A moderate to large pericardial effusion noted.No   chamber collapse seen.Significant inspiratory drop in mitral inflow   velocities  consistent with echocardiographic pulsus paradoxus.  The IVC is dilated   (>2.1   cm) with an abnormal inspiratory collapse (<50%) consistent with elevated   right atrial pressure.

## 2018-08-15 LAB
ANION GAP SERPL CALC-SCNC: 3 MMOL/L — LOW (ref 5–17)
BASE EXCESS BLDA CALC-SCNC: 21.4 MMOL/L — HIGH (ref -2–3)
BASE EXCESS BLDV CALC-SCNC: 22.4 MMOL/L — SIGNIFICANT CHANGE UP
BUN SERPL-MCNC: 11 MG/DL — SIGNIFICANT CHANGE UP (ref 7–23)
CALCIUM SERPL-MCNC: 9.4 MG/DL — SIGNIFICANT CHANGE UP (ref 8.4–10.5)
CHLORIDE SERPL-SCNC: 92 MMOL/L — LOW (ref 96–108)
CO2 SERPL-SCNC: 49 MMOL/L — CRITICAL HIGH (ref 22–31)
CREAT SERPL-MCNC: 0.75 MG/DL — SIGNIFICANT CHANGE UP (ref 0.5–1.3)
GAS PNL BLDA: SIGNIFICANT CHANGE UP
GAS PNL BLDV: SIGNIFICANT CHANGE UP
GLUCOSE BLDC GLUCOMTR-MCNC: 134 MG/DL — HIGH (ref 70–99)
GLUCOSE BLDC GLUCOMTR-MCNC: 155 MG/DL — HIGH (ref 70–99)
GLUCOSE BLDC GLUCOMTR-MCNC: 161 MG/DL — HIGH (ref 70–99)
GLUCOSE BLDC GLUCOMTR-MCNC: 164 MG/DL — HIGH (ref 70–99)
GLUCOSE BLDC GLUCOMTR-MCNC: 84 MG/DL — SIGNIFICANT CHANGE UP (ref 70–99)
GLUCOSE SERPL-MCNC: 91 MG/DL — SIGNIFICANT CHANGE UP (ref 70–99)
HCO3 BLDA-SCNC: 50 MMOL/L — CRITICAL HIGH (ref 21–28)
HCO3 BLDV-SCNC: 50 MMOL/L — CRITICAL HIGH (ref 20–27)
HCT VFR BLD CALC: 33.9 % — LOW (ref 34.5–45)
HGB BLD-MCNC: 8.7 G/DL — LOW (ref 11.5–15.5)
MAGNESIUM SERPL-MCNC: 1.7 MG/DL — SIGNIFICANT CHANGE UP (ref 1.6–2.6)
MCHC RBC-ENTMCNC: 24.3 PG — LOW (ref 27–34)
MCHC RBC-ENTMCNC: 25.7 G/DL — LOW (ref 32–36)
MCV RBC AUTO: 94.7 FL — SIGNIFICANT CHANGE UP (ref 80–100)
PCO2 BLDA: 82 MMHG — CRITICAL HIGH (ref 32–45)
PCO2 BLDV: 80 MMHG — HIGH (ref 41–51)
PH BLDA: 7.4 — SIGNIFICANT CHANGE UP (ref 7.35–7.45)
PH BLDV: 7.42 — SIGNIFICANT CHANGE UP (ref 7.32–7.43)
PHOSPHATE SERPL-MCNC: 3.8 MG/DL — SIGNIFICANT CHANGE UP (ref 2.5–4.5)
PLATELET # BLD AUTO: 226 K/UL — SIGNIFICANT CHANGE UP (ref 150–400)
PO2 BLDA: 84 MMHG — SIGNIFICANT CHANGE UP (ref 83–108)
PO2 BLDV: 43 MMHG — SIGNIFICANT CHANGE UP
POTASSIUM SERPL-MCNC: 3.7 MMOL/L — SIGNIFICANT CHANGE UP (ref 3.5–5.3)
POTASSIUM SERPL-SCNC: 3.7 MMOL/L — SIGNIFICANT CHANGE UP (ref 3.5–5.3)
RBC # BLD: 3.58 M/UL — LOW (ref 3.8–5.2)
RBC # FLD: 18.2 % — HIGH (ref 10.3–16.9)
SAO2 % BLDA: 96 % — SIGNIFICANT CHANGE UP (ref 95–100)
SAO2 % BLDV: 68 % — SIGNIFICANT CHANGE UP
SODIUM SERPL-SCNC: 144 MMOL/L — SIGNIFICANT CHANGE UP (ref 135–145)
WBC # BLD: 6.6 K/UL — SIGNIFICANT CHANGE UP (ref 3.8–10.5)
WBC # FLD AUTO: 6.6 K/UL — SIGNIFICANT CHANGE UP (ref 3.8–10.5)

## 2018-08-15 PROCEDURE — 71045 X-RAY EXAM CHEST 1 VIEW: CPT | Mod: 26

## 2018-08-15 PROCEDURE — 99233 SBSQ HOSP IP/OBS HIGH 50: CPT | Mod: GC

## 2018-08-15 RX ORDER — POTASSIUM CHLORIDE 20 MEQ
20 PACKET (EA) ORAL ONCE
Qty: 0 | Refills: 0 | Status: COMPLETED | OUTPATIENT
Start: 2018-08-15 | End: 2018-08-15

## 2018-08-15 RX ORDER — MAGNESIUM SULFATE 500 MG/ML
2 VIAL (ML) INJECTION ONCE
Qty: 0 | Refills: 0 | Status: COMPLETED | OUTPATIENT
Start: 2018-08-15 | End: 2018-08-15

## 2018-08-15 RX ORDER — ACETAZOLAMIDE 250 MG/1
500 TABLET ORAL
Qty: 0 | Refills: 0 | Status: COMPLETED | OUTPATIENT
Start: 2018-08-15 | End: 2018-08-16

## 2018-08-15 RX ADMIN — ENOXAPARIN SODIUM 60 MILLIGRAM(S): 100 INJECTION SUBCUTANEOUS at 11:09

## 2018-08-15 RX ADMIN — ATORVASTATIN CALCIUM 80 MILLIGRAM(S): 80 TABLET, FILM COATED ORAL at 22:44

## 2018-08-15 RX ADMIN — ACETAZOLAMIDE 500 MILLIGRAM(S): 250 TABLET ORAL at 10:00

## 2018-08-15 RX ADMIN — Medication 80 MILLIGRAM(S): at 18:50

## 2018-08-15 RX ADMIN — ACETAZOLAMIDE 500 MILLIGRAM(S): 250 TABLET ORAL at 18:50

## 2018-08-15 RX ADMIN — Medication 80 MILLIGRAM(S): at 08:07

## 2018-08-15 RX ADMIN — Medication 50 GRAM(S): at 08:08

## 2018-08-15 RX ADMIN — Medication 1: at 17:44

## 2018-08-15 RX ADMIN — Medication 20 MILLIEQUIVALENT(S): at 10:00

## 2018-08-15 RX ADMIN — Medication 1: at 11:19

## 2018-08-15 NOTE — PROGRESS NOTE ADULT - PROBLEM SELECTOR PLAN 2
Pt with symptoms consistent w/ CHF exacerbation including progressive SOB/RODRIGUEZ/ b/l LE swelling,  (lower d/t obesity), although CXR not showing any pleural effusions, just cardiomegaly/increased lung markings. CTAngio showing unchanged large pericardial effusion as possible worsening cause of HF. Trigger is multifactorial including medication/diet noncompliance. Less likely infection being that no signs/symptoms of infection & afebrile, no leukocytosis, or Ischemia being that no sx of chest pain, Trops neg2x; Last cath in 2016 no evidence of ischemia. Pt on Home [id number], Norvasc2.5q, [id number], Atorvastatin 80qd )  - echo 8/14 demonstrated known mod-large pericardial effusion, now with echo evidence of pulsus paradoxus  -c/w Diuresis IV Lasix 80 2x given  -C/w [id number], Norvasc2.5 q, Atorvastatin 80qd   -weight today 184.4kg, down from 186.6kg on admission. c/w Daily weight & strict I/Os  -f/u Dr. Montemayor with echo findings  -f/u Dr. Larry Pt with symptoms consistent w/ CHF exacerbation including progressive SOB/RODRIGUEZ/ b/l LE swelling,  (lower d/t obesity), although CXR not showing any pleural effusions, just cardiomegaly/increased lung markings. CTAngio showing unchanged large pericardial effusion as possible worsening cause of HF. Trigger is multifactorial including medication/diet noncompliance. Less likely infection being that no signs/symptoms of infection & afebrile, no leukocytosis, or Ischemia being that no sx of chest pain, Trops neg2x; Last cath in 2016 no evidence of ischemia. Pt on Home [id number], Norvasc2.5q, [id number], Atorvastatin 80qd )  - echo 8/14 demonstrated known mod-large pericardial effusion, now with echo evidence of pulsus paradoxus  -c/w Diuresis IV Lasix 80 BID  -C/w [id number], Norvasc2.5 q, Atorvastatin 80qd   -weight today 184.4kg, down from 186.6kg on admission. c/w Daily weight & strict I/Os  -f/u Dr. Verde for possible pericardiocentesis of posterior cardiac effusion with pulsus paradoxus

## 2018-08-15 NOTE — PROGRESS NOTE ADULT - SUBJECTIVE AND OBJECTIVE BOX
OVERNIGHT EVENTS: ADDY on BiPAP overnight. 1AM ABG with pH 7.40, pCO2 82, HCO3 50    SUBJECTIVE / INTERVAL HPI: Patient seen and examined at bedside. No complaints although does admit to some SOB. Patient denies chest pain, nausea, vomiting, diarrhea, constipation, fevers, chills, night sweats.    VITAL SIGNS:  Vital Signs Last 24 Hrs  T(C): 37.2 (15 Aug 2018 09:00), Max: 37.5 (14 Aug 2018 14:00)  T(F): 99 (15 Aug 2018 09:00), Max: 99.5 (14 Aug 2018 14:00)  HR: 94 (15 Aug 2018 11:20) (84 - 96)  BP: 107/76 (15 Aug 2018 11:20) (94/46 - 111/61)  BP(mean): 90 (15 Aug 2018 11:20) (62 - 90)  RR: 26 (15 Aug 2018 11:20) (24 - 38)  SpO2: 100% (15 Aug 2018 11:20) (74% - 100%)    PHYSICAL EXAM:    Constitutional: Severe morbidly obese. On BIPAP, appears to be breathing more comfortably. NAD  HEENT: NC/AT, anicteric sclera, MMM  Neck: supple  Respiratory: Exam limited to body habitus. In no respiratory distress, not using accessory respiratory muscles for inhalation. Decreased breath sounds throughout. Some crackles appreciated b/l in the lower lung fields.  Cardiac: +S1/S2; RRR  Gastrointestinal: morbidly obese abdomen, nontender. +BSx4  Extremities: b/l 2+ pitting edema in b/l lower extremities. No erythema, no cyanosis b/l  Vascular: 2+ radial pulses b/l, 1+ dorsalis pedis pulses b/l  Neurologic: AAOx3. Answers questions appropriately.     MEDICATIONS:  MEDICATIONS  (STANDING):  acetazolamide    Tablet 500 milliGRAM(s) Oral two times a day  amLODIPine   Tablet 2.5 milliGRAM(s) Oral daily  atorvastatin 80 milliGRAM(s) Oral at bedtime  dextrose 5%. 1000 milliLiter(s) (50 mL/Hr) IV Continuous <Continuous>  dextrose 50% Injectable 12.5 Gram(s) IV Push once  dextrose 50% Injectable 25 Gram(s) IV Push once  dextrose 50% Injectable 25 Gram(s) IV Push once  enoxaparin Injectable 60 milliGRAM(s) SubCutaneous daily  furosemide   Injectable 80 milliGRAM(s) IV Push every 12 hours  insulin lispro (HumaLOG) corrective regimen sliding scale   SubCutaneous three times a day before meals  insulin lispro (HumaLOG) corrective regimen sliding scale   SubCutaneous at bedtime  losartan 25 milliGRAM(s) Oral daily    MEDICATIONS  (PRN):  acetaminophen   Tablet. 650 milliGRAM(s) Oral every 6 hours PRN Mild Pain (1 - 3)  dextrose 40% Gel 15 Gram(s) Oral once PRN Blood Glucose LESS THAN 70 milliGRAM(s)/deciliter  glucagon  Injectable 1 milliGRAM(s) IntraMuscular once PRN Glucose LESS THAN 70 milligrams/deciliter      ALLERGIES:  Allergies    No Known Drug Allergies  shellfish (Anaphylaxis)    Intolerances        LABS:                        8.7    6.6   )-----------( 226      ( 15 Aug 2018 06:16 )             33.9     08-15    144  |  92<L>  |  11  ----------------------------<  91  3.7   |  49<HH>  |  0.75    Ca    9.4      15 Aug 2018 06:16  Phos  3.8     08-15  Mg     1.7     08-15    TPro  7.6  /  Alb  3.9  /  TBili  0.7  /  DBili  x   /  AST  16  /  ALT  15  /  AlkPhos  55  08-13    PT/INR - ( 14 Aug 2018 10:27 )   PT: 14.0 sec;   INR: 1.26          PTT - ( 14 Aug 2018 10:27 )  PTT:33.5 sec    CAPILLARY BLOOD GLUCOSE      POCT Blood Glucose.: 164 mg/dL (15 Aug 2018 11:07)      RADIOLOGY & ADDITIONAL TESTS: Reviewed.    < from: Xray Chest 1 View- PORTABLE-Routine (08.15.18 @ 07:25) >  Grossly similar appearance to prior exam 8/14/2018 with cardiomegaly and   lung infiltrates/pulmonary vascular congestion again noted.    < from: Echocardiogram (08.14.18 @ 14:01) >  Interpretation Summary  Normal left ventricular size and wall thickness.The left ventricular wall   motion is normal.The left ventricular ejection fraction is normal.The   left   atrial size is normal.Right atrial size is normal.Structurally normal   pulmonic   valve.No evidence for any hemodynamically significant valvular disease.No   aortic root dilatation.A moderate to large pericardial effusion noted.No   chamber collapse seen.Significant inspiratory drop in mitral inflow   velocities  consistent with echocardiographic pulsus paradoxus.  The IVC is dilated   (>2.1   cm) with an abnormal inspiratory collapse (<50%) consistent with elevated   right atrial pressure. OVERNIGHT EVENTS: ADDY on BiPAP overnight. 1AM ABG with pH 7.40, pCO2 82, HCO3 50    SUBJECTIVE / INTERVAL HPI: Patient seen and examined at bedside. No complaints although does admit to some SOB. Patient denies chest pain, nausea, vomiting, diarrhea, constipation, fevers, chills, night sweats.    VITAL SIGNS:  Vital Signs Last 24 Hrs  T(C): 37.2 (15 Aug 2018 09:00), Max: 37.5 (14 Aug 2018 14:00)  T(F): 99 (15 Aug 2018 09:00), Max: 99.5 (14 Aug 2018 14:00)  HR: 94 (15 Aug 2018 11:20) (84 - 96)  BP: 107/76 (15 Aug 2018 11:20) (94/46 - 111/61)  BP(mean): 90 (15 Aug 2018 11:20) (62 - 90)  RR: 26 (15 Aug 2018 11:20) (24 - 38)  SpO2: 100% (15 Aug 2018 11:20) (74% - 100%)    PHYSICAL EXAM:    Constitutional: Severe morbidly obese. Breathing comfortably on BiPAP. NAD  HEENT: NC/AT, anicteric sclera, MMM  Neck: supple  Respiratory: Exam limited to body habitus. In no respiratory distress, not using accessory respiratory muscles for inhalation. Decreased breath sounds throughout. Some crackles appreciated b/l in the lower lung fields.  Cardiac: +S1/S2; RRR  Gastrointestinal: morbidly obese abdomen, nontender. +BSx4  Extremities: b/l 2+ pitting edema in b/l lower extremities. No erythema, no cyanosis b/l  Vascular: 2+ radial pulses b/l, 1+ dorsalis pedis pulses b/l  Neurologic: AAOx3. Answers questions appropriately.     MEDICATIONS:  MEDICATIONS  (STANDING):  acetazolamide    Tablet 500 milliGRAM(s) Oral two times a day  amLODIPine   Tablet 2.5 milliGRAM(s) Oral daily  atorvastatin 80 milliGRAM(s) Oral at bedtime  dextrose 5%. 1000 milliLiter(s) (50 mL/Hr) IV Continuous <Continuous>  dextrose 50% Injectable 12.5 Gram(s) IV Push once  dextrose 50% Injectable 25 Gram(s) IV Push once  dextrose 50% Injectable 25 Gram(s) IV Push once  enoxaparin Injectable 60 milliGRAM(s) SubCutaneous daily  furosemide   Injectable 80 milliGRAM(s) IV Push every 12 hours  insulin lispro (HumaLOG) corrective regimen sliding scale   SubCutaneous three times a day before meals  insulin lispro (HumaLOG) corrective regimen sliding scale   SubCutaneous at bedtime  losartan 25 milliGRAM(s) Oral daily    MEDICATIONS  (PRN):  acetaminophen   Tablet. 650 milliGRAM(s) Oral every 6 hours PRN Mild Pain (1 - 3)  dextrose 40% Gel 15 Gram(s) Oral once PRN Blood Glucose LESS THAN 70 milliGRAM(s)/deciliter  glucagon  Injectable 1 milliGRAM(s) IntraMuscular once PRN Glucose LESS THAN 70 milligrams/deciliter      ALLERGIES:  Allergies    No Known Drug Allergies  shellfish (Anaphylaxis)    Intolerances        LABS:                        8.7    6.6   )-----------( 226      ( 15 Aug 2018 06:16 )             33.9     08-15    144  |  92<L>  |  11  ----------------------------<  91  3.7   |  49<HH>  |  0.75    Ca    9.4      15 Aug 2018 06:16  Phos  3.8     08-15  Mg     1.7     08-15    TPro  7.6  /  Alb  3.9  /  TBili  0.7  /  DBili  x   /  AST  16  /  ALT  15  /  AlkPhos  55  08-13    PT/INR - ( 14 Aug 2018 10:27 )   PT: 14.0 sec;   INR: 1.26          PTT - ( 14 Aug 2018 10:27 )  PTT:33.5 sec    CAPILLARY BLOOD GLUCOSE      POCT Blood Glucose.: 164 mg/dL (15 Aug 2018 11:07)      RADIOLOGY & ADDITIONAL TESTS: Reviewed.    < from: Xray Chest 1 View- PORTABLE-Routine (08.15.18 @ 07:25) >  Grossly similar appearance to prior exam 8/14/2018 with cardiomegaly and   lung infiltrates/pulmonary vascular congestion again noted.    < from: Echocardiogram (08.14.18 @ 14:01) >  Interpretation Summary  Normal left ventricular size and wall thickness.The left ventricular wall   motion is normal.The left ventricular ejection fraction is normal.The   left   atrial size is normal.Right atrial size is normal.Structurally normal   pulmonic   valve.No evidence for any hemodynamically significant valvular disease.No   aortic root dilatation.A moderate to large pericardial effusion noted.No   chamber collapse seen.Significant inspiratory drop in mitral inflow   velocities  consistent with echocardiographic pulsus paradoxus.  The IVC is dilated   (>2.1   cm) with an abnormal inspiratory collapse (<50%) consistent with elevated   right atrial pressure.

## 2018-08-15 NOTE — PROGRESS NOTE ADULT - PROBLEM SELECTOR PLAN 6
Pt on Metformin & 13U Lantus. HbA1C 5.3, currently well controlled.  -TSH  nL   -c/w ISS & 13U Lantus Pt on Metformin & 13U Lantus. HbA1C 5.3, currently well controlled.  -f/u FSs  -c/w ISS

## 2018-08-15 NOTE — PROGRESS NOTE ADULT - PROBLEM SELECTOR PLAN 1
Pt w/ SaO2 70% at home on 4L NC going up to 90% on NRB. Patient requiring home oxygen for years recently progressed from 2 to 3-4L NC requirement 24/7 w/ BIPAP 12/5 at night). ABG illustrating a Compensated Respiratory Acidosis pH 7.29, pCO2 100, HCO3- 47. Presumed to be d/t MANUEL vs cardiac cause less likely being that CXR/CTAngio not illustrating pleural effusions.   -AM ABG hypoxemia/respiratory acidosis worsening/lethargy. Patient with improving mental status on BiPAP and repeat ABG pH 7.40, pCO2 82, HCO3 50,   -placed on HFNC, c/w BiPAP at night or in respiratory distress  -c/w ABG monitoring  -Dr. Valerio wellington, f/u recs  -f/u VBG  -c/w diamox 500 BID for 48 hrs Pt w/ SaO2 70% at home on 4L NC going up to 90% on NRB. Patient requiring home oxygen for years recently progressed from 2 to 3-4L NC requirement 24/7 w/ BIPAP 12/5 at night). ABG illustrating a Compensated Respiratory Acidosis pH 7.29, pCO2 100, HCO3- 47. Presumed to be d/t MANUEL vs cardiac cause less likely being that CXR/CTAngio not illustrating pleural effusions.   -AM ABG hypoxemia/respiratory acidosis worsening/lethargy. Patient with improving mental status on BiPAP and repeat ABG pH 7.40, pCO2 82, HCO3 50,   -c/w BiPAP 18/8, consider HFNC   -c/w ABG monitoring  -Dr. Valerio wellington, f/u recs  -f/u VBG  -c/w diamox 500 BID for 48 hrs  -c/w ABG monitoring

## 2018-08-15 NOTE — PROGRESS NOTE ADULT - PROBLEM SELECTOR PLAN 7
F: None  E: replete for K<4 Mag<2  N: DASH Diet     DVT ppx: lovenox 60mg daily, adjusted for BMI  DISPO: [facility] F: None  E: replete for K<4 Mag<2  N: DASH Diet     DVT ppx: lovenox 60mg daily, adjusted for BMI

## 2018-08-15 NOTE — PROGRESS NOTE ADULT - PROBLEM SELECTOR PLAN 3
Pt with unchanged large pericardial effusion on CTAngio when compared to previous Echo in Sept 2017.  -If patient's BP drops, contact fellow for concerning tamponade. Pt with unchanged large pericardial effusion on CTAngio when compared to previous Echo in Sept 2017.  -If patient's BP drops, contact fellow for concerning tamponade.  -f/u Dr. Verde for possible IR pericardiocentesis of posterior cardiac effusion with pulsus paradoxus

## 2018-08-15 NOTE — PROGRESS NOTE ADULT - PROBLEM SELECTOR PLAN 5
[id number]; possibly d/t MANUEL.   -c/w Norvasc 2.5  -c/w  [id number] Consistent with medical history  -c/w Norvasc 2.5  -c/w  [id number]

## 2018-08-15 NOTE — PROGRESS NOTE ADULT - PROBLEM SELECTOR PLAN 4
BIPAP 12/5 at night; ABG illustrating a Respiratory Acidosis  -c/w BIPAP. Increase settings if needed Patient on 18/8 BiPAP; ABG illustrating a Respiratory Acidosis that is now improving on BiPAP  -c/w BIPAP at night

## 2018-08-16 LAB
ANION GAP SERPL CALC-SCNC: 6 MMOL/L — SIGNIFICANT CHANGE UP (ref 5–17)
BUN SERPL-MCNC: 12 MG/DL — SIGNIFICANT CHANGE UP (ref 7–23)
CALCIUM SERPL-MCNC: 9.6 MG/DL — SIGNIFICANT CHANGE UP (ref 8.4–10.5)
CHLORIDE SERPL-SCNC: 92 MMOL/L — LOW (ref 96–108)
CO2 SERPL-SCNC: 44 MMOL/L — HIGH (ref 22–31)
CREAT SERPL-MCNC: 0.83 MG/DL — SIGNIFICANT CHANGE UP (ref 0.5–1.3)
GLUCOSE BLDC GLUCOMTR-MCNC: 111 MG/DL — HIGH (ref 70–99)
GLUCOSE BLDC GLUCOMTR-MCNC: 125 MG/DL — HIGH (ref 70–99)
GLUCOSE BLDC GLUCOMTR-MCNC: 162 MG/DL — HIGH (ref 70–99)
GLUCOSE BLDC GLUCOMTR-MCNC: 168 MG/DL — HIGH (ref 70–99)
GLUCOSE SERPL-MCNC: 109 MG/DL — HIGH (ref 70–99)
HCT VFR BLD CALC: 36.6 % — SIGNIFICANT CHANGE UP (ref 34.5–45)
HGB BLD-MCNC: 9.2 G/DL — LOW (ref 11.5–15.5)
MAGNESIUM SERPL-MCNC: 1.9 MG/DL — SIGNIFICANT CHANGE UP (ref 1.6–2.6)
MCHC RBC-ENTMCNC: 24.1 PG — LOW (ref 27–34)
MCHC RBC-ENTMCNC: 25.1 G/DL — LOW (ref 32–36)
MCV RBC AUTO: 95.8 FL — SIGNIFICANT CHANGE UP (ref 80–100)
PHOSPHATE SERPL-MCNC: 4.1 MG/DL — SIGNIFICANT CHANGE UP (ref 2.5–4.5)
PLATELET # BLD AUTO: 273 K/UL — SIGNIFICANT CHANGE UP (ref 150–400)
POTASSIUM SERPL-MCNC: 3.5 MMOL/L — SIGNIFICANT CHANGE UP (ref 3.5–5.3)
POTASSIUM SERPL-SCNC: 3.5 MMOL/L — SIGNIFICANT CHANGE UP (ref 3.5–5.3)
RBC # BLD: 3.82 M/UL — SIGNIFICANT CHANGE UP (ref 3.8–5.2)
RBC # FLD: 18 % — HIGH (ref 10.3–16.9)
SODIUM SERPL-SCNC: 142 MMOL/L — SIGNIFICANT CHANGE UP (ref 135–145)
WBC # BLD: 6.9 K/UL — SIGNIFICANT CHANGE UP (ref 3.8–10.5)
WBC # FLD AUTO: 6.9 K/UL — SIGNIFICANT CHANGE UP (ref 3.8–10.5)

## 2018-08-16 PROCEDURE — 99233 SBSQ HOSP IP/OBS HIGH 50: CPT | Mod: GC

## 2018-08-16 PROCEDURE — 71045 X-RAY EXAM CHEST 1 VIEW: CPT | Mod: 26

## 2018-08-16 RX ORDER — POTASSIUM CHLORIDE 20 MEQ
40 PACKET (EA) ORAL ONCE
Qty: 0 | Refills: 0 | Status: COMPLETED | OUTPATIENT
Start: 2018-08-16 | End: 2018-08-16

## 2018-08-16 RX ORDER — MAGNESIUM SULFATE 500 MG/ML
1 VIAL (ML) INJECTION ONCE
Qty: 0 | Refills: 0 | Status: COMPLETED | OUTPATIENT
Start: 2018-08-16 | End: 2018-08-16

## 2018-08-16 RX ADMIN — Medication 80 MILLIGRAM(S): at 06:00

## 2018-08-16 RX ADMIN — AMLODIPINE BESYLATE 2.5 MILLIGRAM(S): 2.5 TABLET ORAL at 06:52

## 2018-08-16 RX ADMIN — Medication 40 MILLIEQUIVALENT(S): at 09:00

## 2018-08-16 RX ADMIN — ACETAZOLAMIDE 500 MILLIGRAM(S): 250 TABLET ORAL at 06:52

## 2018-08-16 RX ADMIN — Medication 100 GRAM(S): at 09:00

## 2018-08-16 RX ADMIN — Medication 80 MILLIGRAM(S): at 18:00

## 2018-08-16 RX ADMIN — LOSARTAN POTASSIUM 25 MILLIGRAM(S): 100 TABLET, FILM COATED ORAL at 06:53

## 2018-08-16 RX ADMIN — ACETAZOLAMIDE 500 MILLIGRAM(S): 250 TABLET ORAL at 17:23

## 2018-08-16 RX ADMIN — ENOXAPARIN SODIUM 60 MILLIGRAM(S): 100 INJECTION SUBCUTANEOUS at 11:23

## 2018-08-16 RX ADMIN — Medication 1: at 11:23

## 2018-08-16 NOTE — DIETITIAN INITIAL EVALUATION ADULT. - PROBLEM SELECTOR PLAN 8
1) PCP Contacted on Admission: (Y/N) --> Name & Phone #: At [facility]   2) Date of Contact with PCP:  3) PCP Contacted at Discharge: (Y/N, N/A)  4) Summary of Handoff Given to PCP:   5) Post-Discharge Appointment Date and Location:

## 2018-08-16 NOTE — DIETITIAN INITIAL EVALUATION ADULT. - PROBLEM SELECTOR PLAN 1
Pt w/ SaO2 70% at home on 4L NC going up to 90% on NRB. Patient requiring home oxygen for years recently progressed from 2 to 3-4L NC requirement 24/7 w/ BIPAP 12/5 at night). ABG illustrating a Compensated Respiratory Acidosis pH 7.29, pCO2 100, HCO3- 47. Presumed to be d/t MANUEL vs cardiac cause less likely being that CXR/CTAngio not illustrating pleural effusions.   -BIPAP ordered.   -ABG monitoring if decompensated.   -Patient sees Dr. Luo who will be contacted.

## 2018-08-16 NOTE — PHYSICAL THERAPY INITIAL EVALUATION ADULT - ADDITIONAL COMMENTS
Patient reports that she has been home bound x ~3 months due to worsening SOB with movement. Lives in an elevator building, no steps to enter. [facility] health aide assists with all ADLs. Has/uses Rollator at home.

## 2018-08-16 NOTE — DIETITIAN INITIAL EVALUATION ADULT. - PROBLEM SELECTOR PLAN 2
Pt with symptoms consistent w/ CHF exacerbation including progressive SOB/RODRIGUEZ/ b/l LE swelling,  (lower d/t obesity), although CXR not showing any pleural effusions, just cardiomegaly/increased lung markings. CTAngio showing unchanged large pericardial effusion as possible worsening cause of HF. Trigger is multifactorial including medication/diet noncompliance. Less likely infection being that no signs/symptoms of infection & afebrile, no leukocytosis, or Ischemia being that no sx of chest pain, Trops neg1x; Last cath in 2016 no evidence of ischemia. Pt on Home [id number], Norvasc2.5q, [id number], Atorvastatin 80qd )  -Diuresis: IV Lasix 80 2x given; will assess for 2L output in AM, and re-dose  -C/w [id number], Norvasc2.5 q, Atorvastatin 80qd   -CXR AM  -Echo   -Daily weight & strict I/Os

## 2018-08-16 NOTE — CHART NOTE - NSCHARTNOTEFT_GEN_A_CORE
Upon Nutritional Assessment by the Registered Dietitian your patient was determined to meet criteria / has evidence of the following diagnosis/diagnoses:          [ ]  Mild Protein Calorie Malnutrition        [ ]  Moderate Protein Calorie Malnutrition        [ ] Severe Protein Calorie Malnutrition        [ ] Unspecified Protein Calorie Malnutrition        [ ] Underweight / BMI <19        [X] Morbid Obesity / BMI > 40      Findings as based on:  •  Comprehensive nutrition assessment and consultation  •  Calorie counts (nutrient intake analysis)  •  Food acceptance and intake status from observations by staff  •  Follow up  •  Patient education  •  Intervention secondary to interdisciplinary rounds  •   concerns      Treatment:    Nutrition education provided to patient on DASH/TLC, Heart Failure diet therapy and Weight Management.   Refer patient to [facility] Nutrition services [phone] for individual nutrition counseling and outpatient nutrition support when patient is able to participate in outpatient programs      PROVIDER Section:     By signing this assessment you are acknowledging and agree with the diagnosis/diagnoses assigned by the Registered Dietitian    Comments:  Patient may benefit from weight management program/facility and consider optimization for bariatric surgery    Shannon Ni RDN, CDN, CNSC   Ext: [phone] or available via [employer]

## 2018-08-16 NOTE — DIETITIAN INITIAL EVALUATION ADULT. - NS AS NUTRI INTERV COLLABORAT
Bariatric facility for weight reduction management/Referral to community agencies/programs (specify)

## 2018-08-16 NOTE — PROGRESS NOTE ADULT - PROBLEM SELECTOR PLAN 4
Patient on 18/8 BiPAP; ABG illustrating a Respiratory Acidosis that is now improving on BiPAP  -c/w BIPAP at night. Decreased FiO2 to 40%, down from 50%.

## 2018-08-16 NOTE — PROGRESS NOTE ADULT - PROBLEM SELECTOR PLAN 3
Pt with unchanged large pericardial effusion on CTAngio when compared to previous Echo in Sept 2017.  -If patient's BP drops, contact fellow for concerning tamponade.  -f/u Dr. Verde for possible IR pericardiocentesis of posterior cardiac effusion with pulsus paradoxus

## 2018-08-16 NOTE — PHYSICAL THERAPY INITIAL EVALUATION ADULT - PERTINENT HX OF CURRENT PROBLEM, REHAB EVAL
43yoF with PMH of DM2(insulin/metformin) , HTN(Norvasc, [place]an), HLD, CHF (lasix), Severe morbid Obesity, MANUEL (On home 24/7 3-4L NC &  night CPAP 12/5) c/o progressively worsening SOB with Oxygen desaturation 70% at home presumed to be d/t medication/diet noncompliance admitted to [facility] for r/o CHF exacerbation and management of hypoxia, transferred [facility] for continued management of hypoxemia and hypercapnia on BiPAP.

## 2018-08-16 NOTE — PROGRESS NOTE ADULT - PROBLEM SELECTOR PLAN 1
Pt w/ SaO2 70% at home on 4L NC going up to 90% on NRB. Patient requiring home oxygen for years recently progressed from 2 to 3-4L NC requirement 24/7 w/ BIPAP 12/5 at night). ABG illustrating a Compensated Respiratory Acidosis pH 7.29, pCO2 100, HCO3- 47. Presumed to be d/t MANUEL vs cardiac cause less likely being that CXR/CTAngio not illustrating pleural effusions.   -Patient with improved mental status on BiPAP and repeat ABG pH 7.40, pCO2 82, HCO3 50  -VBG showing improvement with decreased hypercapnia; 8/15: pH 7.42, pCO2 80, HCO3 50. Will repeat in AM  -c/w BiPAP 18/8, decreased FiO2 from 50%-40% today, continue to wean patient off daytime BiPAP  -Dr. Luo following, f/u recs  -c/w diamox 500 BID for 24 more hours

## 2018-08-16 NOTE — PHYSICAL THERAPY INITIAL EVALUATION ADULT - CRITERIA FOR SKILLED THERAPEUTIC INTERVENTIONS
therapy frequency/anticipated discharge recommendation/impairments found/rehab potential/anticipated equipment needs at discharge

## 2018-08-16 NOTE — PROGRESS NOTE ADULT - SUBJECTIVE AND OBJECTIVE BOX
OVERNIGHT EVENTS: On BiPAP, desat to 89% on bipap at 1 am.  FiO2 increased to 60% with improvement to 93%.  Mildly tachypneic (20), but no increased work of breathing or distress.    SUBJECTIVE / INTERVAL HPI: Patient seen and examined at bedside. Patient denied chest pain, SOB, nausea, vomiting, diarrhea, constipation, fevers, chills, night sweats.     VITAL SIGNS:  Vital Signs Last 24 Hrs  T(C): 37.1 (16 Aug 2018 05:54), Max: 37.2 (15 Aug 2018 09:00)  T(F): 98.7 (16 Aug 2018 05:54), Max: 99 (15 Aug 2018 09:00)  HR: 88 (16 Aug 2018 08:17) (80 - 94)  BP: 109/62 (16 Aug 2018 08:17) (104/56 - 125/67)  BP(mean): 81 (16 Aug 2018 08:17) (67 - 90)  RR: 20 (16 Aug 2018 08:17) (20 - 28)  SpO2: 93% (16 Aug 2018 08:17) (92% - 100%)    PHYSICAL EXAM:    Constitutional: Severe morbidly obese. Breathing comfortably on BiPAP. NAD  HEENT: NC/AT, anicteric sclera, MMM  Neck: supple  Respiratory: Exam limited to body habitus. In no respiratory distress, not using accessory respiratory muscles for inhalation. Decreased breath sounds throughout. Some crackles appreciated b/l in the lower lung fields.  Cardiac: +S1/S2; RRR  Gastrointestinal: morbidly obese abdomen, nontender. +BSx4  Extremities: b/l 2+ pitting edema in b/l lower extremities. No erythema, no cyanosis b/l  Vascular: 2+ radial pulses b/l, 1+ dorsalis pedis pulses b/l  Neurologic: AAOx3. Answers questions appropriately.     MEDICATIONS:  MEDICATIONS  (STANDING):  acetazolamide    Tablet 500 milliGRAM(s) Oral two times a day  amLODIPine   Tablet 2.5 milliGRAM(s) Oral daily  atorvastatin 80 milliGRAM(s) Oral at bedtime  dextrose 5%. 1000 milliLiter(s) (50 mL/Hr) IV Continuous <Continuous>  dextrose 50% Injectable 12.5 Gram(s) IV Push once  dextrose 50% Injectable 25 Gram(s) IV Push once  dextrose 50% Injectable 25 Gram(s) IV Push once  enoxaparin Injectable 60 milliGRAM(s) SubCutaneous daily  furosemide   Injectable 80 milliGRAM(s) IV Push every 12 hours  insulin lispro (HumaLOG) corrective regimen sliding scale   SubCutaneous three times a day before meals  insulin lispro (HumaLOG) corrective regimen sliding scale   SubCutaneous at bedtime  losartan 25 milliGRAM(s) Oral daily  magnesium sulfate  IVPB 1 Gram(s) IV Intermittent once  potassium chloride   Powder 40 milliEquivalent(s) Oral once    MEDICATIONS  (PRN):  acetaminophen   Tablet. 650 milliGRAM(s) Oral every 6 hours PRN Mild Pain (1 - 3)  dextrose 40% Gel 15 Gram(s) Oral once PRN Blood Glucose LESS THAN 70 milliGRAM(s)/deciliter  glucagon  Injectable 1 milliGRAM(s) IntraMuscular once PRN Glucose LESS THAN 70 milligrams/deciliter      ALLERGIES:  Allergies    No Known Drug Allergies  shellfish (Anaphylaxis)    Intolerances        LABS:                        9.2    6.9   )-----------( 273      ( 16 Aug 2018 07:32 )             36.6     08-16    142  |  92<L>  |  12  ----------------------------<  109<H>  3.5   |  44<H>  |  0.83    Ca    9.6      16 Aug 2018 07:32  Phos  4.1     08-16  Mg     1.9     08-16      PT/INR - ( 14 Aug 2018 10:27 )   PT: 14.0 sec;   INR: 1.26          PTT - ( 14 Aug 2018 10:27 )  PTT:33.5 sec    CAPILLARY BLOOD GLUCOSE      POCT Blood Glucose.: 111 mg/dL (16 Aug 2018 06:20)      RADIOLOGY & ADDITIONAL TESTS: Reviewed. OVERNIGHT EVENTS: On BiPAP, desat to 89% on bipap at 1 am.  FiO2 increased to 60% with improvement to 93%.  Mildly tachypneic (20), but no increased work of breathing or distress.    SUBJECTIVE / INTERVAL HPI: Patient seen and examined at bedside. Patient denied chest pain, SOB, nausea, vomiting, diarrhea, constipation, fevers, chills, night sweats.     VITAL SIGNS:  Vital Signs Last 24 Hrs  T(C): 37.1 (16 Aug 2018 05:54), Max: 37.2 (15 Aug 2018 09:00)  T(F): 98.7 (16 Aug 2018 05:54), Max: 99 (15 Aug 2018 09:00)  HR: 88 (16 Aug 2018 08:17) (80 - 94)  BP: 109/62 (16 Aug 2018 08:17) (104/56 - 125/67)  BP(mean): 81 (16 Aug 2018 08:17) (67 - 90)  RR: 20 (16 Aug 2018 08:17) (20 - 28)  SpO2: 93% (16 Aug 2018 08:17) (92% - 100%)    PHYSICAL EXAM:    Constitutional: Severe morbidly obese. Breathing comfortably on BiPAP. NAD  HEENT: NC/AT, anicteric sclera, MMM  Neck: supple  Respiratory: Exam limited to body habitus. In no respiratory distress, not using accessory respiratory muscles for inhalation. Decreased breath sounds throughout. Some crackles appreciated b/l in the lower lung fields.  Cardiac: +S1/S2; RRR  Gastrointestinal: morbidly obese abdomen, nontender. +BSx4  Extremities: b/l 2+ pitting edema in b/l lower extremities. No erythema, no cyanosis b/l  Vascular: 2+ radial pulses b/l, 1+ dorsalis pedis pulses b/l  Neurologic: AAOx3. Answers questions appropriately.     MEDICATIONS:  MEDICATIONS  (STANDING):  acetazolamide    Tablet 500 milliGRAM(s) Oral two times a day  amLODIPine   Tablet 2.5 milliGRAM(s) Oral daily  atorvastatin 80 milliGRAM(s) Oral at bedtime  dextrose 5%. 1000 milliLiter(s) (50 mL/Hr) IV Continuous <Continuous>  dextrose 50% Injectable 12.5 Gram(s) IV Push once  dextrose 50% Injectable 25 Gram(s) IV Push once  dextrose 50% Injectable 25 Gram(s) IV Push once  enoxaparin Injectable 60 milliGRAM(s) SubCutaneous daily  furosemide   Injectable 80 milliGRAM(s) IV Push every 12 hours  insulin lispro (HumaLOG) corrective regimen sliding scale   SubCutaneous three times a day before meals  insulin lispro (HumaLOG) corrective regimen sliding scale   SubCutaneous at bedtime  losartan 25 milliGRAM(s) Oral daily  magnesium sulfate  IVPB 1 Gram(s) IV Intermittent once  potassium chloride   Powder 40 milliEquivalent(s) Oral once    MEDICATIONS  (PRN):  acetaminophen   Tablet. 650 milliGRAM(s) Oral every 6 hours PRN Mild Pain (1 - 3)  dextrose 40% Gel 15 Gram(s) Oral once PRN Blood Glucose LESS THAN 70 milliGRAM(s)/deciliter  glucagon  Injectable 1 milliGRAM(s) IntraMuscular once PRN Glucose LESS THAN 70 milligrams/deciliter      ALLERGIES:  Allergies    No Known Drug Allergies  shellfish (Anaphylaxis)    Intolerances        LABS:                        9.2    6.9   )-----------( 273      ( 16 Aug 2018 07:32 )             36.6     08-16    142  |  92<L>  |  12  ----------------------------<  109<H>  3.5   |  44<H>  |  0.83    Ca    9.6      16 Aug 2018 07:32  Phos  4.1     08-16  Mg     1.9     08-16      PT/INR - ( 14 Aug 2018 10:27 )   PT: 14.0 sec;   INR: 1.26          PTT - ( 14 Aug 2018 10:27 )  PTT:33.5 sec    CAPILLARY BLOOD GLUCOSE      POCT Blood Glucose.: 111 mg/dL (16 Aug 2018 06:20)      RADIOLOGY & ADDITIONAL TESTS: Reviewed.    < from: Xray Chest 1 View- PORTABLE-Routine (08.16.18 @ 07:56) >  IMPRESSION:  Interval mild decreased pulmonary edema.

## 2018-08-16 NOTE — DIETITIAN INITIAL EVALUATION ADULT. - OTHER INFO
Nutrition consult received and appreciated. 43 y.o F from home with PMHx of DM, HTN, HLD, CHF, Morbid Obesity, MANUEL. Pt c/o progressively declining with ALD's  and SOB, now bedridden associated with obesity. Pt has HHA and family to assist with cooking at home, follows regular diet with difficulty adherence to therapeutic diet, eats 3 meals and snacks 2-3 times daily, good appetite, known food allergy to shellfish.Pt takes Vitamin D3 supplement per home medication. Pt reports Dry UBW to be 417 lbs (4/2018), admits to gaining ~2 to 3 lbs/wk from excessive caloric intake, fluid intake and inactivity. Pt is currently tolerating DASH/TLC FR 1000 mL/d well with good >75% PO intake of meals. Denies N/V/D/C or pain. No BM recorded thus far. Skin intact. B/L 2+ pitting edema lower extremities. Nutrition edu provided to pt. Discussed case with MD.

## 2018-08-16 NOTE — DIETITIAN INITIAL EVALUATION ADULT. - PROBLEM SELECTOR PLAN 4
w/ BIPAP 12/5 at night; ABG illustrating a Compensated Respiratory Acidosis pH 7.29, pCO2 100, HCO3- 47.   -BIPAP ordered.

## 2018-08-16 NOTE — PROGRESS NOTE ADULT - PROBLEM SELECTOR PLAN 2
Pt with symptoms consistent w/ CHF exacerbation including progressive SOB/RODRIGUEZ/ b/l LE swelling,  (lower d/t obesity), although CXR not showing any pleural effusions, just cardiomegaly/increased lung markings. CTAngio showing unchanged large pericardial effusion as possible worsening cause of HF. Trigger is multifactorial including medication/diet noncompliance. Less likely infection being that no signs/symptoms of infection & afebrile, no leukocytosis, or Ischemia being that no sx of chest pain, Trops neg2x; Last cath in 2016 no evidence of ischemia. Pt on Home [id number], Norvasc2.5q, [id number], Atorvastatin 80qd )  - echo 8/14 demonstrated known mod-large pericardial effusion, now with echo evidence of pulsus paradoxus  -c/w Diuresis IV Lasix 80 BID  -C/w [id number], Norvasc2.5 q, Atorvastatin 80qd   -weight today 181.6kg, down from 186.6kg on admission. c/w Daily weight & strict I/Os  -f/u Dr. Verde for possible pericardiocentesis of posterior cardiac effusion with pulsus paradoxus

## 2018-08-16 NOTE — DIETITIAN INITIAL EVALUATION ADULT. - ENERGY NEEDS
Height: 5'7" Weight: 400.3 lbs (8/16), 406.5 lbs (8/15), IBW 135lbs+/-10%, %%, BMI 64.4,  IBW used to calculate EER as per pt's current body weight >120% of IBW   *** Height: 5'7" Weight: 400.3 lbs (8/16), 406.5 lbs (8/15), IBW 135lbs+/-10%, %%, BMI 64.4,  IBW used to calculate EER as per pt's current body weight >120% of IBW   Estimated nutrient needs based on [facility] SOC for maintenance in obese adult

## 2018-08-16 NOTE — DIETITIAN INITIAL EVALUATION ADULT. - NS AS NUTRI INTERV ED CONTENT
Discussed with patient about DASH/TLC, FR 1000 mL/d in depth. Provided tips and strategies on reducing calories, sodium with fluid management. Educational handout provided to patient to reinforce teaching. Pt verbally expressed understanding and fair to good compliance expected./Purpose of the nutrition education

## 2018-08-17 ENCOUNTER — RESULT REVIEW (OUTPATIENT)
Age: 43
End: 2018-08-17

## 2018-08-17 LAB
ANION GAP SERPL CALC-SCNC: 7 MMOL/L — SIGNIFICANT CHANGE UP (ref 5–17)
BASE EXCESS BLDV CALC-SCNC: 14.1 MMOL/L — SIGNIFICANT CHANGE UP
BUN SERPL-MCNC: 12 MG/DL — SIGNIFICANT CHANGE UP (ref 7–23)
CALCIUM SERPL-MCNC: 9.7 MG/DL — SIGNIFICANT CHANGE UP (ref 8.4–10.5)
CHLORIDE SERPL-SCNC: 93 MMOL/L — LOW (ref 96–108)
CO2 SERPL-SCNC: 40 MMOL/L — HIGH (ref 22–31)
CREAT SERPL-MCNC: 0.76 MG/DL — SIGNIFICANT CHANGE UP (ref 0.5–1.3)
GAS PNL BLDV: SIGNIFICANT CHANGE UP
GLUCOSE BLDC GLUCOMTR-MCNC: 121 MG/DL — HIGH (ref 70–99)
GLUCOSE BLDC GLUCOMTR-MCNC: 150 MG/DL — HIGH (ref 70–99)
GLUCOSE BLDC GLUCOMTR-MCNC: 154 MG/DL — HIGH (ref 70–99)
GLUCOSE BLDC GLUCOMTR-MCNC: 92 MG/DL — SIGNIFICANT CHANGE UP (ref 70–99)
GLUCOSE SERPL-MCNC: 115 MG/DL — HIGH (ref 70–99)
GRAM STN FLD: SIGNIFICANT CHANGE UP
HCO3 BLDV-SCNC: 45 MMOL/L — HIGH (ref 20–27)
HCT VFR BLD CALC: 35.3 % — SIGNIFICANT CHANGE UP (ref 34.5–45)
HGB BLD-MCNC: 9.3 G/DL — LOW (ref 11.5–15.5)
MAGNESIUM SERPL-MCNC: 1.9 MG/DL — SIGNIFICANT CHANGE UP (ref 1.6–2.6)
MCHC RBC-ENTMCNC: 24.5 PG — LOW (ref 27–34)
MCHC RBC-ENTMCNC: 26.3 G/DL — LOW (ref 32–36)
MCV RBC AUTO: 92.9 FL — SIGNIFICANT CHANGE UP (ref 80–100)
PCO2 BLDV: 92 MMHG — HIGH (ref 41–51)
PH BLDV: 7.31 — LOW (ref 7.32–7.43)
PHOSPHATE SERPL-MCNC: 4.4 MG/DL — SIGNIFICANT CHANGE UP (ref 2.5–4.5)
PLATELET # BLD AUTO: 247 K/UL — SIGNIFICANT CHANGE UP (ref 150–400)
PO2 BLDV: 34 MMHG — SIGNIFICANT CHANGE UP
POTASSIUM SERPL-MCNC: 3.8 MMOL/L — SIGNIFICANT CHANGE UP (ref 3.5–5.3)
POTASSIUM SERPL-SCNC: 3.8 MMOL/L — SIGNIFICANT CHANGE UP (ref 3.5–5.3)
RBC # BLD: 3.8 M/UL — SIGNIFICANT CHANGE UP (ref 3.8–5.2)
RBC # FLD: 18.2 % — HIGH (ref 10.3–16.9)
SAO2 % BLDV: 56 % — SIGNIFICANT CHANGE UP
SODIUM SERPL-SCNC: 140 MMOL/L — SIGNIFICANT CHANGE UP (ref 135–145)
SPECIMEN SOURCE: SIGNIFICANT CHANGE UP
WBC # BLD: 6.8 K/UL — SIGNIFICANT CHANGE UP (ref 3.8–10.5)
WBC # FLD AUTO: 6.8 K/UL — SIGNIFICANT CHANGE UP (ref 3.8–10.5)

## 2018-08-17 PROCEDURE — 71045 X-RAY EXAM CHEST 1 VIEW: CPT | Mod: 26

## 2018-08-17 PROCEDURE — 99233 SBSQ HOSP IP/OBS HIGH 50: CPT | Mod: GC

## 2018-08-17 PROCEDURE — 49406 IMAGE CATH FLUID PERI/RETRO: CPT

## 2018-08-17 RX ORDER — POTASSIUM CHLORIDE 20 MEQ
20 PACKET (EA) ORAL ONCE
Qty: 0 | Refills: 0 | Status: DISCONTINUED | OUTPATIENT
Start: 2018-08-17 | End: 2018-08-17

## 2018-08-17 RX ORDER — MAGNESIUM SULFATE 500 MG/ML
1 VIAL (ML) INJECTION ONCE
Qty: 0 | Refills: 0 | Status: DISCONTINUED | OUTPATIENT
Start: 2018-08-17 | End: 2018-08-17

## 2018-08-17 RX ORDER — IPRATROPIUM/ALBUTEROL SULFATE 18-103MCG
3 AEROSOL WITH ADAPTER (GRAM) INHALATION EVERY 6 HOURS
Qty: 0 | Refills: 0 | Status: DISCONTINUED | OUTPATIENT
Start: 2018-08-17 | End: 2018-08-24

## 2018-08-17 RX ADMIN — Medication 650 MILLIGRAM(S): at 19:14

## 2018-08-17 RX ADMIN — ATORVASTATIN CALCIUM 80 MILLIGRAM(S): 80 TABLET, FILM COATED ORAL at 21:32

## 2018-08-17 RX ADMIN — Medication 80 MILLIGRAM(S): at 18:50

## 2018-08-17 RX ADMIN — Medication 650 MILLIGRAM(S): at 20:23

## 2018-08-17 RX ADMIN — Medication 80 MILLIGRAM(S): at 06:43

## 2018-08-17 RX ADMIN — AMLODIPINE BESYLATE 2.5 MILLIGRAM(S): 2.5 TABLET ORAL at 06:43

## 2018-08-17 RX ADMIN — LOSARTAN POTASSIUM 25 MILLIGRAM(S): 100 TABLET, FILM COATED ORAL at 06:43

## 2018-08-17 NOTE — PROGRESS NOTE ADULT - PROBLEM SELECTOR PLAN 1
Pt w/ SaO2 70% at home on 4L NC going up to 90% on NRB. Patient requiring home oxygen for years recently progressed from 2 to 3-4L NC requirement 24/7 w/ BIPAP 12/5 at night). ABG illustrating a Compensated Respiratory Acidosis pH 7.29, pCO2 100, HCO3- 47. Presumed to be d/t MANUEL vs cardiac cause less likely being that CXR/CTAngio not illustrating pleural effusions. ABG pH 7.40, pCO2 82, HCO3 50  -O/N patient had desat, night team increased BiPAP FiO2 to 50%. Patient was otherwise stable  -Placed patient on HFNC trial. About 1 hour thereafter patient felt SOB and BiPAP was put back on  -Patient's respiratory status continues to improve. Continue to monitor  -VBG 8/17 on HFNC: pH 7.31, pCO2 92, HCO3 45. Worsening pCO2 possibly due to   -c/w BiPAP 18/8  -Dr. Valerio wellington, f/u recs  -d/c diamox today  -c/w duoneb q6 PRN for SOB.

## 2018-08-17 NOTE — PROGRESS NOTE ADULT - PROBLEM SELECTOR PLAN 3
Pt with unchanged large pericardial effusion on CTAngio when compared to previous Echo in Sept 2017.  -If patient's BP drops, contact fellow for concerning tamponade.  -f/u Dr. Verde for possible IR pericardiocentesis of posterior cardiac effusion with pulsus paradoxus  -patient NPO today for possible IR procedure for cardiac tamponade

## 2018-08-17 NOTE — PROGRESS NOTE ADULT - SUBJECTIVE AND OBJECTIVE BOX
OVERNIGHT EVENTS: Increased BiPAP FiO2 to 50% for desaturation.    SUBJECTIVE / INTERVAL HPI: Patient seen and examined at bedside. Patient with no complaints. Patient denied chest pain, SOB, nausea, vomiting, diarrhea, constipation, fevers, chills, night sweats.     VITAL SIGNS:  Vital Signs Last 24 Hrs  T(C): 36.8 (17 Aug 2018 09:17), Max: 37 (16 Aug 2018 22:32)  T(F): 98.2 (17 Aug 2018 09:17), Max: 98.6 (16 Aug 2018 22:32)  HR: 90 (17 Aug 2018 09:18) (73 - 90)  BP: 115/58 (17 Aug 2018 09:00) (107/83 - 119/62)  BP(mean): 75 (17 Aug 2018 09:00) (75 - 97)  RR: 28 (17 Aug 2018 09:18) (20 - 36)  SpO2: 81% (17 Aug 2018 09:18) (81% - 95%)    PHYSICAL EXAM:    Constitutional: Severe morbidly obese. Breathing comfortably on BiPAP. NAD  HEENT: NC/AT, anicteric sclera, MMM  Neck: supple  Respiratory: Exam limited to body habitus. In no respiratory distress, not using accessory respiratory muscles for inhalation. Decreased breath sounds throughout. Some crackles appreciated b/l in the lower lung fields.  Cardiac: +S1/S2; RRR  Gastrointestinal: morbidly obese abdomen, nontender. +BSx4  Extremities: b/l 2+ pitting edema in b/l lower extremities. No erythema, no cyanosis b/l  Vascular: 2+ radial pulses b/l, 1+ dorsalis pedis pulses b/l  Neurologic: AAOx3. Answers questions appropriately.     MEDICATIONS:  MEDICATIONS  (STANDING):  amLODIPine   Tablet 2.5 milliGRAM(s) Oral daily  atorvastatin 80 milliGRAM(s) Oral at bedtime  dextrose 5%. 1000 milliLiter(s) (50 mL/Hr) IV Continuous <Continuous>  dextrose 50% Injectable 12.5 Gram(s) IV Push once  dextrose 50% Injectable 25 Gram(s) IV Push once  dextrose 50% Injectable 25 Gram(s) IV Push once  furosemide   Injectable 80 milliGRAM(s) IV Push every 12 hours  insulin lispro (HumaLOG) corrective regimen sliding scale   SubCutaneous three times a day before meals  insulin lispro (HumaLOG) corrective regimen sliding scale   SubCutaneous at bedtime  losartan 25 milliGRAM(s) Oral daily    MEDICATIONS  (PRN):  acetaminophen   Tablet. 650 milliGRAM(s) Oral every 6 hours PRN Mild Pain (1 - 3)  dextrose 40% Gel 15 Gram(s) Oral once PRN Blood Glucose LESS THAN 70 milliGRAM(s)/deciliter  glucagon  Injectable 1 milliGRAM(s) IntraMuscular once PRN Glucose LESS THAN 70 milligrams/deciliter      ALLERGIES:  Allergies    No Known Drug Allergies  shellfish (Anaphylaxis)    Intolerances        LABS:                        9.3    6.8   )-----------( 247      ( 17 Aug 2018 06:39 )             35.3     08-17    140  |  93<L>  |  12  ----------------------------<  115<H>  3.8   |  40<H>  |  0.76    Ca    9.7      17 Aug 2018 06:39  Phos  4.4     08-17  Mg     1.9     08-17          CAPILLARY BLOOD GLUCOSE      POCT Blood Glucose.: 150 mg/dL (17 Aug 2018 10:59)      RADIOLOGY & ADDITIONAL TESTS: Reviewed.

## 2018-08-17 NOTE — PROGRESS NOTE ADULT - PROBLEM SELECTOR PLAN 4
Patient on 18/8 BiPAP; ABG illustrating a Respiratory Acidosis that is now improving on BiPAP  -c/w BIPAP at night.

## 2018-08-17 NOTE — PROGRESS NOTE ADULT - PROBLEM SELECTOR PLAN 7
F: None  E: replete for K<4 Mag<2  N: NPO for now. Can resume DASH Diet after procedure    DVT ppx: holding lovenox 60mg daily(adjusted for BMI) as patient possibly going to for IR procedure today

## 2018-08-17 NOTE — PROGRESS NOTE ADULT - PROBLEM SELECTOR PLAN 2
Pt with symptoms consistent w/ CHF exacerbation including progressive SOB/RODRIGUEZ/ b/l LE swelling,  (lower d/t obesity), although CXR not showing any pleural effusions, just cardiomegaly/increased lung markings. CTAngio showing unchanged large pericardial effusion as possible worsening cause of HF. Trigger is multifactorial including medication/diet noncompliance. Less likely infection being that no signs/symptoms of infection & afebrile, no leukocytosis, or Ischemia being that no sx of chest pain, Trops neg2x; Last cath in 2016 no evidence of ischemia. Pt on Home [id number], Norvasc2.5q, [id number], Atorvastatin 80qd )  - echo 8/14 demonstrated known mod-large pericardial effusion, now with echo evidence of pulsus paradoxus  -c/w Diuresis IV Lasix 80 BID  -C/w [id number], Norvasc2.5 q, Atorvastatin 80qd   -weight today 176.6kg, down from 186.6kg on admission. c/w Daily weight & strict I/Os  -f/u Dr. Verde for possible pericardiocentesis of posterior cardiac effusion with pulsus paradoxus  -patient NPO today for possible IR procedure for cardiac tamponade

## 2018-08-18 LAB
ANION GAP SERPL CALC-SCNC: 7 MMOL/L — SIGNIFICANT CHANGE UP (ref 5–17)
BUN SERPL-MCNC: 9 MG/DL — SIGNIFICANT CHANGE UP (ref 7–23)
CALCIUM SERPL-MCNC: 9.6 MG/DL — SIGNIFICANT CHANGE UP (ref 8.4–10.5)
CHLORIDE SERPL-SCNC: 93 MMOL/L — LOW (ref 96–108)
CO2 SERPL-SCNC: 39 MMOL/L — HIGH (ref 22–31)
CREAT SERPL-MCNC: 0.76 MG/DL — SIGNIFICANT CHANGE UP (ref 0.5–1.3)
GLUCOSE BLDC GLUCOMTR-MCNC: 119 MG/DL — HIGH (ref 70–99)
GLUCOSE BLDC GLUCOMTR-MCNC: 123 MG/DL — HIGH (ref 70–99)
GLUCOSE BLDC GLUCOMTR-MCNC: 145 MG/DL — HIGH (ref 70–99)
GLUCOSE BLDC GLUCOMTR-MCNC: 151 MG/DL — HIGH (ref 70–99)
GLUCOSE SERPL-MCNC: 107 MG/DL — HIGH (ref 70–99)
HCT VFR BLD CALC: 37.3 % — SIGNIFICANT CHANGE UP (ref 34.5–45)
HGB BLD-MCNC: 10.1 G/DL — LOW (ref 11.5–15.5)
MAGNESIUM SERPL-MCNC: 1.8 MG/DL — SIGNIFICANT CHANGE UP (ref 1.6–2.6)
MCHC RBC-ENTMCNC: 24.6 PG — LOW (ref 27–34)
MCHC RBC-ENTMCNC: 27.1 G/DL — LOW (ref 32–36)
MCV RBC AUTO: 90.8 FL — SIGNIFICANT CHANGE UP (ref 80–100)
PHOSPHATE SERPL-MCNC: 4.1 MG/DL — SIGNIFICANT CHANGE UP (ref 2.5–4.5)
PLATELET # BLD AUTO: 270 K/UL — SIGNIFICANT CHANGE UP (ref 150–400)
POTASSIUM SERPL-MCNC: 3.6 MMOL/L — SIGNIFICANT CHANGE UP (ref 3.5–5.3)
POTASSIUM SERPL-SCNC: 3.6 MMOL/L — SIGNIFICANT CHANGE UP (ref 3.5–5.3)
RBC # BLD: 4.11 M/UL — SIGNIFICANT CHANGE UP (ref 3.8–5.2)
RBC # FLD: 18 % — HIGH (ref 10.3–16.9)
SODIUM SERPL-SCNC: 139 MMOL/L — SIGNIFICANT CHANGE UP (ref 135–145)
WBC # BLD: 6.8 K/UL — SIGNIFICANT CHANGE UP (ref 3.8–10.5)
WBC # FLD AUTO: 6.8 K/UL — SIGNIFICANT CHANGE UP (ref 3.8–10.5)

## 2018-08-18 PROCEDURE — 99233 SBSQ HOSP IP/OBS HIGH 50: CPT

## 2018-08-18 RX ORDER — KETOROLAC TROMETHAMINE 30 MG/ML
15 SYRINGE (ML) INJECTION ONCE
Qty: 0 | Refills: 0 | Status: DISCONTINUED | OUTPATIENT
Start: 2018-08-18 | End: 2018-08-18

## 2018-08-18 RX ORDER — ENOXAPARIN SODIUM 100 MG/ML
60 INJECTION SUBCUTANEOUS
Qty: 0 | Refills: 0 | Status: DISCONTINUED | OUTPATIENT
Start: 2018-08-18 | End: 2018-08-24

## 2018-08-18 RX ADMIN — Medication 80 MILLIGRAM(S): at 17:55

## 2018-08-18 RX ADMIN — ATORVASTATIN CALCIUM 80 MILLIGRAM(S): 80 TABLET, FILM COATED ORAL at 21:43

## 2018-08-18 RX ADMIN — AMLODIPINE BESYLATE 2.5 MILLIGRAM(S): 2.5 TABLET ORAL at 11:43

## 2018-08-18 RX ADMIN — Medication 15 MILLIGRAM(S): at 09:15

## 2018-08-18 RX ADMIN — ENOXAPARIN SODIUM 60 MILLIGRAM(S): 100 INJECTION SUBCUTANEOUS at 21:43

## 2018-08-18 RX ADMIN — Medication 15 MILLIGRAM(S): at 08:36

## 2018-08-18 RX ADMIN — Medication 650 MILLIGRAM(S): at 06:05

## 2018-08-18 RX ADMIN — Medication 650 MILLIGRAM(S): at 05:17

## 2018-08-18 RX ADMIN — LOSARTAN POTASSIUM 25 MILLIGRAM(S): 100 TABLET, FILM COATED ORAL at 11:43

## 2018-08-18 RX ADMIN — Medication 80 MILLIGRAM(S): at 05:16

## 2018-08-18 RX ADMIN — Medication 1: at 17:04

## 2018-08-18 NOTE — PROGRESS NOTE ADULT - SUBJECTIVE AND OBJECTIVE BOX
INTERVAL HPI/OVERNIGHT EVENTS:  Patient seen and examined at bedside. Patient with no complaints, slept well with Bipap through tout the night.  SOB, nausea, vomiting, diarrhea, constipation, fevers, chills, night sweats.   Pericardial drain ~ 1 liter fluid over 24 hrs    VITAL SIGNS:  T(F): 98.7 (08-18-18 @ 21:27)  HR: 98 (08-18-18 @ 20:28)  BP: 100/54 (08-18-18 @ 20:28)  RR: 20 (08-18-18 @ 20:28)  SpO2: 96% (08-18-18 @ 20:28)  Wt(kg): --    PHYSICAL EXAM:    Constitutional: Severe morbidly obese. Breathing comfortably on BiPAP. NAD  HEENT: NC/AT, anicteric sclera, MMM  Neck: supple  Chest/Respiratory: Exam limited to body habitus. In no respiratory distress, not using accessory respiratory muscles for inhalation. Decreased breath sounds throughout. Some crackles appreciated b/l in the lower lung fields. + pericardial drain with serosanguinous fluids  Cardiac: +S1/S2; RRR  Gastrointestinal: morbidly obese abdomen, nontender. +BSx4  Extremities: b/l 2+ pitting edema in b/l lower extremities. No erythema, no cyanosis b/l  Vascular: 2+ radial pulses b/l, 1+ dorsalis pedis pulses b/l  Neurologic: AAOx3. Answers questions appropriately.     MEDICATIONS  (STANDING):  amLODIPine   Tablet 2.5 milliGRAM(s) Oral daily  atorvastatin 80 milliGRAM(s) Oral at bedtime  dextrose 5%. 1000 milliLiter(s) (50 mL/Hr) IV Continuous <Continuous>  dextrose 50% Injectable 12.5 Gram(s) IV Push once  dextrose 50% Injectable 25 Gram(s) IV Push once  dextrose 50% Injectable 25 Gram(s) IV Push once  enoxaparin Injectable 60 milliGRAM(s) SubCutaneous two times a day  furosemide   Injectable 80 milliGRAM(s) IV Push every 12 hours  insulin lispro (HumaLOG) corrective regimen sliding scale   SubCutaneous three times a day before meals  insulin lispro (HumaLOG) corrective regimen sliding scale   SubCutaneous at bedtime  losartan 25 milliGRAM(s) Oral daily    MEDICATIONS  (PRN):  acetaminophen   Tablet. 650 milliGRAM(s) Oral every 6 hours PRN Mild Pain (1 - 3)  ALBUTerol/ipratropium for Nebulization 3 milliLiter(s) Nebulizer every 6 hours PRN Shortness of Breath  dextrose 40% Gel 15 Gram(s) Oral once PRN Blood Glucose LESS THAN 70 milliGRAM(s)/deciliter  glucagon  Injectable 1 milliGRAM(s) IntraMuscular once PRN Glucose LESS THAN 70 milligrams/deciliter      Allergies    No Known Drug Allergies  shellfish (Anaphylaxis)    Intolerances        LABS:                        10.1   6.8   )-----------( 270      ( 18 Aug 2018 06:35 )             37.3     08-18    139  |  93<L>  |  9   ----------------------------<  107<H>  3.6   |  39<H>  |  0.76    Ca    9.6      18 Aug 2018 06:36  Phos  4.1     08-18  Mg     1.8     08-18            RADIOLOGY & ADDITIONAL TESTS:  Reviewed INTERVAL HPI/OVERNIGHT EVENTS:  Patient seen and examined at bedside. Patient with no complaints, slept well with Bipap through tout the night.  SOB, nausea, vomiting, diarrhea, constipation, fevers, chills, night sweats.   Pericardial drain ~ 1 liter fluid over 24 hrs    VITAL SIGNS:  T(F): 98.7 (08-18-18 @ 21:27)  HR: 98 (08-18-18 @ 20:28)  BP: 100/54 (08-18-18 @ 20:28)  RR: 20 (08-18-18 @ 20:28)  SpO2: 96% (08-18-18 @ 20:28)  Wt(kg): --    PHYSICAL EXAM:    Constitutional: Severe morbidly obese. Breathing comfortably on BiPAP. NAD  HEENT: NC/AT, anicteric sclera, MMM  Neck: supple  Chest/Respiratory: Exam limited to body habitus. In no respiratory distress, not using accessory respiratory muscles for inhalation. Decreased breath sounds throughout. Some crackles appreciated b/l in the lower lung fields. + pericardial drain: whitish fluid  Cardiac: +S1/S2; RRR  Gastrointestinal: morbidly obese abdomen, nontender. +BSx4  Extremities: b/l 2+ pitting edema in b/l lower extremities. No erythema, no cyanosis b/l  Vascular: 2+ radial pulses b/l, 1+ dorsalis pedis pulses b/l  Neurologic: AAOx3. Answers questions appropriately.     MEDICATIONS  (STANDING):  amLODIPine   Tablet 2.5 milliGRAM(s) Oral daily  atorvastatin 80 milliGRAM(s) Oral at bedtime  dextrose 5%. 1000 milliLiter(s) (50 mL/Hr) IV Continuous <Continuous>  dextrose 50% Injectable 12.5 Gram(s) IV Push once  dextrose 50% Injectable 25 Gram(s) IV Push once  dextrose 50% Injectable 25 Gram(s) IV Push once  enoxaparin Injectable 60 milliGRAM(s) SubCutaneous two times a day  furosemide   Injectable 80 milliGRAM(s) IV Push every 12 hours  insulin lispro (HumaLOG) corrective regimen sliding scale   SubCutaneous three times a day before meals  insulin lispro (HumaLOG) corrective regimen sliding scale   SubCutaneous at bedtime  losartan 25 milliGRAM(s) Oral daily    MEDICATIONS  (PRN):  acetaminophen   Tablet. 650 milliGRAM(s) Oral every 6 hours PRN Mild Pain (1 - 3)  ALBUTerol/ipratropium for Nebulization 3 milliLiter(s) Nebulizer every 6 hours PRN Shortness of Breath  dextrose 40% Gel 15 Gram(s) Oral once PRN Blood Glucose LESS THAN 70 milliGRAM(s)/deciliter  glucagon  Injectable 1 milliGRAM(s) IntraMuscular once PRN Glucose LESS THAN 70 milligrams/deciliter      Allergies    No Known Drug Allergies  shellfish (Anaphylaxis)    Intolerances        LABS:                        10.1   6.8   )-----------( 270      ( 18 Aug 2018 06:35 )             37.3     08-18    139  |  93<L>  |  9   ----------------------------<  107<H>  3.6   |  39<H>  |  0.76    Ca    9.6      18 Aug 2018 06:36  Phos  4.1     08-18  Mg     1.8     08-18            RADIOLOGY & ADDITIONAL TESTS:  Reviewed

## 2018-08-18 NOTE — PROGRESS NOTE ADULT - SUBJECTIVE AND OBJECTIVE BOX
Patient is a 43y old  Female who presents with a chief complaint of SOB/Hypoxia (13 Aug 2018 17:18)        INTERVAL HPI/OVERNIGHT EVENTS: sp drainage of effusion    SYMPTOMS no sob, pain    DRIPS none        ICU Vital Signs Last 24 Hrs  T(C): 37.1 (18 Aug 2018 10:42), Max: 37.6 (17 Aug 2018 18:20)  T(F): 98.8 (18 Aug 2018 10:42), Max: 99.6 (17 Aug 2018 18:20)  HR: 62 (18 Aug 2018 08:45) (62 - 92)  BP: 106/59 (18 Aug 2018 08:45) (97/47 - 112/56)  BP(mean): 75 (18 Aug 2018 08:45) (61 - 79)  ABP: --  ABP(mean): --  RR: 20 (18 Aug 2018 08:45) (17 - 36)  SpO2: 96% (18 Aug 2018 08:45) (66% - 96%)      I&O's Summary    17 Aug 2018 07:01  -  18 Aug 2018 07:00  --------------------------------------------------------  IN: 0 mL / OUT: 2660 mL / NET: -2660 mL    18 Aug 2018 07:01  -  18 Aug 2018 12:02  --------------------------------------------------------  IN: 0 mL / OUT: 35 mL / NET: -35 mL        EXAM    Chest chear    Heart rr    Abdomen soft nontender with bs    Extremities trace edema    Neuro awkae alert      LABS:                            10.1   6.8   )-----------( 270      ( 18 Aug 2018 06:35 )             37.3     08-18    139  |  93<L>  |  9   ----------------------------<  107<H>  3.6   |  39<H>  |  0.76    Ca    9.6      18 Aug 2018 06:36  Phos  4.1     08-18  Mg     1.8     08-18                RADIOLOGY & ADDITIONAL STUDIES:    CRITICAL CARE TIME SPENT:

## 2018-08-18 NOTE — PROGRESS NOTE ADULT - ASSESSMENT
A - chronic respiratory failure/MANUEL/morbid obesity/DM/pericardial effusion    Suggest:  BIPAP/ hidh flow a tolerated  no change in insullin regimen  continue lasix as dosed  may need to decrease bp med  fu pericardial fluid studies

## 2018-08-18 NOTE — PROGRESS NOTE ADULT - PROBLEM SELECTOR PLAN 3
Pt with unchanged large pericardial effusion on CTAngio when compared to previous Echo in Sept 2017.  -pericardial fluid cx negative  -If patient's BP drops, contact fellow for concerning tamponade.  -f/u Dr. Verde for possible IR pericardiocentesis of posterior cardiac effusion with pulsus paradoxus

## 2018-08-18 NOTE — PROGRESS NOTE ADULT - PROBLEM SELECTOR PLAN 1
Pt w/ SaO2 70% at home on 4L NC going up to 90% on NRB. Patient requiring home oxygen for years recently progressed from 2 to 3-4L NC requirement 24/7 w/ BIPAP 12/5 at night). ABG illustrating a Compensated Respiratory Acidosis pH 7.29, pCO2 100, HCO3- 47. Presumed to be d/t MANUEL vs cardiac cause less likely being that CXR/CTAngio not illustrating pleural effusions. ABG pH 7.40, pCO2 82, HCO3 50  -Patient's respiratory status continues to improve. Continue to monitor  -VBG 8/17 on HFNC: pH 7.31, pCO2 92, HCO3 45. Worsening pCO2 possibly due to   -c/w BiPAP 18/8  -Dr. Valerio wellington, f/u recs  -d/c diamox today  -c/w duoneb q6 PRN for SOB.  -scheduled tentatively for V/Q scan Monday

## 2018-08-19 LAB
ANION GAP SERPL CALC-SCNC: 8 MMOL/L — SIGNIFICANT CHANGE UP (ref 5–17)
BASOPHILS NFR BLD AUTO: 0.1 % — SIGNIFICANT CHANGE UP (ref 0–2)
BUN SERPL-MCNC: 19 MG/DL — SIGNIFICANT CHANGE UP (ref 7–23)
CALCIUM SERPL-MCNC: 9.5 MG/DL — SIGNIFICANT CHANGE UP (ref 8.4–10.5)
CHLORIDE SERPL-SCNC: 95 MMOL/L — LOW (ref 96–108)
CO2 SERPL-SCNC: 39 MMOL/L — HIGH (ref 22–31)
CREAT SERPL-MCNC: 0.81 MG/DL — SIGNIFICANT CHANGE UP (ref 0.5–1.3)
CULTURE RESULTS: NO GROWTH — SIGNIFICANT CHANGE UP
EOSINOPHIL NFR BLD AUTO: 1.3 % — SIGNIFICANT CHANGE UP (ref 0–6)
GLUCOSE BLDC GLUCOMTR-MCNC: 120 MG/DL — HIGH (ref 70–99)
GLUCOSE BLDC GLUCOMTR-MCNC: 143 MG/DL — HIGH (ref 70–99)
GLUCOSE BLDC GLUCOMTR-MCNC: 179 MG/DL — HIGH (ref 70–99)
GLUCOSE BLDC GLUCOMTR-MCNC: 191 MG/DL — HIGH (ref 70–99)
GLUCOSE SERPL-MCNC: 110 MG/DL — HIGH (ref 70–99)
HCT VFR BLD CALC: 35.5 % — SIGNIFICANT CHANGE UP (ref 34.5–45)
HGB BLD-MCNC: 9.8 G/DL — LOW (ref 11.5–15.5)
LYMPHOCYTES # BLD AUTO: 14.3 % — SIGNIFICANT CHANGE UP (ref 13–44)
MAGNESIUM SERPL-MCNC: 1.9 MG/DL — SIGNIFICANT CHANGE UP (ref 1.6–2.6)
MCHC RBC-ENTMCNC: 24.4 PG — LOW (ref 27–34)
MCHC RBC-ENTMCNC: 27.6 G/DL — LOW (ref 32–36)
MCV RBC AUTO: 88.3 FL — SIGNIFICANT CHANGE UP (ref 80–100)
MONOCYTES NFR BLD AUTO: 16 % — HIGH (ref 2–14)
NEUTROPHILS NFR BLD AUTO: 68.3 % — SIGNIFICANT CHANGE UP (ref 43–77)
PLATELET # BLD AUTO: 249 K/UL — SIGNIFICANT CHANGE UP (ref 150–400)
POTASSIUM SERPL-MCNC: 3.5 MMOL/L — SIGNIFICANT CHANGE UP (ref 3.5–5.3)
POTASSIUM SERPL-SCNC: 3.5 MMOL/L — SIGNIFICANT CHANGE UP (ref 3.5–5.3)
RBC # BLD: 4.02 M/UL — SIGNIFICANT CHANGE UP (ref 3.8–5.2)
RBC # FLD: 17.9 % — HIGH (ref 10.3–16.9)
SODIUM SERPL-SCNC: 142 MMOL/L — SIGNIFICANT CHANGE UP (ref 135–145)
SPECIMEN SOURCE: SIGNIFICANT CHANGE UP
WBC # BLD: 7.8 K/UL — SIGNIFICANT CHANGE UP (ref 3.8–10.5)
WBC # FLD AUTO: 7.8 K/UL — SIGNIFICANT CHANGE UP (ref 3.8–10.5)

## 2018-08-19 PROCEDURE — 71045 X-RAY EXAM CHEST 1 VIEW: CPT | Mod: 26

## 2018-08-19 PROCEDURE — 99233 SBSQ HOSP IP/OBS HIGH 50: CPT

## 2018-08-19 RX ORDER — POTASSIUM CHLORIDE 20 MEQ
40 PACKET (EA) ORAL ONCE
Qty: 0 | Refills: 0 | Status: COMPLETED | OUTPATIENT
Start: 2018-08-19 | End: 2018-08-19

## 2018-08-19 RX ORDER — FUROSEMIDE 40 MG
80 TABLET ORAL DAILY
Qty: 0 | Refills: 0 | Status: DISCONTINUED | OUTPATIENT
Start: 2018-08-20 | End: 2018-08-23

## 2018-08-19 RX ORDER — MAGNESIUM SULFATE 500 MG/ML
1 VIAL (ML) INJECTION ONCE
Qty: 0 | Refills: 0 | Status: COMPLETED | OUTPATIENT
Start: 2018-08-19 | End: 2018-08-19

## 2018-08-19 RX ADMIN — ENOXAPARIN SODIUM 60 MILLIGRAM(S): 100 INJECTION SUBCUTANEOUS at 17:43

## 2018-08-19 RX ADMIN — Medication 80 MILLIGRAM(S): at 07:31

## 2018-08-19 RX ADMIN — Medication 40 MILLIEQUIVALENT(S): at 11:28

## 2018-08-19 RX ADMIN — LOSARTAN POTASSIUM 25 MILLIGRAM(S): 100 TABLET, FILM COATED ORAL at 11:28

## 2018-08-19 RX ADMIN — ATORVASTATIN CALCIUM 80 MILLIGRAM(S): 80 TABLET, FILM COATED ORAL at 21:23

## 2018-08-19 RX ADMIN — Medication 1: at 11:43

## 2018-08-19 RX ADMIN — Medication 650 MILLIGRAM(S): at 18:19

## 2018-08-19 RX ADMIN — Medication 100 GRAM(S): at 11:28

## 2018-08-19 RX ADMIN — Medication 650 MILLIGRAM(S): at 17:31

## 2018-08-19 RX ADMIN — ENOXAPARIN SODIUM 60 MILLIGRAM(S): 100 INJECTION SUBCUTANEOUS at 07:31

## 2018-08-19 NOTE — PROGRESS NOTE ADULT - PROBLEM SELECTOR PLAN 4
Patient was on 18/8 BiPAP, now weaning to HFNC; ABG illustrating a Respiratory Acidosis improved with BiPAP  -c/w HFNC at night vs. BIPAP if SOB or respiratory distress Patient was on 18/8 BiPAP, now weaning to HFNC; ABG illustrating a Respiratory Acidosis improved with BiPAP  -c/w nighttime BIPAP

## 2018-08-19 NOTE — PROGRESS NOTE ADULT - PROBLEM SELECTOR PLAN 2
Pt with symptoms consistent w/ CHF exacerbation including progressive SOB/RODRIGUEZ/ b/l LE swelling,  (lower d/t obesity), although CXR not showing any pleural effusions, just cardiomegaly/increased lung markings. CTAngio showing unchanged large pericardial effusion as possible worsening cause of HF. Trigger is multifactorial including medication/diet noncompliance. Less likely infection being that no signs/symptoms of infection & afebrile, no leukocytosis, or Ischemia being that no sx of chest pain, Trops neg2x; Last cath in 2016 no evidence of ischemia. Pt on Home [id number], Norvasc2.5q, [id number], Atorvastatin 80qd )  - echo 8/14 demonstrated known mod-large pericardial effusion, now with echo evidence of pulsus paradoxus  -consider repeat echo  -decreased lasix to IV lasix 80mg daily (was previously BID at same dose and route)  -C/w [id number], Norvasc2.5 q, Atorvastatin 80qd   -most recent weight 169.3kg 8/18, down from 186.6kg on admission. c/w Daily weight & strict I/Os

## 2018-08-19 NOTE — PROGRESS NOTE ADULT - SUBJECTIVE AND OBJECTIVE BOX
OVERNIGHT EVENTS: Amlodipine d/tania due to low BPs. Pericardial drain noted to have cloudy appearance, but afebrile and non-tachy. Tolerated HCNF well overnight.    SUBJECTIVE / INTERVAL HPI: Patient seen and examined at bedside. Patient with no complaints in the AM. Patient denied chest pain, SOB, n/v/d/c, fevers, chills, night sweats. Nurse paged provider after  and patient admitted to palpitations with no other complaints, /74, RR 20, SpO2 88% on HFNC. Manual radial pulse in 90s. STAT EKG with APCs.     VITAL SIGNS:  Vital Signs Last 24 Hrs  T(C): 36.6 (19 Aug 2018 14:48), Max: 37.1 (18 Aug 2018 21:27)  T(F): 97.8 (19 Aug 2018 14:48), Max: 98.7 (18 Aug 2018 21:27)  HR: 128 (19 Aug 2018 16:48) (92 - 128)  BP: 113/74 (19 Aug 2018 16:48) (93/74 - 113/74)  BP(mean): 87 (19 Aug 2018 16:48) (70 - 87)  RR: 20 (19 Aug 2018 16:48) (18 - 31)  SpO2: 88% (19 Aug 2018 16:48) (88% - 99%)    PHYSICAL EXAM:    General: Severe morbidly obese. Breathing comfortably on HFNC. NAD  HEENT: NC/AT, anicteric sclera, MMM  Neck: supple  Chest/Respiratory: Exam limited to body habitus. In no respiratory distress, not using accessory respiratory muscles for inhalation. Decreased breath sounds throughout. No crackles, no wheezing, no rhonchi appreciated. + pericardial drain: draining cloudy yellowish fluid with white ?clot  Cardiac: +S1/S2; RRR, no murmur appreciated  Gastrointestinal: morbidly obese abdomen, soft, nontender. +BSx4  Extremities: b/l 2+ pitting edema in b/l lower extremities. No erythema, no cyanosis b/l  Vascular: 2+ radial pulses b/l, 1+ dorsalis pedis pulses b/l  Neurologic: AAOx3. Answers questions appropriately.     MEDICATIONS:  MEDICATIONS  (STANDING):  atorvastatin 80 milliGRAM(s) Oral at bedtime  dextrose 5%. 1000 milliLiter(s) (50 mL/Hr) IV Continuous <Continuous>  dextrose 50% Injectable 12.5 Gram(s) IV Push once  dextrose 50% Injectable 25 Gram(s) IV Push once  dextrose 50% Injectable 25 Gram(s) IV Push once  enoxaparin Injectable 60 milliGRAM(s) SubCutaneous two times a day  insulin lispro (HumaLOG) corrective regimen sliding scale   SubCutaneous three times a day before meals  insulin lispro (HumaLOG) corrective regimen sliding scale   SubCutaneous at bedtime  losartan 25 milliGRAM(s) Oral daily    MEDICATIONS  (PRN):  acetaminophen   Tablet. 650 milliGRAM(s) Oral every 6 hours PRN Mild Pain (1 - 3)  ALBUTerol/ipratropium for Nebulization 3 milliLiter(s) Nebulizer every 6 hours PRN Shortness of Breath  dextrose 40% Gel 15 Gram(s) Oral once PRN Blood Glucose LESS THAN 70 milliGRAM(s)/deciliter  glucagon  Injectable 1 milliGRAM(s) IntraMuscular once PRN Glucose LESS THAN 70 milligrams/deciliter      ALLERGIES:  Allergies    No Known Drug Allergies  shellfish (Anaphylaxis)    Intolerances        LABS:                        9.8    7.8   )-----------( 249      ( 19 Aug 2018 06:21 )             35.5     08-19    142  |  95<L>  |  19  ----------------------------<  110<H>  3.5   |  39<H>  |  0.81    Ca    9.5      19 Aug 2018 06:19  Phos  4.1     08-18  Mg     1.9     08-19          CAPILLARY BLOOD GLUCOSE      POCT Blood Glucose.: 143 mg/dL (19 Aug 2018 16:12)      RADIOLOGY & ADDITIONAL TESTS: Reviewed.    < from: Xray Chest 1 View- PORTABLE-Urgent (08.19.18 @ 10:52) >    Impression: Left effusion

## 2018-08-19 NOTE — PROGRESS NOTE ADULT - PROBLEM SELECTOR PLAN 3
Pt with unchanged large pericardial effusion on CTAngio when compared to previous Echo in Sept 2017. Now s/p IR pericardiocentesis with drain in place. Drained 1L.  -pericardial fluid drained 105cc over 24hrs. Yellow cloudy appearance with ?white clot. unlikely infectious as patient afebrile, pericardial fluid cxs negative.

## 2018-08-19 NOTE — PROGRESS NOTE ADULT - ASSESSMENT
43yoF with PMH of DM2(insulin/metformin) , HTN(Norvasc, Losartsan), HLD, CHF (lasix), Severe morbid Obesity, MANUEL (On home 24/7 3-4L NC &  night CPAP 12/5) c/o progressively worsening SOB with Oxygen desaturation 70% at home presumed to be d/t medication/diet noncompliance admitted to [facility] for r/o CHF exacerbation and management of hypoxia, transferred [facility] for continued management of hypoxemia and hypercapnia on BiPAP, now s/p IR pericardiocentesis and weaning to HFNC

## 2018-08-19 NOTE — PROGRESS NOTE ADULT - ASSESSMENT
A - morbid obesity/MANUEL/pericardial effusion/chf/htn    Suggest:  no change in insulin  try to taper off high flow in daytime  nightime BIPAP  decrease lasix dose  no change in DM mnagment

## 2018-08-19 NOTE — PROGRESS NOTE ADULT - PROBLEM SELECTOR PLAN 1
Pt w/ SaO2 70% at home on 4L NC going up to 90% on NRB. Patient requiring home oxygen for years recently progressed from 2 to 3-4L NC requirement 24/7 w/ BIPAP 12/5 at night). ABG illustrating a Compensated Respiratory Acidosis pH 7.29, pCO2 100, HCO3- 47. Presumed to be d/t MANUEL vs cardiac cause less likely being that CXR/CTAngio not illustrating pleural effusions. ABG pH 7.40, pCO2 82, HCO3 50  -Patient's respiratory status continues to improve. Sating well on HFNC overnight and throughout the day.   -VBG 8/17 on HFNC: pH 7.31, pCO2 92, HCO3 45. Worsening pCO2 possibly due to   -c/w HFNC as tolerated. Can escalate to BiPAP if in respiratory distress, desating.  -Dr. Valerio wellington, f/u recs  -c/w duoneb q6 PRN for SOB.  -scheduled tentatively for V/Q scan Monday

## 2018-08-19 NOTE — PROGRESS NOTE ADULT - SUBJECTIVE AND OBJECTIVE BOX
44 y/o F s/p paricardial drain placement. Patient alert, NAD.  Drained 105 ml of straw colored fluid in past 24 hrs

## 2018-08-19 NOTE — PROGRESS NOTE ADULT - SUBJECTIVE AND OBJECTIVE BOX
Patient is a 43y old  Female who presents with a chief complaint of SOB/Hypoxia (13 Aug 2018 17:18)        INTERVAL HPI/OVERNIGHT EVENTS: none    SYMPTOMS feels better, less sob    DRIPS none        ICU Vital Signs Last 24 Hrs  T(C): 36.7 (19 Aug 2018 10:36), Max: 38 (18 Aug 2018 12:46)  T(F): 98 (19 Aug 2018 10:36), Max: 100.4 (18 Aug 2018 12:46)  HR: 94 (19 Aug 2018 11:30) (92 - 102)  BP: 93/74 (19 Aug 2018 11:30) (93/74 - 105/64)  BP(mean): 86 (19 Aug 2018 11:30) (67 - 86)  ABP: --  ABP(mean): --  RR: 20 (19 Aug 2018 11:30) (18 - 31)  SpO2: 99% (19 Aug 2018 11:30) (91% - 99%)      I&O's Summary    18 Aug 2018 07:01  -  19 Aug 2018 07:00  --------------------------------------------------------  IN: 770 mL / OUT: 1505 mL / NET: -735 mL    19 Aug 2018 07:01  -  19 Aug 2018 12:16  --------------------------------------------------------  IN: 0 mL / OUT: 350 mL / NET: -350 mL        EXAM    Chest decreased bs at bases    Heart rr    Abdomen soft nontender with bs    Extremities with edema    Neuro awake, alert      LABS:                            9.8    7.8   )-----------( 249      ( 19 Aug 2018 06:21 )             35.5     08-19    142  |  95<L>  |  19  ----------------------------<  110<H>  3.5   |  39<H>  |  0.81    Ca    9.5      19 Aug 2018 06:19  Phos  4.1     08-18  Mg     1.9     08-19        fs < 200    precardial fluid cultures neg    RADIOLOGY & ADDITIONAL STUDIES:    CRITICAL CARE TIME SPENT:

## 2018-08-20 LAB
ANION GAP SERPL CALC-SCNC: 8 MMOL/L — SIGNIFICANT CHANGE UP (ref 5–17)
BUN SERPL-MCNC: 14 MG/DL — SIGNIFICANT CHANGE UP (ref 7–23)
CALCIUM SERPL-MCNC: 9.4 MG/DL — SIGNIFICANT CHANGE UP (ref 8.4–10.5)
CHLORIDE SERPL-SCNC: 95 MMOL/L — LOW (ref 96–108)
CO2 SERPL-SCNC: 39 MMOL/L — HIGH (ref 22–31)
CREAT SERPL-MCNC: 0.76 MG/DL — SIGNIFICANT CHANGE UP (ref 0.5–1.3)
GLUCOSE BLDC GLUCOMTR-MCNC: 126 MG/DL — HIGH (ref 70–99)
GLUCOSE BLDC GLUCOMTR-MCNC: 143 MG/DL — HIGH (ref 70–99)
GLUCOSE BLDC GLUCOMTR-MCNC: 174 MG/DL — HIGH (ref 70–99)
GLUCOSE BLDC GLUCOMTR-MCNC: 204 MG/DL — HIGH (ref 70–99)
GLUCOSE SERPL-MCNC: 137 MG/DL — HIGH (ref 70–99)
HCT VFR BLD CALC: 36.5 % — SIGNIFICANT CHANGE UP (ref 34.5–45)
HGB BLD-MCNC: 9.6 G/DL — LOW (ref 11.5–15.5)
MAGNESIUM SERPL-MCNC: 2 MG/DL — SIGNIFICANT CHANGE UP (ref 1.6–2.6)
MCHC RBC-ENTMCNC: 23.7 PG — LOW (ref 27–34)
MCHC RBC-ENTMCNC: 26.3 G/DL — LOW (ref 32–36)
MCV RBC AUTO: 90.1 FL — SIGNIFICANT CHANGE UP (ref 80–100)
PLATELET # BLD AUTO: 276 K/UL — SIGNIFICANT CHANGE UP (ref 150–400)
POTASSIUM SERPL-MCNC: 3.7 MMOL/L — SIGNIFICANT CHANGE UP (ref 3.5–5.3)
POTASSIUM SERPL-SCNC: 3.7 MMOL/L — SIGNIFICANT CHANGE UP (ref 3.5–5.3)
RBC # BLD: 4.05 M/UL — SIGNIFICANT CHANGE UP (ref 3.8–5.2)
RBC # FLD: 17.3 % — HIGH (ref 10.3–16.9)
SODIUM SERPL-SCNC: 142 MMOL/L — SIGNIFICANT CHANGE UP (ref 135–145)
WBC # BLD: 8.1 K/UL — SIGNIFICANT CHANGE UP (ref 3.8–10.5)
WBC # FLD AUTO: 8.1 K/UL — SIGNIFICANT CHANGE UP (ref 3.8–10.5)

## 2018-08-20 PROCEDURE — 71045 X-RAY EXAM CHEST 1 VIEW: CPT | Mod: 26

## 2018-08-20 PROCEDURE — 99232 SBSQ HOSP IP/OBS MODERATE 35: CPT

## 2018-08-20 PROCEDURE — 99233 SBSQ HOSP IP/OBS HIGH 50: CPT | Mod: GC

## 2018-08-20 PROCEDURE — 93306 TTE W/DOPPLER COMPLETE: CPT | Mod: 26

## 2018-08-20 RX ORDER — POTASSIUM CHLORIDE 20 MEQ
40 PACKET (EA) ORAL ONCE
Qty: 0 | Refills: 0 | Status: COMPLETED | OUTPATIENT
Start: 2018-08-20 | End: 2018-08-20

## 2018-08-20 RX ADMIN — ENOXAPARIN SODIUM 60 MILLIGRAM(S): 100 INJECTION SUBCUTANEOUS at 06:07

## 2018-08-20 RX ADMIN — Medication 40 MILLIEQUIVALENT(S): at 11:42

## 2018-08-20 RX ADMIN — Medication 80 MILLIGRAM(S): at 06:07

## 2018-08-20 RX ADMIN — ATORVASTATIN CALCIUM 80 MILLIGRAM(S): 80 TABLET, FILM COATED ORAL at 22:15

## 2018-08-20 RX ADMIN — LOSARTAN POTASSIUM 25 MILLIGRAM(S): 100 TABLET, FILM COATED ORAL at 06:08

## 2018-08-20 RX ADMIN — Medication 1: at 11:42

## 2018-08-20 RX ADMIN — ENOXAPARIN SODIUM 60 MILLIGRAM(S): 100 INJECTION SUBCUTANEOUS at 17:26

## 2018-08-20 NOTE — PROGRESS NOTE ADULT - SUBJECTIVE AND OBJECTIVE BOX
OVERNIGHT EVENTS:    SUBJECTIVE / INTERVAL HPI: Patient seen and examined at bedside.     VITAL SIGNS:  Vital Signs Last 24 Hrs  T(C): 37.2 (20 Aug 2018 09:34), Max: 37.6 (19 Aug 2018 20:02)  T(F): 99 (20 Aug 2018 09:34), Max: 99.7 (19 Aug 2018 20:02)  HR: 88 (20 Aug 2018 10:32) (76 - 128)  BP: 118/71 (20 Aug 2018 08:33) (93/74 - 118/71)  BP(mean): 98 (20 Aug 2018 08:33) (71 - 98)  RR: 28 (20 Aug 2018 10:32) (15 - 29)  SpO2: 92% (20 Aug 2018 10:32) (88% - 100%)    PHYSICAL EXAM:    General: WDWN  HEENT: NC/AT; PERRL, anicteric sclera; MMM  Neck: supple  Cardiovascular: +S1/S2, RRR  Respiratory: CTA B/L; no W/R/R  Gastrointestinal: soft, NT/ND; +BSx4  Extremities: WWP; no edema, clubbing or cyanosis  Vascular: 2+ radial, DP/PT pulses B/L  Neurological: AAOx3; no focal deficits    MEDICATIONS:  MEDICATIONS  (STANDING):  atorvastatin 80 milliGRAM(s) Oral at bedtime  dextrose 5%. 1000 milliLiter(s) (50 mL/Hr) IV Continuous <Continuous>  dextrose 50% Injectable 12.5 Gram(s) IV Push once  dextrose 50% Injectable 25 Gram(s) IV Push once  dextrose 50% Injectable 25 Gram(s) IV Push once  enoxaparin Injectable 60 milliGRAM(s) SubCutaneous two times a day  furosemide   Injectable 80 milliGRAM(s) IV Push daily  insulin lispro (HumaLOG) corrective regimen sliding scale   SubCutaneous three times a day before meals  insulin lispro (HumaLOG) corrective regimen sliding scale   SubCutaneous at bedtime  losartan 25 milliGRAM(s) Oral daily  potassium chloride    Tablet ER 40 milliEquivalent(s) Oral once    MEDICATIONS  (PRN):  acetaminophen   Tablet. 650 milliGRAM(s) Oral every 6 hours PRN Mild Pain (1 - 3)  ALBUTerol/ipratropium for Nebulization 3 milliLiter(s) Nebulizer every 6 hours PRN Shortness of Breath  dextrose 40% Gel 15 Gram(s) Oral once PRN Blood Glucose LESS THAN 70 milliGRAM(s)/deciliter  glucagon  Injectable 1 milliGRAM(s) IntraMuscular once PRN Glucose LESS THAN 70 milligrams/deciliter      ALLERGIES:  Allergies    No Known Drug Allergies  shellfish (Anaphylaxis)    Intolerances        LABS:                        9.6    8.1   )-----------( 276      ( 20 Aug 2018 07:41 )             36.5     08-20    142  |  95<L>  |  14  ----------------------------<  137<H>  3.7   |  39<H>  |  0.76    Ca    9.4      20 Aug 2018 07:41  Mg     2.0     08-20          CAPILLARY BLOOD GLUCOSE      POCT Blood Glucose.: 174 mg/dL (20 Aug 2018 11:11)      RADIOLOGY & ADDITIONAL TESTS: Reviewed. OVERNIGHT EVENTS: ADDY     SUBJECTIVE / INTERVAL HPI: Patient seen and examined at bedside. Patient with no complaints. Patient denies chest pain, SOB,     VITAL SIGNS:  Vital Signs Last 24 Hrs  T(C): 37.2 (20 Aug 2018 09:34), Max: 37.6 (19 Aug 2018 20:02)  T(F): 99 (20 Aug 2018 09:34), Max: 99.7 (19 Aug 2018 20:02)  HR: 88 (20 Aug 2018 10:32) (76 - 128)  BP: 118/71 (20 Aug 2018 08:33) (93/74 - 118/71)  BP(mean): 98 (20 Aug 2018 08:33) (71 - 98)  RR: 28 (20 Aug 2018 10:32) (15 - 29)  SpO2: 92% (20 Aug 2018 10:32) (88% - 100%)    PHYSICAL EXAM:    General: Severe morbidly obese. Breathing comfortably on HFNC. NAD  HEENT: NC/AT, anicteric sclera, MMM  Neck: supple  Chest/Respiratory: Exam limited to body habitus. In no respiratory distress, not using accessory respiratory muscles for inhalation. Decreased breath sounds throughout. No crackles, no wheezing, no rhonchi appreciated. + pericardial drain: transparent yellowish fluid  Cardiac: +S1/S2; RRR, no murmur appreciated  Gastrointestinal: morbidly obese abdomen, soft, nontender. +BSx4  Extremities: b/l 2+ pitting edema in b/l lower extremities. No erythema, no cyanosis b/l  Vascular: 2+ radial pulses b/l, 1+ dorsalis pedis pulses b/l  Neurologic: AAOx3. Answers questions appropriately.     MEDICATIONS:  MEDICATIONS  (STANDING):  atorvastatin 80 milliGRAM(s) Oral at bedtime  dextrose 5%. 1000 milliLiter(s) (50 mL/Hr) IV Continuous <Continuous>  dextrose 50% Injectable 12.5 Gram(s) IV Push once  dextrose 50% Injectable 25 Gram(s) IV Push once  dextrose 50% Injectable 25 Gram(s) IV Push once  enoxaparin Injectable 60 milliGRAM(s) SubCutaneous two times a day  furosemide   Injectable 80 milliGRAM(s) IV Push daily  insulin lispro (HumaLOG) corrective regimen sliding scale   SubCutaneous three times a day before meals  insulin lispro (HumaLOG) corrective regimen sliding scale   SubCutaneous at bedtime  losartan 25 milliGRAM(s) Oral daily  potassium chloride    Tablet ER 40 milliEquivalent(s) Oral once    MEDICATIONS  (PRN):  acetaminophen   Tablet. 650 milliGRAM(s) Oral every 6 hours PRN Mild Pain (1 - 3)  ALBUTerol/ipratropium for Nebulization 3 milliLiter(s) Nebulizer every 6 hours PRN Shortness of Breath  dextrose 40% Gel 15 Gram(s) Oral once PRN Blood Glucose LESS THAN 70 milliGRAM(s)/deciliter  glucagon  Injectable 1 milliGRAM(s) IntraMuscular once PRN Glucose LESS THAN 70 milligrams/deciliter      ALLERGIES:  Allergies    No Known Drug Allergies  shellfish (Anaphylaxis)    Intolerances        LABS:                        9.6    8.1   )-----------( 276      ( 20 Aug 2018 07:41 )             36.5     08-20    142  |  95<L>  |  14  ----------------------------<  137<H>  3.7   |  39<H>  |  0.76    Ca    9.4      20 Aug 2018 07:41  Mg     2.0     08-20          CAPILLARY BLOOD GLUCOSE      POCT Blood Glucose.: 174 mg/dL (20 Aug 2018 11:11)      RADIOLOGY & ADDITIONAL TESTS: Reviewed.    < from: Xray Chest 1 View- PORTABLE-Urgent (08.20.18 @ 08:49) >  Improved visualization of the left lung base compared to prior exam   8/19/2018 and may represent improvement of left pleural effusion.   Hypoinflation and prominent size heart again noted. No definite lung   infiltrates.      < end of copied text >

## 2018-08-20 NOTE — PROGRESS NOTE ADULT - PROBLEM SELECTOR PLAN 3
Pt with unchanged large pericardial effusion on CTAngio when compared to previous Echo in Sept 2017. Now s/p IR pericardiocentesis with drain in place. Drained 1L.  -pericardial fluid drained 180cc over 24hrs. IR flushed clogged tubing for drain and now draining transparent yellow fluid

## 2018-08-20 NOTE — PROGRESS NOTE ADULT - PROBLEM SELECTOR PLAN 4
Patient was on 18/8 BiPAP, now weaning to NC 5-6L O2; ABG illustrating a Respiratory Acidosis improved with BiPAP  -c/w nighttime BIPAP

## 2018-08-20 NOTE — PROGRESS NOTE ADULT - SUBJECTIVE AND OBJECTIVE BOX
Chief Complaint/Reason for Consult: pericardial effusion  INTERVAL HPI: s/p IR guided drain, no clinical s/s cardiac tamponade  	  MEDICATIONS:  furosemide   Injectable 80 milliGRAM(s) IV Push daily  losartan 25 milliGRAM(s) Oral daily      ALBUTerol/ipratropium for Nebulization 3 milliLiter(s) Nebulizer every 6 hours PRN    acetaminophen   Tablet. 650 milliGRAM(s) Oral every 6 hours PRN      atorvastatin 80 milliGRAM(s) Oral at bedtime  dextrose 40% Gel 15 Gram(s) Oral once PRN  dextrose 50% Injectable 12.5 Gram(s) IV Push once  dextrose 50% Injectable 25 Gram(s) IV Push once  dextrose 50% Injectable 25 Gram(s) IV Push once  glucagon  Injectable 1 milliGRAM(s) IntraMuscular once PRN  insulin lispro (HumaLOG) corrective regimen sliding scale   SubCutaneous three times a day before meals  insulin lispro (HumaLOG) corrective regimen sliding scale   SubCutaneous at bedtime    dextrose 5%. 1000 milliLiter(s) IV Continuous <Continuous>  enoxaparin Injectable 60 milliGRAM(s) SubCutaneous two times a day      REVIEW OF SYSTEMS:  [x] As per HPI  CONSTITUTIONAL: No fever, weight loss, or fatigue  RESPIRATORY: No cough, wheezing, chills or hemoptysis; No Shortness of Breath  CARDIOVASCULAR: No chest pain, palpitations, dizziness, or leg swelling  GASTROINTESTINAL: No abdominal or epigastric pain. No nausea, vomiting, or hematemesis; No diarrhea or constipation. No melena or hematochezia.  MUSCULOSKELETAL: No joint pain or swelling; No muscle, back, or extremity pain  [x] All others negative	  [ ] Unable to obtain    PHYSICAL EXAM:  T(C): 36.9 (08-20-18 @ 13:09), Max: 37.6 (08-19-18 @ 20:02)  HR: 96 (08-20-18 @ 13:18) (76 - 128)  BP: 113/67 (08-20-18 @ 13:18) (103/78 - 118/71)  RR: 25 (08-20-18 @ 13:18) (15 - 34)  SpO2: 92% (08-20-18 @ 13:18) (88% - 100%)  Wt(kg): --  I&O's Summary    19 Aug 2018 07:01  -  20 Aug 2018 07:00  --------------------------------------------------------  IN: 680 mL / OUT: 2180 mL / NET: -1500 mL    20 Aug 2018 07:01  -  20 Aug 2018 13:31  --------------------------------------------------------  IN: 300 mL / OUT: 25 mL / NET: 275 mL          Appearance: Normal	  HEENT:   Normal oral mucosa  Cardiovascular: Normal S1 S2, No JVD, No murmurs, No edema  Respiratory: Lungs clear to auscultation	  Gastrointestinal:  Soft, Non-tender, + BS	  Extremities: Normal range of motion, No clubbing, cyanosis or edema  Vascular: Peripheral pulses palpable 2+ bilaterally    TELEMETRY: 	    ECG:   	  RADIOLOGY:   CXR:  CT:  US:    CARDIAC TESTING:  Echocardiogram:  Catheterization:  Stress Test:      LABS:	 	    CARDIAC MARKERS:                                  9.6    8.1   )-----------( 276      ( 20 Aug 2018 07:41 )             36.5     08-20    142  |  95<L>  |  14  ----------------------------<  137<H>  3.7   |  39<H>  |  0.76    Ca    9.4      20 Aug 2018 07:41  Mg     2.0     08-20      proBNP:   Lipid Profile:   HgA1c:   TSH:     ASSESSMENT/PLAN: 	    # s/p IR percardiocentesis - f/u fluid analysis  no events on tele  no s/s cardiac tamponade.    R/O Acute on chronic congestive heart failure, unspecified heart failure type.  Plan: Pt with symptoms consistent w/ CHF exacerbation including progressive SOB/RODRIGUEZ/ b/l LE swelling,  (lower d/t obesity), although CXR not showing any pleural effusions, just cardiomegaly/increased lung markings. CTAngio showing unchanged large pericardial effusion as possible worsening cause of HF. Trigger is multifactorial including medication/diet noncompliance. Less likely infection being that no signs/symptoms of infection & afebrile, no leukocytosis, or Ischemia being that no sx of chest pain, Trops neg2x; Last cath in 2016 no evidence of ischemia. Pt on Home [id number], Norvasc2.5q, [id number], Atorvastatin 80qd )  - echo 8/14 demonstrated known mod-large pericardial effusion, now with echo evidence of pulsus paradoxus  -consider repeat echo  -decreased lasix to IV lasix 80mg daily (was previously BID at same dose and route)  -C/w [id number], Norvasc2.5 q, Atorvastatin 80qd          Pericarditis, constrictive.  Plan: Pt with unchanged large pericardial effusion on CTAngio when compared to previous Echo in Sept 2017. Now s/p IR pericardiocentesis with drain in place. Drained 1L.  -pericardial fluid drained 105cc over 24hrs. Yellow cloudy appearance with ?white clot. unlikely infectious as patient afebrile, pericardial fluid cxs negative.

## 2018-08-20 NOTE — PROGRESS NOTE ADULT - ASSESSMENT
43yoF with PMH of DM2(insulin/metformin) , HTN(Norvasc, [place]), HLD, CHF (lasix), Severe morbid Obesity, MANUEL (On home 24/7 3-4L NC &  night CPAP 12/5) c/o progressively worsening SOB with Oxygen desaturation 70% at home presumed to be d/t medication/diet noncompliance admitted [facility] for r/o CHF exacerbation and management of hypoxia, transferred [facility] for continued management of hypoxemia and hypercapnia on BiPAP, now s/p IR pericardiocentesis and weaning to HFNC with BiPAP at night

## 2018-08-20 NOTE — PROVIDER CONTACT NOTE (OTHER) - SITUATION
Patient had increased drain from pericardial drain (SHELLY) for the past two hours. Color is clear yellow.

## 2018-08-20 NOTE — PROGRESS NOTE ADULT - PROBLEM SELECTOR PLAN 2
Pt with symptoms consistent w/ CHF exacerbation including progressive SOB/RODRIGUEZ/ b/l LE swelling,  (lower d/t obesity), although CXR not showing any pleural effusions, just cardiomegaly/increased lung markings. CTAngio showing unchanged large pericardial effusion as possible worsening cause of HF. Trigger is multifactorial including medication/diet noncompliance. Less likely infection being that no signs/symptoms of infection & afebrile, no leukocytosis, or Ischemia being that no sx of chest pain, Trops neg2x; Last cath in 2016 no evidence of ischemia. Pt on Home [id number], Norvasc2.5q, [id number], Atorvastatin 80qd )  - echo 8/14 demonstrated known mod-large pericardial effusion, now with echo evidence of pulsus paradoxus prior to IR procedure  -f/u repeat echo  -c/w IV lasix 80mg daily  -C/w [id number], Norvasc2.5 q, Atorvastatin 80qd   -most recent weight 166.1kg 8/20, down from 186.6kg on admission. c/w Daily weight & strict I/Os  -cardio following, recs appreciated

## 2018-08-20 NOTE — PROGRESS NOTE ADULT - SUBJECTIVE AND OBJECTIVE BOX
43 year old female s/p Pericardiocentesis for pericardial effusion. The patient feels better this AM. She has been afebrile overnight. Drain output has been 180cc in the past 24h and 105cc in the previous 24h period. Drain output is serous and the tube was flushed with 5cc of NS with resistance that cleared with flushing.

## 2018-08-20 NOTE — PROGRESS NOTE ADULT - ASSESSMENT
43 year old female s/p Pericardiocentesis for pericardial effusion. Tube was unclogged with saline flushing. .Will continue to follow.    Pauline Ponce MD  [phone]

## 2018-08-20 NOTE — PROGRESS NOTE ADULT - PROBLEM SELECTOR PLAN 1
Pt w/ SaO2 70% at home on 4L NC going up to 90% on NRB. Patient requiring home oxygen for years recently progressed from 2 to 3-4L NC requirement 24/7 w/ BIPAP 12/5 at night). ABG illustrating a Compensated Respiratory Acidosis pH 7.29, pCO2 100, HCO3- 47. Presumed to be d/t MANUEL vs cardiac cause less likely being that CXR/CTAngio not illustrating pleural effusions. ABG pH 7.40, pCO2 82, HCO3 50  -Patient's respiratory status continues to improve. Sating well on NC 5-6L O2 during day and BiPAP at night.   -VBG 8/17 on HFNC: pH 7.31, pCO2 92, HCO3 45. Worsening pCO2 possibly due to   -c/w NC- can escalate to HFNC, BiPAP if in respiratory distress, desating.  -Dr. Luo following, f/u recs  -c/w duoneb q6 PRN for SOB.  -consider V/Q scan prior to discharge

## 2018-08-21 DIAGNOSIS — I50.9 HEART FAILURE, UNSPECIFIED: ICD-10-CM

## 2018-08-21 LAB
ANION GAP SERPL CALC-SCNC: 7 MMOL/L — SIGNIFICANT CHANGE UP (ref 5–17)
BUN SERPL-MCNC: 13 MG/DL — SIGNIFICANT CHANGE UP (ref 7–23)
CALCIUM SERPL-MCNC: 9.6 MG/DL — SIGNIFICANT CHANGE UP (ref 8.4–10.5)
CHLORIDE SERPL-SCNC: 95 MMOL/L — LOW (ref 96–108)
CO2 SERPL-SCNC: 38 MMOL/L — HIGH (ref 22–31)
CREAT SERPL-MCNC: 0.74 MG/DL — SIGNIFICANT CHANGE UP (ref 0.5–1.3)
GLUCOSE BLDC GLUCOMTR-MCNC: 131 MG/DL — HIGH (ref 70–99)
GLUCOSE BLDC GLUCOMTR-MCNC: 142 MG/DL — HIGH (ref 70–99)
GLUCOSE BLDC GLUCOMTR-MCNC: 152 MG/DL — HIGH (ref 70–99)
GLUCOSE BLDC GLUCOMTR-MCNC: 159 MG/DL — HIGH (ref 70–99)
GLUCOSE SERPL-MCNC: 122 MG/DL — HIGH (ref 70–99)
HCT VFR BLD CALC: 36.8 % — SIGNIFICANT CHANGE UP (ref 34.5–45)
HGB BLD-MCNC: 9.8 G/DL — LOW (ref 11.5–15.5)
MAGNESIUM SERPL-MCNC: 2.1 MG/DL — SIGNIFICANT CHANGE UP (ref 1.6–2.6)
MCHC RBC-ENTMCNC: 23.8 PG — LOW (ref 27–34)
MCHC RBC-ENTMCNC: 26.6 G/DL — LOW (ref 32–36)
MCV RBC AUTO: 89.5 FL — SIGNIFICANT CHANGE UP (ref 80–100)
NON-GYNECOLOGICAL CYTOLOGY STUDY: SIGNIFICANT CHANGE UP
PHOSPHATE SERPL-MCNC: 4.1 MG/DL — SIGNIFICANT CHANGE UP (ref 2.5–4.5)
PLATELET # BLD AUTO: 281 K/UL — SIGNIFICANT CHANGE UP (ref 150–400)
POTASSIUM SERPL-MCNC: 3.7 MMOL/L — SIGNIFICANT CHANGE UP (ref 3.5–5.3)
POTASSIUM SERPL-SCNC: 3.7 MMOL/L — SIGNIFICANT CHANGE UP (ref 3.5–5.3)
RBC # BLD: 4.11 M/UL — SIGNIFICANT CHANGE UP (ref 3.8–5.2)
RBC # FLD: 17 % — HIGH (ref 10.3–16.9)
SODIUM SERPL-SCNC: 140 MMOL/L — SIGNIFICANT CHANGE UP (ref 135–145)
WBC # BLD: 6.5 K/UL — SIGNIFICANT CHANGE UP (ref 3.8–10.5)
WBC # FLD AUTO: 6.5 K/UL — SIGNIFICANT CHANGE UP (ref 3.8–10.5)

## 2018-08-21 PROCEDURE — 71045 X-RAY EXAM CHEST 1 VIEW: CPT | Mod: 26

## 2018-08-21 PROCEDURE — 99233 SBSQ HOSP IP/OBS HIGH 50: CPT | Mod: GC

## 2018-08-21 RX ORDER — POTASSIUM CHLORIDE 20 MEQ
20 PACKET (EA) ORAL ONCE
Qty: 0 | Refills: 0 | Status: COMPLETED | OUTPATIENT
Start: 2018-08-21 | End: 2018-08-21

## 2018-08-21 RX ADMIN — Medication 80 MILLIGRAM(S): at 06:41

## 2018-08-21 RX ADMIN — LOSARTAN POTASSIUM 25 MILLIGRAM(S): 100 TABLET, FILM COATED ORAL at 09:39

## 2018-08-21 RX ADMIN — ENOXAPARIN SODIUM 60 MILLIGRAM(S): 100 INJECTION SUBCUTANEOUS at 06:42

## 2018-08-21 RX ADMIN — ATORVASTATIN CALCIUM 80 MILLIGRAM(S): 80 TABLET, FILM COATED ORAL at 21:42

## 2018-08-21 RX ADMIN — ENOXAPARIN SODIUM 60 MILLIGRAM(S): 100 INJECTION SUBCUTANEOUS at 17:58

## 2018-08-21 RX ADMIN — Medication 20 MILLIEQUIVALENT(S): at 09:39

## 2018-08-21 RX ADMIN — Medication 1: at 11:30

## 2018-08-21 NOTE — PROGRESS NOTE ADULT - PROBLEM SELECTOR PLAN 1
Pt w/ SaO2 70% at home on 4L NC going up to 90% on NRB. Patient requiring home oxygen for years recently progressed from 2 to 3-4L NC requirement 24/7 w/ BIPAP 12/5 at night). ABG illustrating a Compensated Respiratory Acidosis pH 7.29, pCO2 100, HCO3- 47. Presumed to be d/t MANUEL vs cardiac cause less likely being that CXR/CTAngio not illustrating pleural effusions. ABG pH 7.40, pCO2 82, HCO3 50  -Patient's respiratory status continues to improve. Sating well on NC 5-6L O2 during day and BiPAP at night.   -VBG 8/17 on HFNC: pH 7.31, pCO2 92, HCO3 45. Worsening pCO2 possibly due to   -c/w NC- can escalate to HFNC, BiPAP if in respiratory distress, desating.  -Dr. Luo following, f/u recs  -c/w duoneb q6 PRN for SOB.  -consider V/Q scan prior to discharge Pt w/ SaO2 70% at home on 4L NC going up to 90% on NRB. Patient requiring home oxygen for years recently progressed from 2 to 3-4L NC requirement 24/7 w/ BIPAP 12/5 at night). ABG illustrating a Compensated Respiratory Acidosis pH 7.29, pCO2 100, HCO3- 47. Presumed to be d/t MANUEL vs cardiac cause less likely being that CXR/CTAngio not illustrating pleural effusions. ABG pH 7.40, pCO2 82, HCO3 50  -Patient's respiratory status continues to improve. Sating well on NC 5-6L O2 during day and BiPAP at night.   -c/w NC- can escalate to HFNC, BiPAP if in respiratory distress, desating.  -Dr. Luo following, f/u recs  -c/w duoneb q6 PRN for SOB.  -consider V/Q scan prior to discharge

## 2018-08-21 NOTE — PROGRESS NOTE ADULT - PROBLEM SELECTOR PROBLEM 2
R/O Acute on chronic congestive heart failure, unspecified heart failure type Acute on chronic congestive heart failure, unspecified heart failure type

## 2018-08-21 NOTE — PROGRESS NOTE ADULT - SUBJECTIVE AND OBJECTIVE BOX
OVERNIGHT EVENTS:    SUBJECTIVE / INTERVAL HPI: Patient seen and examined at bedside.     VITAL SIGNS:  Vital Signs Last 24 Hrs  T(C): 36.8 (21 Aug 2018 05:41), Max: 37.2 (20 Aug 2018 09:34)  T(F): 98.3 (21 Aug 2018 05:41), Max: 99 (20 Aug 2018 09:34)  HR: 76 (21 Aug 2018 05:30) (72 - 98)  BP: 113/57 (21 Aug 2018 05:08) (103/78 - 118/71)  BP(mean): 79 (21 Aug 2018 05:08) (73 - 101)  RR: 21 (21 Aug 2018 05:08) (15 - 35)  SpO2: 94% (21 Aug 2018 05:30) (89% - 98%)    PHYSICAL EXAM:    General: Severe morbidly obese. Breathing comfortably on HFNC. NAD  HEENT: NC/AT, anicteric sclera, MMM  Neck: supple  Chest/Respiratory: Exam limited to body habitus. In no respiratory distress, not using accessory respiratory muscles for inhalation. Decreased breath sounds throughout. No crackles, no wheezing, no rhonchi appreciated. + pericardial drain: draining cloudy yellowish fluid with white ?clot  Cardiac: +S1/S2; RRR, no murmur appreciated  Gastrointestinal: morbidly obese abdomen, soft, nontender. +BSx4  Extremities: b/l 2+ pitting edema in b/l lower extremities. No erythema, no cyanosis b/l  Vascular: 2+ radial pulses b/l, 1+ dorsalis pedis pulses b/l  Neurologic: AAOx3. Answers questions appropriately.     MEDICATIONS:  MEDICATIONS  (STANDING):  atorvastatin 80 milliGRAM(s) Oral at bedtime  dextrose 5%. 1000 milliLiter(s) (50 mL/Hr) IV Continuous <Continuous>  dextrose 50% Injectable 12.5 Gram(s) IV Push once  dextrose 50% Injectable 25 Gram(s) IV Push once  dextrose 50% Injectable 25 Gram(s) IV Push once  enoxaparin Injectable 60 milliGRAM(s) SubCutaneous two times a day  furosemide   Injectable 80 milliGRAM(s) IV Push daily  insulin lispro (HumaLOG) corrective regimen sliding scale   SubCutaneous three times a day before meals  insulin lispro (HumaLOG) corrective regimen sliding scale   SubCutaneous at bedtime  losartan 25 milliGRAM(s) Oral daily    MEDICATIONS  (PRN):  acetaminophen   Tablet. 650 milliGRAM(s) Oral every 6 hours PRN Mild Pain (1 - 3)  ALBUTerol/ipratropium for Nebulization 3 milliLiter(s) Nebulizer every 6 hours PRN Shortness of Breath  dextrose 40% Gel 15 Gram(s) Oral once PRN Blood Glucose LESS THAN 70 milliGRAM(s)/deciliter  glucagon  Injectable 1 milliGRAM(s) IntraMuscular once PRN Glucose LESS THAN 70 milligrams/deciliter      ALLERGIES:  Allergies    No Known Drug Allergies  shellfish (Anaphylaxis)    Intolerances        LABS:                        9.6    8.1   )-----------( 276      ( 20 Aug 2018 07:41 )             36.5     08-20    142  |  95<L>  |  14  ----------------------------<  137<H>  3.7   |  39<H>  |  0.76    Ca    9.4      20 Aug 2018 07:41  Mg     2.0     08-20          CAPILLARY BLOOD GLUCOSE      POCT Blood Glucose.: 204 mg/dL (20 Aug 2018 21:26)      RADIOLOGY & ADDITIONAL TESTS: Reviewed. OVERNIGHT EVENTS: ADDY    SUBJECTIVE / INTERVAL HPI: Patient seen and examined at bedside. Patient with no complaints. Denies chest pain, SOB, fevers, chills, night sweats, n/v/d/c.    VITAL SIGNS:  Vital Signs Last 24 Hrs  T(C): 36.8 (21 Aug 2018 05:41), Max: 37.2 (20 Aug 2018 09:34)  T(F): 98.3 (21 Aug 2018 05:41), Max: 99 (20 Aug 2018 09:34)  HR: 76 (21 Aug 2018 05:30) (72 - 98)  BP: 113/57 (21 Aug 2018 05:08) (103/78 - 118/71)  BP(mean): 79 (21 Aug 2018 05:08) (73 - 101)  RR: 21 (21 Aug 2018 05:08) (15 - 35)  SpO2: 94% (21 Aug 2018 05:30) (89% - 98%)    PHYSICAL EXAM:    General: Severe morbidly obese. Breathing comfortably on HFNC. NAD  HEENT: NC/AT, anicteric sclera, MMM  Neck: supple  Chest/Respiratory: Exam limited to body habitus. In no respiratory distress, not using accessory respiratory muscles for inhalation. Decreased breath sounds throughout. No crackles, no wheezing, no rhonchi appreciated. + pericardial drain: draining transparent yellow fluid   Cardiac: +S1/S2; RRR, no murmur appreciated  Gastrointestinal: morbidly obese abdomen, soft, nontender. +BSx4  Extremities: b/l 1+ pitting edema in b/l lower extremities. No erythema, no cyanosis b/l  Vascular: 2+ radial pulses b/l, 1+ dorsalis pedis pulses b/l  Neurologic: AAOx3. Answers questions appropriately.     MEDICATIONS:  MEDICATIONS  (STANDING):  atorvastatin 80 milliGRAM(s) Oral at bedtime  dextrose 5%. 1000 milliLiter(s) (50 mL/Hr) IV Continuous <Continuous>  dextrose 50% Injectable 12.5 Gram(s) IV Push once  dextrose 50% Injectable 25 Gram(s) IV Push once  dextrose 50% Injectable 25 Gram(s) IV Push once  enoxaparin Injectable 60 milliGRAM(s) SubCutaneous two times a day  furosemide   Injectable 80 milliGRAM(s) IV Push daily  insulin lispro (HumaLOG) corrective regimen sliding scale   SubCutaneous three times a day before meals  insulin lispro (HumaLOG) corrective regimen sliding scale   SubCutaneous at bedtime  losartan 25 milliGRAM(s) Oral daily    MEDICATIONS  (PRN):  acetaminophen   Tablet. 650 milliGRAM(s) Oral every 6 hours PRN Mild Pain (1 - 3)  ALBUTerol/ipratropium for Nebulization 3 milliLiter(s) Nebulizer every 6 hours PRN Shortness of Breath  dextrose 40% Gel 15 Gram(s) Oral once PRN Blood Glucose LESS THAN 70 milliGRAM(s)/deciliter  glucagon  Injectable 1 milliGRAM(s) IntraMuscular once PRN Glucose LESS THAN 70 milligrams/deciliter      ALLERGIES:  Allergies    No Known Drug Allergies  shellfish (Anaphylaxis)    Intolerances        LABS:                        9.6    8.1   )-----------( 276      ( 20 Aug 2018 07:41 )             36.5     08-20    142  |  95<L>  |  14  ----------------------------<  137<H>  3.7   |  39<H>  |  0.76    Ca    9.4      20 Aug 2018 07:41  Mg     2.0     08-20          CAPILLARY BLOOD GLUCOSE      POCT Blood Glucose.: 204 mg/dL (20 Aug 2018 21:26)      RADIOLOGY & ADDITIONAL TESTS: Reviewed. OVERNIGHT EVENTS: ADDY    SUBJECTIVE / INTERVAL HPI: Patient seen and examined at bedside. Patient with no complaints. Denies chest pain, SOB, fevers, chills, night sweats, n/v/d/c.    VITAL SIGNS:  Vital Signs Last 24 Hrs  T(C): 36.8 (21 Aug 2018 05:41), Max: 37.2 (20 Aug 2018 09:34)  T(F): 98.3 (21 Aug 2018 05:41), Max: 99 (20 Aug 2018 09:34)  HR: 76 (21 Aug 2018 05:30) (72 - 98)  BP: 113/57 (21 Aug 2018 05:08) (103/78 - 118/71)  BP(mean): 79 (21 Aug 2018 05:08) (73 - 101)  RR: 21 (21 Aug 2018 05:08) (15 - 35)  SpO2: 94% (21 Aug 2018 05:30) (89% - 98%)    PHYSICAL EXAM:    General: Severe morbidly obese. Breathing comfortably on HFNC. NAD  HEENT: NC/AT, anicteric sclera, MMM  Neck: supple  Chest/Respiratory: Exam limited to body habitus. In no respiratory distress, not using accessory respiratory muscles for inhalation. Decreased breath sounds throughout. No crackles, no wheezing, no rhonchi appreciated. + pericardial drain: draining transparent yellow fluid   Cardiac: +S1/S2; RRR, no murmur appreciated  Gastrointestinal: morbidly obese abdomen, soft, nontender. +BSx4  Extremities: b/l 1+ pitting edema in b/l lower extremities. No erythema, no cyanosis b/l  Vascular: 2+ radial pulses b/l, 1+ dorsalis pedis pulses b/l  Neurologic: AAOx3. Answers questions appropriately.     MEDICATIONS:  MEDICATIONS  (STANDING):  atorvastatin 80 milliGRAM(s) Oral at bedtime  dextrose 5%. 1000 milliLiter(s) (50 mL/Hr) IV Continuous <Continuous>  dextrose 50% Injectable 12.5 Gram(s) IV Push once  dextrose 50% Injectable 25 Gram(s) IV Push once  dextrose 50% Injectable 25 Gram(s) IV Push once  enoxaparin Injectable 60 milliGRAM(s) SubCutaneous two times a day  furosemide   Injectable 80 milliGRAM(s) IV Push daily  insulin lispro (HumaLOG) corrective regimen sliding scale   SubCutaneous three times a day before meals  insulin lispro (HumaLOG) corrective regimen sliding scale   SubCutaneous at bedtime  losartan 25 milliGRAM(s) Oral daily    MEDICATIONS  (PRN):  acetaminophen   Tablet. 650 milliGRAM(s) Oral every 6 hours PRN Mild Pain (1 - 3)  ALBUTerol/ipratropium for Nebulization 3 milliLiter(s) Nebulizer every 6 hours PRN Shortness of Breath  dextrose 40% Gel 15 Gram(s) Oral once PRN Blood Glucose LESS THAN 70 milliGRAM(s)/deciliter  glucagon  Injectable 1 milliGRAM(s) IntraMuscular once PRN Glucose LESS THAN 70 milligrams/deciliter      ALLERGIES:  Allergies    No Known Drug Allergies  shellfish (Anaphylaxis)    Intolerances        LABS:                        9.6    8.1   )-----------( 276      ( 20 Aug 2018 07:41 )             36.5     08-20    142  |  95<L>  |  14  ----------------------------<  137<H>  3.7   |  39<H>  |  0.76    Ca    9.4      20 Aug 2018 07:41  Mg     2.0     08-20          CAPILLARY BLOOD GLUCOSE      POCT Blood Glucose.: 204 mg/dL (20 Aug 2018 21:26)      RADIOLOGY & ADDITIONAL TESTS: Reviewed.  < from: Echocardiogram (08.20.18 @ 15:44) >  Interpretation Summary  Left ventricular hypertrophy presentThe left ventricular ejection   fraction is   estimated to be 50-55%Septal bounce noted.The right ventricle is not well   visualized.The right ventricle is dilated.The right atrium is dilated.The   aorticvalve is not well visualized.No hemodynamically significant   valvular   aortic stenosis.Mitral annular calcification noted.Mitral valve   thickening   noted.There is trace mitral regurgitation.There is mild tricuspid   regurgitation.The pulmonary artery systolic pressure is estimated to be   42   mmHg.There is mild pulmonic regurgitation.The inferior vena cava is   normal in   size (<2.1 cm) with abnormal inspiratory collapse (<50%) consistent with   mildly elevated right atrial pressure (  8mmHg, range 5-10mmHg).  A   trivial pericardial effusion noted.    < from: Xray Chest 1 View- PORTABLE-Routine (08.21.18 @ 06:00) >    Lung infiltrates/pulmonary edema increasing compared to prior exam   8/20/2018. Limitedexam due to hypoinflation and technical factors/body   habitus. Left lower lung chest tube again noted. HOSPITAL COURSE: Pt is a 43yoF with PMH of DM2(insulin/metformin) , HTN(Norvasc, Losartsan), HLD, CHF (Lasix), Severe morbid Obesity, MANUEL (On home 24/7 3-4L NC &  night CPAP 12/5) presenting on 8/13/18 with progressively increasing SOB of 3-4 months and b/l LE swelling causing her to be bedridden, came to hospital because of hypoxia 70% SpO2 at home. At ED: Vitals 98.6 , HR 98, /83 , RR17 97% on O2 They administered: IV Lasix40 1x , Labs s/f: Hb9.1 (stable normocytic as in past), [id number]; ; Images:EKG: NSR/ low voltage HR 92 ; CXR- Inc lung markings/cardiomegaly; CTA: Unchanged large pericardial effusion & Pulm HTN (unchanged from 2015), Diffuse mosaic pattern of lung;L>R subsegmental atelactasis in lower lobes; no PE; Admitted to [facility], pt's ABG was found to show Compensated Respiratory Acidosis w/ pH7.29, [id number], HCO3-47 w/ pO2 82 attributed to history of MANUEL and Hypoventilation syndrome d/t body habitus. Pt was found to be lethargic with continued SOB, and ABG illustrated hypoxemia pO2 58 and worsenign respiratory acidosis while on BIPAP overnight. BIPAP settings were increased and ICU was consulted for Hypoxemia and continued respiratory decline. CT angio chest negative for PE. Transferred to [facility] for continued respiratory monitoring.  On [facility], patient continued on aggressive diuresis for CHF exacerbation, remained on 24hr BiPAP until improved hypercapnia demonstrated on ABG and VBG and patient with improved mental status. Of note, TTE demonstrated known mod-large pericardial effusion with evidence of pulsus paradoxus. Now s/p IR drainage 1L with drain in place, currently draining about 100-200cc per day of clear yellow fluid, pericardial fluid with negative cultures. Patient weaned to NC 5-6L O2 during day and BiPAP at night. Dispo goal to bariatric rehab after IR removal chest drain from IR procedure.      OVERNIGHT EVENTS: ADDY    SUBJECTIVE / INTERVAL HPI: Patient seen and examined at bedside. Patient with no complaints. Denies chest pain, SOB, fevers, chills, night sweats, n/v/d/c.    VITAL SIGNS:  Vital Signs Last 24 Hrs  T(C): 36.8 (21 Aug 2018 05:41), Max: 37.2 (20 Aug 2018 09:34)  T(F): 98.3 (21 Aug 2018 05:41), Max: 99 (20 Aug 2018 09:34)  HR: 76 (21 Aug 2018 05:30) (72 - 98)  BP: 113/57 (21 Aug 2018 05:08) (103/78 - 118/71)  BP(mean): 79 (21 Aug 2018 05:08) (73 - 101)  RR: 21 (21 Aug 2018 05:08) (15 - 35)  SpO2: 94% (21 Aug 2018 05:30) (89% - 98%)    PHYSICAL EXAM:    General: Severe morbidly obese. Breathing comfortably on HFNC. NAD  HEENT: NC/AT, anicteric sclera, MMM  Neck: supple  Chest/Respiratory: Exam limited to body habitus. In no respiratory distress, not using accessory respiratory muscles for inhalation. Decreased breath sounds throughout. No crackles, no wheezing, no rhonchi appreciated. + pericardial drain: draining transparent yellow fluid   Cardiac: +S1/S2; RRR, no murmur appreciated  Gastrointestinal: morbidly obese abdomen, soft, nontender. +BSx4  Extremities: b/l 1+ pitting edema in b/l lower extremities. No erythema, no cyanosis b/l  Vascular: 2+ radial pulses b/l, 1+ dorsalis pedis pulses b/l  Neurologic: AAOx3. Answers questions appropriately.     MEDICATIONS:  MEDICATIONS  (STANDING):  atorvastatin 80 milliGRAM(s) Oral at bedtime  dextrose 5%. 1000 milliLiter(s) (50 mL/Hr) IV Continuous <Continuous>  dextrose 50% Injectable 12.5 Gram(s) IV Push once  dextrose 50% Injectable 25 Gram(s) IV Push once  dextrose 50% Injectable 25 Gram(s) IV Push once  enoxaparin Injectable 60 milliGRAM(s) SubCutaneous two times a day  furosemide   Injectable 80 milliGRAM(s) IV Push daily  insulin lispro (HumaLOG) corrective regimen sliding scale   SubCutaneous three times a day before meals  insulin lispro (HumaLOG) corrective regimen sliding scale   SubCutaneous at bedtime  losartan 25 milliGRAM(s) Oral daily    MEDICATIONS  (PRN):  acetaminophen   Tablet. 650 milliGRAM(s) Oral every 6 hours PRN Mild Pain (1 - 3)  ALBUTerol/ipratropium for Nebulization 3 milliLiter(s) Nebulizer every 6 hours PRN Shortness of Breath  dextrose 40% Gel 15 Gram(s) Oral once PRN Blood Glucose LESS THAN 70 milliGRAM(s)/deciliter  glucagon  Injectable 1 milliGRAM(s) IntraMuscular once PRN Glucose LESS THAN 70 milligrams/deciliter      ALLERGIES:  Allergies    No Known Drug Allergies  shellfish (Anaphylaxis)    Intolerances        LABS:                        9.6    8.1   )-----------( 276      ( 20 Aug 2018 07:41 )             36.5     08-20    142  |  95<L>  |  14  ----------------------------<  137<H>  3.7   |  39<H>  |  0.76    Ca    9.4      20 Aug 2018 07:41  Mg     2.0     08-20          CAPILLARY BLOOD GLUCOSE      POCT Blood Glucose.: 204 mg/dL (20 Aug 2018 21:26)      RADIOLOGY & ADDITIONAL TESTS: Reviewed.  < from: Echocardiogram (08.20.18 @ 15:44) >  Interpretation Summary  Left ventricular hypertrophy presentThe left ventricular ejection   fraction is   estimated to be 50-55%Septal bounce noted.The right ventricle is not well   visualized.The right ventricle is dilated.The right atrium is dilated.The   aorticvalve is not well visualized.No hemodynamically significant   valvular   aortic stenosis.Mitral annular calcification noted.Mitral valve   thickening   noted.There is trace mitral regurgitation.There is mild tricuspid   regurgitation.The pulmonary artery systolic pressure is estimated to be   42   mmHg.There is mild pulmonic regurgitation.The inferior vena cava is   normal in   size (<2.1 cm) with abnormal inspiratory collapse (<50%) consistent with   mildly elevated right atrial pressure (  8mmHg, range 5-10mmHg).  A   trivial pericardial effusion noted.    < from: Xray Chest 1 View- PORTABLE-Routine (08.21.18 @ 06:00) >    Lung infiltrates/pulmonary edema increasing compared to prior exam   8/20/2018. Limitedexam due to hypoinflation and technical factors/body   habitus. Left lower lung chest tube again noted.

## 2018-08-21 NOTE — PROGRESS NOTE ADULT - SUBJECTIVE AND OBJECTIVE BOX
43 year old female s/p Pericardiocentesis for pericardial effusion.  Drain output has been 900cc in the past 24h and 180cc in the previous 24h period. Drain output is serous and the tube was flushed with 5cc of NS with resistance that decreased with flushing.

## 2018-08-21 NOTE — PROGRESS NOTE ADULT - PROBLEM SELECTOR PLAN 2
Pt with symptoms consistent w/ CHF exacerbation including progressive SOB/RODRIGUEZ/ b/l LE swelling,  (lower d/t obesity), although CXR not showing any pleural effusions, just cardiomegaly/increased lung markings. CTAngio showing unchanged large pericardial effusion as possible worsening cause of HF. Trigger is multifactorial including medication/diet noncompliance. Less likely infection being that no signs/symptoms of infection & afebrile, no leukocytosis, or Ischemia being that no sx of chest pain, Trops neg2x; Last cath in 2016 no evidence of ischemia. Pt on Home [id number], Norvasc2.5q, [id number], Atorvastatin 80qd )  - echo 8/14 demonstrated known mod-large pericardial effusion, now with echo evidence of pulsus paradoxus prior to IR procedure  -f/u repeat echo  -c/w IV lasix 80mg daily  -C/w [id number], Norvasc2.5 q, Atorvastatin 80qd   -most recent weight 166.1kg 8/20, down from 186.6kg on admission. c/w Daily weight & strict I/Os  -cardio following, recs appreciated Pt with symptoms consistent w/ CHF exacerbation including progressive SOB/RODRIGUEZ/ b/l LE swelling,  (lower d/t obesity), although CXR not showing any pleural effusions, just cardiomegaly/increased lung markings. CTAngio showing unchanged large pericardial effusion as possible worsening cause of HF. Trigger is multifactorial including medication/diet noncompliance. Less likely infection being that no signs/symptoms of infection & afebrile, no leukocytosis, or Ischemia being that no sx of chest pain, Trops neg2x; Last cath in 2016 no evidence of ischemia. Pt on Home [id number], Norvasc2.5q, [id number], Atorvastatin 80qd. Echo 8/14 demonstrated known mod-large pericardial effusion, now with echo evidence of pulsus paradoxus prior to IR procedure. S/p pericardiocentesis with drain still in place.   -repeat echo demonstrated LVEF 50-55% with septal bounce, dilated RA and RV, Pulm artery systolic pressure 42mmHg.  -c/w IV lasix 80mg daily. Consider increasing as pt. with CXR that showed worsening pulmonary infiltrates/pulmonary edema   -C/w [id number], Norvasc2.5 q, Atorvastatin 80qd   -most recent weight 166 8/21, down from 186.6kg on admission. c/w Daily weight & strict I/Os  -cardio following, recs appreciated

## 2018-08-21 NOTE — PROGRESS NOTE ADULT - PROBLEM SELECTOR PLAN 3
Pt with unchanged large pericardial effusion on CTAngio when compared to previous Echo in Sept 2017. Now s/p IR pericardiocentesis with drain in place. Drained 1L.  -pericardial fluid drained 180cc over 24hrs. IR flushed clogged tubing for drain and now draining transparent yellow fluid Pt with unchanged large pericardial effusion on CTAngio when compared to previous Echo in Sept 2017. Now s/p IR pericardiocentesis with drain in place. Drained 1L.  -pericardial fluid drained 130cc over 24hrs. Will d/c drain when draining decreases

## 2018-08-21 NOTE — PROGRESS NOTE ADULT - ASSESSMENT
43yoF with PMH of DM2(insulin/metformin) , HTN(Norvasc, Losartsan), HLD, CHF (lasix), Severe morbid Obesity, MANEUL (On home 24/7 3-4L NC &  night CPAP 12/5) c/o progressively worsening SOB with Oxygen desaturation 70% at home presumed to be d/t medication/diet noncompliance admitted to [facility] for r/o CHF exacerbation and management of hypoxia, transferred [facility] for continued management of hypoxemia and hypercapnia on BiPAP, now s/p IR pericardiocentesis and weaning to NC with BiPAP at night

## 2018-08-21 NOTE — PROGRESS NOTE ADULT - ASSESSMENT
43 year old female s/p Pericardiocentesis for pericardial effusion. Tube was unclogged with saline flushing. Will continue to follow.

## 2018-08-22 LAB
ANION GAP SERPL CALC-SCNC: 10 MMOL/L — SIGNIFICANT CHANGE UP (ref 5–17)
BUN SERPL-MCNC: 16 MG/DL — SIGNIFICANT CHANGE UP (ref 7–23)
CALCIUM SERPL-MCNC: 10 MG/DL — SIGNIFICANT CHANGE UP (ref 8.4–10.5)
CHLORIDE SERPL-SCNC: 96 MMOL/L — SIGNIFICANT CHANGE UP (ref 96–108)
CO2 SERPL-SCNC: 36 MMOL/L — HIGH (ref 22–31)
CREAT SERPL-MCNC: 0.79 MG/DL — SIGNIFICANT CHANGE UP (ref 0.5–1.3)
GLUCOSE BLDC GLUCOMTR-MCNC: 146 MG/DL — HIGH (ref 70–99)
GLUCOSE BLDC GLUCOMTR-MCNC: 164 MG/DL — HIGH (ref 70–99)
GLUCOSE BLDC GLUCOMTR-MCNC: 170 MG/DL — HIGH (ref 70–99)
GLUCOSE BLDC GLUCOMTR-MCNC: 176 MG/DL — HIGH (ref 70–99)
GLUCOSE SERPL-MCNC: 136 MG/DL — HIGH (ref 70–99)
HCT VFR BLD CALC: 40.4 % — SIGNIFICANT CHANGE UP (ref 34.5–45)
HGB BLD-MCNC: 11 G/DL — LOW (ref 11.5–15.5)
MAGNESIUM SERPL-MCNC: 2 MG/DL — SIGNIFICANT CHANGE UP (ref 1.6–2.6)
MCHC RBC-ENTMCNC: 24.1 PG — LOW (ref 27–34)
MCHC RBC-ENTMCNC: 27.2 G/DL — LOW (ref 32–36)
MCV RBC AUTO: 88.6 FL — SIGNIFICANT CHANGE UP (ref 80–100)
PHOSPHATE SERPL-MCNC: 4.3 MG/DL — SIGNIFICANT CHANGE UP (ref 2.5–4.5)
PLATELET # BLD AUTO: 324 K/UL — SIGNIFICANT CHANGE UP (ref 150–400)
POTASSIUM SERPL-MCNC: 4 MMOL/L — SIGNIFICANT CHANGE UP (ref 3.5–5.3)
POTASSIUM SERPL-SCNC: 4 MMOL/L — SIGNIFICANT CHANGE UP (ref 3.5–5.3)
RBC # BLD: 4.56 M/UL — SIGNIFICANT CHANGE UP (ref 3.8–5.2)
RBC # FLD: 17.1 % — HIGH (ref 10.3–16.9)
SODIUM SERPL-SCNC: 142 MMOL/L — SIGNIFICANT CHANGE UP (ref 135–145)
WBC # BLD: 7.4 K/UL — SIGNIFICANT CHANGE UP (ref 3.8–10.5)
WBC # FLD AUTO: 7.4 K/UL — SIGNIFICANT CHANGE UP (ref 3.8–10.5)

## 2018-08-22 PROCEDURE — 99233 SBSQ HOSP IP/OBS HIGH 50: CPT | Mod: GC

## 2018-08-22 PROCEDURE — 99232 SBSQ HOSP IP/OBS MODERATE 35: CPT

## 2018-08-22 RX ADMIN — Medication 80 MILLIGRAM(S): at 06:59

## 2018-08-22 RX ADMIN — ENOXAPARIN SODIUM 60 MILLIGRAM(S): 100 INJECTION SUBCUTANEOUS at 07:00

## 2018-08-22 RX ADMIN — LOSARTAN POTASSIUM 25 MILLIGRAM(S): 100 TABLET, FILM COATED ORAL at 07:00

## 2018-08-22 RX ADMIN — Medication 1: at 11:52

## 2018-08-22 RX ADMIN — Medication 650 MILLIGRAM(S): at 07:45

## 2018-08-22 RX ADMIN — Medication 650 MILLIGRAM(S): at 22:30

## 2018-08-22 RX ADMIN — ENOXAPARIN SODIUM 60 MILLIGRAM(S): 100 INJECTION SUBCUTANEOUS at 17:11

## 2018-08-22 RX ADMIN — Medication 1: at 17:10

## 2018-08-22 RX ADMIN — Medication 650 MILLIGRAM(S): at 21:40

## 2018-08-22 RX ADMIN — Medication 650 MILLIGRAM(S): at 07:00

## 2018-08-22 RX ADMIN — ATORVASTATIN CALCIUM 80 MILLIGRAM(S): 80 TABLET, FILM COATED ORAL at 21:40

## 2018-08-22 NOTE — PROGRESS NOTE ADULT - PROBLEM SELECTOR PLAN 2
Pt with symptoms consistent w/ CHF exacerbation including progressive SOB/RODRIGUEZ/ b/l LE swelling,  (lower d/t obesity), although CXR not showing any pleural effusions, just cardiomegaly/increased lung markings. CTAngio showing unchanged large pericardial effusion as possible worsening cause of HF. Trigger is multifactorial including medication/diet noncompliance. Less likely infection being that no signs/symptoms of infection & afebrile, no leukocytosis, or Ischemia being that no sx of chest pain, Trops neg2x; Last cath in 2016 no evidence of ischemia. Pt on Home [id number], Norvasc2.5q, [id number], Atorvastatin 80qd. Echo 8/14 demonstrated known mod-large pericardial effusion, now with echo evidence of pulsus paradoxus prior to IR procedure. S/p pericardiocentesis with drain still in place.   -repeat echo demonstrated LVEF 50-55% with septal bounce, dilated RA and RV, Pulm artery systolic pressure 42mmHg.  -c/w IV lasix 80mg daily. Consider increasing as pt. with CXR that showed worsening pulmonary infiltrates/pulmonary edema   -C/w [id number], Norvasc2.5 q, Atorvastatin 80qd   -most recent weight 166 8/21, down from 186.6kg on admission. c/w Daily weight & strict I/Os  -cardio following, recs appreciated Pt with symptoms consistent w/ CHF exacerbation including progressive SOB/RODRIGUEZ/ b/l LE swelling,  (lower d/t obesity), although CXR not showing any pleural effusions, just cardiomegaly/increased lung markings. CTAngio showing unchanged large pericardial effusion as possible worsening cause of HF. Trigger is multifactorial including medication/diet noncompliance. Less likely infection being that no signs/symptoms of infection & afebrile, no leukocytosis, or Ischemia being that no sx of chest pain, Trops neg2x; Last cath in 2016 no evidence of ischemia. Pt on Home [id number], Norvasc2.5q, [id number], Atorvastatin 80qd. Echo 8/14 demonstrated known mod-large pericardial effusion, now with echo evidence of pulsus paradoxus prior to IR procedure. S/p pericardiocentesis with drain still in place.   -repeat echo demonstrated LVEF 50-55% with septal bounce, dilated RA and RV, Pulm artery systolic pressure 42mmHg.  -c/w IV lasix 80mg daily. Consider decreasing as tolerated  -C/w [id number], Norvasc2.5 q, Atorvastatin 80qd   -most recent weight 166 8/21, down from 186.6kg on admission. c/w Daily weight & strict I/Os  -cardio following, recs appreciated

## 2018-08-22 NOTE — PROGRESS NOTE ADULT - SUBJECTIVE AND OBJECTIVE BOX
Chief Complaint/Reason for Consult: chf  INTERVAL HPI: clinically improving, no events on tele  	  MEDICATIONS:  furosemide   Injectable 80 milliGRAM(s) IV Push daily  losartan 25 milliGRAM(s) Oral daily      ALBUTerol/ipratropium for Nebulization 3 milliLiter(s) Nebulizer every 6 hours PRN    acetaminophen   Tablet. 650 milliGRAM(s) Oral every 6 hours PRN      atorvastatin 80 milliGRAM(s) Oral at bedtime  dextrose 40% Gel 15 Gram(s) Oral once PRN  dextrose 50% Injectable 12.5 Gram(s) IV Push once  dextrose 50% Injectable 25 Gram(s) IV Push once  dextrose 50% Injectable 25 Gram(s) IV Push once  glucagon  Injectable 1 milliGRAM(s) IntraMuscular once PRN  insulin lispro (HumaLOG) corrective regimen sliding scale   SubCutaneous three times a day before meals  insulin lispro (HumaLOG) corrective regimen sliding scale   SubCutaneous at bedtime    dextrose 5%. 1000 milliLiter(s) IV Continuous <Continuous>  enoxaparin Injectable 60 milliGRAM(s) SubCutaneous two times a day      REVIEW OF SYSTEMS:  [x] As per HPI  CONSTITUTIONAL: No fever, weight loss, or fatigue  RESPIRATORY: No cough, wheezing, chills or hemoptysis; No Shortness of Breath  CARDIOVASCULAR: No chest pain, palpitations, dizziness, or leg swelling  GASTROINTESTINAL: No abdominal or epigastric pain. No nausea, vomiting, or hematemesis; No diarrhea or constipation. No melena or hematochezia.  MUSCULOSKELETAL: No joint pain or swelling; No muscle, back, or extremity pain  [x] All others negative	  [ ] Unable to obtain    PHYSICAL EXAM:  T(C): 36.8 (08-22-18 @ 13:20), Max: 37.2 (08-21-18 @ 22:05)  HR: 91 (08-22-18 @ 15:56) (78 - 98)  BP: 104/57 (08-22-18 @ 12:54) (102/60 - 124/68)  RR: 20 (08-22-18 @ 12:54) (19 - 28)  SpO2: 90% (08-22-18 @ 15:56) (88% - 99%)  Wt(kg): --  I&O's Summary    21 Aug 2018 07:01  -  22 Aug 2018 07:00  --------------------------------------------------------  IN: 760 mL / OUT: 2485 mL / NET: -1725 mL    22 Aug 2018 07:01  -  22 Aug 2018 16:06  --------------------------------------------------------  IN: 480 mL / OUT: 505 mL / NET: -25 mL          Appearance: Normal	  HEENT:   Normal oral mucosa  Cardiovascular: Normal S1 S2, No JVD, No murmurs, No edema  Respiratory: Lungs clear to auscultation	  Gastrointestinal:  Soft, Non-tender, + BS	  Extremities: Normal range of motion, No clubbing, cyanosis or edema  Vascular: Peripheral pulses palpable 2+ bilaterally    TELEMETRY: 	    ECG:   	  RADIOLOGY:   CXR:  CT:  US:    CARDIAC TESTING:  Echocardiogram:  Catheterization:  Stress Test:      LABS:	 	    CARDIAC MARKERS:                                  11.0   7.4   )-----------( 324      ( 22 Aug 2018 07:13 )             40.4     08-22    142  |  96  |  16  ----------------------------<  136<H>  4.0   |  36<H>  |  0.79    Ca    10.0      22 Aug 2018 07:13  Phos  4.3     08-22  Mg     2.0     08-22      proBNP:   Lipid Profile:   HgA1c:   TSH:     ASSESSMENT/PLAN: 	    # CHF - continue IV lasix 80 qd can transition to 40 PO BID or TID pending clinical progress prior to discharge    C/w [id number], Norvasc2.5 q, Atorvastatin 80qd        Pericarditis, constrictive.  Plan: Pt with unchanged large pericardial effusion on CTAngio when compared to previous Echo in Sept 2017. Now s/p IR pericardiocentesis with drain in place. Drained 1L.  -pericardial fluid drained 105cc over 24hrs. Yellow cloudy appearance with ?white clot. unlikely infectious as patient afebrile, pericardial fluid cxs negative.

## 2018-08-22 NOTE — PROGRESS NOTE ADULT - PROBLEM SELECTOR PLAN 1
Pt w/ SaO2 70% at home on 4L NC going up to 90% on NRB. Patient requiring home oxygen for years recently progressed from 2 to 3-4L NC requirement 24/7 w/ BIPAP 12/5 at night). ABG illustrating a Compensated Respiratory Acidosis pH 7.29, pCO2 100, HCO3- 47. Presumed to be d/t MANUEL vs cardiac cause less likely being that CXR/CTAngio not illustrating pleural effusions. ABG pH 7.40, pCO2 82, HCO3 50  -Patient's respiratory status continues to improve. Sating well on NC 5-6L O2 during day and BiPAP at night.   -c/w NC- can escalate to HFNC, BiPAP if in respiratory distress, desating.  -Dr. Luo following, f/u recs  -c/w duoneb q6 PRN for SOB.  -consider V/Q scan prior to discharge Pt w/ SaO2 70% at home on 4L NC going up to 90% on NRB. Patient requiring home oxygen for years recently progressed from 2 to 3-4L NC requirement 24/7 w/ BIPAP 12/5 at night). ABG illustrating a Compensated Respiratory Acidosis pH 7.29, pCO2 100, HCO3- 47. Presumed to be d/t MANUEL vs cardiac cause less likely being that CXR/CTAngio not illustrating pleural effusions. ABG pH 7.40, pCO2 82, HCO3 50  -Patient's respiratory status continues to improve. Sating well on NC 5-6L O2 during day and BiPAP at night.   -c/w NC- can escalate to HFNC, BiPAP if in respiratory distress, desating.  -c/w duoneb q6 PRN for SOB

## 2018-08-22 NOTE — PROGRESS NOTE ADULT - ASSESSMENT
43yoF with PMH of DM2(insulin/metformin) , HTN(Norvasc, Losartsan), HLD, CHF (lasix), Severe morbid Obesity, MANUEL (On home 24/7 3-4L NC &  night CPAP 12/5) c/o progressively worsening SOB with Oxygen desaturation 70% at home presumed to be d/t medication/diet noncompliance admitted to [facility] for r/o CHF exacerbation and management of hypoxia, transferred [facility] for continued management of hypoxemia and hypercapnia on BiPAP, now s/p IR pericardiocentesis and weaning to NC with BiPAP at night 43yoF with PMH of DM2(insulin/metformin) , HTN(Norvasc, Losartsan), HLD, CHF (lasix), Severe morbid Obesity, MANUEL (On home 24/7 3-4L NC &  night CPAP 12/5) c/o progressively worsening SOB with Oxygen desaturation 70% at home presumed to be d/t medication/diet noncompliance admitted to [facility] for r/o CHF exacerbation and management of hypoxia, transferred [facility] for continued management of hypoxemia and hypercapnia on BiPAP, now s/p IR pericardiocentesis and on NC with BiPAP at night

## 2018-08-22 NOTE — PROGRESS NOTE ADULT - SUBJECTIVE AND OBJECTIVE BOX
INTERVAL HPI/OVERNIGHT EVENTS:  Patient was seen and examined at bedside. As per nurse and patient, no o/n events, patient resting comfortably. No complaints at this time. Patient denies: fever, chills, dizziness, weakness, HA, Changes in vision, CP, palpitations, SOB, cough, N/V/D/C, dysuria, changes in bowel movements, LE edema. ROS otherwise negative.    VITAL SIGNS:  T(F): 97.4 (08-22-18 @ 05:10)  HR: 78 (08-22-18 @ 05:55)  BP: 105/57 (08-22-18 @ 04:56)  RR: 19 (08-22-18 @ 05:55)  SpO2: 93% (08-22-18 @ 05:55)  Wt(kg): --    PHYSICAL EXAM:    Constitutional: WDWN, NAD  HEENT: PERRL, EOMI, sclera non-icteric, neck supple, trachea midline, no masses, no JVD, MMM, good dentition  Respiratory: CTA b/l, good air entry b/l, no wheezing, no rhonchi, no rales, without accessory muscle use and no intercostal retractions  Cardiovascular: RRR, normal S1S2, no M/R/G  Gastrointestinal: soft, NTND, no masses palpable, BS normal  Extremities: Warm, well perfused, pulses equal bilateral upper and lower extremities, no edema, no clubbing. Capillary refill <2 sec  Neurological: AAOx3, CN Grossly intact  Skin: Normal temperature, warm, dry    MEDICATIONS  (STANDING):  atorvastatin 80 milliGRAM(s) Oral at bedtime  dextrose 5%. 1000 milliLiter(s) (50 mL/Hr) IV Continuous <Continuous>  dextrose 50% Injectable 12.5 Gram(s) IV Push once  dextrose 50% Injectable 25 Gram(s) IV Push once  dextrose 50% Injectable 25 Gram(s) IV Push once  enoxaparin Injectable 60 milliGRAM(s) SubCutaneous two times a day  furosemide   Injectable 80 milliGRAM(s) IV Push daily  insulin lispro (HumaLOG) corrective regimen sliding scale   SubCutaneous three times a day before meals  insulin lispro (HumaLOG) corrective regimen sliding scale   SubCutaneous at bedtime  losartan 25 milliGRAM(s) Oral daily    MEDICATIONS  (PRN):  acetaminophen   Tablet. 650 milliGRAM(s) Oral every 6 hours PRN Mild Pain (1 - 3)  ALBUTerol/ipratropium for Nebulization 3 milliLiter(s) Nebulizer every 6 hours PRN Shortness of Breath  dextrose 40% Gel 15 Gram(s) Oral once PRN Blood Glucose LESS THAN 70 milliGRAM(s)/deciliter  glucagon  Injectable 1 milliGRAM(s) IntraMuscular once PRN Glucose LESS THAN 70 milligrams/deciliter      Allergies    No Known Drug Allergies  shellfish (Anaphylaxis)    Intolerances        LABS:                        11.0   7.4   )-----------( 324      ( 22 Aug 2018 07:13 )             40.4     08-22    142  |  96  |  16  ----------------------------<  136<H>  4.0   |  36<H>  |  0.79    Ca    10.0      22 Aug 2018 07:13  Phos  4.3     08-22  Mg     2.0     08-22            RADIOLOGY & ADDITIONAL TESTS:  Reviewed INTERVAL HPI/OVERNIGHT EVENTS:  Patient was seen and examined at bedside. As per nurse and patient, no o/n events, patient was on BIPAP; Pericardial drain drained 20ccs O/N of clear/yellow fluid. No complaints at this time. Pt on 5L NC satting well. Patient denies: fever, chills, dizziness, weakness, HA, Changes in vision, CP, palpitations, SOB, N/V/D/C, dysuria, changes in bowel movements. ROS otherwise negative.    VITAL SIGNS:  T(F): 97.4 (08-22-18 @ 05:10)  HR: 78 (08-22-18 @ 05:55)  BP: 105/57 (08-22-18 @ 04:56)  RR: 19 (08-22-18 @ 05:55)  SpO2: 93% (08-22-18 @ 05:55)  Wt(kg): --    PHYSICAL EXAM:    Constitutional: Obese female in NAD on 5L NC  HEENT:  PERRL, EOMI, sclera non-icteric, neck supple, trachea midline, no masses, no JVD, MMM, good dentition  Respiratory: Exam limited to body habitus. In no respiratory distress, not using accessory respiratory muscles for inhalation. Decreased breath sounds. No wheezing, rales or ronchi.   Cardiovascular: RRR, normal S1S2, no M/R/G. + pericardial drain: draining transparent yellow fluid   Gastrointestinal: soft, NTND, no masses palpable, BS normal  Extremities: Warm, well perfused, pulses equal bilateral upper and lower extremities, no edema, no clubbing. Capillary refill <2 sec  Neurological: AAOx3, CN Grossly intact  Skin: Normal temperature, warm, dry    MEDICATIONS  (STANDING):  atorvastatin 80 milliGRAM(s) Oral at bedtime  dextrose 5%. 1000 milliLiter(s) (50 mL/Hr) IV Continuous <Continuous>  dextrose 50% Injectable 12.5 Gram(s) IV Push once  dextrose 50% Injectable 25 Gram(s) IV Push once  dextrose 50% Injectable 25 Gram(s) IV Push once  enoxaparin Injectable 60 milliGRAM(s) SubCutaneous two times a day  furosemide   Injectable 80 milliGRAM(s) IV Push daily  insulin lispro (HumaLOG) corrective regimen sliding scale   SubCutaneous three times a day before meals  insulin lispro (HumaLOG) corrective regimen sliding scale   SubCutaneous at bedtime  losartan 25 milliGRAM(s) Oral daily    MEDICATIONS  (PRN):  acetaminophen   Tablet. 650 milliGRAM(s) Oral every 6 hours PRN Mild Pain (1 - 3)  ALBUTerol/ipratropium for Nebulization 3 milliLiter(s) Nebulizer every 6 hours PRN Shortness of Breath  dextrose 40% Gel 15 Gram(s) Oral once PRN Blood Glucose LESS THAN 70 milliGRAM(s)/deciliter  glucagon  Injectable 1 milliGRAM(s) IntraMuscular once PRN Glucose LESS THAN 70 milligrams/deciliter      Allergies    No Known Drug Allergies  shellfish (Anaphylaxis)    Intolerances        LABS:                        11.0   7.4   )-----------( 324      ( 22 Aug 2018 07:13 )             40.4     08-22    142  |  96  |  16  ----------------------------<  136<H>  4.0   |  36<H>  |  0.79    Ca    10.0      22 Aug 2018 07:13  Phos  4.3     08-22  Mg     2.0     08-22            RADIOLOGY & ADDITIONAL TESTS:  Reviewed

## 2018-08-22 NOTE — PROGRESS NOTE ADULT - ASSESSMENT
43 year old female s/p Pericardial drain placement for pericardial effusion. Drain is patent and draining. Consider Repeat Echo prior to removal of the pericardial drain. Will continue to follow.    Pauline Ponce MD

## 2018-08-22 NOTE — PROGRESS NOTE ADULT - PROBLEM SELECTOR PLAN 6
Pt on Metformin & 13U Lantus. HbA1C 5.3, currently well controlled.  -f/u FSs  -c/w ISS Pt on Metformin & 13U Lantus at home. HbA1C 5.3, currently well controlled with sliding scale.  -f/u FSs  -c/w ISS

## 2018-08-22 NOTE — PROGRESS NOTE ADULT - PROBLEM SELECTOR PLAN 3
Pt with unchanged large pericardial effusion on CTAngio when compared to previous Echo in Sept 2017. Now s/p IR pericardiocentesis with drain in place. Drained 1L.  -pericardial fluid drained 130cc over 24hrs. Will d/c drain when draining decreases Pt with unchanged large pericardial effusion on CTAngio when compared to previous Echo in Sept 2017. Now s/p IR pericardiocentesis with drain in place. Drained 1L.  -pericardial fluid drained 30ccs over 24hrs. IR will d/c drain when draining <10cc/24hrs

## 2018-08-22 NOTE — PROGRESS NOTE ADULT - SUBJECTIVE AND OBJECTIVE BOX
43 year old female s/p Pericardial drain placement for pericardial effusion.  Drain output has been 20cc in the past 24h and 130cc in the previous 24h period. Drain output is serous and the tube was flushed with 5cc of NS without resistance.

## 2018-08-22 NOTE — CHART NOTE - NSCHARTNOTEFT_GEN_A_CORE
Admitting Diagnosis:   Patient is a 43y old  Female who presents with a chief complaint of SOB/Hypoxia (13 Aug 2018 17:18)      PAST MEDICAL & SURGICAL HISTORY:  Chronic diastolic heart failure: Chronic diastolic CHF (congestive heart failure)  Edema: Anasarca  Type 2 diabetes mellitus: Insulin Requiring  Essential hypertension: HTN (hypertension)  Morbid obesity: Morbid obesity  Disease of pericardium: Pericardial effusion  Obstructive sleep apnea syndrome: MANUEL on CPAP  Cytomegalovirus infection not present: No significant past surgical history      Current Nutrition Order:    PO Intake: Good (%) [   ]  Fair (50-75%) [   ] Poor (<25%) [   ]    GI Issues:     Pain:    Skin Integrity:    Labs:       142  |  96  |  16  ----------------------------<  136<H>  4.0   |  36<H>  |  0.79    Ca    10.0      22 Aug 2018 07:13  Phos  4.3       Mg     2.0           CAPILLARY BLOOD GLUCOSE      POCT Blood Glucose.: 146 mg/dL (22 Aug 2018 06:53)  POCT Blood Glucose.: 152 mg/dL (21 Aug 2018 21:20)  POCT Blood Glucose.: 142 mg/dL (21 Aug 2018 16:15)  POCT Blood Glucose.: 159 mg/dL (21 Aug 2018 11:03)      Medications:  MEDICATIONS  (STANDING):  atorvastatin 80 milliGRAM(s) Oral at bedtime  dextrose 5%. 1000 milliLiter(s) (50 mL/Hr) IV Continuous <Continuous>  dextrose 50% Injectable 12.5 Gram(s) IV Push once  dextrose 50% Injectable 25 Gram(s) IV Push once  dextrose 50% Injectable 25 Gram(s) IV Push once  enoxaparin Injectable 60 milliGRAM(s) SubCutaneous two times a day  furosemide   Injectable 80 milliGRAM(s) IV Push daily  insulin lispro (HumaLOG) corrective regimen sliding scale   SubCutaneous three times a day before meals  insulin lispro (HumaLOG) corrective regimen sliding scale   SubCutaneous at bedtime  losartan 25 milliGRAM(s) Oral daily    MEDICATIONS  (PRN):  acetaminophen   Tablet. 650 milliGRAM(s) Oral every 6 hours PRN Mild Pain (1 - 3)  ALBUTerol/ipratropium for Nebulization 3 milliLiter(s) Nebulizer every 6 hours PRN Shortness of Breath  dextrose 40% Gel 15 Gram(s) Oral once PRN Blood Glucose LESS THAN 70 milliGRAM(s)/deciliter  glucagon  Injectable 1 milliGRAM(s) IntraMuscular once PRN Glucose LESS THAN 70 milligrams/deciliter      Weight:  Daily     Daily Weight in k.4 (22 Aug 2018 04:56)    Weight Change:     Estimated energy needs:     Subjective:     Previous Nutrition Diagnosis:    Active [   ]  Resolved [   ]    If resolved, new PES:     Goal:    Recommendations:    Education:     Risk Level: High [   ] Moderate [   ] Low [   ] Admitting Diagnosis:   Patient is a 43y old  Female who presents with a chief complaint of SOB/Hypoxia (13 Aug 2018 17:18)      PAST MEDICAL & SURGICAL HISTORY:  Chronic diastolic heart failure: Chronic diastolic CHF (congestive heart failure)  Edema: Anasarca  Type 2 diabetes mellitus: Insulin Requiring  Essential hypertension: HTN (hypertension)  Morbid obesity: Morbid obesity  Disease of pericardium: Pericardial effusion  Obstructive sleep apnea syndrome: MANUEL on CPAP  Cytomegalovirus infection not present: No significant past surgical history      Current Nutrition Order: DASH/TLC Sodium & Cholesterol Restricted     PO Intake: Good (%) [ X ]  Fair (50-75%) [   ] Poor (<25%) [   ]    GI Issues: Denies N/V/D/C  +BM yesterday per pt    Pain: Denies pain/discomfort     Skin Integrity: intact     Labs:       142  |  96  |  16  ----------------------------<  136<H>  4.0   |  36<H>  |  0.79    Ca    10.0      22 Aug 2018 07:13  Phos  4.3       Mg     2.0           CAPILLARY BLOOD GLUCOSE      POCT Blood Glucose.: 146 mg/dL (22 Aug 2018 06:53)  POCT Blood Glucose.: 152 mg/dL (21 Aug 2018 21:20)  POCT Blood Glucose.: 142 mg/dL (21 Aug 2018 16:15)  POCT Blood Glucose.: 159 mg/dL (21 Aug 2018 11:03)      Medications:  MEDICATIONS  (STANDING):  atorvastatin 80 milliGRAM(s) Oral at bedtime  dextrose 5%. 1000 milliLiter(s) (50 mL/Hr) IV Continuous <Continuous>  dextrose 50% Injectable 12.5 Gram(s) IV Push once  dextrose 50% Injectable 25 Gram(s) IV Push once  dextrose 50% Injectable 25 Gram(s) IV Push once  enoxaparin Injectable 60 milliGRAM(s) SubCutaneous two times a day  furosemide   Injectable 80 milliGRAM(s) IV Push daily  insulin lispro (HumaLOG) corrective regimen sliding scale   SubCutaneous three times a day before meals  insulin lispro (HumaLOG) corrective regimen sliding scale   SubCutaneous at bedtime  losartan 25 milliGRAM(s) Oral daily    MEDICATIONS  (PRN):  acetaminophen   Tablet. 650 milliGRAM(s) Oral every 6 hours PRN Mild Pain (1 - 3)  ALBUTerol/ipratropium for Nebulization 3 milliLiter(s) Nebulizer every 6 hours PRN Shortness of Breath  dextrose 40% Gel 15 Gram(s) Oral once PRN Blood Glucose LESS THAN 70 milliGRAM(s)/deciliter  glucagon  Injectable 1 milliGRAM(s) IntraMuscular once PRN Glucose LESS THAN 70 milligrams/deciliter      Weight:  360 lbs (),  380 lbs (),  366 lbs (),   373 lbs (),  389.3 lbs ()  Weight hx:  400.3 lbs (),   406.5 lbs (8/15),  Daily Weight    Weight Change: Weights trending down likely from fluid loss 2/2 fluid retention.     Height: 5'7", IBW 135lbs+/-10%, %%, BMI 64.4,  Estimated energy needs:   IBW used to calculate EER as per pt's current body weight >120% of IBW   Estimated nutrient needs based on [facility] SOC for maintenance in obese adult  15-20 kcal/k-1224 kcal  1-1.2 gm/k-73 gm protein  8161-6466 mL fluids or per MD discretion     Subjective:   Nutrition consult received and appreciated. 43 y.o F from home with PMHx of DM, HTN, HLD, CHF, Morbid Obesity, MANUEL. Pt c/o progressively declining with ALD's  and SOB, now bedridden associated with obesity. Pt has HHA and family to assist with cooking at home, follows regular diet with difficulty adherence to therapeutic diet, eats 3 meals and snacks 2-3 times daily, good appetite, known food allergy to shellfish.Pt takes Vitamin D3 supplement per home medication. Pt reports Dry UBW to be 417 lbs (2018), admits to gaining ~2 to 3 lbs/wk from excessive caloric intake, fluid intake and inactivity. Seen patient OOB, sitting in chair. Pt is currently tolerating DASH/TLC well with good >75% PO intake of meals. Denies N/V/D/C or pain. +BM. Skin intact. B/L 2+ pitting edema lower extremities is improving. Pt reports monitoring her food intake during meals. Encouraged patient to continue adjusting to dietary modifications and behaviors. Reviewed nutrition edu with pt. +pericardial drain output 130 mL noted, plan to remove. +BiPAP o/n. On lasix taper at this time. Discharge planning in discussion to bariatric rehab facility to promote weight loss.     Previous Nutrition Diagnosis:  Overweight/obesity R/T difficulty adherence to diet therapy and reduction in calories AEB pt's dietary recall suggestive of poor diet, BMI 64.4 indicative to morbid obesity    Active [ X ]  Resolved [   ], improving with weight loss    If resolved, new PES: none identified at this time    Goal:  Promote weight loss 0.1/1.0 lbs/wk,   list at least 2 tips/strategies to reduce sodium/calorie intake     Recommendations:  1) Continue DASH/TLC diet  Referral to community agencies/programs (specify); Bariatric facility for weight reduction management    Education: Encouraged patient to continue adjusting to dietary modifications and behaviors. Reviewed nutrition edu with pt    Risk Level: High [   ] Moderate [ X ] Low [   ]

## 2018-08-23 LAB
ANION GAP SERPL CALC-SCNC: 7 MMOL/L — SIGNIFICANT CHANGE UP (ref 5–17)
BUN SERPL-MCNC: 17 MG/DL — SIGNIFICANT CHANGE UP (ref 7–23)
CALCIUM SERPL-MCNC: 9.5 MG/DL — SIGNIFICANT CHANGE UP (ref 8.4–10.5)
CHLORIDE SERPL-SCNC: 95 MMOL/L — LOW (ref 96–108)
CO2 SERPL-SCNC: 38 MMOL/L — HIGH (ref 22–31)
CREAT SERPL-MCNC: 0.77 MG/DL — SIGNIFICANT CHANGE UP (ref 0.5–1.3)
GLUCOSE BLDC GLUCOMTR-MCNC: 133 MG/DL — HIGH (ref 70–99)
GLUCOSE BLDC GLUCOMTR-MCNC: 134 MG/DL — HIGH (ref 70–99)
GLUCOSE BLDC GLUCOMTR-MCNC: 179 MG/DL — HIGH (ref 70–99)
GLUCOSE BLDC GLUCOMTR-MCNC: 211 MG/DL — HIGH (ref 70–99)
GLUCOSE SERPL-MCNC: 145 MG/DL — HIGH (ref 70–99)
HCT VFR BLD CALC: 38.8 % — SIGNIFICANT CHANGE UP (ref 34.5–45)
HGB BLD-MCNC: 10.6 G/DL — LOW (ref 11.5–15.5)
MAGNESIUM SERPL-MCNC: 2 MG/DL — SIGNIFICANT CHANGE UP (ref 1.6–2.6)
MCHC RBC-ENTMCNC: 24.1 PG — LOW (ref 27–34)
MCHC RBC-ENTMCNC: 27.3 G/DL — LOW (ref 32–36)
MCV RBC AUTO: 88.4 FL — SIGNIFICANT CHANGE UP (ref 80–100)
PHOSPHATE SERPL-MCNC: 4.6 MG/DL — HIGH (ref 2.5–4.5)
PLATELET # BLD AUTO: 330 K/UL — SIGNIFICANT CHANGE UP (ref 150–400)
POTASSIUM SERPL-MCNC: 3.8 MMOL/L — SIGNIFICANT CHANGE UP (ref 3.5–5.3)
POTASSIUM SERPL-SCNC: 3.8 MMOL/L — SIGNIFICANT CHANGE UP (ref 3.5–5.3)
RBC # BLD: 4.39 M/UL — SIGNIFICANT CHANGE UP (ref 3.8–5.2)
RBC # FLD: 16.9 % — SIGNIFICANT CHANGE UP (ref 10.3–16.9)
SODIUM SERPL-SCNC: 140 MMOL/L — SIGNIFICANT CHANGE UP (ref 135–145)
WBC # BLD: 6.9 K/UL — SIGNIFICANT CHANGE UP (ref 3.8–10.5)
WBC # FLD AUTO: 6.9 K/UL — SIGNIFICANT CHANGE UP (ref 3.8–10.5)

## 2018-08-23 PROCEDURE — 71045 X-RAY EXAM CHEST 1 VIEW: CPT | Mod: 26

## 2018-08-23 PROCEDURE — 99233 SBSQ HOSP IP/OBS HIGH 50: CPT | Mod: GC

## 2018-08-23 RX ORDER — FUROSEMIDE 40 MG
80 TABLET ORAL DAILY
Qty: 0 | Refills: 0 | Status: DISCONTINUED | OUTPATIENT
Start: 2018-08-24 | End: 2018-08-24

## 2018-08-23 RX ADMIN — LOSARTAN POTASSIUM 25 MILLIGRAM(S): 100 TABLET, FILM COATED ORAL at 07:00

## 2018-08-23 RX ADMIN — ENOXAPARIN SODIUM 60 MILLIGRAM(S): 100 INJECTION SUBCUTANEOUS at 06:59

## 2018-08-23 RX ADMIN — ATORVASTATIN CALCIUM 80 MILLIGRAM(S): 80 TABLET, FILM COATED ORAL at 21:13

## 2018-08-23 RX ADMIN — Medication 80 MILLIGRAM(S): at 06:59

## 2018-08-23 RX ADMIN — ENOXAPARIN SODIUM 60 MILLIGRAM(S): 100 INJECTION SUBCUTANEOUS at 18:27

## 2018-08-23 RX ADMIN — Medication 2: at 11:38

## 2018-08-23 NOTE — PROGRESS NOTE ADULT - PROBLEM SELECTOR PROBLEM 4
Obstructive sleep apnea syndrome
Acute on chronic respiratory failure

## 2018-08-23 NOTE — PROGRESS NOTE ADULT - ATTENDING COMMENTS
Assessment: Patient personally seen and examined myself during rounds with the Physician Assistant/House Staff/Nurse Practitioner  ON DATE 8/14/18  Physician Assistant/House Staff/Nurse Practitioner note read, including vitals, physical findings, laboratory data, and radiological reports.   Revisions included below.   Direct personal management at bed side and extensive interpretation of the data.    Plan was outlined and discussed in details with the Physician Assistant/House Staff/Nurse Practitioner.    Decision making of high complexity   Risk high of complications, morbidity, and/or mortality  Assessment and Action taken for acute disease activity to reflect the level of care provided:  -Hemodynamic evaluation and support  -Medication reconciliation  -Review laboratory data  -Cardiac Telemetry reviewed  - hypercapneic respiratory failure will require upgrade to MICU for possible elective intubation  -EKG reviewed   -Echo reviewed   -ACS assessment and treatment as applicable  -Heart failure assessment and treatment as applicable  -Interdisciplinary discussion with IC / EP / HF / CTS teams as needed  TIME SPENT in evaluation and management, reassessments, review and interpretation of labs and x-rays, and hemodynamic management, formulating a plan and coordinating care: ___35____ MIN.  Time does not include procedural time.    Ricardo Oscar MD  Cardiology    Mobile: [phone]  Office: [phone]  [address] [address]
Patient seen and examined with house-staff during bedside rounds.  Resident note read, including vitals, physical findings, laboratory data, and radiological reports.   Revisions included below.  Direct personal management at bed side and extensive interpretation of the data.  Plan was outlined and discussed in details with the housestaff.  Decision making of high complexity  Action taken for acute disease activity to reflect the level of care provided:  - medication reconciliation  - review laboratory data  Patient is clinically improving. Accept saturation 88%. Patient has acute on top of chronic respiratory acidosis. She lost 17 pounds since admission. I discussed the case with interventional radiology. The Lovenox was held this morning. Patient had drainage of the pericardial fluid. Patient has pericardial drain. Hold Lovenox for now.
Patient seen and examined with house-staff during bedside rounds.  Resident note read, including vitals, physical findings, laboratory data, and radiological reports.   Revisions included below.  Direct personal management at bed side and extensive interpretation of the data.  Plan was outlined and discussed in details with the housestaff.  Decision making of high complexity  Action taken for acute disease activity to reflect the level of care provided:  - medication reconciliation  - review laboratory data  Patient seen and examined. The patient lost 40 pounds since admission. She is tolerating high flow and was changed and is attending the pyridoxine saturation 88%. Decrease Lasix to avoid over diuresis. Will repeat echocardiogram. Continued percutaneous drainage of the precordium on analysis of the pericardial fluid. Patient had physical therapy and was able to ambulate. Patient has to sleep on BiPAP at night
Patient seen and examined with house-staff during bedside rounds.  Resident note read, including vitals, physical findings, laboratory data, and radiological reports.   Revisions included below.  Direct personal management at bed side and extensive interpretation of the data.  Plan was outlined and discussed in details with the housestaff.  Decision making of high complexity  Action taken for acute disease activity to reflect the level of care provided:  - medication reconciliation  - review laboratory data  The patient condition is deteriorating. She is using the BiPAP constantly. The case discussed critical care and the patient was transferred to the ICU service. Continue BiPAP. Continue oxygenation. Increased diuresis. Echocardiogram was discussed with cardiology. I reviewed the echocardiogram in the CAT scan with Dr. Larry will evaluate with IR regarding drainage of the pericardial effusion. Monitor hemodynamics.
Patient seen and examined with house-staff during bedside rounds.  Resident note read, including vitals, physical findings, laboratory data, and radiological reports.   Revisions included below.  Direct personal management at bed side and extensive interpretation of the data.  Plan was outlined and discussed in details with the housestaff.  Decision making of high complexity  Action taken for acute disease activity to reflect the level of care provided:  - medication reconciliation  - review laboratory data  The patient was seen examined. She is losing weight with diuresis. She lost 5 kg in submission. She is tolerating noninvasive ventilation. Was start weaning from noninvasive ventilation to high flow it cannula. The cardiac status is stable. I would discuss the drainage of the pericardial fluid with interventional radiology.
The patient is clinically stable per the pericardial drain was removed. She drained less than 10 cc last night. Patient is clinically stable participating in physical therapy. Patient is to be discharged tomorrow    Patient seen and examined with house-staff during bedside rounds.  Resident note read, including vitals, physical findings, laboratory data, and radiological reports.   Revisions included below.  Direct personal management at bed side and extensive interpretation of the data.  Plan was outlined and discussed in details with the housestaff.  Decision making of high complexity  Action taken for acute disease activity to reflect the level of care provided:  - medication reconciliation  - review laboratory data
Patient seen and examined with house-staff during bedside rounds.  Resident note read, including vitals, physical findings, laboratory data, and radiological reports.   Revisions included below.  Direct personal management at bed side and extensive interpretation of the data.  Plan was outlined and discussed in details with the housestaff.  Decision making of high complexity  Action taken for acute disease activity to reflect the level of care provided:  - medication reconciliation  - review laboratory data  Long discussion with the patient and her mother. Also included discussion with the . Patient is recommended for pediatric rehabilitation for 3 months for weight loss. Patient is clinically stable. She lost 40 pounds since admission. She is compliant with her meds, and the BiPAP at night. Continue her current regimen. 20 cc was drained from the  drain.  Continue on the current regimen.
Patient seen and examined with house-staff during bedside rounds.  Resident note read, including vitals, physical findings, laboratory data, and radiological reports.   Revisions included below.  Direct personal management at bed side and extensive interpretation of the data.  Plan was outlined and discussed in details with the housestaff.  Decision making of high complexity  Action taken for acute disease activity to reflect the level of care provided:  - medication reconciliation  - review laboratory data  The patient is clinically improving. She walked to the chair we let her assistant. She is tolerating is a cannula. She lost 6 pounds since yesterday. There pericardial drainage decrease. Culture negative. Cytology still pending. Discharge after the pericardial drainage is removed
Patient seen and examined with house-staff during bedside rounds.  Resident note read, including vitals, physical findings, laboratory data, and radiological reports.   Revisions included below.  Direct personal management at bed side and extensive interpretation of the data.  Plan was outlined and discussed in details with the housestaff.  Decision making of high complexity  Action taken for acute disease activity to reflect the level of care provided:  - medication reconciliation  - review laboratory data  The patient is clinically improving. She is tolerating the BiPAP and high flow. She lost 5 pounds with current diuresis. I started Diamox might for the next 48 hours to correct the primary metabolic alkalosis. Continue to current treatment. I will discuss the CAT scan drainage by IR tomorrow as the director of IR is not available today
Patient seen and examined with house-staff during bedside rounds.  Resident note read, including vitals, physical findings, laboratory data, and radiological reports.   Revisions included below.  Direct personal management at bed side and extensive interpretation of the data.  Plan was outlined and discussed in details with the housestaff.  Decision making of high complexity  Action taken for acute disease activity to reflect the level of care provided:  - medication reconciliation  - review laboratory data

## 2018-08-23 NOTE — PROGRESS NOTE ADULT - PROBLEM SELECTOR PLAN 6
Pt on Metformin & 13U Lantus at home. HbA1C 5.3, currently well controlled with sliding scale.  -f/u FSs  -c/w ISS

## 2018-08-23 NOTE — PROGRESS NOTE ADULT - ASSESSMENT
43yoF with PMH of DM2(insulin/metformin) , HTN(Norvasc, Losartsan), HLD, CHF (lasix), Severe morbid Obesity, MANUEL (On home 24/7 3-4L NC &  night CPAP 12/5) c/o progressively worsening SOB with Oxygen desaturation 70% at home presumed to be d/t medication/diet noncompliance admitted to [facility] for r/o CHF exacerbation and management of hypoxia, transferred [facility] for continued management of hypoxemia and hypercapnia on BiPAP, now s/p IR pericardiocentesis and on NC with BiPAP at night

## 2018-08-23 NOTE — PROGRESS NOTE ADULT - PROBLEM SELECTOR PLAN 8
1) PCP Contacted on Admission: (Y/N) --> Name & Phone #: At [facility]   2) Date of Contact with PCP:  3) PCP Contacted at Discharge: (Y/N, N/A)  4) Summary of Handoff Given to PCP:   5) Post-Discharge Appointment Date and Location:    Case and plan discussed with primary team and consultation services.
1) PCP Contacted on Admission: (Y/N) --> Name & Phone #: At [facility]   2) Date of Contact with PCP:  3) PCP Contacted at Discharge: (Y/N, N/A)  4) Summary of Handoff Given to PCP:   5) Post-Discharge Appointment Date and Location:    Case and plan discussed with primary team and consultation services.
1) PCP Contacted on Admission: (Y/N) --> Name & Phone #: At [facility]   2) Date of Contact with PCP:  3) PCP Contacted at Discharge: (Y/N, N/A)  4) Summary of Handoff Given to PCP:   5) Post-Discharge Appointment Date and Location:    Case and plan discussed with primary team and consultation services.
1) PCP Contacted on Admission: (Y/N) --> Name & Phone #: At [facility]   2) Date of Contact with PCP:  3) PCP Contacted at Discharge: (Y/N, N/A)  4) Summary of Handoff Given to PCP:   5) Post-Discharge Appointment Date and Location:    Case and plan discussed with primary team and consultation services.
1) PCP Contacted on Admission: (Y/N) --> Name & Phone #: At [facility]   2) Date of Contact with PCP:  3) PCP Contacted at Discharge: (Y/N, N/A)  4) Summary of Handoff Given to PCP:   5) Post-Discharge Appointment Date and Location:
1) PCP Contacted on Admission: (Y/N) --> Name & Phone #: At [facility]   2) Date of Contact with PCP:  3) PCP Contacted at Discharge: (Y/N, N/A)  4) Summary of Handoff Given to PCP:   5) Post-Discharge Appointment Date and Location:    Case and plan discussed with primary team and consultation services.
1) PCP Contacted on Admission: (Y/N) --> Name & Phone #: At [facility]   2) Date of Contact with PCP:  3) PCP Contacted at Discharge: (Y/N, N/A)  4) Summary of Handoff Given to PCP:   5) Post-Discharge Appointment Date and Location:    Case and plan discussed with primary team and consultation services.
1) PCP Contacted on Admission: (Y/N) --> Name & Phone #: At [facility]   2) Date of Contact with PCP:  3) PCP Contacted at Discharge: (Y/N, N/A)  4) Summary of Handoff Given to PCP:   5) Post-Discharge Appointment Date and Location:    Case and plan discussed with primary team and consultation services.
1) PCP Contacted on Admission: (Y/N) --> Name & Phone #: At [facility]   2) Date of Contact with PCP:  3) PCP Contacted at Discharge: (Y/N, N/A)  4) Summary of Handoff Given to PCP:   5) Post-Discharge Appointment Date and Location:    Case and plan discussed with primary team and consultation services.
1) PCP Contacted on Admission: (Y/N) --> Name & Phone #: At [facility]   2) Date of Contact with PCP:  3) PCP Contacted at Discharge: (Y/N, N/A)  4) Summary of Handoff Given to PCP:   5) Post-Discharge Appointment Date and Location:    Case and plan discussed with primary team and consultation services.
1) PCP Contacted on Admission: (Y/N) --> Name & Phone #: At [facility]   2) Date of Contact with PCP:  3) PCP Contacted at Discharge: (Y/N, N/A)  4) Summary of Handoff Given to PCP:   5) Post-Discharge Appointment Date and Location:    Case and plan discussed with primary team and consultation services.

## 2018-08-23 NOTE — PROGRESS NOTE ADULT - PROBLEM SELECTOR PROBLEM 6
Type 2 diabetes mellitus without complication, with long-term current use of insulin

## 2018-08-23 NOTE — PROGRESS NOTE ADULT - PROBLEM SELECTOR PROBLEM 8
Transition of care performed with sharing of clinical summary

## 2018-08-23 NOTE — PROGRESS NOTE ADULT - PROVIDER SPECIALTY LIST ADULT
Cardiology
Critical Care
Critical Care
Internal Medicine
Intervent Radiology
Pulmonology
Internal Medicine
Internal Medicine

## 2018-08-23 NOTE — PROGRESS NOTE ADULT - PROBLEM SELECTOR PLAN 2
Pt with symptoms consistent w/ CHF exacerbation including progressive SOB/RODRIGUEZ/ b/l LE swelling,  (lower d/t obesity), although CXR not showing any pleural effusions, just cardiomegaly/increased lung markings. CTAngio showing unchanged large pericardial effusion as possible worsening cause of HF. Trigger is multifactorial including medication/diet noncompliance. Less likely infection being that no signs/symptoms of infection & afebrile, no leukocytosis, or Ischemia being that no sx of chest pain, Trops neg2x; Last cath in 2016 no evidence of ischemia. Pt on Home [id number], Norvasc2.5q, [id number], Atorvastatin 80qd. Echo 8/14 demonstrated known mod-large pericardial effusion, now with echo evidence of pulsus paradoxus prior to IR procedure. S/p pericardiocentesis with drain still in place.   -repeat echo demonstrated LVEF 50-55% with septal bounce, dilated RA and RV, Pulm artery systolic pressure 42mmHg.  -c/w IV lasix 80mg daily. Consider decreasing as tolerated  -C/w [id number], Norvasc2.5 q, Atorvastatin 80qd   -most recent weight 166 8/21, down from 186.6kg on admission. c/w Daily weight & strict I/Os  -cardio following, recs appreciated Pt with symptoms consistent w/ CHF exacerbation including progressive SOB/RODRIGUEZ/ b/l LE swelling,  (lower d/t obesity), although CXR not showing any pleural effusions, just cardiomegaly/increased lung markings. CTAngio showing unchanged large pericardial effusion as possible worsening cause of HF. Trigger is multifactorial including medication/diet noncompliance. Less likely infection being that no signs/symptoms of infection & afebrile, no leukocytosis, or Ischemia being that no sx of chest pain, Trops neg2x; Last cath in 2016 no evidence of ischemia. Pt on Home [id number], Norvasc2.5q, [id number], Atorvastatin 80qd. Echo 8/14 demonstrated known mod-large pericardial effusion, now with echo evidence of pulsus paradoxus prior to IR procedure. S/p pericardiocentesis with drain. Drain removed today.  -repeat echo demonstrated LVEF 50-55% with septal bounce, dilated RA and RV, Pulm artery systolic pressure 42mmHg.  -c/w IV lasix 80mg daily. Consider decreasing as tolerated  -C/w [id number], Norvasc2.5 q, Atorvastatin 80qd   -most recent weight 164.9 8/23, down from 186.6kg on admission. c/w Daily weight & strict I/Os  -cardio following, recs appreciated  - repeat echo tomorrow before D/C

## 2018-08-23 NOTE — PROGRESS NOTE ADULT - PROBLEM SELECTOR PLAN 3
Pt with unchanged large pericardial effusion on CTAngio when compared to previous Echo in Sept 2017. Now s/p IR pericardiocentesis with drain in place. Drained 1L.  -pericardial fluid drained 30ccs over 24hrs. IR will d/c drain when draining <10cc/24hrs Pt with unchanged large pericardial effusion on CTAngio when compared to previous Echo in Sept 2017. Now s/p IR pericardiocentesis with drain; drain removed today AM  - pericardial drain removed today  - monitor for signs/sxs of increasing fluid  - f/u echo tomorrow morning

## 2018-08-23 NOTE — PROGRESS NOTE ADULT - SUBJECTIVE AND OBJECTIVE BOX
INTERVAL HPI/OVERNIGHT EVENTS:  Patient was seen and examined at bedside. As per nurse and patient, no o/n events, patient resting comfortably. No complaints at this time. Patient denies: fever, chills, dizziness, weakness, HA, Changes in vision, CP, palpitations, SOB, cough, N/V/D/C, dysuria, changes in bowel movements, LE edema. ROS otherwise negative.    VITAL SIGNS:  T(F): 97.9 (08-23-18 @ 05:31)  HR: 79 (08-23-18 @ 05:39)  BP: 116/68 (08-23-18 @ 05:20)  RR: 20 (08-23-18 @ 05:20)  SpO2: 95% (08-23-18 @ 05:39)  Wt(kg): --    PHYSICAL EXAM:    Constitutional: WDWN, NAD  HEENT: PERRL, EOMI, sclera non-icteric, neck supple, trachea midline, no masses, no JVD, MMM, good dentition  Respiratory: CTA b/l, good air entry b/l, no wheezing, no rhonchi, no rales, without accessory muscle use and no intercostal retractions  Cardiovascular: RRR, normal S1S2, no M/R/G  Gastrointestinal: soft, NTND, no masses palpable, BS normal  Extremities: Warm, well perfused, pulses equal bilateral upper and lower extremities, no edema, no clubbing. Capillary refill <2 sec  Neurological: AAOx3, CN Grossly intact  Skin: Normal temperature, warm, dry    MEDICATIONS  (STANDING):  atorvastatin 80 milliGRAM(s) Oral at bedtime  dextrose 5%. 1000 milliLiter(s) (50 mL/Hr) IV Continuous <Continuous>  dextrose 50% Injectable 12.5 Gram(s) IV Push once  dextrose 50% Injectable 25 Gram(s) IV Push once  dextrose 50% Injectable 25 Gram(s) IV Push once  enoxaparin Injectable 60 milliGRAM(s) SubCutaneous two times a day  furosemide   Injectable 80 milliGRAM(s) IV Push daily  insulin lispro (HumaLOG) corrective regimen sliding scale   SubCutaneous three times a day before meals  insulin lispro (HumaLOG) corrective regimen sliding scale   SubCutaneous at bedtime  losartan 25 milliGRAM(s) Oral daily    MEDICATIONS  (PRN):  acetaminophen   Tablet. 650 milliGRAM(s) Oral every 6 hours PRN Mild Pain (1 - 3)  ALBUTerol/ipratropium for Nebulization 3 milliLiter(s) Nebulizer every 6 hours PRN Shortness of Breath  dextrose 40% Gel 15 Gram(s) Oral once PRN Blood Glucose LESS THAN 70 milliGRAM(s)/deciliter  glucagon  Injectable 1 milliGRAM(s) IntraMuscular once PRN Glucose LESS THAN 70 milligrams/deciliter      Allergies    No Known Drug Allergies  shellfish (Anaphylaxis)    Intolerances        LABS:                        11.0   7.4   )-----------( 324      ( 22 Aug 2018 07:13 )             40.4     08-22    142  |  96  |  16  ----------------------------<  136<H>  4.0   |  36<H>  |  0.79    Ca    10.0      22 Aug 2018 07:13  Phos  4.3     08-22  Mg     2.0     08-22            RADIOLOGY & ADDITIONAL TESTS:  Reviewed INTERVAL HPI/OVERNIGHT EVENTS:  Patient was seen and examined at bedside. As per nurse and patient, no o/n events, patient resting comfortably. Pt wore BIPAP overnight. Drain output was 5ccs and IR removed drain today AM. No complaints at this time.     VITAL SIGNS:  T(F): 97.9 (08-23-18 @ 05:31)  HR: 79 (08-23-18 @ 05:39)  BP: 116/68 (08-23-18 @ 05:20)  RR: 20 (08-23-18 @ 05:20)  SpO2: 95% (08-23-18 @ 05:39)  Wt(kg): --    PHYSICAL EXAM:    Constitutional: Obese female sitting up in chair in NAD on 5L NC  HEENT:  PERRL, EOMI, sclera non-icteric, neck supple, trachea midline, no masses, no JVD, MMM, good dentition  Respiratory: Exam limited to body habitus. In no respiratory distress, not using accessory respiratory muscles for inhalation. Decreased breath sounds. No wheezing, rales or rhonchi   Cardiovascular: RRR, normal S1S2, no M/R/G. Gauze covering site where pericardial drain was removed.  Gastrointestinal: soft, NTND, no masses palpable, BS normal  Extremities: Warm, well perfused, pulses equal bilateral upper and lower extremities, B/L LE large but nonedematous, no clubbing. Capillary refill <2 sec  Neurological: AAOx3, CN Grossly intact  Skin: Normal temperature, warm, dry    MEDICATIONS  (STANDING):  atorvastatin 80 milliGRAM(s) Oral at bedtime  dextrose 5%. 1000 milliLiter(s) (50 mL/Hr) IV Continuous <Continuous>  dextrose 50% Injectable 12.5 Gram(s) IV Push once  dextrose 50% Injectable 25 Gram(s) IV Push once  dextrose 50% Injectable 25 Gram(s) IV Push once  enoxaparin Injectable 60 milliGRAM(s) SubCutaneous two times a day  furosemide   Injectable 80 milliGRAM(s) IV Push daily  insulin lispro (HumaLOG) corrective regimen sliding scale   SubCutaneous three times a day before meals  insulin lispro (HumaLOG) corrective regimen sliding scale   SubCutaneous at bedtime  losartan 25 milliGRAM(s) Oral daily    MEDICATIONS  (PRN):  acetaminophen   Tablet. 650 milliGRAM(s) Oral every 6 hours PRN Mild Pain (1 - 3)  ALBUTerol/ipratropium for Nebulization 3 milliLiter(s) Nebulizer every 6 hours PRN Shortness of Breath  dextrose 40% Gel 15 Gram(s) Oral once PRN Blood Glucose LESS THAN 70 milliGRAM(s)/deciliter  glucagon  Injectable 1 milliGRAM(s) IntraMuscular once PRN Glucose LESS THAN 70 milligrams/deciliter      Allergies    No Known Drug Allergies  shellfish (Anaphylaxis)    Intolerances        LABS:                        11.0   7.4   )-----------( 324      ( 22 Aug 2018 07:13 )             40.4     08-22    142  |  96  |  16  ----------------------------<  136<H>  4.0   |  36<H>  |  0.79    Ca    10.0      22 Aug 2018 07:13  Phos  4.3     08-22  Mg     2.0     08-22            RADIOLOGY & ADDITIONAL TESTS:  Reviewed

## 2018-08-23 NOTE — PROGRESS NOTE ADULT - PROBLEM SELECTOR PROBLEM 3
Pericarditis, constrictive
Hypoventilation syndrome

## 2018-08-23 NOTE — PROGRESS NOTE ADULT - ASSESSMENT
43 year old female s/p Pericardiocentesis for pericardial effusion. Tube was removed at the bedside without immediate complications. Will sign off. Please reconsult as needed.    Pauline Ponce MD  [phone]

## 2018-08-23 NOTE — PROGRESS NOTE ADULT - PROBLEM SELECTOR PROBLEM 5
Essential hypertension

## 2018-08-23 NOTE — PROGRESS NOTE ADULT - SUBJECTIVE AND OBJECTIVE BOX
43 year old female s/p Pericardial drain placement for pericardial effusion.  Drain output has been 5cc in the past 24h. Drain output is serous. Low output d/w the team and the decision was made to remove the tube at the bedside. The drain was removed without complication.

## 2018-08-23 NOTE — PROGRESS NOTE ADULT - PROBLEM SELECTOR PLAN 1
Pt w/ SaO2 70% at home on 4L NC going up to 90% on NRB. Patient requiring home oxygen for years recently progressed from 2 to 3-4L NC requirement 24/7 w/ BIPAP 12/5 at night). ABG illustrating a Compensated Respiratory Acidosis pH 7.29, pCO2 100, HCO3- 47. Presumed to be d/t MANUEL vs cardiac cause less likely being that CXR/CTAngio not illustrating pleural effusions. ABG pH 7.40, pCO2 82, HCO3 50  -Patient's respiratory status continues to improve. Sating well on NC 5-6L O2 during day and BiPAP at night.   -c/w NC- can escalate to HFNC, BiPAP if in respiratory distress, desating.  -c/w duoneb q6 PRN for SOB Pt w/ SaO2 70% at home on 4L NC going up to 90% on NRB. Patient requiring home oxygen for years recently progressed from 2 to 3-4L NC requirement 24/7 w/ BIPAP 12/5 at night). ABG illustrating a Compensated Respiratory Acidosis pH 7.29, pCO2 100, HCO3- 47. Presumed to be d/t MANUEL vs cardiac cause less likely being that CXR/CTAngio not illustrating pleural effusions. ABG pH 7.40, pCO2 82, HCO3 50  -Patient's respiratory status continues to improve. Sating well on NC 5-6L O2 during day and BiPAP at night.   -c/w NC- can escalate to HFNC, BiPAP if in respiratory distress, desatting.  -c/w duoneb q6 PRN for SOB

## 2018-08-24 ENCOUNTER — TRANSCRIPTION ENCOUNTER (OUTPATIENT)
Age: 43
End: 2018-08-24

## 2018-08-24 VITALS — TEMPERATURE: 99 F

## 2018-08-24 LAB
GLUCOSE BLDC GLUCOMTR-MCNC: 154 MG/DL — HIGH (ref 70–99)
GLUCOSE BLDC GLUCOMTR-MCNC: 164 MG/DL — HIGH (ref 70–99)

## 2018-08-24 PROCEDURE — 87205 SMEAR GRAM STAIN: CPT

## 2018-08-24 PROCEDURE — 82550 ASSAY OF CK (CPK): CPT

## 2018-08-24 PROCEDURE — 82553 CREATINE MB FRACTION: CPT

## 2018-08-24 PROCEDURE — 83036 HEMOGLOBIN GLYCOSYLATED A1C: CPT

## 2018-08-24 PROCEDURE — 97116 GAIT TRAINING THERAPY: CPT

## 2018-08-24 PROCEDURE — 84443 ASSAY THYROID STIM HORMONE: CPT

## 2018-08-24 PROCEDURE — 94660 CPAP INITIATION&MGMT: CPT

## 2018-08-24 PROCEDURE — 71275 CT ANGIOGRAPHY CHEST: CPT

## 2018-08-24 PROCEDURE — 93321 DOPPLER ECHO F-UP/LMTD STD: CPT

## 2018-08-24 PROCEDURE — 83735 ASSAY OF MAGNESIUM: CPT

## 2018-08-24 PROCEDURE — 87075 CULTR BACTERIA EXCEPT BLOOD: CPT

## 2018-08-24 PROCEDURE — 88112 CYTOPATH CELL ENHANCE TECH: CPT

## 2018-08-24 PROCEDURE — 71045 X-RAY EXAM CHEST 1 VIEW: CPT

## 2018-08-24 PROCEDURE — 82962 GLUCOSE BLOOD TEST: CPT

## 2018-08-24 PROCEDURE — 82803 BLOOD GASES ANY COMBINATION: CPT

## 2018-08-24 PROCEDURE — 36415 COLL VENOUS BLD VENIPUNCTURE: CPT

## 2018-08-24 PROCEDURE — 93306 TTE W/DOPPLER COMPLETE: CPT | Mod: 26

## 2018-08-24 PROCEDURE — 96374 THER/PROPH/DIAG INJ IV PUSH: CPT | Mod: XU

## 2018-08-24 PROCEDURE — 85730 THROMBOPLASTIN TIME PARTIAL: CPT

## 2018-08-24 PROCEDURE — 93005 ELECTROCARDIOGRAM TRACING: CPT

## 2018-08-24 PROCEDURE — 80053 COMPREHEN METABOLIC PANEL: CPT

## 2018-08-24 PROCEDURE — 83880 ASSAY OF NATRIURETIC PEPTIDE: CPT

## 2018-08-24 PROCEDURE — 85025 COMPLETE CBC W/AUTO DIFF WBC: CPT

## 2018-08-24 PROCEDURE — 88305 TISSUE EXAM BY PATHOLOGIST: CPT

## 2018-08-24 PROCEDURE — 49406 IMAGE CATH FLUID PERI/RETRO: CPT

## 2018-08-24 PROCEDURE — 84484 ASSAY OF TROPONIN QUANT: CPT

## 2018-08-24 PROCEDURE — 85027 COMPLETE CBC AUTOMATED: CPT

## 2018-08-24 PROCEDURE — 84100 ASSAY OF PHOSPHORUS: CPT

## 2018-08-24 PROCEDURE — 93306 TTE W/DOPPLER COMPLETE: CPT

## 2018-08-24 PROCEDURE — 99238 HOSP IP/OBS DSCHRG MGMT 30/<: CPT

## 2018-08-24 PROCEDURE — 84702 CHORIONIC GONADOTROPIN TEST: CPT

## 2018-08-24 PROCEDURE — 80048 BASIC METABOLIC PNL TOTAL CA: CPT

## 2018-08-24 PROCEDURE — 99285 EMERGENCY DEPT VISIT HI MDM: CPT | Mod: 25

## 2018-08-24 PROCEDURE — 85610 PROTHROMBIN TIME: CPT

## 2018-08-24 PROCEDURE — 87070 CULTURE OTHR SPECIMN AEROBIC: CPT

## 2018-08-24 PROCEDURE — 97162 PT EVAL MOD COMPLEX 30 MIN: CPT

## 2018-08-24 PROCEDURE — 80061 LIPID PANEL: CPT

## 2018-08-24 RX ORDER — INSULIN LISPRO 100/ML
0 VIAL (ML) SUBCUTANEOUS
Qty: 0 | Refills: 0 | DISCHARGE
Start: 2018-08-24

## 2018-08-24 RX ORDER — IPRATROPIUM/ALBUTEROL SULFATE 18-103MCG
3 AEROSOL WITH ADAPTER (GRAM) INHALATION
Qty: 0 | Refills: 0 | DISCHARGE
Start: 2018-08-24

## 2018-08-24 RX ORDER — INSULIN GLARGINE 100 [IU]/ML
13 INJECTION, SOLUTION SUBCUTANEOUS
Qty: 0 | Refills: 0 | COMMUNITY

## 2018-08-24 RX ADMIN — ENOXAPARIN SODIUM 60 MILLIGRAM(S): 100 INJECTION SUBCUTANEOUS at 06:44

## 2018-08-24 RX ADMIN — Medication 80 MILLIGRAM(S): at 06:44

## 2018-08-24 RX ADMIN — LOSARTAN POTASSIUM 25 MILLIGRAM(S): 100 TABLET, FILM COATED ORAL at 06:44

## 2018-08-24 RX ADMIN — Medication 1: at 12:25

## 2018-08-24 RX ADMIN — Medication 1: at 07:29

## 2018-08-24 NOTE — DISCHARGE NOTE ADULT - HOSPITAL COURSE
43yoF with PMH of DM2(insulin/metformin) , HTN(Norvasc, Losartsan), HLD, CHF (Lasix), Severe morbid Obesity, MANUEL (On home 24/7 3-4L NC &  night CPAP 12/5), obesity hypoventilation syndrome presenting on 8/13/18 with progressively increasing SOB of 3-4 months and b/l LE swelling causing her to be bedridden, admitted to [facility] for acute on chronic resp failure. ABG was found to show Compensated Respiratory Acidosis. Pt was found to be lethargic with continued SOB, and ABG illustrated hypoxemia pO2 58 and worsening respiratory acidosis while on BIPAP overnight. BIPAP settings were increased and ICU was consulted for Hypoxemia and continued respiratory decline. CT angio chest negative for PE. Transferred to [facility] for continued respiratory monitoring.  On [facility], patient continued on aggressive diuresis for CHF exacerbation, remained on 24hr BiPAP until improved hypercapnia demonstrated on ABG and VBG and patient with improved mental status. Of note, TTE demonstrated known mod-large pericardial effusion with evidence of pulsus paradoxus. Pt had IR drainage 1L and drain was placed which drained an additional 600 ccs of pericardial fluid. Cxs of the fluid were negative and drain was removed 8/24. Patient weaned to NC 4L O2 during day and BiPAP at night. Repeat echo today shows no increase in pericardial fluid s/p drain removal. Pt is stable for D/C to bariatric rehab. 43yoF with PMH of DM2(insulin/metformin) , HTN(Norvasc, Losartsan), HLD, CHF (Lasix), Severe morbid Obesity, MANUEL (On home 24/7 3-4L NC &  night CPAP 12/5), obesity hypoventilation syndrome presenting on 8/13/18 with progressively increasing SOB of 3-4 months and b/l LE swelling causing her to be bedridden, admitted to [facility] for acute on chronic resp failure. ABG was found to show Compensated Respiratory Acidosis. Pt was found to be lethargic with continued SOB, and ABG illustrated hypoxemia pO2 58 and worsening respiratory acidosis while on BIPAP overnight. BIPAP settings were increased and ICU was consulted for Hypoxemia and continued respiratory decline. CT angio chest negative for PE. Transferred to [facility] for continued respiratory monitoring.  On 7 [facility], patient continued on aggressive diuresis for CHF exacerbation, remained on 24hr BiPAP until improved hypercapnia demonstrated on ABG and VBG and patient with improved mental status. Of note, TTE demonstrated known mod-large pericardial effusion with evidence of pulsus paradoxus. Pt had IR drainage 1L and drain was placed which drained an additional 600 ccs of pericardial fluid. Cxs of the fluid were negative and drain was removed 8/24. Patient weaned to NC 4L O2 during day and BiPAP at night. Repeat echo today shows no increase in pericardial fluid s/p drain removal. Pt is stable for D/C to bariatric rehab.

## 2018-08-24 NOTE — DISCHARGE NOTE ADULT - PATIENT PORTAL LINK FT
You can access the [facility] Patient Portal, offered by [facility], by registering with the following website: [email or web address]

## 2018-08-24 NOTE — DISCHARGE NOTE ADULT - MEDICATION SUMMARY - MEDICATIONS TO TAKE
I will START or STAY ON the medications listed below when I get home from the hospital:    losartan 25 mg oral tablet  -- 1 tab(s) by mouth once a day  -- Indication: For Essential hypertension    metFORMIN 1000 mg oral tablet  -- 1 tab(s) by mouth 2 times a day  -- Indication: For Type 2 diabetes mellitus without complication, with long-term current use of insulin    insulin lispro  -- 1 Unit(s) if Glucose 151 - 200  2 Unit(s) if Glucose 201 - 250  3 Unit(s) if Glucose 251 - 300  4 Unit(s) if Glucose 301 - 350  5 Unit(s) if Glucose 351 - 400  6 Unit(s) if Glucose Greater Than 400  3 times a day before meals  -- Indication: For Type 2 diabetes mellitus without complication, with long-term current use of insulin    insulin lispro  -- 0 Unit(s) if Glucose 0 - 250  1 Unit(s) if Glucose 251 - 300  2 Unit(s) if Glucose 301 - 350  3 Unit(s) if Glucose 351 - 400  4 Unit(s) if Glucose Greater Than 400     at bedtime  -- Indication: For Type 2 diabetes mellitus without complication, with long-term current use of insulin    atorvastatin 80 mg oral tablet  -- 1 tab(s) by mouth once a day  -- Indication: For Hyperlipidemia    ipratropium-albuterol 0.5 mg-2.5 mg/3 mLinhalation solution  -- 3 milliliter(s) inhaled every 6 hours, As needed, Shortness of Breath  -- Indication: For Acute on chronic respiratory failure    amLODIPine 5 mg oral tablet  -- 1 tab(s) by mouth once a day  -- Indication: For Essential hypertension    Lasix 80 mg oral tablet  -- 1 tab(s) by mouth every 12 hours  -- Indication: For Acute on chronic congestive heart failure, unspecified heart failure type    Zantac 150 150 mg oral tablet  -- 1 tab(s) by mouth 2 times a day, As Needed  -- It is very important that you take or use this exactly as directed.  Do not skip doses or discontinue unless directed by your doctor.  Obtain medical advice before taking any non-prescription drugs as some may affect the action of this medication.    -- Indication: For Nutrition, metabolism, and development symptoms    potassium chloride 20 mEq oral tablet, extended release  -- 1 tab(s) by mouth once a day  -- Indication: For Nutrition, metabolism, and development symptoms

## 2018-08-24 NOTE — DISCHARGE NOTE ADULT - SECONDARY DIAGNOSIS.
Obstructive sleep apnea syndrome Acute on chronic congestive heart failure, unspecified heart failure type Pericarditis, constrictive Type 2 diabetes mellitus without complication, with long-term current use of insulin Morbid obesity Essential hypertension

## 2018-08-24 NOTE — DISCHARGE NOTE ADULT - CARE PROVIDER_API CALL
Sheridan Luo), Critical Care Medicine; Pulmonary Disease  [address], [place] [address]  Phone: [phone]  Fax: [phone]

## 2018-08-24 NOTE — DISCHARGE NOTE ADULT - PLAN OF CARE
Prevent recurrence You were found to have fluid around your heart that was causing problems with its pumping. You had a procedure to You came in on lantus and metformin. In the hospital you were on a diet and only receiving a sliding scale of humalog insulin and your sugars were well controlled. You may continue the metformin at discharge without continuation of the lantus, and instead continue with sliding scale as needed in rehab. Please follow up for changes in insulin needs. Your plan is to go to rehab to lose weight which will help with your breathing and functional status Continue with home medications You came in for increasing shortness of breath for 3-4 months and were found to be in respiratory failure. You were placed on bipap 24 hours per day and diuresed with lasix. You were clinically improving and bipap was switched to just at night with oxygen during the day. Your oxygen requirements were initially 6L but we were able to decrease it to 4L before discharge. If you have any further shortness of breath or increasing volume status, please see your doctor or return to the ED. You have obstructive sleep apnea. Please continue using bipap at night. You have heart failure with preserved ejection fraction. You were found to be fluid overloaded. You received lasix IV with positive effect and were diuresed appropriately. You were transitioned back to oral lasix and should continue on that as an outpatient. If you have any further swelling or shortness of breath, please come back to the ED immediately You were found to have fluid around your heart that was causing problems with its pumping. You had a procedure to remove the fluid. It removed 1 liter initially and then an additional 600 mls with the drain. The drain was removed 8/23. You got an echo 8/24 to assess for reaccumulation of the fluid which was negative. If you have any further chest pain please come to the ED immediately You came in on lantus and metformin. In the hospital you were on a diet and only receiving a sliding scale of humalog insulin and your sugars were well controlled. You may continue the metformin at discharge without continuation of the lantus, and instead continue with sliding scale as needed in rehab. Please follow up for changes in insulin needs.    You have a diagnosis of type 2 diabetes (sometimes called type 2 "diabetes mellitus"). This is a disorder that disrupts the way your body uses sugar. All the cells in your body need sugar to work normally. Sugar gets into the cells with the help of a hormone called insulin. If there is not enough insulin, or if the body stops responding to insulin, sugar builds up in the blood. That is what happens to people with diabetes. Type 2 diabetes usually causes no symptoms. When symptoms do occur, they include the need to urinate often, intense thirst and blurry vision. Even though type 2 diabetes might not make you feel sick, it can cause serious problems over time, if it is not treated. The disorder can lead to heart attacks, strokes, kidney disease, vision problems (or even blindness), pain or loss of feeling in the hands and feet, and the need to have fingers, toes, or other body parts removed (amputated). In addition to maintaining an active lifestyle, losing weight, eating right, and not smoking it is important to take your diabetes medications as directed to control your blood sugar and prevent the possible complications from this disease.

## 2018-08-24 NOTE — DISCHARGE NOTE ADULT - COMMUNITY RESOURCES
Discharge to: [facility]ZMAN, [address] [place] [address]. p: [phone].

## 2018-08-24 NOTE — DISCHARGE NOTE ADULT - MEDICATION SUMMARY - MEDICATIONS TO STOP TAKING
I will STOP taking the medications listed below when I get home from the hospital:    Lantus 100 units/mL subcutaneous solution  -- 13 unit(s) subcutaneous once a day (at bedtime)

## 2018-08-24 NOTE — DISCHARGE NOTE ADULT - CARE PLAN
Principal Discharge DX:	Acute on chronic respiratory failure  Goal:	Prevent recurrence  Secondary Diagnosis:	Obstructive sleep apnea syndrome  Secondary Diagnosis:	Acute on chronic congestive heart failure, unspecified heart failure type  Secondary Diagnosis:	Pericarditis, constrictive  Secondary Diagnosis:	Type 2 diabetes mellitus without complication, with long-term current use of insulin  Secondary Diagnosis:	Morbid obesity  Secondary Diagnosis:	Essential hypertension Principal Discharge DX:	Acute on chronic respiratory failure  Goal:	Prevent recurrence  Secondary Diagnosis:	Obstructive sleep apnea syndrome  Secondary Diagnosis:	Acute on chronic congestive heart failure, unspecified heart failure type  Secondary Diagnosis:	Pericarditis, constrictive  Assessment and plan of treatment:	You were found to have fluid around your heart that was causing problems with its pumping. You had a procedure to  Secondary Diagnosis:	Type 2 diabetes mellitus without complication, with long-term current use of insulin  Assessment and plan of treatment:	You came in on lantus and metformin. In the hospital you were on a diet and only receiving a sliding scale of humalog insulin and your sugars were well controlled. You may continue the metformin at discharge without continuation of the lantus, and instead continue with sliding scale as needed in rehab. Please follow up for changes in insulin needs.  Secondary Diagnosis:	Morbid obesity  Assessment and plan of treatment:	Your plan is to go to rehab to lose weight which will help with your breathing and functional status  Secondary Diagnosis:	Essential hypertension  Assessment and plan of treatment:	Continue with home medications Principal Discharge DX:	Acute on chronic respiratory failure  Goal:	Prevent recurrence  Assessment and plan of treatment:	You came in for increasing shortness of breath for 3-4 months and were found to be in respiratory failure. You were placed on bipap 24 hours per day and diuresed with lasix. You were clinically improving and bipap was switched to just at night with oxygen during the day. Your oxygen requirements were initially 6L but we were able to decrease it to 4L before discharge. If you have any further shortness of breath or increasing volume status, please see your doctor or return to the ED.  Secondary Diagnosis:	Obstructive sleep apnea syndrome  Assessment and plan of treatment:	You have obstructive sleep apnea. Please continue using bipap at night.  Secondary Diagnosis:	Acute on chronic congestive heart failure, unspecified heart failure type  Assessment and plan of treatment:	You have heart failure with preserved ejection fraction. You were found to be fluid overloaded. You received lasix IV with positive effect and were diuresed appropriately. You were transitioned back to oral lasix and should continue on that as an outpatient. If you have any further swelling or shortness of breath, please come back to the ED immediately  Secondary Diagnosis:	Pericarditis, constrictive  Assessment and plan of treatment:	You were found to have fluid around your heart that was causing problems with its pumping. You had a procedure to remove the fluid. It removed 1 liter initially and then an additional 600 mls with the drain. The drain was removed 8/23. You got an echo 8/24 to assess for reaccumulation of the fluid which was negative. If you have any further chest pain please come to the ED immediately  Secondary Diagnosis:	Type 2 diabetes mellitus without complication, with long-term current use of insulin  Assessment and plan of treatment:	You came in on lantus and metformin. In the hospital you were on a diet and only receiving a sliding scale of humalog insulin and your sugars were well controlled. You may continue the metformin at discharge without continuation of the lantus, and instead continue with sliding scale as needed in rehab. Please follow up for changes in insulin needs.    You have a diagnosis of type 2 diabetes (sometimes called type 2 "diabetes mellitus"). This is a disorder that disrupts the way your body uses sugar. All the cells in your body need sugar to work normally. Sugar gets into the cells with the help of a hormone called insulin. If there is not enough insulin, or if the body stops responding to insulin, sugar builds up in the blood. That is what happens to people with diabetes. Type 2 diabetes usually causes no symptoms. When symptoms do occur, they include the need to urinate often, intense thirst and blurry vision. Even though type 2 diabetes might not make you feel sick, it can cause serious problems over time, if it is not treated. The disorder can lead to heart attacks, strokes, kidney disease, vision problems (or even blindness), pain or loss of feeling in the hands and feet, and the need to have fingers, toes, or other body parts removed (amputated). In addition to maintaining an active lifestyle, losing weight, eating right, and not smoking it is important to take your diabetes medications as directed to control your blood sugar and prevent the possible complications from this disease.  Secondary Diagnosis:	Morbid obesity  Assessment and plan of treatment:	Your plan is to go to rehab to lose weight which will help with your breathing and functional status  Secondary Diagnosis:	Essential hypertension  Assessment and plan of treatment:	Continue with home medications

## 2018-08-28 DIAGNOSIS — R60.0 LOCALIZED EDEMA: ICD-10-CM

## 2018-08-28 DIAGNOSIS — E66.01 MORBID (SEVERE) OBESITY DUE TO EXCESS CALORIES: ICD-10-CM

## 2018-08-28 DIAGNOSIS — E66.2 MORBID (SEVERE) OBESITY WITH ALVEOLAR HYPOVENTILATION: ICD-10-CM

## 2018-08-28 DIAGNOSIS — I31.1 CHRONIC CONSTRICTIVE PERICARDITIS: ICD-10-CM

## 2018-08-28 DIAGNOSIS — E87.2 ACIDOSIS: ICD-10-CM

## 2018-08-28 DIAGNOSIS — I27.20 PULMONARY HYPERTENSION, UNSPECIFIED: ICD-10-CM

## 2018-08-28 DIAGNOSIS — J96.21 ACUTE AND CHRONIC RESPIRATORY FAILURE WITH HYPOXIA: ICD-10-CM

## 2018-08-28 DIAGNOSIS — I31.3 PERICARDIAL EFFUSION (NONINFLAMMATORY): ICD-10-CM

## 2018-08-28 DIAGNOSIS — I11.0 HYPERTENSIVE HEART DISEASE WITH HEART FAILURE: ICD-10-CM

## 2018-08-28 DIAGNOSIS — J96.22 ACUTE AND CHRONIC RESPIRATORY FAILURE WITH HYPERCAPNIA: ICD-10-CM

## 2018-08-28 DIAGNOSIS — R06.02 SHORTNESS OF BREATH: ICD-10-CM

## 2018-08-28 DIAGNOSIS — Z91.013 ALLERGY TO SEAFOOD: ICD-10-CM

## 2018-08-28 DIAGNOSIS — I50.33 ACUTE ON CHRONIC DIASTOLIC (CONGESTIVE) HEART FAILURE: ICD-10-CM

## 2018-08-28 DIAGNOSIS — G47.33 OBSTRUCTIVE SLEEP APNEA (ADULT) (PEDIATRIC): ICD-10-CM

## 2018-08-28 DIAGNOSIS — E78.5 HYPERLIPIDEMIA, UNSPECIFIED: ICD-10-CM

## 2018-08-28 DIAGNOSIS — E11.9 TYPE 2 DIABETES MELLITUS WITHOUT COMPLICATIONS: ICD-10-CM

## 2018-10-30 NOTE — ED PROVIDER NOTE - CADM POA CENTRAL LINE
Patient screened for discharge needs, no needs identified at this time, however CM available if needs arise.
No

## 2018-11-26 ENCOUNTER — APPOINTMENT (OUTPATIENT)
Dept: INTERNAL MEDICINE | Facility: CLINIC | Age: 43
End: 2018-11-26

## 2018-11-27 ENCOUNTER — MEDICATION RENEWAL (OUTPATIENT)
Age: 43
End: 2018-11-27

## 2018-11-28 ENCOUNTER — OTHER (OUTPATIENT)
Age: 43
End: 2018-11-28

## 2018-12-07 ENCOUNTER — APPOINTMENT (OUTPATIENT)
Dept: INTERNAL MEDICINE | Facility: CLINIC | Age: 43
End: 2018-12-07

## 2018-12-14 ENCOUNTER — LABORATORY RESULT (OUTPATIENT)
Age: 43
End: 2018-12-14

## 2018-12-14 ENCOUNTER — APPOINTMENT (OUTPATIENT)
Dept: INTERNAL MEDICINE | Facility: CLINIC | Age: 43
End: 2018-12-14
Payer: MEDICAID

## 2018-12-14 VITALS
TEMPERATURE: 97.8 F | HEART RATE: 97 BPM | WEIGHT: 293 LBS | BODY MASS INDEX: 46.53 KG/M2 | DIASTOLIC BLOOD PRESSURE: 72 MMHG | HEIGHT: 66.5 IN | SYSTOLIC BLOOD PRESSURE: 105 MMHG | OXYGEN SATURATION: 95 %

## 2018-12-14 PROCEDURE — 36415 COLL VENOUS BLD VENIPUNCTURE: CPT

## 2018-12-14 PROCEDURE — 99214 OFFICE O/P EST MOD 30 MIN: CPT | Mod: GC,25

## 2018-12-18 ENCOUNTER — RX RENEWAL (OUTPATIENT)
Age: 43
End: 2018-12-18

## 2018-12-24 ENCOUNTER — OTHER (OUTPATIENT)
Age: 43
End: 2018-12-24

## 2018-12-24 LAB
ANION GAP SERPL CALC-SCNC: 10 MMOL/L
BASOPHILS # BLD AUTO: 0.02 K/UL
BASOPHILS NFR BLD AUTO: 0.3 %
BUN SERPL-MCNC: 14 MG/DL
CALCIUM SERPL-MCNC: 9.4 MG/DL
CHLORIDE SERPL-SCNC: 97 MMOL/L
CO2 SERPL-SCNC: 39 MMOL/L
CREAT SERPL-MCNC: 0.68 MG/DL
EOSINOPHIL # BLD AUTO: 0.13 K/UL
EOSINOPHIL NFR BLD AUTO: 1.9 %
GLUCOSE SERPL-MCNC: 139 MG/DL
HBA1C MFR BLD HPLC: 6.3 %
HCT VFR BLD CALC: 35.3 %
HGB BLD-MCNC: 9.3 G/DL
IMM GRANULOCYTES NFR BLD AUTO: 0.3 %
LYMPHOCYTES # BLD AUTO: 1.42 K/UL
LYMPHOCYTES NFR BLD AUTO: 20.4 %
MAN DIFF?: NORMAL
MCHC RBC-ENTMCNC: 25.3 PG
MCHC RBC-ENTMCNC: 26.3 GM/DL
MCV RBC AUTO: 95.9 FL
MONOCYTES # BLD AUTO: 0.69 K/UL
MONOCYTES NFR BLD AUTO: 9.9 %
NEUTROPHILS # BLD AUTO: 4.69 K/UL
NEUTROPHILS NFR BLD AUTO: 67.2 %
PLATELET # BLD AUTO: 257 K/UL
POTASSIUM SERPL-SCNC: 4 MMOL/L
RBC # BLD: 3.68 M/UL
RBC # FLD: 16.9 %
SODIUM SERPL-SCNC: 146 MMOL/L
WBC # FLD AUTO: 6.97 K/UL

## 2019-01-10 ENCOUNTER — MESSAGE (OUTPATIENT)
Age: 44
End: 2019-01-10

## 2019-01-14 ENCOUNTER — APPOINTMENT (OUTPATIENT)
Dept: INTERNAL MEDICINE | Facility: CLINIC | Age: 44
End: 2019-01-14
Payer: MEDICAID

## 2019-01-14 VITALS
DIASTOLIC BLOOD PRESSURE: 76 MMHG | SYSTOLIC BLOOD PRESSURE: 115 MMHG | WEIGHT: 293 LBS | BODY MASS INDEX: 46.53 KG/M2 | TEMPERATURE: 97.7 F | HEART RATE: 94 BPM | OXYGEN SATURATION: 89 % | HEIGHT: 66.5 IN

## 2019-01-14 DIAGNOSIS — Z80.3 FAMILY HISTORY OF MALIGNANT NEOPLASM OF BREAST: ICD-10-CM

## 2019-01-14 PROCEDURE — 36415 COLL VENOUS BLD VENIPUNCTURE: CPT

## 2019-01-14 PROCEDURE — 99214 OFFICE O/P EST MOD 30 MIN: CPT | Mod: 25,GC

## 2019-01-14 RX ORDER — GUAIFENESIN/DEXTROMETHORPHAN 100-10MG/5
100-10 LIQUID (ML) ORAL EVERY 4 HOURS
Qty: 300 | Refills: 0 | Status: DISCONTINUED | COMMUNITY
Start: 2018-03-02 | End: 2019-01-14

## 2019-01-14 RX ORDER — INSULIN GLARGINE 100 [IU]/ML
100 INJECTION, SOLUTION SUBCUTANEOUS
Qty: 1 | Refills: 3 | Status: DISCONTINUED | COMMUNITY
Start: 2017-11-28 | End: 2019-01-14

## 2019-01-14 RX ORDER — CHROMIUM 200 MCG
1000 TABLET ORAL
Qty: 90 | Refills: 3 | Status: DISCONTINUED | COMMUNITY
Start: 2017-09-26 | End: 2019-01-14

## 2019-01-22 LAB
BASOPHILS # BLD AUTO: 0.01 K/UL
BASOPHILS NFR BLD AUTO: 0.1 %
EOSINOPHIL # BLD AUTO: 0.12 K/UL
EOSINOPHIL NFR BLD AUTO: 1.5 %
FERRITIN SERPL-MCNC: 189 NG/ML
FOLATE SERPL-MCNC: 11 NG/ML
HCT VFR BLD CALC: 32.9 %
HGB BLD-MCNC: 9.1 G/DL
IMM GRANULOCYTES NFR BLD AUTO: 0.3 %
IRON SATN MFR SERPL: 16 %
IRON SERPL-MCNC: 47 UG/DL
LYMPHOCYTES # BLD AUTO: 1.77 K/UL
LYMPHOCYTES NFR BLD AUTO: 22.8 %
MAN DIFF?: NORMAL
MCHC RBC-ENTMCNC: 25.5 PG
MCHC RBC-ENTMCNC: 27.7 GM/DL
MCV RBC AUTO: 92.2 FL
MONOCYTES # BLD AUTO: 0.56 K/UL
MONOCYTES NFR BLD AUTO: 7.2 %
NEUTROPHILS # BLD AUTO: 5.3 K/UL
NEUTROPHILS NFR BLD AUTO: 68.1 %
PLATELET # BLD AUTO: 295 K/UL
RBC # BLD: 3.57 M/UL
RBC # FLD: 15.9 %
TIBC SERPL-MCNC: 303 UG/DL
TRANSFERRIN SERPL-MCNC: 219 MG/DL
UIBC SERPL-MCNC: 256 UG/DL
VIT B12 SERPL-MCNC: 362 PG/ML
WBC # FLD AUTO: 7.78 K/UL

## 2019-01-28 ENCOUNTER — RX RENEWAL (OUTPATIENT)
Age: 44
End: 2019-01-28

## 2019-01-31 ENCOUNTER — RX RENEWAL (OUTPATIENT)
Age: 44
End: 2019-01-31

## 2019-02-14 ENCOUNTER — OTHER (OUTPATIENT)
Age: 44
End: 2019-02-14

## 2019-03-22 ENCOUNTER — APPOINTMENT (OUTPATIENT)
Dept: INTERNAL MEDICINE | Facility: CLINIC | Age: 44
End: 2019-03-22

## 2019-04-02 ENCOUNTER — RX RENEWAL (OUTPATIENT)
Age: 44
End: 2019-04-02

## 2019-04-05 ENCOUNTER — LABORATORY RESULT (OUTPATIENT)
Age: 44
End: 2019-04-05

## 2019-04-05 ENCOUNTER — RESULT CHARGE (OUTPATIENT)
Age: 44
End: 2019-04-05

## 2019-04-05 ENCOUNTER — RX RENEWAL (OUTPATIENT)
Age: 44
End: 2019-04-05

## 2019-04-05 ENCOUNTER — APPOINTMENT (OUTPATIENT)
Dept: INTERNAL MEDICINE | Facility: CLINIC | Age: 44
End: 2019-04-05
Payer: MEDICARE

## 2019-04-05 DIAGNOSIS — N92.0 EXCESSIVE AND FREQUENT MENSTRUATION WITH REGULAR CYCLE: ICD-10-CM

## 2019-04-05 DIAGNOSIS — D64.9 ANEMIA, UNSPECIFIED: ICD-10-CM

## 2019-04-05 LAB — GLUCOSE BLDC GLUCOMTR-MCNC: 114

## 2019-04-05 PROCEDURE — 36415 COLL VENOUS BLD VENIPUNCTURE: CPT

## 2019-04-05 PROCEDURE — 99214 OFFICE O/P EST MOD 30 MIN: CPT | Mod: GC,25

## 2019-04-05 PROCEDURE — 82962 GLUCOSE BLOOD TEST: CPT

## 2019-04-06 PROBLEM — N92.0 MENORRHAGIA WITH REGULAR CYCLE: Status: ACTIVE | Noted: 2019-01-14

## 2019-04-09 LAB
ANION GAP SERPL CALC-SCNC: 10 MMOL/L
BASOPHILS # BLD AUTO: 0.02 K/UL
BASOPHILS NFR BLD AUTO: 0.2 %
BUN SERPL-MCNC: 12 MG/DL
CALCIUM SERPL-MCNC: 9.9 MG/DL
CHLORIDE SERPL-SCNC: 94 MMOL/L
CO2 SERPL-SCNC: 37 MMOL/L
CREAT SERPL-MCNC: 0.71 MG/DL
EOSINOPHIL # BLD AUTO: 0.19 K/UL
EOSINOPHIL NFR BLD AUTO: 2.2 %
GLUCOSE SERPL-MCNC: 115 MG/DL
HCT VFR BLD CALC: 36.7 %
HGB BLD-MCNC: 9.9 G/DL
IMM GRANULOCYTES NFR BLD AUTO: 0.3 %
LYMPHOCYTES # BLD AUTO: 1.63 K/UL
LYMPHOCYTES NFR BLD AUTO: 18.8 %
MAN DIFF?: NORMAL
MCHC RBC-ENTMCNC: 25.7 PG
MCHC RBC-ENTMCNC: 27 GM/DL
MCV RBC AUTO: 95.3 FL
MONOCYTES # BLD AUTO: 0.66 K/UL
MONOCYTES NFR BLD AUTO: 7.6 %
NEUTROPHILS # BLD AUTO: 6.12 K/UL
NEUTROPHILS NFR BLD AUTO: 70.9 %
PLATELET # BLD AUTO: 288 K/UL
POTASSIUM SERPL-SCNC: 4.3 MMOL/L
RBC # BLD: 3.85 M/UL
RBC # FLD: 15.2 %
SODIUM SERPL-SCNC: 141 MMOL/L
WBC # FLD AUTO: 8.65 K/UL

## 2019-05-06 ENCOUNTER — APPOINTMENT (OUTPATIENT)
Dept: PULMONOLOGY | Facility: CLINIC | Age: 44
End: 2019-05-06

## 2019-05-13 ENCOUNTER — RX RENEWAL (OUTPATIENT)
Age: 44
End: 2019-05-13

## 2019-05-14 ENCOUNTER — RX RENEWAL (OUTPATIENT)
Age: 44
End: 2019-05-14

## 2019-05-16 ENCOUNTER — APPOINTMENT (OUTPATIENT)
Dept: PULMONOLOGY | Facility: CLINIC | Age: 44
End: 2019-05-16
Payer: MEDICARE

## 2019-05-16 VITALS
TEMPERATURE: 98 F | BODY MASS INDEX: 46.53 KG/M2 | HEART RATE: 105 BPM | OXYGEN SATURATION: 92 % | HEIGHT: 66.5 IN | SYSTOLIC BLOOD PRESSURE: 125 MMHG | DIASTOLIC BLOOD PRESSURE: 85 MMHG | WEIGHT: 293 LBS

## 2019-05-16 DIAGNOSIS — R06.02 SHORTNESS OF BREATH: ICD-10-CM

## 2019-05-16 PROCEDURE — 99214 OFFICE O/P EST MOD 30 MIN: CPT | Mod: 25

## 2019-05-16 PROCEDURE — 94010 BREATHING CAPACITY TEST: CPT

## 2019-05-16 PROCEDURE — 94729 DIFFUSING CAPACITY: CPT

## 2019-05-16 PROCEDURE — 94727 GAS DIL/WSHOT DETER LNG VOL: CPT

## 2019-05-16 RX ORDER — ALBUTEROL SULFATE 90 UG/1
108 (90 BASE) AEROSOL, METERED RESPIRATORY (INHALATION)
Qty: 1 | Refills: 11 | Status: ACTIVE | COMMUNITY
Start: 2019-05-16

## 2019-05-17 DIAGNOSIS — K11.5 SIALOLITHIASIS: ICD-10-CM

## 2019-05-22 ENCOUNTER — APPOINTMENT (OUTPATIENT)
Dept: PULMONOLOGY | Facility: CLINIC | Age: 44
End: 2019-05-22

## 2019-06-07 ENCOUNTER — APPOINTMENT (OUTPATIENT)
Dept: INTERNAL MEDICINE | Facility: CLINIC | Age: 44
End: 2019-06-07

## 2019-06-10 NOTE — ED PROVIDER NOTE - PRINCIPAL DIAGNOSIS
Patient returned writer's call. Discussed with patient the integration of behavioral health services into primary care and provision of short-term treatment interventions. Patient expressed interest in being connected with a longer term therapy provider she was provided with a referral to [facility], as well as [facility] ([phone]) and [facility] ([phone]). She acknowledged suicidal ideation, but denied any planning or intent. She agreed to call writer back with any further questions or concerns.  
Primary Care/Behavioral Health Integration - Program Introduction    Patient referred to behavioral health by her primary care physician, Katy Tse DO for concerns related to depression and anxiety. Attempted to contact patient to discuss the integration of behavioral health services into the primary care clinic and the provision of care coordination and/or short-term, brief interventions to improve overall health and functioning. Left a brief message with contact information and requested a callback at her earliest convenience.       
Shortness of breath

## 2019-06-24 ENCOUNTER — APPOINTMENT (OUTPATIENT)
Dept: INTERNAL MEDICINE | Facility: CLINIC | Age: 44
End: 2019-06-24

## 2019-06-28 ENCOUNTER — APPOINTMENT (OUTPATIENT)
Dept: ORTHOPEDIC SURGERY | Facility: CLINIC | Age: 44
End: 2019-06-28

## 2019-07-01 ENCOUNTER — RX RENEWAL (OUTPATIENT)
Age: 44
End: 2019-07-01

## 2019-07-19 ENCOUNTER — INBOUND DOCUMENT (OUTPATIENT)
Age: 44
End: 2019-07-19

## 2019-08-16 ENCOUNTER — RX RENEWAL (OUTPATIENT)
Age: 44
End: 2019-08-16

## 2019-08-20 ENCOUNTER — APPOINTMENT (OUTPATIENT)
Dept: INTERNAL MEDICINE | Facility: CLINIC | Age: 44
End: 2019-08-20

## 2019-08-20 NOTE — DISCHARGE NOTE ADULT - NS TRANSFER PATIENT BELONGINGS
denies smoking, drug abuse. +drinking.
Electronic Device (specify)/Clothing/Cell Phone/PDA (specify)

## 2019-08-22 ENCOUNTER — APPOINTMENT (OUTPATIENT)
Dept: PULMONOLOGY | Facility: CLINIC | Age: 44
End: 2019-08-22

## 2019-08-23 ENCOUNTER — RX RENEWAL (OUTPATIENT)
Age: 44
End: 2019-08-23

## 2019-10-07 ENCOUNTER — MEDICATION RENEWAL (OUTPATIENT)
Age: 44
End: 2019-10-07

## 2019-10-16 ENCOUNTER — FORM ENCOUNTER (OUTPATIENT)
Age: 44
End: 2019-10-16

## 2019-10-17 ENCOUNTER — APPOINTMENT (OUTPATIENT)
Dept: PULMONOLOGY | Facility: CLINIC | Age: 44
End: 2019-10-17
Payer: MEDICARE

## 2019-10-17 ENCOUNTER — OUTPATIENT (OUTPATIENT)
Dept: OUTPATIENT SERVICES | Facility: HOSPITAL | Age: 44
LOS: 1 days | End: 2019-10-17
Payer: MEDICARE

## 2019-10-17 VITALS
HEART RATE: 117 BPM | TEMPERATURE: 97.9 F | BODY MASS INDEX: 46.53 KG/M2 | SYSTOLIC BLOOD PRESSURE: 130 MMHG | OXYGEN SATURATION: 84 % | HEIGHT: 66.5 IN | WEIGHT: 293 LBS | DIASTOLIC BLOOD PRESSURE: 70 MMHG

## 2019-10-17 VITALS — HEART RATE: 99 BPM | OXYGEN SATURATION: 90 %

## 2019-10-17 PROCEDURE — 90688 IIV4 VACCINE SPLT 0.5 ML IM: CPT

## 2019-10-17 PROCEDURE — 71046 X-RAY EXAM CHEST 2 VIEWS: CPT | Mod: 26

## 2019-10-17 PROCEDURE — 99214 OFFICE O/P EST MOD 30 MIN: CPT | Mod: 25

## 2019-10-17 PROCEDURE — G0008: CPT

## 2019-10-17 PROCEDURE — 71046 X-RAY EXAM CHEST 2 VIEWS: CPT

## 2019-10-17 NOTE — REVIEW OF SYSTEMS
[Dyspnea] : dyspnea [Recent Wt Gain (___ Lbs)] : recent [unfilled] ~Ulb weight gain [Negative] : Cardiovascular

## 2019-10-17 NOTE — PHYSICAL EXAM
[General Appearance - Well Developed] : well developed [Normal Appearance] : normal appearance [Well Groomed] : well groomed [General Appearance - Well Nourished] : well nourished [General Appearance - In No Acute Distress] : no acute distress [No Deformities] : no deformities [Normal Conjunctiva] : the conjunctiva exhibited no abnormalities [Eyelids - No Xanthelasma] : the eyelids demonstrated no xanthelasmas [Normal Oropharynx] : normal oropharynx [Neck Appearance] : the appearance of the neck was normal [Neck Cervical Mass (___cm)] : no neck mass was observed [Jugular Venous Distention Increased] : there was no jugular-venous distention [Thyroid Diffuse Enlargement] : the thyroid was not enlarged [Thyroid Nodule] : there were no palpable thyroid nodules [Heart Sounds] : normal S1 and S2 [Heart Rate And Rhythm] : heart rate and rhythm were normal [Murmurs] : no murmurs present [Respiration, Rhythm And Depth] : normal respiratory rhythm and effort [Exaggerated Use Of Accessory Muscles For Inspiration] : no accessory muscle use [Auscultation Breath Sounds / Voice Sounds] : lungs were clear to auscultation bilaterally [Abnormal Walk] : normal gait [Gait - Sufficient For Exercise Testing] : the gait was sufficient for exercise testing [Nail Clubbing] : no clubbing of the fingernails [Cyanosis, Localized] : no localized cyanosis [Petechial Hemorrhages (___cm)] : no petechial hemorrhages [] : no ischemic changes [Sensation] : the sensory exam was normal to light touch and pinprick [Deep Tendon Reflexes (DTR)] : deep tendon reflexes were 2+ and symmetric [No Focal Deficits] : no focal deficits

## 2019-10-18 ENCOUNTER — OTHER (OUTPATIENT)
Age: 44
End: 2019-10-18

## 2019-10-22 ENCOUNTER — APPOINTMENT (OUTPATIENT)
Dept: INTERNAL MEDICINE | Facility: CLINIC | Age: 44
End: 2019-10-22
Payer: MEDICARE

## 2019-10-22 VITALS
TEMPERATURE: 99.4 F | OXYGEN SATURATION: 90 % | DIASTOLIC BLOOD PRESSURE: 60 MMHG | HEART RATE: 86 BPM | SYSTOLIC BLOOD PRESSURE: 105 MMHG

## 2019-10-22 DIAGNOSIS — Z99.89 DEPENDENCE ON OTHER ENABLING MACHINES AND DEVICES: ICD-10-CM

## 2019-10-22 DIAGNOSIS — L20.9 ATOPIC DERMATITIS, UNSPECIFIED: ICD-10-CM

## 2019-10-22 DIAGNOSIS — Z74.09 OTHER REDUCED MOBILITY: ICD-10-CM

## 2019-10-22 DIAGNOSIS — R32 UNSPECIFIED URINARY INCONTINENCE: ICD-10-CM

## 2019-10-22 PROCEDURE — 36415 COLL VENOUS BLD VENIPUNCTURE: CPT

## 2019-10-22 PROCEDURE — 99214 OFFICE O/P EST MOD 30 MIN: CPT | Mod: GC,25

## 2019-10-23 ENCOUNTER — MEDICATION RENEWAL (OUTPATIENT)
Age: 44
End: 2019-10-23

## 2019-10-23 ENCOUNTER — INBOUND DOCUMENT (OUTPATIENT)
Age: 44
End: 2019-10-23

## 2019-10-28 LAB
ANION GAP SERPL CALC-SCNC: 14 MMOL/L
BASOPHILS # BLD AUTO: 0.04 K/UL
BASOPHILS NFR BLD AUTO: 0.4 %
BUN SERPL-MCNC: 9 MG/DL
CALCIUM SERPL-MCNC: 9.7 MG/DL
CHLORIDE SERPL-SCNC: 97 MMOL/L
CO2 SERPL-SCNC: 30 MMOL/L
CREAT SERPL-MCNC: 0.72 MG/DL
EOSINOPHIL # BLD AUTO: 0.19 K/UL
EOSINOPHIL NFR BLD AUTO: 2 %
ESTIMATED AVERAGE GLUCOSE: 183 MG/DL
FERRITIN SERPL-MCNC: 81 NG/ML
GLUCOSE SERPL-MCNC: 181 MG/DL
HBA1C MFR BLD HPLC: 8 %
HCT VFR BLD CALC: 37.8 %
HGB BLD-MCNC: 9.9 G/DL
IMM GRANULOCYTES NFR BLD AUTO: 0.2 %
IRON SERPL-MCNC: 35 UG/DL
LYMPHOCYTES # BLD AUTO: 1.91 K/UL
LYMPHOCYTES NFR BLD AUTO: 19.9 %
MAN DIFF?: NORMAL
MCHC RBC-ENTMCNC: 24.8 PG
MCHC RBC-ENTMCNC: 26.2 GM/DL
MCV RBC AUTO: 94.5 FL
MONOCYTES # BLD AUTO: 0.61 K/UL
MONOCYTES NFR BLD AUTO: 6.4 %
NEUTROPHILS # BLD AUTO: 6.81 K/UL
NEUTROPHILS NFR BLD AUTO: 71.1 %
PLATELET # BLD AUTO: 326 K/UL
POTASSIUM SERPL-SCNC: 4.5 MMOL/L
RBC # BLD: 4 M/UL
RBC # FLD: 16.7 %
SODIUM SERPL-SCNC: 141 MMOL/L
WBC # FLD AUTO: 9.58 K/UL

## 2019-10-29 NOTE — HISTORY OF PRESENT ILLNESS
[FreeTextEntry1] : Patient is here for her 6 month follow up. She needs lab work and has medical complaints.  [de-identified] : Patient is a 42yo F w/ extensive PMH of MANUEL (on CPAP qHS, 4L home O2), morbid obesity, HFpEF 55%, Anemia, DM Type 2 presenting for her 6 month follow up. She said she is due for lab work to check her kidneys and HgbA1c. She admits to a rash on her left ankle which started as small tiny bumps and has spread to the top of her foot forming patches. The rash is itchy without redness, inflammation or pain. She used Clotrimazole and bleach on her ankle/foot which did not help. She denies having a previous rash like this in the past. Patient also reports left eye pain with mucus, itchy ears and sore throat since Friday. She received her Flu Shot last Thursday and the following day she developed these symptoms. She has been taking Extra Strength Tylenol with some relief. She feels that her shortness of breath has worsened from baseline. She says it is worse with exertion. Denies fevers, chills, nasal congestion, chest pain, nausea, vomiting, abdominal pain, dysuria, diarrhea, constipation.

## 2019-10-29 NOTE — ASSESSMENT
[FreeTextEntry1] : Patient is a 44yo F w/ extensive PMH of MANUEL (on CPAP qHS, 4L home O2), morbid obesity, HFpEF 55%, Anemia, DM Type 2 presenting for her 6 month follow up. She is due for routine lab work and has upper respiratory symptoms with bacterial conjunctivitis of the left eye. \par \par #Left eye bacterial conjunctivitis\par -Reports red/swollen eye since last Friday. Also developed b/l ear itchiness and sore throat \par -Will start Polytrim eyedrops 1 gtt every 3hr for 7-10 days \par \par #URI \par -Developed URI symptoms the day after receiving her flu shot last week, likely viral in origin \par -Reports ear itchiness with exam showing b/l middle ear erythema with TMIs b/l \par -Symptomatic treatment; Will start acetic acid drops for ears b/l prn \par \par #Atopic Dermatitis \par -Left ankle/foot maculopapular rash with itchiness. Also with left 2cm patches on dorsum of foot.Tried over the counter antifungal cream (Clotrimazole) with no relief. \par -Will start Triamcinolone cream prn for 5-7 days. If no improvement she was told to call or return to the office. Will consider trying antifungal cream or sending to dermatology if no signs/symptoms of improvement. \par \par #DM \par -F/u HgbA1c \par -Continue Metformin 500mg BID \par \par #Anemia \par -F/u CBC, iron, ferritin \par \par #Hypokalemia\par -Take potassium pills\par -F/u BMP\par \par #HFpEF \par -On Furosemide, Amlodipine, Losartan \par -Recently saw Dr. Luo who sent her for an echo. Cardiologist is Dr. Dunn \par \par #Chronic Hypoxemia \par -Likely 2/2 to morbid obesity. Uses oxygen, 4L at home and 2-3L when out of the house. \par -She reporting worsening dyspnea on exertion. Likely related to her URI. She is saturating between 90-94% on 3L O2. Says her baseline O2 sat is 88-89%. \par \par #MANUEL\par -Uses noninvasive ventilator at home to sleep

## 2019-10-29 NOTE — PHYSICAL EXAM
[Ill-Appearing] : ill-appearing [No Acute Distress] : no acute distress [EOMI] : extraocular movements intact [PERRL] : pupils equal round and reactive to light [Normal TMs] : both tympanic membranes were normal [No Lymphadenopathy] : no lymphadenopathy [Normal Outer Ear/Nose] : the outer ears and nose were normal in appearance [No Respiratory Distress] : no respiratory distress  [Supple] : supple [No Accessory Muscle Use] : no accessory muscle use [Clear to Auscultation] : lungs were clear to auscultation bilaterally [Normal Rate] : normal rate  [No Murmur] : no murmur heard [Soft] : abdomen soft [Regular Rhythm] : with a regular rhythm [Normal S1, S2] : normal S1 and S2 [Non Tender] : non-tender [Non-distended] : non-distended [Normal Bowel Sounds] : normal bowel sounds [Normal Posterior Cervical Nodes] : no posterior cervical lymphadenopathy [Normal Anterior Cervical Nodes] : no anterior cervical lymphadenopathy [Normal Affect] : the affect was normal [Normal Insight/Judgement] : insight and judgment were intact [de-identified] : Erythematous and edematous conjunctiva  [de-identified] : erythematous middle ear canal b/l. edematous, erythematous nasal turbinates  [de-identified] : Bilateral edema of LEs [de-identified] : <1cm maculopapular rash at the inner left ankle. Two 2cm hyperpigmented patches on the dorsum of her left foot.

## 2019-10-29 NOTE — REVIEW OF SYSTEMS
[Discharge] : discharge [Pain] : pain [Redness] : redness [Earache] : earache [Sore Throat] : sore throat [Lower Ext Edema] : lower extremity edema [Shortness Of Breath] : shortness of breath [Dyspnea on Exertion] : dyspnea on exertion [Itching] : Itching [Incontinence] : incontinence [Skin Rash] : skin rash [Fever] : no fever [Chills] : no chills [Vision Problems] : no vision problems [Hearing Loss] : no hearing loss [Nasal Discharge] : no nasal discharge [Chest Pain] : no chest pain [Palpitations] : no palpitations [Abdominal Pain] : no abdominal pain [Nausea] : no nausea [Constipation] : no constipation [Diarrhea] : diarrhea [Vomiting] : no vomiting [Dysuria] : no dysuria [Headache] : no headache [Dizziness] : no dizziness

## 2019-11-06 ENCOUNTER — INPATIENT (INPATIENT)
Facility: HOSPITAL | Age: 44
LOS: 5 days | Discharge: HOME HEALTH | DRG: 291 | End: 2019-11-12
Attending: INTERNAL MEDICINE | Admitting: INTERNAL MEDICINE
Payer: COMMERCIAL

## 2019-11-06 VITALS
DIASTOLIC BLOOD PRESSURE: 63 MMHG | OXYGEN SATURATION: 100 % | SYSTOLIC BLOOD PRESSURE: 134 MMHG | RESPIRATION RATE: 22 BRPM | HEART RATE: 97 BPM

## 2019-11-06 DIAGNOSIS — E11.9 TYPE 2 DIABETES MELLITUS WITHOUT COMPLICATIONS: ICD-10-CM

## 2019-11-06 DIAGNOSIS — G47.33 OBSTRUCTIVE SLEEP APNEA (ADULT) (PEDIATRIC): ICD-10-CM

## 2019-11-06 DIAGNOSIS — E66.01 MORBID (SEVERE) OBESITY DUE TO EXCESS CALORIES: ICD-10-CM

## 2019-11-06 DIAGNOSIS — R09.02 HYPOXEMIA: ICD-10-CM

## 2019-11-06 DIAGNOSIS — J96.21 ACUTE AND CHRONIC RESPIRATORY FAILURE WITH HYPOXIA: ICD-10-CM

## 2019-11-06 DIAGNOSIS — I50.32 CHRONIC DIASTOLIC (CONGESTIVE) HEART FAILURE: ICD-10-CM

## 2019-11-06 DIAGNOSIS — E66.2 MORBID (SEVERE) OBESITY WITH ALVEOLAR HYPOVENTILATION: ICD-10-CM

## 2019-11-06 DIAGNOSIS — R63.8 OTHER SYMPTOMS AND SIGNS CONCERNING FOOD AND FLUID INTAKE: ICD-10-CM

## 2019-11-06 DIAGNOSIS — I10 ESSENTIAL (PRIMARY) HYPERTENSION: ICD-10-CM

## 2019-11-06 LAB
ALBUMIN SERPL ELPH-MCNC: 3.9 G/DL — SIGNIFICANT CHANGE UP (ref 3.3–5)
ALP SERPL-CCNC: 82 U/L — SIGNIFICANT CHANGE UP (ref 40–120)
ALT FLD-CCNC: 12 U/L — SIGNIFICANT CHANGE UP (ref 10–45)
ANION GAP SERPL CALC-SCNC: 8 MMOL/L — SIGNIFICANT CHANGE UP (ref 5–17)
APPEARANCE UR: CLEAR — SIGNIFICANT CHANGE UP
APTT BLD: 30.9 SEC — SIGNIFICANT CHANGE UP (ref 27.5–36.3)
AST SERPL-CCNC: 11 U/L — SIGNIFICANT CHANGE UP (ref 10–40)
BASE EXCESS BLDA CALC-SCNC: 12.4 MMOL/L — HIGH (ref -2–3)
BASE EXCESS BLDV CALC-SCNC: 7.9 MMOL/L — SIGNIFICANT CHANGE UP
BASOPHILS # BLD AUTO: 0.02 K/UL — SIGNIFICANT CHANGE UP (ref 0–0.2)
BASOPHILS NFR BLD AUTO: 0.2 % — SIGNIFICANT CHANGE UP (ref 0–2)
BILIRUB SERPL-MCNC: 0.6 MG/DL — SIGNIFICANT CHANGE UP (ref 0.2–1.2)
BILIRUB UR-MCNC: NEGATIVE — SIGNIFICANT CHANGE UP
BUN SERPL-MCNC: 10 MG/DL — SIGNIFICANT CHANGE UP (ref 7–23)
CALCIUM SERPL-MCNC: 9.2 MG/DL — SIGNIFICANT CHANGE UP (ref 8.4–10.5)
CHLORIDE SERPL-SCNC: 98 MMOL/L — SIGNIFICANT CHANGE UP (ref 96–108)
CK MB CFR SERPL CALC: <1 NG/ML — SIGNIFICANT CHANGE UP (ref 0–6.7)
CK SERPL-CCNC: 37 U/L — SIGNIFICANT CHANGE UP (ref 25–170)
CO2 SERPL-SCNC: 34 MMOL/L — HIGH (ref 22–31)
COLOR SPEC: YELLOW — SIGNIFICANT CHANGE UP
CREAT SERPL-MCNC: 0.73 MG/DL — SIGNIFICANT CHANGE UP (ref 0.5–1.3)
DIFF PNL FLD: NEGATIVE — SIGNIFICANT CHANGE UP
EOSINOPHIL # BLD AUTO: 0.11 K/UL — SIGNIFICANT CHANGE UP (ref 0–0.5)
EOSINOPHIL NFR BLD AUTO: 1 % — SIGNIFICANT CHANGE UP (ref 0–6)
GLUCOSE BLDC GLUCOMTR-MCNC: 126 MG/DL — HIGH (ref 70–99)
GLUCOSE SERPL-MCNC: 150 MG/DL — HIGH (ref 70–99)
GLUCOSE UR QL: NEGATIVE — SIGNIFICANT CHANGE UP
HCO3 BLDA-SCNC: 40 MMOL/L — HIGH (ref 21–28)
HCO3 BLDV-SCNC: 36 MMOL/L — HIGH (ref 20–27)
HCT VFR BLD CALC: 35.7 % — SIGNIFICANT CHANGE UP (ref 34.5–45)
HGB BLD-MCNC: 9.3 G/DL — LOW (ref 11.5–15.5)
IMM GRANULOCYTES NFR BLD AUTO: 0.3 % — SIGNIFICANT CHANGE UP (ref 0–1.5)
INR BLD: 1.22 — HIGH (ref 0.88–1.16)
KETONES UR-MCNC: NEGATIVE — SIGNIFICANT CHANGE UP
LEUKOCYTE ESTERASE UR-ACNC: NEGATIVE — SIGNIFICANT CHANGE UP
LYMPHOCYTES # BLD AUTO: 1.49 K/UL — SIGNIFICANT CHANGE UP (ref 1–3.3)
LYMPHOCYTES # BLD AUTO: 12.9 % — LOW (ref 13–44)
MCHC RBC-ENTMCNC: 24.4 PG — LOW (ref 27–34)
MCHC RBC-ENTMCNC: 26.1 GM/DL — LOW (ref 32–36)
MCV RBC AUTO: 93.7 FL — SIGNIFICANT CHANGE UP (ref 80–100)
MONOCYTES # BLD AUTO: 0.6 K/UL — SIGNIFICANT CHANGE UP (ref 0–0.9)
MONOCYTES NFR BLD AUTO: 5.2 % — SIGNIFICANT CHANGE UP (ref 2–14)
NEUTROPHILS # BLD AUTO: 9.27 K/UL — HIGH (ref 1.8–7.4)
NEUTROPHILS NFR BLD AUTO: 80.4 % — HIGH (ref 43–77)
NITRITE UR-MCNC: NEGATIVE — SIGNIFICANT CHANGE UP
NRBC # BLD: 0 /100 WBCS — SIGNIFICANT CHANGE UP (ref 0–0)
NT-PROBNP SERPL-SCNC: 184 PG/ML — SIGNIFICANT CHANGE UP (ref 0–300)
PCO2 BLDA: 72 MMHG — CRITICAL HIGH (ref 32–45)
PCO2 BLDV: 77 MMHG — HIGH (ref 41–51)
PH BLDA: 7.36 — SIGNIFICANT CHANGE UP (ref 7.35–7.45)
PH BLDV: 7.29 — LOW (ref 7.32–7.43)
PH UR: 6 — SIGNIFICANT CHANGE UP (ref 5–8)
PLATELET # BLD AUTO: 301 K/UL — SIGNIFICANT CHANGE UP (ref 150–400)
PO2 BLDA: 69 MMHG — LOW (ref 83–108)
PO2 BLDV: 40 MMHG — SIGNIFICANT CHANGE UP
POTASSIUM SERPL-MCNC: 4.6 MMOL/L — SIGNIFICANT CHANGE UP (ref 3.5–5.3)
POTASSIUM SERPL-SCNC: 4.6 MMOL/L — SIGNIFICANT CHANGE UP (ref 3.5–5.3)
PROT SERPL-MCNC: 7.8 G/DL — SIGNIFICANT CHANGE UP (ref 6–8.3)
PROT UR-MCNC: NEGATIVE MG/DL — SIGNIFICANT CHANGE UP
PROTHROM AB SERPL-ACNC: 13.9 SEC — HIGH (ref 10–12.9)
RBC # BLD: 3.81 M/UL — SIGNIFICANT CHANGE UP (ref 3.8–5.2)
RBC # FLD: 16.8 % — HIGH (ref 10.3–14.5)
SAO2 % BLDA: 93 % — LOW (ref 95–100)
SAO2 % BLDV: 68 % — SIGNIFICANT CHANGE UP
SODIUM SERPL-SCNC: 140 MMOL/L — SIGNIFICANT CHANGE UP (ref 135–145)
SP GR SPEC: 1.01 — SIGNIFICANT CHANGE UP (ref 1–1.03)
TROPONIN T SERPL-MCNC: <0.01 NG/ML — SIGNIFICANT CHANGE UP (ref 0–0.01)
UROBILINOGEN FLD QL: 0.2 E.U./DL — SIGNIFICANT CHANGE UP
WBC # BLD: 11.52 K/UL — HIGH (ref 3.8–10.5)
WBC # FLD AUTO: 11.52 K/UL — HIGH (ref 3.8–10.5)

## 2019-11-06 PROCEDURE — 71045 X-RAY EXAM CHEST 1 VIEW: CPT | Mod: 26

## 2019-11-06 PROCEDURE — 99222 1ST HOSP IP/OBS MODERATE 55: CPT

## 2019-11-06 PROCEDURE — 99285 EMERGENCY DEPT VISIT HI MDM: CPT

## 2019-11-06 PROCEDURE — 93010 ELECTROCARDIOGRAM REPORT: CPT

## 2019-11-06 PROCEDURE — 99223 1ST HOSP IP/OBS HIGH 75: CPT | Mod: GC

## 2019-11-06 PROCEDURE — 93306 TTE W/DOPPLER COMPLETE: CPT | Mod: 26

## 2019-11-06 RX ORDER — DEXTROSE 50 % IN WATER 50 %
15 SYRINGE (ML) INTRAVENOUS ONCE
Refills: 0 | Status: DISCONTINUED | OUTPATIENT
Start: 2019-11-06 | End: 2019-11-12

## 2019-11-06 RX ORDER — FUROSEMIDE 40 MG
80 TABLET ORAL ONCE
Refills: 0 | Status: COMPLETED | OUTPATIENT
Start: 2019-11-06 | End: 2019-11-06

## 2019-11-06 RX ORDER — ASPIRIN/CALCIUM CARB/MAGNESIUM 324 MG
81 TABLET ORAL DAILY
Refills: 0 | Status: DISCONTINUED | OUTPATIENT
Start: 2019-11-06 | End: 2019-11-12

## 2019-11-06 RX ORDER — DEXTROSE 50 % IN WATER 50 %
25 SYRINGE (ML) INTRAVENOUS ONCE
Refills: 0 | Status: DISCONTINUED | OUTPATIENT
Start: 2019-11-06 | End: 2019-11-12

## 2019-11-06 RX ORDER — HEPARIN SODIUM 5000 [USP'U]/ML
7500 INJECTION INTRAVENOUS; SUBCUTANEOUS EVERY 8 HOURS
Refills: 0 | Status: DISCONTINUED | OUTPATIENT
Start: 2019-11-06 | End: 2019-11-12

## 2019-11-06 RX ORDER — FUROSEMIDE 40 MG
80 TABLET ORAL EVERY 12 HOURS
Refills: 0 | Status: DISCONTINUED | OUTPATIENT
Start: 2019-11-06 | End: 2019-11-11

## 2019-11-06 RX ORDER — ATORVASTATIN CALCIUM 80 MG/1
80 TABLET, FILM COATED ORAL AT BEDTIME
Refills: 0 | Status: DISCONTINUED | OUTPATIENT
Start: 2019-11-06 | End: 2019-11-12

## 2019-11-06 RX ORDER — COLCHICINE 0.6 MG
0.6 TABLET ORAL
Refills: 0 | Status: DISCONTINUED | OUTPATIENT
Start: 2019-11-06 | End: 2019-11-12

## 2019-11-06 RX ORDER — LOSARTAN POTASSIUM 100 MG/1
25 TABLET, FILM COATED ORAL DAILY
Refills: 0 | Status: DISCONTINUED | OUTPATIENT
Start: 2019-11-06 | End: 2019-11-12

## 2019-11-06 RX ORDER — INSULIN LISPRO 100/ML
VIAL (ML) SUBCUTANEOUS
Refills: 0 | Status: DISCONTINUED | OUTPATIENT
Start: 2019-11-06 | End: 2019-11-12

## 2019-11-06 RX ORDER — GLUCAGON INJECTION, SOLUTION 0.5 MG/.1ML
1 INJECTION, SOLUTION SUBCUTANEOUS ONCE
Refills: 0 | Status: DISCONTINUED | OUTPATIENT
Start: 2019-11-06 | End: 2019-11-12

## 2019-11-06 RX ORDER — AMLODIPINE BESYLATE 2.5 MG/1
5 TABLET ORAL DAILY
Refills: 0 | Status: DISCONTINUED | OUTPATIENT
Start: 2019-11-06 | End: 2019-11-12

## 2019-11-06 RX ORDER — IPRATROPIUM/ALBUTEROL SULFATE 18-103MCG
3 AEROSOL WITH ADAPTER (GRAM) INHALATION EVERY 6 HOURS
Refills: 0 | Status: DISCONTINUED | OUTPATIENT
Start: 2019-11-06 | End: 2019-11-07

## 2019-11-06 RX ORDER — DEXTROSE 50 % IN WATER 50 %
12.5 SYRINGE (ML) INTRAVENOUS ONCE
Refills: 0 | Status: DISCONTINUED | OUTPATIENT
Start: 2019-11-06 | End: 2019-11-12

## 2019-11-06 RX ORDER — SODIUM CHLORIDE 9 MG/ML
1000 INJECTION, SOLUTION INTRAVENOUS
Refills: 0 | Status: DISCONTINUED | OUTPATIENT
Start: 2019-11-06 | End: 2019-11-12

## 2019-11-06 RX ORDER — ALBUTEROL 90 UG/1
2.5 AEROSOL, METERED ORAL EVERY 4 HOURS
Refills: 0 | Status: DISCONTINUED | OUTPATIENT
Start: 2019-11-06 | End: 2019-11-06

## 2019-11-06 RX ADMIN — ATORVASTATIN CALCIUM 80 MILLIGRAM(S): 80 TABLET, FILM COATED ORAL at 21:41

## 2019-11-06 RX ADMIN — Medication 80 MILLIGRAM(S): at 19:41

## 2019-11-06 RX ADMIN — Medication 0.6 MILLIGRAM(S): at 21:41

## 2019-11-06 RX ADMIN — Medication 80 MILLIGRAM(S): at 11:55

## 2019-11-06 RX ADMIN — HEPARIN SODIUM 7500 UNIT(S): 5000 INJECTION INTRAVENOUS; SUBCUTANEOUS at 21:41

## 2019-11-06 RX ADMIN — Medication 3 MILLILITER(S): at 21:42

## 2019-11-06 NOTE — H&P ADULT - PROBLEM SELECTOR PLAN 5
-continue BiPAP QHS  -wean to oxygen as patient diureses   -appreciate pulm input  -nutrition consult

## 2019-11-06 NOTE — CONSULT NOTE ADULT - PROBLEM SELECTOR RECOMMENDATION 2
Patient has hx of OHS and should be kept on BiPAP overnight. She has had high bicarb levels on most metabolic panels done in the past, which is likely a reflection of her renal compensation for the respiratory acidosis. VBG today shows respiratory acidosis, but would follow up with an ABG to adequately assess acid-base status.     Recommend  - Continue with BiPAP at home settings (12/5 at FiO2 of 30% with back up Patient has hx of OHS and should be kept on BiPAP overnight. She has had high bicarb levels on most metabolic panels done in the past, which is likely a reflection of her renal compensation for the respiratory acidosis. VBG today shows respiratory acidosis, but would follow up with an ABG to adequately assess acid-base status.     Recommend  - ABG  - Continue with BiPAP at home settings (12/5 at FiO2 of 30% with back up rate of 12) Patient has hx of OHS and should be kept on BiPAP overnight. She has had high bicarb levels on most metabolic panels done in the past, which is likely a reflection of her renal compensation for the respiratory acidosis. VBG today shows respiratory acidosis, but would follow up with an ABG to adequately assess acid-base status.     Recommend  - ABG  - Continue with BiPAP at home settings (18/5 at FiO2 of 30% with back up rate of 12)

## 2019-11-06 NOTE — ED PROVIDER NOTE - CONSTITUTIONAL, MLM
normal... Morbidly obese, awake, alert, oriented to person, place, time/situation and in mild respiratory distress.

## 2019-11-06 NOTE — CONSULT NOTE ADULT - PROBLEM SELECTOR PROBLEM 2
Back to [facility] [place] [facility]/Other:/[facility] Nursing Fac[facility] (SNF) Obesity hypoventilation syndrome

## 2019-11-06 NOTE — ED ADULT NURSE NOTE - OBJECTIVE STATEMENT
Pt presents to ED c/o SOB. Pt with Hx CHF, pericardial effusion, on home O2 4L, presents today with worsening SOB over the last month, using 4L at home though family states "she probably needed more." Pt reports non compliance with lasix "for a couple of days." Pt also with increased bilateral LE edema, no LE pain. No fevers/cough at home, no CP, no abd pain, no n/v/d. On arrival pt with RA O2 sat 75% per EMS, pt brought to resus 2 and upgraded to MD Wills. Pt placed on continuous cardiac monitor and pulse ox on arrival to bed resus 2.

## 2019-11-06 NOTE — ED PROVIDER NOTE - CARE PLAN
Principal Discharge DX:	Hypoxia  Secondary Diagnosis:	Respiratory acidosis  Secondary Diagnosis:	Chronic diastolic heart failure

## 2019-11-06 NOTE — ED ADULT NURSE REASSESSMENT NOTE - NS ED NURSE REASSESS COMMENT FT1
Pt with O2 sat 100% on NRB, MD Wills at bedside for eval, bipap on standby. Pt baseline 4L NC at home with O2 sat 88-90 on 4L, per MD Wills trial pt with 4L NC, pt now with O2 sat 88-90% on RA though speaking in full sentences, respirations unlabored, no c/o SOB at this time. Per MD Wills no bipap at this time, MD at bedside for US. Pt with VS otherwise stable, Will continue to monitor.

## 2019-11-06 NOTE — H&P ADULT - ATTENDING COMMENTS
Assessment: Patient personally seen and examined myself during rounds with the   Nurse Practitioner   ON DATE 11/6/19    Note read, including vitals, physical findings, laboratory data, and radiological reports.   Agree with above and revisions, if any, included below.  - New acute decompensated heart failure  - Severe sob  - Unstable hemodynamics  - Plan of Care: continuous telemetry monitoring for arrhythmias, echocardiogram to evaluate ejection fraction and central pressures, daily weights and I/Os, serial EKGs and lab work to evaluate and replete potassium, magnesium. Troponin added to labs and will cycle. Will need to be seen by EP or Interv Cardio if intervention needed.

## 2019-11-06 NOTE — ED PROVIDER NOTE - CLINICAL SUMMARY MEDICAL DECISION MAKING FREE TEXT BOX
43 y/o F with PMHx CHF, HTN, DM, obesely hypoventilation syndrome (on home O2 NC), noncompliant with Lasix 80mg, presents with worsening SOB this week. Labs so far showing pH 7.29 CO2 77 - pt placed on bipap. Pt given Lasix 80mg. Most likely needs admission for diuresis. 45 y/o F with PMHx CHF, HTN, DM, obesely hypoventilation syndrome (on home O2 NC), noncompliant with Lasix 80mg, presents with worsening SOB this week. Labs so far showing pH 7.29 CO2 77 - pt placed on bipap. Pt given Lasix 80mg. Most likely needs admission for diuresis.  Pulm fellow oseas pt in ED feels pt should be admitted to [facility].  My bedside echo shows small pericardial effusion.  Official echo ordered.  Discussed with raheel who agrees on admission to [facility].

## 2019-11-06 NOTE — ED ADULT NURSE NOTE - NSIMPLEMENTINTERV_GEN_ALL_ED
Implemented All Universal Safety Interventions:  [place] to call system. Call bell, personal items and telephone within reach. Instruct patient to call for assistance. Room bathroom lighting operational. Non-slip footwear when patient is off stretcher. Physically safe environment: no spills, clutter or unnecessary equipment. Stretcher in lowest position, wheels locked, appropriate side rails in place.

## 2019-11-06 NOTE — H&P ADULT - PROBLEM SELECTOR PLAN 1
Has not been taking lasix x 1 month with weight gain and progressive RODRIGUEZ, more hypoxic in ED, Rx BiPAP and Lasix now improved.   -continue BiPAP while diuresing, patient was as low as 78% while sleeping in ED.   -pulmonary following, appreciate recs  -continuous sats  -duonebs prn

## 2019-11-06 NOTE — CONSULT NOTE ADULT - PROBLEM SELECTOR RECOMMENDATION 9
Patient's development of symptoms occurred proceeding her discontinuation of lasix. She also has increased bilateral lower extremity edema and development of a pericardial effusion (which was attributed to volume overload during a prior admission). On examination she has no wheezing and is moving air well. Her hypoxemic respiratory failure is likely 2/2 CHF exacerbation and fluid overload. She is moving air adequately, has no wheezing on examination, and had symptom improvement with BiPAP and diuresis. Would continue with current treatment of aggressive diuresis and NIPPV use.   Given her immobility and LE edema, would also check LE dopplers as well. POCUS echo at bedside showed no McConnel's sign but with slight right ventricular dilation, though with no diastolic ventricular collapse. Would follow with official echocardiogram to see if there is any evidence of cardiac tamponade.    Recommend:  - Diuresis per primary team  - LE dopplers.  - Echocardiogram   - BiPAP at current settings throughout tonight. Patient's development of symptoms occurred proceeding her discontinuation of lasix. She also has increased bilateral lower extremity edema and development of a pericardial effusion (which was attributed to volume overload during a prior admission). On examination she has no wheezing and is moving air well. Her hypoxemic respiratory failure is likely 2/2 CHF exacerbation and fluid overload. She is moving air adequately, has no wheezing on examination, and had symptom improvement with BiPAP and diuresis. Would continue with current treatment of aggressive diuresis and NIPPV use.   Given her immobility and LE edema, would also check LE dopplers as well. POCUS echo at bedside showed no McConnel's sign but with slight right ventricular dilation, though with no diastolic ventricular collapse. Would follow with official echocardiogram to see if there is any evidence of cardiac tamponade.    Recommend:  - Diuresis per primary team  - LE dopplers.  - Echocardiogram   - BiPAP at current settings throughout tonight. Oxygen supplementation during awake hours to maintain sats >92%

## 2019-11-06 NOTE — H&P ADULT - NSHPPHYSICALEXAM_GEN_ALL_CORE
Awake, talkative, in NAD  AXOX3, follows commands, appropriate  Neck supple, no JVD noted  PEERL, nasal/buccal mucosa moist and well perfused  BS clear bilaterally, unlabored, symmetrical  S1/S2, no S3, RRR, no M/G/R noted  Abdomen soft, non tender, non distended  No edema noted, perfusion brisk  Pulses palpable throughout  Skin warm and dry, no rashes/lesions noted Morbidly obese, on bipap  Awake, talkative, in NAD  AXOX3, follows commands, appropriate  Neck supple, (+) JVD noted  PEERL, nasal/buccal mucosa moist and well perfused  BS diminished bilaterally, unlabored, symmetrical, Bipap 10/5, 40%, rate 12  S1/S2, no S3, no M/G/R noted  Abdomen protuberant, soft, non tender, non distended, (+) moreno catheter draining clear urine.   pitting edema to thighs, perfusion brisk  Pulses palpable throughout  Skin warm and dry, no rashes/lesions noted Morbidly obese, on bipap  Awake, talkative, in NAD  AXOX3, follows commands, appropriate  Neck supple, (+) JVD noted  PEERL, nasal/buccal mucosa moist and well perfused  BS diminished bilaterally, unlabored, symmetrical, Bipap 10/5, 40%, rate 12  S1/S2, no S3, no M/G/R noted  Abdomen protuberant, soft, non tender, non distended, (+) moreno catheter draining clear urine.   pitting edema to thighs, perfusion brisk  Pulses palpable throughout  Skin warm and dry, no rashes/lesions noted  msk +from  psych aox3

## 2019-11-06 NOTE — H&P ADULT - PROBLEM SELECTOR PLAN 7
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

## 2019-11-06 NOTE — H&P ADULT - HISTORY OF PRESENT ILLNESS
43 yr old female with PMH of MANUEL (on BiPAP), Hypoxemia (Home oxygen) and morbid obesity. Pt presents today for annual follow up. She was recently in [facility] for 3 months and discharged from [facility] the week of Thanksgiving. She was in [facility] in October with PNA.     She had a sleep study done while in [facility] and they increased her BiPAP setting to 20-15/5. She is due for a new Bipap and will have the results sent to us. She is compliant with the BiPAP everytime she sleep and is tolerating the full face mask and pressure.    She on 4 L of oxygen Continous but does down to 2 L when she is sitting. She is finished with home physical therapy but PCP suggested out pt physical therapy. She has nursing aid help at home for 28 hours. She has SOB after 0.5 block.        The patient is unchanged with improvement since hospitalization. She was participating in physical therapy. The PFT was consistent with combined obstructive and restrictive lung disease and decrease in the diffusion capacity. Compared to the previous one, there was further decrease in the flow volumes and diffusion capacity. The base line oxygen saturation is normal with oxygen supplementation, 92% with 3 L NC. Pt desaturates to 86% with ambulation and Increased with increase in oxygen supplementation, 92% after 2 mins on 4 L NC. She is to repeat the ECHO to evaluate for increase in PA pressures which may need to be addressed due to severe decrease in DLCO. I discussed with the patient the need for weight loss to improve her condition.     Hypoxemia   Patient is to continue her 4L/min nasal cannula oxygen. Says she is able to walk with the oxygen and may need to increase the oxygen supplementation if required. Ordered portable oxygen concentrator for pt.     Other alveolar and parieto-alveolar conditions    The patient is to continue on the current bronchodilators. Pulmonary function tests as previously noted.      Obstructive sleep apnea     The patient has been compliant with her Bipap and tolerating. Continue to use Bipap at night for at least 4 plus hours. She needs a new BIpap machine and will have her latest titration study which is done in rehab sent to me so I can order the appriate settings.    Morbid (severe) obesity due to excess calories    The patient is following with the nutritionist and recording her calorie intake. Continue diuretics a is. Follow with repeat CXR. Discussed weight loss and it affects on dyspnea.     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 Rheumatological work up was considered. Patient was not considered for an intervention at the time per cardiology. To follow with Echo and cardiology MD Cancino.       43yoF with PMH of DM2(insulin/metformin) , HTN(Norvasc, Losartsan), HLD, CHF (Lasix), Severe morbid Obesity, MANUEL (On home 24/7 3-4L NC &  night CPAP 12/5), obesity hypoventilation syndrome presenting on 8/13/18 with progressively increasing SOB of 3-4 months and b/l LE swelling causing her to be bedridden, admitted to [facility] for acute on chronic resp failure. ABG was found to show Compensated Respiratory Acidosis. Pt was found to be lethargic with continued SOB, and ABG illustrated hypoxemia pO2 58 and worsening respiratory acidosis while on BIPAP overnight. BIPAP settings were increased and ICU was consulted for Hypoxemia and continued respiratory decline. CT angio chest negative for PE. Transferred to [facility] for continued respiratory monitoring.  On [facility] [place], patient continued on aggressive diuresis for CHF exacerbation, remained on 24hr BiPAP until improved hypercapnia demonstrated on ABG and VBG and patient with improved mental status. Of note, TTE demonstrated known mod-large pericardial effusion with evidence of pulsus paradoxus. Pt had IR drainage 1L and drain was placed which drained an additional 600 ccs of pericardial fluid. Cxs of the fluid were negative and drain was removed 8/24. Patient weaned to NC 4L O2 during day and BiPAP at night. Repeat echo today shows no increase in pericardial fluid s/p drain removal. Pt is stable for D/C to bariatric rehab. Ms. Mabry is a very pleasant 44-year-old female, severe morbid obesity, PMH DM2, HTN, HLD, chronic diastolic CHF (EF 55%), MANUEL (On home O2 24/7 3L mobile, 2L sitting, & CPAP QHS 12/5), obesity hypoventilation syndrome, s/p admission 8/2018 at [facility] for SOB, volume overload and pericardial effusion s/p IR drain (~1.6L; cx's NEG), who presented to [facility] today for progressive SOB and increasing LE edema in the setting of not taking her prescribed lasix x 1 month.     Of note,  upon DC from [facility] 8/2018, she went to [facility], and was in [facility] in October 2018 with PNA.     The base line oxygen saturation is normal with oxygen supplementation, 92% with 3 L NC. Pt desaturates to 86% with ambulation and Increased with increase in oxygen supplementation, 92% after 2 mins on 4 L NC.       PFT was consistent with combined obstructive and restrictive lung disease and decrease in the diffusion capacity. Ms. Mabry is a very pleasant 44-year-old female, severe morbid obesity, PMH DM2, HTN, HLD, chronic diastolic CHF (EF 55%), MANUEL (On home O2 24/7 3L mobile, 2L sitting, & CPAP QHS 12/5), obesity hypoventilation syndrome, s/p admission 8/2018 at [facility] for SOB, volume overload and pericardial effusion s/p IR drain (~1.6L; cx's NEG), who presented to [facility] today for progressive SOB and increasing LE edema in the setting of not taking her prescribed lasix x 1 month. In the ED she was found to be tachypneic and overloaded on CXR with sats ~80%. She was given 80mg IV lasix and started on Bipap. A moreno catheter was placed. Bedside TTE showed no pericardial effusion. Sats improved and patient was more comfortable. Pulmonary consulted on patient and decision was made to admit to  for telemetry monitoring and further management.     On exam, patient is on Bipap 10/5, 40%, pulling adequate VT. Her sats are around 78-83%. She awakens to voice and sats rise to ~95%. She is talking coherently and states that she has stopped taking her lasix for around 1 month given the frequency of urination. Moreover, it is difficult for her to get to the bathroom quickly so she decided to stop. Per patient, she will take lasix off and on. Since stopping, she has noticed more progressive SOB and marked bilateral LE swelling. She is SOB going from her bedroom to the living room with her Rollator. She denies CP/palpitations/dizziness/syncope/nausea/diaphoresis with these episodes. Of note, upon DC from [facility] 8/2018, she went to [facility] for 3 months, and was in [facility] in October 2018 with PNA. I verified patients medications with her pharmacy and she is supposed to be on 13 units of Lantus QHS, which she does not report taking.     Per pulmonary notes in [employer]: patient's base line oxygen saturation is normal with oxygen supplementation, 92% with 3 L NC. Pt desaturates to 86% with ambulation and Increased with increase in oxygen supplementation, 92% after 2 mins on 4 L NC.     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

## 2019-11-06 NOTE — H&P ADULT - NSICDXFAMILYHX_GEN_ALL_CORE_FT
FAMILY HISTORY:  Mother  Still living? Unknown  Family history of acute congestive heart failure, Age at diagnosis: Age Unknown

## 2019-11-06 NOTE — CONSULT NOTE ADULT - SUBJECTIVE AND OBJECTIVE BOX
HPI:  Patient is known to Dr. Sheridan Luo. She is a 45 yo F with morbid obesity, OHS (on home BPAP ), chronic hypoxemia (on 2-3L NC at home during night), and HFpEF, who presented to [facility] ED for shortness of breath. Symptoms started 2 weeks ago without any triggering events, and became worse since then. She was unable to tolerate her symptoms anymore, and decided to come in to the hospital. Notably, she stopped taking her lasix 3-4 weeks ago, because she felt inconvenienced by the urinary frequency. She also has noted increased bilateral leg edema since then as well. She has some fevers but otherwise denies any fevers, coughs, sputum production, and any other constitutional symptoms.   Of note, she has a history of having a pericardial effusion in 2018 requiring an IR drain. This was attributed to volume overload at the time.     All other components of the 10 point review of systems is negative aside from those mentioned above.    PAST MEDICAL & SURGICAL HISTORY:  Chronic diastolic heart failure: Chronic diastolic CHF (congestive heart failure)  Edema: Anasarca  Type 2 diabetes mellitus: Insulin Requiring  Essential hypertension: HTN (hypertension)  Morbid obesity: Morbid obesity  Disease of pericardium: Pericardial effusion  Obstructive sleep apnea syndrome: MANUEL on CPAP  Cytomegalovirus infection not present: No significant past surgical history    FAMILY HISTORY:  Family history of acute congestive heart failure (Mother)    Smoking history:  [ ] Current [ ] Former [X] Never      VITAL SIGNS:  ICU Vital Signs Last 24 Hrs  T(C): 36.7 (2019 17:53), Max: 36.7 (2019 11:18)  T(F): 98.1 (2019 17:53), Max: 98.1 (2019 17:53)  HR: 94 (2019 17:53) (94 - 105)  BP: 102/60 (2019 17:53) (102/60 - 134/63)  BP(mean): --  ABP: --  ABP(mean): --  RR: 16 (2019 17:53) (16 - 22)  SpO2: 91% (2019 17:53) (75% - 100%)         @ 07:01  -   @ 18:16  --------------------------------------------------------  IN: 0 mL / OUT: 4000 mL / NET: -4000 mL      CAPILLARY BLOOD GLUCOSE          PHYSICAL EXAM:  Constitutional: resting comfortably in bed, NAD, slightly fatigued  HEENT: NC/AT; PERRL, anicteric sclera; no oropharyngeal erythema or exudates; MMM  Neck: supple, JVD unable to assess 2/2 body habitus  Respiratory: No audible wheezes, crackles heard at bases bilaterally; respirations appear non-labored, speaking full sentences but on BiPAP   Cardiovascular: +S1/S2, RRR  Gastrointestinal: abdomen soft, NT/ND; +BS x4  Extremities: WWP; 2+ pitting edema bilaterally  Vascular: 2+ radial, DP/PT and femoral pulses B/L      MEDICATIONS:  MEDICATIONS  (STANDING):  amLODIPine   Tablet 5 milliGRAM(s) Oral daily  aspirin enteric coated 81 milliGRAM(s) Oral daily  atorvastatin 80 milliGRAM(s) Oral at bedtime  dextrose 5%. 1000 milliLiter(s) (50 mL/Hr) IV Continuous <Continuous>  dextrose 50% Injectable 12.5 Gram(s) IV Push once  dextrose 50% Injectable 25 Gram(s) IV Push once  dextrose 50% Injectable 25 Gram(s) IV Push once  furosemide   Injectable 80 milliGRAM(s) IV Push every 12 hours  heparin  Injectable 7500 Unit(s) SubCutaneous every 8 hours  insulin lispro (HumaLOG) corrective regimen sliding scale   SubCutaneous three times a day before meals  losartan 25 milliGRAM(s) Oral daily    MEDICATIONS  (PRN):  dextrose 40% Gel 15 Gram(s) Oral once PRN Blood Glucose LESS THAN 70 milliGRAM(s)/deciliter  glucagon  Injectable 1 milliGRAM(s) IntraMuscular once PRN Glucose LESS THAN 70 milligrams/deciliter      ALLERGIES:  Allergies    No Known Drug Allergies  shellfish (Anaphylaxis)    Intolerances        LABS:                        9.3    11.52 )-----------( 301      ( 2019 11:19 )             35.7     11-06    140  |  98  |  10  ----------------------------<  150<H>  4.6   |  34<H>  |  0.73    Ca    9.2      2019 11:20    TPro  7.8  /  Alb  3.9  /  TBili  0.6  /  DBili  x   /  AST  11  /  ALT  12  /  AlkPhos  82  11-06    PT/INR - ( 2019 11:19 )   PT: 13.9 sec;   INR: 1.22          PTT - ( 2019 11:19 )  PTT:30.9 sec  Urinalysis Basic - ( 2019 12:17 )    Color: Yellow / Appearance: Clear / S.015 / pH: x  Gluc: x / Ketone: NEGATIVE  / Bili: Negative / Urobili: 0.2 E.U./dL   Blood: x / Protein: NEGATIVE mg/dL / Nitrite: NEGATIVE   Leuk Esterase: NEGATIVE / RBC: x / WBC x   Sq Epi: x / Non Sq Epi: x / Bacteria: x        RADIOLOGY & ADDITIONAL TESTS:   CXR reviewed - limited interpretation due to excessive soft tissue; no obvious lobar infiltrate, + cardiomegaly, +increased vascular markings bilaterally, HPI:  Patient is known to Dr. Sheridan Luo. She is a 43 yo F with morbid obesity, OHS (on home BPAP 18/5), chronic hypoxemia (on 2-3L NC at home during night), and HFpEF, who presented to [facility] ED for shortness of breath. Symptoms started 2 weeks ago without any triggering events, and became worse since then. She was unable to tolerate her symptoms anymore, and decided to come in to the hospital. Notably, she stopped taking her lasix 3-4 weeks ago, because she felt inconvenienced by the urinary frequency. She also has noted increased bilateral leg edema since then as well. She has some fevers but otherwise denies any fevers, coughs, sputum production, and any other constitutional symptoms.   Of note, she has a history of having a pericardial effusion in 2018 requiring an IR drain. This was attributed to volume overload at the time.     All other components of the 10 point review of systems is negative aside from those mentioned above.    PAST MEDICAL & SURGICAL HISTORY:  Chronic diastolic heart failure: Chronic diastolic CHF (congestive heart failure)  Edema: Anasarca  Type 2 diabetes mellitus: Insulin Requiring  Essential hypertension: HTN (hypertension)  Morbid obesity: Morbid obesity  Disease of pericardium: Pericardial effusion  Obstructive sleep apnea syndrome: MANUEL on CPAP  Cytomegalovirus infection not present: No significant past surgical history    FAMILY HISTORY:  Family history of acute congestive heart failure (Mother)    Smoking history:  [ ] Current [ ] Former [X] Never      VITAL SIGNS:  ICU Vital Signs Last 24 Hrs  T(C): 36.7 (2019 17:53), Max: 36.7 (2019 11:18)  T(F): 98.1 (2019 17:53), Max: 98.1 (2019 17:53)  HR: 94 (2019 17:53) (94 - 105)  BP: 102/60 (2019 17:53) (102/60 - 134/63)  BP(mean): --  ABP: --  ABP(mean): --  RR: 16 (2019 17:53) (16 - 22)  SpO2: 91% (2019 17:53) (75% - 100%)         @ 07:01  -   @ 18:16  --------------------------------------------------------  IN: 0 mL / OUT: 4000 mL / NET: -4000 mL      CAPILLARY BLOOD GLUCOSE          PHYSICAL EXAM:  Constitutional: resting comfortably in bed, NAD, slightly fatigued  HEENT: NC/AT; PERRL, anicteric sclera; no oropharyngeal erythema or exudates; MMM  Neck: supple, JVD unable to assess 2/2 body habitus  Respiratory: No audible wheezes, crackles heard at bases bilaterally; respirations appear non-labored, speaking full sentences but on BiPAP   Cardiovascular: +S1/S2, RRR  Gastrointestinal: abdomen soft, NT/ND; +BS x4  Extremities: WWP; 2+ pitting edema bilaterally  Vascular: 2+ radial, DP/PT and femoral pulses B/L      MEDICATIONS:  MEDICATIONS  (STANDING):  amLODIPine   Tablet 5 milliGRAM(s) Oral daily  aspirin enteric coated 81 milliGRAM(s) Oral daily  atorvastatin 80 milliGRAM(s) Oral at bedtime  dextrose 5%. 1000 milliLiter(s) (50 mL/Hr) IV Continuous <Continuous>  dextrose 50% Injectable 12.5 Gram(s) IV Push once  dextrose 50% Injectable 25 Gram(s) IV Push once  dextrose 50% Injectable 25 Gram(s) IV Push once  furosemide   Injectable 80 milliGRAM(s) IV Push every 12 hours  heparin  Injectable 7500 Unit(s) SubCutaneous every 8 hours  insulin lispro (HumaLOG) corrective regimen sliding scale   SubCutaneous three times a day before meals  losartan 25 milliGRAM(s) Oral daily    MEDICATIONS  (PRN):  dextrose 40% Gel 15 Gram(s) Oral once PRN Blood Glucose LESS THAN 70 milliGRAM(s)/deciliter  glucagon  Injectable 1 milliGRAM(s) IntraMuscular once PRN Glucose LESS THAN 70 milligrams/deciliter      ALLERGIES:  Allergies    No Known Drug Allergies  shellfish (Anaphylaxis)    Intolerances        LABS:                        9.3    11.52 )-----------( 301      ( 2019 11:19 )             35.7     11-06    140  |  98  |  10  ----------------------------<  150<H>  4.6   |  34<H>  |  0.73    Ca    9.2      2019 11:20    TPro  7.8  /  Alb  3.9  /  TBili  0.6  /  DBili  x   /  AST  11  /  ALT  12  /  AlkPhos  82  11-06    PT/INR - ( 2019 11:19 )   PT: 13.9 sec;   INR: 1.22          PTT - ( 2019 11:19 )  PTT:30.9 sec  Urinalysis Basic - ( 2019 12:17 )    Color: Yellow / Appearance: Clear / S.015 / pH: x  Gluc: x / Ketone: NEGATIVE  / Bili: Negative / Urobili: 0.2 E.U./dL   Blood: x / Protein: NEGATIVE mg/dL / Nitrite: NEGATIVE   Leuk Esterase: NEGATIVE / RBC: x / WBC x   Sq Epi: x / Non Sq Epi: x / Bacteria: x        RADIOLOGY & ADDITIONAL TESTS:   CXR reviewed - limited interpretation due to excessive soft tissue; no obvious lobar infiltrate, + cardiomegaly, +increased vascular markings bilaterally,

## 2019-11-06 NOTE — H&P ADULT - NSICDXPASTMEDICALHX_GEN_ALL_CORE_FT
PAST MEDICAL HISTORY:  Chronic diastolic heart failure Chronic diastolic CHF (congestive heart failure)    Disease of pericardium Pericardial effusion    Edema Anasarca    Essential hypertension HTN (hypertension)    Morbid obesity Morbid obesity    Obstructive sleep apnea syndrome MANUEL on CPAP    Type 2 diabetes mellitus Insulin Requiring

## 2019-11-06 NOTE — CONSULT NOTE ADULT - ATTENDING COMMENTS
Patient seen and examined with house-staff during bedside rounds.  Resident note read, including vitals, physical findings, laboratory data, and radiological reports.   Revisions included below.  Direct personal management at bed side and extensive interpretation of the data.  Plan was outlined and discussed in details with the housestaff.  Decision making of high complexity  Action taken for acute disease activity to reflect the level of care provided:  - medication reconciliation  - review laboratory data  reviewed with fellow and plan outlined  follow with cardio

## 2019-11-06 NOTE — H&P ADULT - NSHPSOCIALHISTORY_GEN_ALL_CORE
single, lives with her two children  former tobacco use, quit 20 years ago  rare etoh  denies illicits  has a HHA  She has nursing aid at home for 28 hours single, lives with her two children  former tobacco use, quit 20 years ago  rare etoh  denies illicits  has a HHA/nursing aid at home for 28 hours  continuous home oxygen  uses rollator for ambulation

## 2019-11-06 NOTE — ED ADULT NURSE REASSESSMENT NOTE - NS ED NURSE REASSESS COMMENT FT1
Pt noted to have desaturation to 80% on NC 4L with movement, pt pending diuresis, Moreno vs commode discussed with pt and MD, decision made to place Moreno catheter. 16F moreno placed 1 attempt by RN, connected to gravity drainage bag. VBG results reviewed with MD Wills, pt acidotic with CO2 retention, per MD Wills pt candidate for bipap. RT at bedside, pt now on bipap fio2 40%, 10/5, I time 1 sec, set rate 12. Pt still speaking in full and clear sentences, respirations unlabored, O2 sat now 91% on bipap. Pt now with VSS, family at bedside, no further needs expressed. Will continue to monitor.

## 2019-11-06 NOTE — ED PROVIDER NOTE - OBJECTIVE STATEMENT
45 y/o F with PMHx CHF with EF 50-55%, HTN, DM, and obesely hypoventilation syndrome presents to the ED complaining of SOB, worsening this past week. Pt states she has not taken her Lasix 80mg BID for a week because it causes her to urinate frequently. Pt is on 4L O2 nasal cannula at home all the time, O2 Sat 88% on 4L. Pt also noted her leg is more swollen than usual. Denies fever, chills, or other infectious symptoms.

## 2019-11-06 NOTE — H&P ADULT - ASSESSMENT
44-year-old female, severe morbid obesity, PMH DM2, HTN, HLD, chronic diastolic CHF (EF 55%), MANUEL (On home O2 24/7 3L mobile, 2L sitting, & CPAP QHS 12/5), obesity hypoventilation syndrome, s/p admission 8/2018 at [facility] for SOB, volume overload and pericardial effusion s/p IR drain (~1.6L; cx's NEG), who presented to [facility] today for progressive SOB and increasing LE edema in the setting of not taking her prescribed lasix x 1 month. In the ED she was found to be tachypneic and overloaded on CXR with sats ~80%. She was given 80mg IV lasix and started on Bipap. A moreno catheter was placed. Bedside TTE showed no pericardial effusion. Sats improved and patient was more comfortable. Pulmonary consulted on patient and decision was made to admit to  for telemetry monitoring and further management and optimization with diuretics.

## 2019-11-06 NOTE — CONSULT NOTE ADULT - ASSESSMENT
45 yo F with morbid obesity, OHS (on home BPAP 12/5), chronic hypoxemia (on 2-3L NC at home during night), and HFpEF, who presented to [facility] ED for shortness of breath

## 2019-11-06 NOTE — H&P ADULT - NSHPLABSRESULTS_GEN_ALL_CORE
LABS:                          9.3    11.52 )-----------( 301      ( 06 Nov 2019 11:19 )             35.7                              11-06    140  |  98  |  10  ----------------------------<  150<H>  4.6   |  34<H>  |  0.73    Ca    9.2      06 Nov 2019 11:20    TPro  7.8  /  Alb  3.9  /  TBili  0.6  /  DBili  x   /  AST  11  /  ALT  12  /  AlkPhos  82  11-06    LIVER FUNCTIONS - ( 06 Nov 2019 11:20 )  Alb: 3.9 g/dL / Pro: 7.8 g/dL / ALK PHOS: 82 U/L / ALT: 12 U/L / AST: 11 U/L / GGT: x                                 PT/INR - ( 06 Nov 2019 11:19 )   PT: 13.9 sec;   INR: 1.22          PTT - ( 06 Nov 2019 11:19 )  PTT:30.9 sec  CAPILLARY BLOOD GLUCOSE        CARDIAC MARKERS ( 06 Nov 2019 11:20 )  x     / <0.01 ng/mL / 37 U/L / x     / <1.0 ng/mL TTE 8/20/2018  LVH, EF 50-55%  RA/RV dilated  mild TR/WY  PASP 42mmHg  no valvular pathology.    Cardiac Cath 11/28/2016:   LM normal  LAD normal  LCx normal  RCA normal    LABS in ED:                          9.3    11.52 )-----------( 301      ( 06 Nov 2019 11:19 )             35.7                              11-06    140  |  98  |  10  ----------------------------<  150<H>  4.6   |  34<H>  |  0.73    Ca    9.2      06 Nov 2019 11:20    TPro  7.8  /  Alb  3.9  /  TBili  0.6  /  DBili  x   /  AST  11  /  ALT  12  /  AlkPhos  82  11-06    LIVER FUNCTIONS - ( 06 Nov 2019 11:20 )  Alb: 3.9 g/dL / Pro: 7.8 g/dL / ALK PHOS: 82 U/L / ALT: 12 U/L / AST: 11 U/L / GGT: x                                 PT/INR - ( 06 Nov 2019 11:19 )   PT: 13.9 sec;   INR: 1.22          PTT - ( 06 Nov 2019 11:19 )  PTT:30.9 sec  CAPILLARY BLOOD GLUCOSE        CARDIAC MARKERS ( 06 Nov 2019 11:20 )  x     / <0.01 ng/mL / 37 U/L / x     / <1.0 ng/mL

## 2019-11-06 NOTE — H&P ADULT - NSHPREVIEWOFSYSTEMS_GEN_ALL_CORE
GENERAL, CONSTITUTIONAL : denies recent weight loss, fever, chills  EYES, VISION: denies changes in vision   EARS, NOSE, THROAT: denies hearing loss  HEART, CARDIOVASCULAR: denies chest pain, arrhythmia, palpitations, claudication, (+) SOB, (+) LE edema  RESPIRATORY: Denies cough, (+) SOB, wheezing, PND, orthopnea  GASTROINTESTINAL: Denies abdominal pain, epigastric pain, nausea, vomiting, or hematemesis, diarrhea, constipation, melena or hematochezia.  GENITOURINARY: (+) frequent urination, urgency  MUSCULOSKELETAL denies joint pain or swelling, restricted motion, musculoskeletal pain.   SKIN & INTEGUMENTARY Denies rashes, sores, blisters, blisters, growths.  NEUROLOGICAL: Denies numbness or tingling sensations, sensation loss, burning.   PSYCHIATRIC: Denies nervousness, anxiety, depression  ENDOCRINE Denies heat or cold intolerance, excessive thirst  HEMATOLOGIC/LYMPHATIC: Denies abnormal bleeding, bleeding of any kind

## 2019-11-06 NOTE — CONSULT NOTE ADULT - PROBLEM SELECTOR RECOMMENDATION 3
BiPAP therapy to continue as above. BiPAP therapy to continue as above.      Addendum: ABG reviewed - consistent with compensated chronic respiratory acidosis with metabolic alkalosis. This is likely a reflection of her OHS with diuretic use causing contraction alkalosis. Recommended again to primary team to keep her on the bipap overnight at settings mentioned above. BiPAP therapy to continue as above.      Addendum: ABG reviewed - consistent with compensated chronic respiratory acidosis with metabolic alkalosis. This is likely a reflection of her OHS with diuretic use causing contraction alkalosis. Recommended again to primary team to keep her on the bipap overnight at settings mentioned above. Spoke with RT as well.

## 2019-11-06 NOTE — H&P ADULT - PROBLEM SELECTOR PLAN 4
Reports taking Metformin, but is not taking insulin (per pharmacist has a Rx for lantus)  -SSI while inpatient  -consider adding Lantus if needed

## 2019-11-07 DIAGNOSIS — I50.33 ACUTE ON CHRONIC DIASTOLIC (CONGESTIVE) HEART FAILURE: ICD-10-CM

## 2019-11-07 DIAGNOSIS — I50.23 ACUTE ON CHRONIC SYSTOLIC (CONGESTIVE) HEART FAILURE: ICD-10-CM

## 2019-11-07 LAB
ALBUMIN SERPL ELPH-MCNC: 3.7 G/DL — SIGNIFICANT CHANGE UP (ref 3.3–5)
ALP SERPL-CCNC: 84 U/L — SIGNIFICANT CHANGE UP (ref 40–120)
ALT FLD-CCNC: 12 U/L — SIGNIFICANT CHANGE UP (ref 10–45)
ANION GAP SERPL CALC-SCNC: 14 MMOL/L — SIGNIFICANT CHANGE UP (ref 5–17)
APTT BLD: 32.3 SEC — SIGNIFICANT CHANGE UP (ref 27.5–36.3)
AST SERPL-CCNC: 19 U/L — SIGNIFICANT CHANGE UP (ref 10–40)
BILIRUB SERPL-MCNC: 0.7 MG/DL — SIGNIFICANT CHANGE UP (ref 0.2–1.2)
BUN SERPL-MCNC: 11 MG/DL — SIGNIFICANT CHANGE UP (ref 7–23)
CALCIUM SERPL-MCNC: 9 MG/DL — SIGNIFICANT CHANGE UP (ref 8.4–10.5)
CHLORIDE SERPL-SCNC: 91 MMOL/L — LOW (ref 96–108)
CHOLEST SERPL-MCNC: 81 MG/DL — SIGNIFICANT CHANGE UP (ref 10–199)
CO2 SERPL-SCNC: 34 MMOL/L — HIGH (ref 22–31)
CREAT SERPL-MCNC: 0.91 MG/DL — SIGNIFICANT CHANGE UP (ref 0.5–1.3)
GLUCOSE BLDC GLUCOMTR-MCNC: 135 MG/DL — HIGH (ref 70–99)
GLUCOSE BLDC GLUCOMTR-MCNC: 160 MG/DL — HIGH (ref 70–99)
GLUCOSE BLDC GLUCOMTR-MCNC: 168 MG/DL — HIGH (ref 70–99)
GLUCOSE BLDC GLUCOMTR-MCNC: 194 MG/DL — HIGH (ref 70–99)
GLUCOSE SERPL-MCNC: 170 MG/DL — HIGH (ref 70–99)
HBA1C BLD-MCNC: 7.8 % — HIGH (ref 4–5.6)
HCT VFR BLD CALC: 36.8 % — SIGNIFICANT CHANGE UP (ref 34.5–45)
HDLC SERPL-MCNC: 39 MG/DL — LOW
HGB BLD-MCNC: 9.8 G/DL — LOW (ref 11.5–15.5)
INR BLD: 1.26 — HIGH (ref 0.88–1.16)
LIPID PNL WITH DIRECT LDL SERPL: 26 MG/DL — SIGNIFICANT CHANGE UP
MAGNESIUM SERPL-MCNC: 1.6 MG/DL — SIGNIFICANT CHANGE UP (ref 1.6–2.6)
MCHC RBC-ENTMCNC: 24.6 PG — LOW (ref 27–34)
MCHC RBC-ENTMCNC: 26.6 GM/DL — LOW (ref 32–36)
MCV RBC AUTO: 92.2 FL — SIGNIFICANT CHANGE UP (ref 80–100)
NRBC # BLD: 0 /100 WBCS — SIGNIFICANT CHANGE UP (ref 0–0)
PLATELET # BLD AUTO: 273 K/UL — SIGNIFICANT CHANGE UP (ref 150–400)
POTASSIUM SERPL-MCNC: 4.9 MMOL/L — SIGNIFICANT CHANGE UP (ref 3.5–5.3)
POTASSIUM SERPL-SCNC: 4.9 MMOL/L — SIGNIFICANT CHANGE UP (ref 3.5–5.3)
PROT SERPL-MCNC: 8 G/DL — SIGNIFICANT CHANGE UP (ref 6–8.3)
PROTHROM AB SERPL-ACNC: 14.3 SEC — HIGH (ref 10–12.9)
RBC # BLD: 3.99 M/UL — SIGNIFICANT CHANGE UP (ref 3.8–5.2)
RBC # FLD: 16.8 % — HIGH (ref 10.3–14.5)
SODIUM SERPL-SCNC: 139 MMOL/L — SIGNIFICANT CHANGE UP (ref 135–145)
TOTAL CHOLESTEROL/HDL RATIO MEASUREMENT: 2.1 RATIO — LOW (ref 3.3–7.1)
TRIGL SERPL-MCNC: 79 MG/DL — SIGNIFICANT CHANGE UP (ref 10–149)
WBC # BLD: 8.67 K/UL — SIGNIFICANT CHANGE UP (ref 3.8–10.5)
WBC # FLD AUTO: 8.67 K/UL — SIGNIFICANT CHANGE UP (ref 3.8–10.5)

## 2019-11-07 PROCEDURE — 93970 EXTREMITY STUDY: CPT | Mod: 26

## 2019-11-07 PROCEDURE — 99233 SBSQ HOSP IP/OBS HIGH 50: CPT | Mod: GC

## 2019-11-07 PROCEDURE — 99232 SBSQ HOSP IP/OBS MODERATE 35: CPT

## 2019-11-07 RX ORDER — IPRATROPIUM/ALBUTEROL SULFATE 18-103MCG
3 AEROSOL WITH ADAPTER (GRAM) INHALATION EVERY 6 HOURS
Refills: 0 | Status: DISCONTINUED | OUTPATIENT
Start: 2019-11-07 | End: 2019-11-12

## 2019-11-07 RX ADMIN — HEPARIN SODIUM 7500 UNIT(S): 5000 INJECTION INTRAVENOUS; SUBCUTANEOUS at 05:59

## 2019-11-07 RX ADMIN — Medication 2: at 07:42

## 2019-11-07 RX ADMIN — AMLODIPINE BESYLATE 5 MILLIGRAM(S): 2.5 TABLET ORAL at 05:59

## 2019-11-07 RX ADMIN — Medication 3 MILLILITER(S): at 11:19

## 2019-11-07 RX ADMIN — Medication 3 MILLILITER(S): at 04:07

## 2019-11-07 RX ADMIN — HEPARIN SODIUM 7500 UNIT(S): 5000 INJECTION INTRAVENOUS; SUBCUTANEOUS at 15:48

## 2019-11-07 RX ADMIN — Medication 81 MILLIGRAM(S): at 11:18

## 2019-11-07 RX ADMIN — Medication 3 MILLILITER(S): at 15:47

## 2019-11-07 RX ADMIN — Medication 2: at 11:18

## 2019-11-07 RX ADMIN — Medication 3 MILLILITER(S): at 21:11

## 2019-11-07 RX ADMIN — LOSARTAN POTASSIUM 25 MILLIGRAM(S): 100 TABLET, FILM COATED ORAL at 05:59

## 2019-11-07 RX ADMIN — Medication 2: at 16:48

## 2019-11-07 RX ADMIN — Medication 0.6 MILLIGRAM(S): at 18:16

## 2019-11-07 RX ADMIN — Medication 80 MILLIGRAM(S): at 18:16

## 2019-11-07 RX ADMIN — Medication 80 MILLIGRAM(S): at 06:00

## 2019-11-07 RX ADMIN — Medication 0.6 MILLIGRAM(S): at 05:59

## 2019-11-07 RX ADMIN — HEPARIN SODIUM 7500 UNIT(S): 5000 INJECTION INTRAVENOUS; SUBCUTANEOUS at 21:11

## 2019-11-07 NOTE — PHYSICAL THERAPY INITIAL EVALUATION ADULT - ADDITIONAL COMMENTS
Patient lives in elevator apartment, no steps to enter. Patient owns shower bench and commode. Patient receives home health assistance Monday-Friday, 5 hours and Saturday for 3 hours. Patient denies history of falls. Patient primarily ambulates from bed to chair and to/from commode. Patient on supplemental O2 at home.

## 2019-11-07 NOTE — DIETITIAN INITIAL EVALUATION ADULT. - PERTINENT LABORATORY DATA
POCT 160 (11/7) 126 (11/6),  (11/7) 150 (11/6), HgbA1c 7.8% (11/7), HDL 39 (L) Hgb 9.8 (L), Cl 91 (L)

## 2019-11-07 NOTE — DIETITIAN INITIAL EVALUATION ADULT. - NUTRITIONGOAL OUTCOME1
Pt to have steady wt loss of 1-2 lbs per week until healthy wt is achieved pt will have safe gradual wt loss upon d/c of 1-2 pounds/week to reach IBW

## 2019-11-07 NOTE — DIETITIAN INITIAL EVALUATION ADULT. - DIET TYPE
DASH/TLC, Consistent Carbohydrate; fluids per team - consider need for fluid restriction d/t noted fluid over load Dx.

## 2019-11-07 NOTE — PHYSICAL THERAPY INITIAL EVALUATION ADULT - PERTINENT HX OF CURRENT PROBLEM, REHAB EVAL
44-year-old female, severe morbid obesity, PMH DM2, HTN, HLD, chronic diastolic CHF (EF 55%), MANUEL (On home O2 24/7 3L mobile, 2L sitting, & CPAP QHS 12/5), obesity hypoventilation syndrome, s/p admission 8/2018 at [facility] for SOB, volume overload and pericardial effusion s/p IR drain (~1.6L; cx's NEG), who presented to [facility] today for progressive SOB and increasing LE edema in the setting of not taking her prescribed lasix x 1 month.

## 2019-11-07 NOTE — PROGRESS NOTE ADULT - PROBLEM SELECTOR PLAN 4
-continue Losartan 25mg daily  -continue Norvasc 5mg daily Reports taking Metformin, but is not taking insulin (per pharmacist has a Rx for lantus)  - SSI while inpatient  - consider adding Lantus if needed

## 2019-11-07 NOTE — DIETITIAN INITIAL EVALUATION ADULT. - PROBLEM SELECTOR PLAN 2
Has not been taking lasix x 1 month with weight gain and progressive RODRIGUEZ, previous effusion s/p tap and drain. TTE today no e/o effusion  -continue lasix 80mg IV BID, goal net neg 2L/day (patient has not been taking lasix)  -continue losartan (not on BB - ?lung disease hx)  -cont ASA 81mg daily  -strict I&O  -daily weight

## 2019-11-07 NOTE — DIETITIAN INITIAL EVALUATION ADULT. - SIGNS/SYMPTOMS
AEB %% and BMI kg/m2 67.4 kg/m2 AEB verbal reports of not following diet despite knowing restrictions

## 2019-11-07 NOTE — PROGRESS NOTE ADULT - PROBLEM SELECTOR PLAN 2
Has not been taking lasix x 1 month with weight gain and progressive RODRIGUEZ, more hypoxic in ED, Rx BiPAP and Lasix now improved.   -continue BiPAP while diuresing, patient was as low as 78% while sleeping in ED.   -pulmonary following, appreciate recs  -continuous sats  -duonebs prn Has not been taking lasix x 1 month with weight gain and progressive RODRIGUEZ, more hypoxic in ED, Rx BiPAP and Lasix now improved.   - continue BiPAP at night   - pulmonary following, appreciate addie albarrann

## 2019-11-07 NOTE — PROGRESS NOTE ADULT - PROBLEM SELECTOR PLAN 3
BiPAP therapy QHS to continue as above.      Addendum: ABG reviewed - consistent with compensated chronic respiratory acidosis with metabolic alkalosis. This is likely a reflection of her OHS with diuretic use causing contraction alkalosis. Recommended again to primary team to keep her on the bipap overnight at settings mentioned above. Spoke with RT as well.

## 2019-11-07 NOTE — PROGRESS NOTE ADULT - PROBLEM SELECTOR PLAN 1
secondary to discontinuation of lasix. Her hypoxemic respiratory failure is likely 2/2 CHF exacerbation and fluid overload.

## 2019-11-07 NOTE — DIETITIAN INITIAL EVALUATION ADULT. - ENERGY NEEDS
Height 67"; #; #; %% BMI: 67.4kg/m2  Ideal body weight used for calculations as pt >120% of IBW.   Nutrient needs based on [facility] standards of care for maintenance in adults. Needs adjusted for obesity.   Defer fluids to team 2/2 fluid overload. Height 67";  pounds;  pounds; %% BMI: 67.4kg/m2  Ideal body weight used for calculations as pt >120% of IBW.   Nutrient needs based on [facility] standards of care for maintenance in adults. Needs adjusted for obesity.   Defer fluids to team 2/2 fluid overload.

## 2019-11-07 NOTE — PROGRESS NOTE ADULT - PROBLEM SELECTOR PLAN 3
Has not been taking lasix x 1 month with weight gain and progressive RODRIGUEZ, previous effusion s/p tap and drain. TTE today no e/o effusion  -continue lasix 80mg IV BID, goal net neg 2L/day (patient has not been taking lasix)  -continue losartan (not on BB - ?lung disease hx)  -cont ASA 81mg daily  -strict I&O  -daily weight HX HTN.   - c/w Losartan 25mg daily  - c/w Norvasc 5mg daily

## 2019-11-07 NOTE — DIETITIAN INITIAL EVALUATION ADULT. - ADD RECOMMEND
1) Reinforce low sodium diet edu prn 2) Monitor labs 3)Obtain wts and trend 4)Monitor Skin, GI distress and POC 1) Reinforce low sodium diet edu prn 2) Monitor labs 3)Obtain daily wts and trend 4)Monitor Skin, GI distress and GOC 4) Monitor %PO intake, Diet tolerance

## 2019-11-07 NOTE — PROGRESS NOTE ADULT - PROBLEM SELECTOR PLAN 1
Presented with increasing SOB and LE edema. Acute on chronic diastolic HF likely in setting of medication non compliance (has not taken lasix in one month).   - ECHO 11/6: normal L/R ventricular systolic function, RV dilated, mild pulm HTN, PAP 39.4  - c/w IV lasix 80mg BID  - c/w Losartan 25mg QD  - strict I/O's, daily weight. goal net neg 2-3 L /day  - 1L fluid restriction

## 2019-11-07 NOTE — PROGRESS NOTE ADULT - SUBJECTIVE AND OBJECTIVE BOX
PULMONARY CONSULT FOLLOW-UP NOTE    INTERVAL HPI:  Reviewed chart and overnight events; patient seen and examined at bedside. No acute events noted overnight. patient is not back to her baseline. tolerated BiPAP overnight. using 4-5LPM o2 NC.     MEDICATIONS:  Pulmonary:  albuterol/ipratropium for Nebulization 3 milliLiter(s) Nebulizer every 6 hours    Antimicrobials:    Anticoagulants:  aspirin enteric coated 81 milliGRAM(s) Oral daily  heparin  Injectable 7500 Unit(s) SubCutaneous every 8 hours    Cardiac:  amLODIPine   Tablet 5 milliGRAM(s) Oral daily  furosemide   Injectable 80 milliGRAM(s) IV Push every 12 hours  losartan 25 milliGRAM(s) Oral daily      Allergies    No Known Drug Allergies  shellfish (Anaphylaxis)    Intolerances        Vital Signs Last 24 Hrs  T(C): 36.8 (2019 05:35), Max: 36.8 (2019 05:35)  T(F): 98.3 (2019 05:35), Max: 98.3 (2019 05:35)  HR: 104 (2019 08:38) (92 - 108)  BP: 112/67 (2019 08:38) (101/48 - 145/88)  BP(mean): 83 (2019 08:38) (72 - 125)  RR: 24 (2019 08:38) (16 - 24)  SpO2: 88% (2019 08:38) (75% - 100%)     @ 07:  -   @ 07:00  --------------------------------------------------------  IN: 0 mL / OUT: 8400 mL / NET: -8400 mL     @ 07:  -   @ 08:44  --------------------------------------------------------  IN: 0 mL / OUT: 350 mL / NET: -350 mL          PHYSICAL EXAM:  Constitutional: morbidly obese female resting in bed on 4-5LPM O2.   HEENT: NC/AT; PERRL, anicteric sclera; MMM  Neck: supple  Cardiovascular: +S1/S2, RRR  Respiratory: CTA B/L; no W/R/R  Gastrointestinal: soft, obese, NT/ND; +BSx4  Extremities:  b/l edema chronic  Vascular: difficult to palpate b/l DP, warm to touch  Neurological: AO x 3, no focal deficits.     LABS:  ABG - ( 2019 19:42 )  pH, Arterial: 7.36  pH, Blood: x     /  pCO2: 72    /  pO2: 69    / HCO3: 40    / Base Excess: 12.4  /  SaO2: 93                  CBC Full  -  ( 2019 07:16 )  WBC Count : 8.67 K/uL  RBC Count : 3.99 M/uL  Hemoglobin : 9.8 g/dL  Hematocrit : 36.8 %  Platelet Count - Automated : 273 K/uL  Mean Cell Volume : 92.2 fl  Mean Cell Hemoglobin : 24.6 pg  Mean Cell Hemoglobin Concentration : 26.6 gm/dL  Auto Neutrophil # : x  Auto Lymphocyte # : x  Auto Monocyte # : x  Auto Eosinophil # : x  Auto Basophil # : x  Auto Neutrophil % : x  Auto Lymphocyte % : x  Auto Monocyte % : x  Auto Eosinophil % : x  Auto Basophil % : x    11-07    139  |  91<L>  |  11  ----------------------------<  170<H>  4.9   |  34<H>  |  0.91    Ca    9.0      2019 07:16  Mg     1.6     11-    TPro  8.0  /  Alb  3.7  /  TBili  0.7  /  DBili  x   /  AST  19  /  ALT  12  /  AlkPhos  84  11-07    PT/INR - ( 2019 07:16 )   PT: 14.3 sec;   INR: 1.26          PTT - ( 2019 07:16 )  PTT:32.3 sec      Urinalysis Basic - ( 2019 12:17 )    Color: Yellow / Appearance: Clear / S.015 / pH: x  Gluc: x / Ketone: NEGATIVE  / Bili: Negative / Urobili: 0.2 E.U./dL   Blood: x / Protein: NEGATIVE mg/dL / Nitrite: NEGATIVE   Leuk Esterase: NEGATIVE / RBC: x / WBC x   Sq Epi: x / Non Sq Epi: x / Bacteria: x                RADIOLOGY & ADDITIONAL STUDIES:

## 2019-11-07 NOTE — PROGRESS NOTE ADULT - PROBLEM SELECTOR PLAN 5
Reports taking Metformin, but is not taking insulin (per pharmacist has a Rx for lantus)  -SSI while inpatient  -consider adding Lantus if needed - continue BiPAP QHS  - wean to oxygen as patient diureses   - appreciate pulm input  - nutrition consult

## 2019-11-07 NOTE — PROGRESS NOTE ADULT - PROBLEM SELECTOR PLAN 2
Patient has chronic CO2 retention due to OHS, but her CO2 is close to baseline per recent ABG.   - Continue with BiPAP at home settings (18/5 at FiO2 of 30% with back up rate of 12).

## 2019-11-07 NOTE — PROGRESS NOTE ADULT - ASSESSMENT
44 y/oF, severe morbid obesity, PMH DM2, HTN, HLD, chronic diastolic CHF (EF 55%), MANUEL (On home O2 24/7 3L mobile, 2L sitting, & CPAP QHS 12/5), obesity hypoventilation syndrome presented 11/6 with progressive SOB and increasing LE edema, admitted for acute on chronic systolic heart failure in the setting of medication non-compliance, now being diuresed with IV lasix. 44 y/oF, severe morbid obesity, PMH DM2, HTN, HLD, chronic diastolic CHF (EF 55%), MANUEL (On home O2 24/7 3L mobile, 2L sitting, & CPAP QHS 12/5), obesity hypoventilation syndrome presented 11/6 with progressive SOB and increasing LE edema, admitted for acute on chronic diastolic heart failure in the setting of medication non-compliance, now being diuresed with IV lasix.

## 2019-11-07 NOTE — PROGRESS NOTE ADULT - ASSESSMENT
43 yo F with morbid obesity, OHS (on home BPAP 12/5), chronic hypoxemia (on 2-3L NC at home during night), and HFpEF, who presented to [facility] ED for shortness of breath. - - -

## 2019-11-07 NOTE — PROGRESS NOTE ADULT - PROBLEM SELECTOR PLAN 6
-continue BiPAP QHS  -wean to oxygen as patient diureses   -appreciate pulm input  -nutrition consult - continue BiPAP QHS  - wean to oxygen as patient diureses   - appreciate pulm input

## 2019-11-07 NOTE — DIETITIAN INITIAL EVALUATION ADULT. - OTHER INFO
44y F with severe morbid obesity, DM2, HTN, HLD, chronic diastolic CHF (EF 55%), obesity hypoventilation syndrome. Pt admitted to [facility] 8/2018 for SOB, volume overload and pericardial effusion, requiring an IR drain. Presents to [facility] for progressive SOB and increasing LE edema d/t noncompliance with prescribed lasix x 1 month. She was found to be tachypneic. Current telemetry monitoring for further mgmt. Skin: edema 2+ b/l leg, intact, no pressure ulcers.    Upon assessment, pt resting in bed with son and mother by bedside. Pt reports good appetite and PO consumption (DASH/TLC + cstCHO w snack diet). PTA, pt reports good appetite, typically eating half of meals inside the home and half at take-out restaurants. Pt reports no recent wt changes. Confirms shellfish allergy, no dietary restrictions. Pt reports awareness of the need for a "low salt" and "low sugar" diet, but acknowledges her non compliance. No chewing or swallowing difficulties. No N/V/C/D. Last BM 11/6. Discussed low sodium diet ed. Pt receptive to diet ed, likely to be compliant with diet recommendations. Please see nutrition recommendations below. RD to monitor and f/u per protocol. 44y F with severe morbid obesity, DM2, HTN, HLD, chronic diastolic CHF (EF 55%), obesity hypoventilation syndrome. Pt admitted to [facility] 8/2018 for SOB, volume overload and pericardial effusion, requiring an IR drain. Presents to [facility] for progressive SOB and increasing LE edema d/t noncompliance with prescribed lasix x 1 month. She was found to be tachypneic upon admission. Current telemetry monitoring for further mgmt. Skin: edema 2+ b/l leg, intact, no pressure ulcers.  Upon assessment, pt resting in bed with son and mother by bedside. Pt reports good appetite and PO consumption (DASH/TLC + cstCHO w snack diet). PTA, pt reports good appetite, typically eating half of meals inside the home and half at take-out restaurants. Pt reports no recent wt changes. Confirms shellfish allergy, no dietary restrictions. Pt reports awareness of the need for a "low salt" and "low sugar" diet, however acknowledges her non compliance. No chewing or swallowing difficulties. No N/V/C/D. Last BM 11/6. Discussed low sodium diet ed. Pt receptive to diet ed, likely to be compliant with diet recommendations. Please see nutrition recommendations below. RD to monitor and f/u per protocol. 44y F with severe morbid obesity, DM2, HTN, HLD, chronic diastolic CHF (EF 55%), obesity hypoventilation syndrome. Pt admitted to [facility] 8/2018 for SOB, volume overload and pericardial effusion, requiring an IR drain, Noted SOB and increasing LE edema d/t noncompliance with prescribed lasix x 1 month. She was found to be tachypneic upon admission. Skin: edema 2+ b/l leg, intact, no pressure ulcers.  Upon assessment, pt resting in bed with son and mother by bedside. Pt reports good appetite and PO consumption at this time (DASH/TLC + Consistent Carbohydrate with evening snack). PTA, pt reports good appetite, typically eating half of meals inside the home and half at take-out restaurants. No dietary restrictions, Pt reports awareness of the need for a "low salt" and "low sugar" diet, however acknowledges her non compliance. No chewing or swallowing difficulties. Pt reports no recent wt changes. Confirms shellfish allergy. No N/V/C/D. Last BM 11/6.  Discussed low sodium diet education & Brief review of CHO examples. Pt/family receptive to diet education, seems to be motivated to make changes, however question extent of Recs to follow.   Please see nutrition recommendations below. RD to monitor and f/u per protocol. Pending order placed. Spoke with team.

## 2019-11-07 NOTE — PROGRESS NOTE ADULT - PROBLEM SELECTOR PLAN 4
Recommendations:   - pending LE dopplers.  - patient has small sized pericardial effusion with diastolic septal bounce, suggestive of constrictive pericarditis. she is hemodynamically stable. we will defer further m/m for this to Cardiology (monitering vs pericardial stripping vs pericardiocentesis).  - diuresis per primary team.   - BiPAP therapy   - consult PT and OOB to chair.     The patient will be s/e/d with Dr Rodriguez.

## 2019-11-07 NOTE — PROGRESS NOTE ADULT - PROBLEM SELECTOR PROBLEM 5
Type 2 diabetes mellitus without complication, unspecified whether long term insulin use Morbid obesity

## 2019-11-07 NOTE — CHART NOTE - NSCHARTNOTEFT_GEN_A_CORE
Upon Nutritional Assessment by the Registered Dietitian your patient was determined to meet criteria / has evidence of the following diagnosis/diagnoses:          [ ]  Mild Protein Calorie Malnutrition        [ ]  Moderate Protein Calorie Malnutrition        [ ] Severe Protein Calorie Malnutrition        [ ] Unspecified Protein Calorie Malnutrition        [ ] Underweight / BMI <19        [ x ] Morbid Obesity / BMI > 40      Findings as based on:  •  Comprehensive nutrition assessment and consultation    Treatment/The following diet has been recommended: DASH/TLC, Consistent Carbohydrate; fluids per team - consider need for fluid restriction d/t noted fluid over load Dx. Please see RD note 11/7 for additional details.       PROVIDER Section:     By signing this assessment you are acknowledging and agree with the diagnosis/diagnoses assigned by the Registered Dietitian    Comments:

## 2019-11-07 NOTE — PROGRESS NOTE ADULT - PROBLEM SELECTOR PROBLEM 4
Essential hypertension Type 2 diabetes mellitus without complication, unspecified whether long term insulin use

## 2019-11-07 NOTE — PROGRESS NOTE ADULT - PROBLEM SELECTOR PLAN 7
-continue BiPAP QHS  -wean to oxygen as patient diureses   -appreciate pulm input F: no IVF   E: Keep K>4, Mag>2  N: Diabetic, consistent carb diet    PT Eval - home with home PT   SW will need to see patient for home care/RN reinstatement    DISPO: 5L

## 2019-11-08 LAB
ANION GAP SERPL CALC-SCNC: 10 MMOL/L — SIGNIFICANT CHANGE UP (ref 5–17)
BASE EXCESS BLDA CALC-SCNC: 15.7 MMOL/L — HIGH (ref -2–3)
BUN SERPL-MCNC: 11 MG/DL — SIGNIFICANT CHANGE UP (ref 7–23)
CALCIUM SERPL-MCNC: 9.2 MG/DL — SIGNIFICANT CHANGE UP (ref 8.4–10.5)
CHLORIDE SERPL-SCNC: 92 MMOL/L — LOW (ref 96–108)
CO2 SERPL-SCNC: 40 MMOL/L — HIGH (ref 22–31)
CREAT SERPL-MCNC: 0.78 MG/DL — SIGNIFICANT CHANGE UP (ref 0.5–1.3)
CULTURE RESULTS: NO GROWTH — SIGNIFICANT CHANGE UP
GLUCOSE BLDC GLUCOMTR-MCNC: 147 MG/DL — HIGH (ref 70–99)
GLUCOSE BLDC GLUCOMTR-MCNC: 157 MG/DL — HIGH (ref 70–99)
GLUCOSE BLDC GLUCOMTR-MCNC: 162 MG/DL — HIGH (ref 70–99)
GLUCOSE BLDC GLUCOMTR-MCNC: 187 MG/DL — HIGH (ref 70–99)
GLUCOSE SERPL-MCNC: 162 MG/DL — HIGH (ref 70–99)
HCO3 BLDA-SCNC: 42 MMOL/L — HIGH (ref 21–28)
HCT VFR BLD CALC: 34.3 % — LOW (ref 34.5–45)
HGB BLD-MCNC: 9.1 G/DL — LOW (ref 11.5–15.5)
MAGNESIUM SERPL-MCNC: 1.6 MG/DL — SIGNIFICANT CHANGE UP (ref 1.6–2.6)
MCHC RBC-ENTMCNC: 24.5 PG — LOW (ref 27–34)
MCHC RBC-ENTMCNC: 26.5 GM/DL — LOW (ref 32–36)
MCV RBC AUTO: 92.5 FL — SIGNIFICANT CHANGE UP (ref 80–100)
NRBC # BLD: 0 /100 WBCS — SIGNIFICANT CHANGE UP (ref 0–0)
PCO2 BLDA: 65 MMHG — CRITICAL HIGH (ref 32–45)
PH BLDA: 7.43 — SIGNIFICANT CHANGE UP (ref 7.35–7.45)
PHOSPHATE SERPL-MCNC: 3.7 MG/DL — SIGNIFICANT CHANGE UP (ref 2.5–4.5)
PLATELET # BLD AUTO: 281 K/UL — SIGNIFICANT CHANGE UP (ref 150–400)
PO2 BLDA: 48 MMHG — CRITICAL LOW (ref 83–108)
POTASSIUM SERPL-MCNC: 3.9 MMOL/L — SIGNIFICANT CHANGE UP (ref 3.5–5.3)
POTASSIUM SERPL-SCNC: 3.9 MMOL/L — SIGNIFICANT CHANGE UP (ref 3.5–5.3)
RBC # BLD: 3.71 M/UL — LOW (ref 3.8–5.2)
RBC # FLD: 16.8 % — HIGH (ref 10.3–14.5)
SAO2 % BLDA: 82 % — LOW (ref 95–100)
SODIUM SERPL-SCNC: 142 MMOL/L — SIGNIFICANT CHANGE UP (ref 135–145)
SPECIMEN SOURCE: SIGNIFICANT CHANGE UP
WBC # BLD: 6.98 K/UL — SIGNIFICANT CHANGE UP (ref 3.8–10.5)
WBC # FLD AUTO: 6.98 K/UL — SIGNIFICANT CHANGE UP (ref 3.8–10.5)

## 2019-11-08 PROCEDURE — 99233 SBSQ HOSP IP/OBS HIGH 50: CPT | Mod: GC

## 2019-11-08 PROCEDURE — 99223 1ST HOSP IP/OBS HIGH 75: CPT | Mod: GC

## 2019-11-08 PROCEDURE — 71045 X-RAY EXAM CHEST 1 VIEW: CPT | Mod: 26

## 2019-11-08 PROCEDURE — 99232 SBSQ HOSP IP/OBS MODERATE 35: CPT

## 2019-11-08 RX ORDER — ACETAZOLAMIDE 250 MG/1
250 TABLET ORAL
Refills: 0 | Status: DISCONTINUED | OUTPATIENT
Start: 2019-11-08 | End: 2019-11-11

## 2019-11-08 RX ADMIN — HEPARIN SODIUM 7500 UNIT(S): 5000 INJECTION INTRAVENOUS; SUBCUTANEOUS at 14:25

## 2019-11-08 RX ADMIN — AMLODIPINE BESYLATE 5 MILLIGRAM(S): 2.5 TABLET ORAL at 06:25

## 2019-11-08 RX ADMIN — Medication 80 MILLIGRAM(S): at 06:24

## 2019-11-08 RX ADMIN — ACETAZOLAMIDE 250 MILLIGRAM(S): 250 TABLET ORAL at 18:32

## 2019-11-08 RX ADMIN — HEPARIN SODIUM 7500 UNIT(S): 5000 INJECTION INTRAVENOUS; SUBCUTANEOUS at 21:19

## 2019-11-08 RX ADMIN — Medication 2: at 21:19

## 2019-11-08 RX ADMIN — Medication 3 MILLILITER(S): at 21:18

## 2019-11-08 RX ADMIN — HEPARIN SODIUM 7500 UNIT(S): 5000 INJECTION INTRAVENOUS; SUBCUTANEOUS at 06:25

## 2019-11-08 RX ADMIN — Medication 2: at 07:45

## 2019-11-08 RX ADMIN — Medication 3 MILLILITER(S): at 10:00

## 2019-11-08 RX ADMIN — Medication 81 MILLIGRAM(S): at 10:00

## 2019-11-08 RX ADMIN — LOSARTAN POTASSIUM 25 MILLIGRAM(S): 100 TABLET, FILM COATED ORAL at 06:25

## 2019-11-08 RX ADMIN — Medication 3 MILLILITER(S): at 16:39

## 2019-11-08 RX ADMIN — Medication 0.6 MILLIGRAM(S): at 17:35

## 2019-11-08 RX ADMIN — Medication 2: at 16:39

## 2019-11-08 RX ADMIN — ATORVASTATIN CALCIUM 80 MILLIGRAM(S): 80 TABLET, FILM COATED ORAL at 21:19

## 2019-11-08 RX ADMIN — Medication 3 MILLILITER(S): at 04:06

## 2019-11-08 RX ADMIN — Medication 0.6 MILLIGRAM(S): at 06:25

## 2019-11-08 RX ADMIN — Medication 80 MILLIGRAM(S): at 18:10

## 2019-11-08 NOTE — PROGRESS NOTE ADULT - PROBLEM SELECTOR PLAN 4
Recommendations:   - pending LE dopplers.  - patient has small sized pericardial effusion with diastolic septal bounce, suggestive of constrictive pericarditis. she is hemodynamically stable. we will defer further m/m for this to Cardiology (monitering vs pericardial stripping vs pericardiocentesis).  -  rising HCO3 likely due to contraction alkalosis. recommend to add diamox. will need careful titration to maintain b/w 32-36 which appears to be the patient's baseline.   - BiPAP therapy QHS  - consult PT and OOB to chair.     The patient was s/e/d with Dr Rodriguez.

## 2019-11-08 NOTE — PROGRESS NOTE ADULT - ASSESSMENT
44 y/oF, severe morbid obesity, PMH DM2, HTN, HLD, chronic diastolic CHF (EF 55%), MANUEL (On home O2 24/7 3L mobile, 2L sitting, & CPAP QHS 12/5), obesity hypoventilation syndrome presented 11/6 with progressive SOB and increasing LE edema, admitted for acute on chronic diastolic heart failure in the setting of medication non-compliance, now being diuresed with IV lasix.

## 2019-11-08 NOTE — PROGRESS NOTE ADULT - PROBLEM SELECTOR PLAN 1
Presented with increasing SOB and LE edema. Acute on chronic diastolic HF likely in setting of medication non compliance (has not taken lasix in one month).   - ECHO 11/6: normal L/R ventricular systolic function, RV dilated, mild pulm HTN, PAP 39.4  - c/w IV lasix 80mg BID with plan to switch to PO Torsemide 40mg QD upon discharge   - ordered for Metolazone 5mg x 1 for this evening   - Diamox 250mg BID initiated   - c/w Losartan 25mg QD  - HF consulted, appreciate recs.   - BL LE US 11/7 (-) DVT  - strict I/O's, daily weight. goal net neg 2-3 L /day  - 1L fluid restriction

## 2019-11-08 NOTE — PROGRESS NOTE ADULT - PROBLEM SELECTOR PLAN 7
F: no IVF   E: Keep K>4, Mag>2  N: Diabetic, consistent carb diet    PT Eval - home with home PT   SW will need to see patient for home care/RN reinstatement    DISPO: 5L

## 2019-11-08 NOTE — PROGRESS NOTE ADULT - SUBJECTIVE AND OBJECTIVE BOX
PULMONARY CONSULT FOLLOW-UP NOTE    INTERVAL HPI:  Reviewed chart and overnight events; patient seen and examined at bedside. Patient sitting in the bed today on 5-6LPM O2 and reports that she is getting better now.     MEDICATIONS:  Pulmonary:  albuterol/ipratropium for Nebulization 3 milliLiter(s) Nebulizer every 6 hours    Antimicrobials:    Anticoagulants:  aspirin enteric coated 81 milliGRAM(s) Oral daily  heparin  Injectable 7500 Unit(s) SubCutaneous every 8 hours    Cardiac:  acetaZOLAMIDE    Tablet 250 milliGRAM(s) Oral two times a day  amLODIPine   Tablet 5 milliGRAM(s) Oral daily  furosemide   Injectable 80 milliGRAM(s) IV Push every 12 hours  losartan 25 milliGRAM(s) Oral daily      Allergies    No Known Drug Allergies  shellfish (Anaphylaxis)    Intolerances        Vital Signs Last 24 Hrs  T(C): 36.9 (08 Nov 2019 18:13), Max: 37.1 (08 Nov 2019 13:32)  T(F): 98.5 (08 Nov 2019 18:13), Max: 98.7 (08 Nov 2019 13:32)  HR: 96 (08 Nov 2019 17:38) (90 - 103)  BP: 115/83 (08 Nov 2019 17:38) (101/60 - 126/80)  BP(mean): 97 (08 Nov 2019 17:38) (76 - 99)  RR: 22 (08 Nov 2019 17:38) (18 - 22)  SpO2: 97% (08 Nov 2019 17:38) (82% - 97%)    11-07 @ 07:01  -  11-08 @ 07:00  --------------------------------------------------------  IN: 1340 mL / OUT: 4600 mL / NET: -3260 mL    11-08 @ 07:01  -  11-08 @ 18:19  --------------------------------------------------------  IN: 0 mL / OUT: 3740 mL / NET: -3740 mL          PHYSICAL EXAM:  Constitutional: morbidly obese female resting in bed on 4-5LPM O2.   HEENT: NC/AT; PERRL, anicteric sclera; MMM  Neck: supple  Cardiovascular: +S1/S2, RRR  Respiratory: CTA B/L; no W/R/R  Gastrointestinal: soft, obese, NT/ND; +BSx4  Extremities:  b/l edema chronic  Vascular: difficult to palpate b/l DP, warm to touch  Neurological: AO x 3, no focal deficits.    LABS:  ABG - ( 08 Nov 2019 11:40 )  pH, Arterial: 7.43  pH, Blood: x     /  pCO2: 65    /  pO2: 48    / HCO3: 42    / Base Excess: 15.7  /  SaO2: 82                  CBC Full  -  ( 08 Nov 2019 07:24 )  WBC Count : 6.98 K/uL  RBC Count : 3.71 M/uL  Hemoglobin : 9.1 g/dL  Hematocrit : 34.3 %  Platelet Count - Automated : 281 K/uL  Mean Cell Volume : 92.5 fl  Mean Cell Hemoglobin : 24.5 pg  Mean Cell Hemoglobin Concentration : 26.5 gm/dL  Auto Neutrophil # : x  Auto Lymphocyte # : x  Auto Monocyte # : x  Auto Eosinophil # : x  Auto Basophil # : x  Auto Neutrophil % : x  Auto Lymphocyte % : x  Auto Monocyte % : x  Auto Eosinophil % : x  Auto Basophil % : x    11-08    142  |  92<L>  |  11  ----------------------------<  162<H>  3.9   |  40<H>  |  0.78    Ca    9.2      08 Nov 2019 07:24  Phos  3.7     11-08  Mg     1.6     11-08    TPro  8.0  /  Alb  3.7  /  TBili  0.7  /  DBili  x   /  AST  19  /  ALT  12  /  AlkPhos  84  11-07    PT/INR - ( 07 Nov 2019 07:16 )   PT: 14.3 sec;   INR: 1.26          PTT - ( 07 Nov 2019 07:16 )  PTT:32.3 sec                  RADIOLOGY & ADDITIONAL STUDIES:

## 2019-11-08 NOTE — PROGRESS NOTE ADULT - PROBLEM SELECTOR PLAN 4
Reports taking Metformin, but is not taking insulin (per pharmacist has a Rx for lantus)  - SSI while inpatient  - consider adding Lantus if needed

## 2019-11-08 NOTE — PROGRESS NOTE ADULT - SUBJECTIVE AND OBJECTIVE BOX
CARDIOLOGY NP PROGRESS NOTE    Subjective:  Patient seen and examined at bedside. Pt in NAD and states feeling well.  Remainder ROS otherwise negative.    Overnight Events:     TELEMETRY:    EKG:      VITAL SIGNS:  T(C): 37.1 (11-08-19 @ 13:32), Max: 37.1 (11-08-19 @ 13:32)  HR: 98 (11-08-19 @ 09:32) (90 - 103)  BP: 101/60 (11-08-19 @ 09:32) (101/60 - 126/80)  RR: 19 (11-08-19 @ 09:32) (18 - 24)  SpO2: 89% (11-08-19 @ 09:32) (82% - 98%)  Wt(kg): --    I&O's Summary    07 Nov 2019 07:01  -  08 Nov 2019 07:00  --------------------------------------------------------  IN: 1340 mL / OUT: 4600 mL / NET: -3260 mL    08 Nov 2019 07:01  -  08 Nov 2019 13:57  --------------------------------------------------------  IN: 0 mL / OUT: 2420 mL / NET: -2420 mL          PHYSICAL EXAM:    General: A/ox 3, No acute Distress  Neck: Supple, NO JVD  Cardiac: S1 S2, No M/R/G  Pulmonary: CTAB, Breathing unlabored, No Rhonchi/Rales/Wheezing  Abdomen: Soft, Non -tender, +BS x 4 quads  Extremities: No Rashes, No edema  Neuro: A/o x 3, No focal deficits          LABS:                          9.1    6.98  )-----------( 281      ( 08 Nov 2019 07:24 )             34.3                              11-08    142  |  92<L>  |  11  ----------------------------<  162<H>  3.9   |  40<H>  |  0.78    Ca    9.2      08 Nov 2019 07:24  Phos  3.7     11-08  Mg     1.6     11-08    TPro  8.0  /  Alb  3.7  /  TBili  0.7  /  DBili  x   /  AST  19  /  ALT  12  /  AlkPhos  84  11-07    LIVER FUNCTIONS - ( 07 Nov 2019 07:16 )  Alb: 3.7 g/dL / Pro: 8.0 g/dL / ALK PHOS: 84 U/L / ALT: 12 U/L / AST: 19 U/L / GGT: x                                 PT/INR - ( 07 Nov 2019 07:16 )   PT: 14.3 sec;   INR: 1.26          PTT - ( 07 Nov 2019 07:16 )  PTT:32.3 sec  CAPILLARY BLOOD GLUCOSE      POCT Blood Glucose.: 147 mg/dL (08 Nov 2019 11:23)  POCT Blood Glucose.: 157 mg/dL (08 Nov 2019 06:53)  POCT Blood Glucose.: 135 mg/dL (07 Nov 2019 21:08)  POCT Blood Glucose.: 168 mg/dL (07 Nov 2019 16:05)            Allergies:  No Known Drug Allergies  shellfish (Anaphylaxis)    MEDICATIONS  (STANDING):  acetaZOLAMIDE    Tablet 250 milliGRAM(s) Oral two times a day  albuterol/ipratropium for Nebulization 3 milliLiter(s) Nebulizer every 6 hours  amLODIPine   Tablet 5 milliGRAM(s) Oral daily  aspirin enteric coated 81 milliGRAM(s) Oral daily  atorvastatin 80 milliGRAM(s) Oral at bedtime  colchicine 0.6 milliGRAM(s) Oral two times a day  dextrose 5%. 1000 milliLiter(s) (50 mL/Hr) IV Continuous <Continuous>  dextrose 50% Injectable 12.5 Gram(s) IV Push once  dextrose 50% Injectable 25 Gram(s) IV Push once  dextrose 50% Injectable 25 Gram(s) IV Push once  furosemide   Injectable 80 milliGRAM(s) IV Push every 12 hours  heparin  Injectable 7500 Unit(s) SubCutaneous every 8 hours  insulin lispro (HumaLOG) corrective regimen sliding scale   SubCutaneous Before meals and at bedtime  losartan 25 milliGRAM(s) Oral daily  metolazone 5 milliGRAM(s) Oral once    MEDICATIONS  (PRN):  dextrose 40% Gel 15 Gram(s) Oral once PRN Blood Glucose LESS THAN 70 milliGRAM(s)/deciliter  glucagon  Injectable 1 milliGRAM(s) IntraMuscular once PRN Glucose LESS THAN 70 milligrams/deciliter        DIAGNOSTIC TESTS: CARDIOLOGY NP PROGRESS NOTE    Subjective:  Patient seen and examined at bedside. Pt in NAD and states feeling well. C/O [id number]. Denies CP, dizziness/diaphoresis, n/v, palpitations. Remainder ROS otherwise negative.    Overnight Events: No acute events overnight     TELEMETRY: SR- ST with occasional PVCs (90s-100s)      VITAL SIGNS:  T(C): 37.1 (11-08-19 @ 13:32), Max: 37.1 (11-08-19 @ 13:32)  HR: 98 (11-08-19 @ 09:32) (90 - 103)  BP: 101/60 (11-08-19 @ 09:32) (101/60 - 126/80)  RR: 19 (11-08-19 @ 09:32) (18 - 24)  SpO2: 89% (11-08-19 @ 09:32) (82% - 98%)  Wt(kg): --    I&O's Summary    07 Nov 2019 07:01  -  08 Nov 2019 07:00  --------------------------------------------------------  IN: 1340 mL / OUT: 4600 mL / NET: -3260 mL    08 Nov 2019 07:01  -  08 Nov 2019 13:57  --------------------------------------------------------  IN: 0 mL / OUT: 2420 mL / NET: -2420 mL          PHYSICAL EXAM:    General: A/ox 3, No acute Distress  Neck: Supple, NO JVD  Cardiac: S1 S2, No M/R/G  Pulmonary:  Diminished breath sounds. Breathing unlabored, No Rhonchi/Rales/Wheezing  Abdomen: Soft, Non -tender, +BS x 4 quads  Extremities: No Rashes, +4 edema BL LE  Neuro: A/o x 3, No focal deficits          LABS:                          9.1    6.98  )-----------( 281      ( 08 Nov 2019 07:24 )             34.3                              11-08    142  |  92<L>  |  11  ----------------------------<  162<H>  3.9   |  40<H>  |  0.78    Ca    9.2      08 Nov 2019 07:24  Phos  3.7     11-08  Mg     1.6     11-08    TPro  8.0  /  Alb  3.7  /  TBili  0.7  /  DBili  x   /  AST  19  /  ALT  12  /  AlkPhos  84  11-07    LIVER FUNCTIONS - ( 07 Nov 2019 07:16 )  Alb: 3.7 g/dL / Pro: 8.0 g/dL / ALK PHOS: 84 U/L / ALT: 12 U/L / AST: 19 U/L / GGT: x                                 PT/INR - ( 07 Nov 2019 07:16 )   PT: 14.3 sec;   INR: 1.26          PTT - ( 07 Nov 2019 07:16 )  PTT:32.3 sec  CAPILLARY BLOOD GLUCOSE      POCT Blood Glucose.: 147 mg/dL (08 Nov 2019 11:23)  POCT Blood Glucose.: 157 mg/dL (08 Nov 2019 06:53)  POCT Blood Glucose.: 135 mg/dL (07 Nov 2019 21:08)  POCT Blood Glucose.: 168 mg/dL (07 Nov 2019 16:05)            Allergies:  No Known Drug Allergies  shellfish (Anaphylaxis)    MEDICATIONS  (STANDING):  acetaZOLAMIDE    Tablet 250 milliGRAM(s) Oral two times a day  albuterol/ipratropium for Nebulization 3 milliLiter(s) Nebulizer every 6 hours  amLODIPine   Tablet 5 milliGRAM(s) Oral daily  aspirin enteric coated 81 milliGRAM(s) Oral daily  atorvastatin 80 milliGRAM(s) Oral at bedtime  colchicine 0.6 milliGRAM(s) Oral two times a day  dextrose 5%. 1000 milliLiter(s) (50 mL/Hr) IV Continuous <Continuous>  dextrose 50% Injectable 12.5 Gram(s) IV Push once  dextrose 50% Injectable 25 Gram(s) IV Push once  dextrose 50% Injectable 25 Gram(s) IV Push once  furosemide   Injectable 80 milliGRAM(s) IV Push every 12 hours  heparin  Injectable 7500 Unit(s) SubCutaneous every 8 hours  insulin lispro (HumaLOG) corrective regimen sliding scale   SubCutaneous Before meals and at bedtime  losartan 25 milliGRAM(s) Oral daily  metolazone 5 milliGRAM(s) Oral once    MEDICATIONS  (PRN):  dextrose 40% Gel 15 Gram(s) Oral once PRN Blood Glucose LESS THAN 70 milliGRAM(s)/deciliter  glucagon  Injectable 1 milliGRAM(s) IntraMuscular once PRN Glucose LESS THAN 70 milligrams/deciliter        DIAGNOSTIC TESTS:

## 2019-11-08 NOTE — PROGRESS NOTE ADULT - SUBJECTIVE AND OBJECTIVE BOX
44-year-old female, severe morbid obesity, PMH DM2, HTN, HLD, chronic diastolic CHF (EF 55%), MANUEL (On home O2 24/7 3L mobile, 2L sitting, & CPAP QHS 12/5), obesity hypoventilation syndrome, who presented to [facility] for progressive SOB and increasing LE edema in the setting of not taking her prescribed lasix x 1 month. Pt reports that she does not like to walk much and the medication was making her go to the restroom often. She is on home lasix 80mg BID.    Vital Signs Last 24 Hrs  T(C): 36.8 (08 Nov 2019 09:41), Max: 36.9 (07 Nov 2019 18:08)  T(F): 98.3 (08 Nov 2019 09:41), Max: 98.5 (07 Nov 2019 18:08)  HR: 98 (08 Nov 2019 09:32) (90 - 103)  BP: 101/60 (08 Nov 2019 09:32) (101/60 - 140/74)  BP(mean): 76 (08 Nov 2019 09:32) (76 - 99)  RR: 19 (08 Nov 2019 09:32) (18 - 24)  SpO2: 89% (08 Nov 2019 09:32) (82% - 98%)    General: Awake, alert, cooperative and in NAD.      HEENT: RANDA     Chest: dry cracles at the bases b/l     CVS: S1 S2 of normal intensity; tachycardic; No murmurs appreciated     Abd: Soft, NT, ND, BS present     Ext: 1_ pitting edema in depended regions                          9.1    6.98  )-----------( 281      ( 08 Nov 2019 07:24 )             34.3     11-08    142  |  92<L>  |  11  ----------------------------<  162<H>  3.9   |  40<H>  |  0.78    Ca    9.2      08 Nov 2019 07:24  Phos  3.7     11-08  Mg     1.6     11-08    TPro  8.0  /  Alb  3.7  /  TBili  0.7  /  DBili  x   /  AST  19  /  ALT  12  /  AlkPhos  84  11-07    ECG: NSR with PVCs  Tele: PVCs    Echo: < from: Echocardiogram (11.06.19 @ 15:11) >   1. Technically difficult study.   2. Normal left and right ventricular size and systolic function.   3. The right ventricle is dilated with probably normal right ventricular   systolic function.   4. Mild pulmonary hypertension, PASP is 39.4 mmHg.   5. There is a small sized circumferential pericardial effusion.   Diastolic septal bounce is noted and the IVC is plethoric. 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 Clinical correlation is   advised.    < end of copied text >      A/P: 44 y/oF, severe morbid obesity, PMH DM2, HTN, HLD, chronic diastolic CHF (EF 55%), MANUEL (On home O2 24/7 3L mobile, 2L sitting, & CPAP QHS 12/5), obesity hypoventilation syndrome presented 11/6 with progressive SOB and increasing LE edema, admitted for acute on chronic diastolic heart failure in the setting of medication non-compliance, now being diuresed with IV    --Continue with IV diureses with Lasix IV 80mg BID. Give 1x dose of Metolazone 5mg prior to next dose of IV diuretics.   --Cont with home amlodipine and losartan.   --When ready to DC switch pt to Torsemide 40 mg PO daily.   --Pt counseled on weight loss, increasing activity and medication compliance.  --Daily weights  --Stict Is and Os 44-year-old female, severe morbid obesity, PMH DM2, HTN, HLD, chronic diastolic CHF (EF 55%), MANUEL (On home O2 24/7 3L mobile, 2L sitting, & CPAP QHS 12/5), obesity hypoventilation syndrome, who presented to [facility] for progressive SOB and increasing LE edema in the setting of not taking her prescribed lasix x 1 month. Pt reports that she does not like to walk much and the medication was making her go to the restroom often. She is on home lasix 80mg BID.    Vital Signs Last 24 Hrs  T(C): 36.8 (08 Nov 2019 09:41), Max: 36.9 (07 Nov 2019 18:08)  T(F): 98.3 (08 Nov 2019 09:41), Max: 98.5 (07 Nov 2019 18:08)  HR: 98 (08 Nov 2019 09:32) (90 - 103)  BP: 101/60 (08 Nov 2019 09:32) (101/60 - 140/74)  BP(mean): 76 (08 Nov 2019 09:32) (76 - 99)  RR: 19 (08 Nov 2019 09:32) (18 - 24)  SpO2: 89% (08 Nov 2019 09:32) (82% - 98%)    General: Awake, alert, cooperative and in NAD.      HEENT: RANDA     Chest: dry cracles at the bases b/l     CVS: S1 S2 of normal intensity; tachycardic; No murmurs appreciated     Abd: Soft, NT, ND, BS present     Ext: 1_ pitting edema in depended regions                          9.1    6.98  )-----------( 281      ( 08 Nov 2019 07:24 )             34.3     11-08    142  |  92<L>  |  11  ----------------------------<  162<H>  3.9   |  40<H>  |  0.78    Ca    9.2      08 Nov 2019 07:24  Phos  3.7     11-08  Mg     1.6     11-08    TPro  8.0  /  Alb  3.7  /  TBili  0.7  /  DBili  x   /  AST  19  /  ALT  12  /  AlkPhos  84  11-07    ECG: NSR with PVCs  Tele: PVCs    Echo: < from: Echocardiogram (11.06.19 @ 15:11) >   1. Technically difficult study.   2. Normal left and right ventricular size and systolic function.   3. The right ventricle is dilated with probably normal right ventricular   systolic function.   4. Mild pulmonary hypertension, PASP is 39.4 mmHg.   5. There is a small sized circumferential pericardial effusion.   Diastolic septal bounce is noted and the IVC is plethoric. 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 Clinical correlation is   advised.    < end of copied text >      A/P: 44 y/oF, severe morbid obesity, PMH DM2, HTN, HLD, chronic diastolic CHF (EF 55%), MANUEL (On home O2 24/7 3L mobile, 2L sitting, & CPAP QHS 12/5), obesity hypoventilation syndrome presented 11/6 with progressive SOB and increasing LE edema, admitted for acute on chronic diastolic heart failure in the setting of medication non-compliance, now being diuresed with IV    --Continue with IV diureses with Lasix IV 80mg BID. Give 1x dose of Metolazone 5mg prior to next dose of IV diuretics.   --Cont with home amlodipine and losartan.   --When ready to DC switch pt to Torsemide 40 mg PO daily.   --Pt counseled on weight loss, increasing activity and medication compliance.  --Daily weights  --Strict Is and Os

## 2019-11-08 NOTE — PROGRESS NOTE ADULT - PROBLEM SELECTOR PLAN 2
Patient has chronic CO2 retention due to OHS, but her CO2 is close to baseline per recent ABG.   - Continue with BiPAP at home settings (20/5 at FiO2 of 30% with back up rate of 12).

## 2019-11-08 NOTE — PROGRESS NOTE ADULT - ASSESSMENT
43 yo F with morbid obesity, OHS (on home BPAP 12/5), chronic hypoxemia (on 2-3L NC at home during night), and HFpEF, who presented to [facility] ED for shortness of breath.

## 2019-11-08 NOTE — PROGRESS NOTE ADULT - PROBLEM SELECTOR PLAN 2
Has not been taking lasix x 1 month with weight gain and progressive RODRIGUEZ, more hypoxic in ED, Rx BiPAP and Lasix now improved.   - continue BiPAP at night   - Diamox 250mg BID initiated for increased bicarb levels  - pulmonary following, appreciate jaynas  - abran prn

## 2019-11-08 NOTE — PROGRESS NOTE ADULT - PROBLEM SELECTOR PLAN 5
- continue BiPAP QHS  - wean to oxygen as patient diureses   - appreciate pulm input  - nutrition consult

## 2019-11-09 DIAGNOSIS — Z91.89 OTHER SPECIFIED PERSONAL RISK FACTORS, NOT ELSEWHERE CLASSIFIED: ICD-10-CM

## 2019-11-09 LAB
BUN SERPL-MCNC: 13 MG/DL — SIGNIFICANT CHANGE UP (ref 7–23)
CALCIUM SERPL-MCNC: 9.3 MG/DL — SIGNIFICANT CHANGE UP (ref 8.4–10.5)
CHLORIDE SERPL-SCNC: 89 MMOL/L — LOW (ref 96–108)
CO2 SERPL-SCNC: 37 MMOL/L — HIGH (ref 22–31)
CREAT SERPL-MCNC: 0.83 MG/DL — SIGNIFICANT CHANGE UP (ref 0.5–1.3)
GLUCOSE BLDC GLUCOMTR-MCNC: 140 MG/DL — HIGH (ref 70–99)
GLUCOSE BLDC GLUCOMTR-MCNC: 166 MG/DL — HIGH (ref 70–99)
GLUCOSE BLDC GLUCOMTR-MCNC: 199 MG/DL — HIGH (ref 70–99)
GLUCOSE BLDC GLUCOMTR-MCNC: 227 MG/DL — HIGH (ref 70–99)
GLUCOSE SERPL-MCNC: 184 MG/DL — HIGH (ref 70–99)
HCT VFR BLD CALC: 37.3 % — SIGNIFICANT CHANGE UP (ref 34.5–45)
HGB BLD-MCNC: 10 G/DL — LOW (ref 11.5–15.5)
MAGNESIUM SERPL-MCNC: 2 MG/DL — SIGNIFICANT CHANGE UP (ref 1.6–2.6)
MCHC RBC-ENTMCNC: 24.3 PG — LOW (ref 27–34)
MCHC RBC-ENTMCNC: 26.8 GM/DL — LOW (ref 32–36)
MCV RBC AUTO: 90.8 FL — SIGNIFICANT CHANGE UP (ref 80–100)
NRBC # BLD: 0 /100 WBCS — SIGNIFICANT CHANGE UP (ref 0–0)
PLATELET # BLD AUTO: 316 K/UL — SIGNIFICANT CHANGE UP (ref 150–400)
POTASSIUM SERPL-MCNC: 4 MMOL/L — SIGNIFICANT CHANGE UP (ref 3.5–5.3)
POTASSIUM SERPL-SCNC: 4 MMOL/L — SIGNIFICANT CHANGE UP (ref 3.5–5.3)
RBC # BLD: 4.11 M/UL — SIGNIFICANT CHANGE UP (ref 3.8–5.2)
RBC # FLD: 16.6 % — HIGH (ref 10.3–14.5)
SODIUM SERPL-SCNC: 136 MMOL/L — SIGNIFICANT CHANGE UP (ref 135–145)
WBC # BLD: 6.54 K/UL — SIGNIFICANT CHANGE UP (ref 3.8–10.5)
WBC # FLD AUTO: 6.54 K/UL — SIGNIFICANT CHANGE UP (ref 3.8–10.5)

## 2019-11-09 PROCEDURE — 99232 SBSQ HOSP IP/OBS MODERATE 35: CPT

## 2019-11-09 RX ORDER — NYSTATIN CREAM 100000 [USP'U]/G
1 CREAM TOPICAL
Refills: 0 | Status: DISCONTINUED | OUTPATIENT
Start: 2019-11-09 | End: 2019-11-12

## 2019-11-09 RX ADMIN — NYSTATIN CREAM 1 APPLICATION(S): 100000 CREAM TOPICAL at 17:21

## 2019-11-09 RX ADMIN — Medication 0.6 MILLIGRAM(S): at 17:21

## 2019-11-09 RX ADMIN — Medication 80 MILLIGRAM(S): at 07:18

## 2019-11-09 RX ADMIN — HEPARIN SODIUM 7500 UNIT(S): 5000 INJECTION INTRAVENOUS; SUBCUTANEOUS at 07:18

## 2019-11-09 RX ADMIN — Medication 3 MILLILITER(S): at 10:04

## 2019-11-09 RX ADMIN — LOSARTAN POTASSIUM 25 MILLIGRAM(S): 100 TABLET, FILM COATED ORAL at 10:35

## 2019-11-09 RX ADMIN — Medication 2: at 11:42

## 2019-11-09 RX ADMIN — Medication 3 MILLILITER(S): at 17:21

## 2019-11-09 RX ADMIN — Medication 3 MILLILITER(S): at 21:11

## 2019-11-09 RX ADMIN — Medication 80 MILLIGRAM(S): at 18:44

## 2019-11-09 RX ADMIN — Medication 0.6 MILLIGRAM(S): at 07:18

## 2019-11-09 RX ADMIN — HEPARIN SODIUM 7500 UNIT(S): 5000 INJECTION INTRAVENOUS; SUBCUTANEOUS at 21:11

## 2019-11-09 RX ADMIN — Medication 2: at 07:18

## 2019-11-09 RX ADMIN — Medication 3 MILLILITER(S): at 03:33

## 2019-11-09 RX ADMIN — Medication 4: at 21:38

## 2019-11-09 RX ADMIN — HEPARIN SODIUM 7500 UNIT(S): 5000 INJECTION INTRAVENOUS; SUBCUTANEOUS at 13:10

## 2019-11-09 RX ADMIN — ACETAZOLAMIDE 250 MILLIGRAM(S): 250 TABLET ORAL at 17:23

## 2019-11-09 RX ADMIN — AMLODIPINE BESYLATE 5 MILLIGRAM(S): 2.5 TABLET ORAL at 10:35

## 2019-11-09 RX ADMIN — ATORVASTATIN CALCIUM 80 MILLIGRAM(S): 80 TABLET, FILM COATED ORAL at 21:11

## 2019-11-09 RX ADMIN — Medication 81 MILLIGRAM(S): at 10:35

## 2019-11-09 RX ADMIN — ACETAZOLAMIDE 250 MILLIGRAM(S): 250 TABLET ORAL at 07:18

## 2019-11-09 NOTE — PROGRESS NOTE ADULT - PROBLEM SELECTOR PLAN 7
F: no IVF   E: Keep K>4, Mag>2  N: Diabetic, consistent carb diet    PT Eval - home with home PT   SW will need to see patient for home care/RN reinstatement    DISPO: 5L F: no IVF   E: Keep K>4, Mag>2  N: DASH/TLC/CC/1L fluid restriction    PT Eval - home with home PT   SW will need to see patient for home care/RN reinstatement    DISPO: 5L  Code: FULL

## 2019-11-09 NOTE — PROGRESS NOTE ADULT - ASSESSMENT
45 y/o F, severe morbid obesity, PMH DM2, HTN, HLD, chronic diastolic CHF (EF 55%), MANUEL (On home O2 24/7 3L mobile, 2L sitting, & CPAP QHS 12/5), obesity hypoventilation syndrome presented 11/6 with progressive SOB and increasing LE edema, admitted for acute on chronic diastolic heart failure in the setting of medication non-compliance, now being diuresed with IV lasix

## 2019-11-09 NOTE — PROGRESS NOTE ADULT - PROBLEM SELECTOR PLAN 5
- continue BiPAP QHS  - wean to oxygen as patient diureses   - appreciate pulm input  - nutrition consult - continue BiPAP QHS  - c/w NC during the day   - nutrition consulted, follow recs

## 2019-11-09 NOTE — PROGRESS NOTE ADULT - SUBJECTIVE AND OBJECTIVE BOX
Interval HPI/overnight events: No acute events reported overnight. Patient was seen and examined at bedside, son at bedside. Reports her breathing has improved. Denies chest pain, palpitations, SOB, fever, chills, abdominal pain, nausea/vomiting, diarrhea/constipation.     VITAL SIGNS:  Vital Signs Last 24 Hrs  T(C): 36.8 (09 Nov 2019 05:23), Max: 37.1 (08 Nov 2019 13:32)  T(F): 98.3 (09 Nov 2019 05:23), Max: 98.7 (08 Nov 2019 13:32)  HR: 94 (09 Nov 2019 08:25) (88 - 98)  BP: 105/74 (09 Nov 2019 08:25) (90/61 - 128/61)  BP(mean): 94 (09 Nov 2019 08:25) (76 - 97)  RR: 20 (09 Nov 2019 08:25) (18 - 27)  SpO2: 92% (09 Nov 2019 08:25) (89% - 98%)    I&O's Summary    08 Nov 2019 07:01  -  09 Nov 2019 07:00  --------------------------------------------------------  IN: 0 mL / OUT: 7940 mL / NET: -7940 mL    09 Nov 2019 07:01  -  09 Nov 2019 09:34  --------------------------------------------------------  IN: 0 mL / OUT: 1400 mL / NET: -1400 mL      PHYSICAL EXAM:  General: morbidly obese, in NAD, lying comfortably in bed; on NC  HEENT: normocephalic, atraumatic; PERRL, anicteric sclera; MMM  Neck: supple, no JVD, no thyromegaly, no lymphadenopathy  Cardiovascular: +S1/S2, RRR, no M/G/R  Respiratory: breath sounds apically clear, diminished breath sounds limited by body habitus, no use of accessory muscles  Gastrointestinal: soft, NT/ND; +BSx4, no organomegaly  Extremities: WWP; non-pitting edema b/l, +lipodermatosclerosis  Vascular: 2+ radial, difficult to palpate DP/PT pulses B/L  Neurological: AAOx3; no focal deficits    MEDICATIONS:  MEDICATIONS  (STANDING):  acetaZOLAMIDE    Tablet 250 milliGRAM(s) Oral two times a day  albuterol/ipratropium for Nebulization 3 milliLiter(s) Nebulizer every 6 hours  amLODIPine   Tablet 5 milliGRAM(s) Oral daily  aspirin enteric coated 81 milliGRAM(s) Oral daily  atorvastatin 80 milliGRAM(s) Oral at bedtime  colchicine 0.6 milliGRAM(s) Oral two times a day  dextrose 5%. 1000 milliLiter(s) (50 mL/Hr) IV Continuous <Continuous>  dextrose 50% Injectable 12.5 Gram(s) IV Push once  dextrose 50% Injectable 25 Gram(s) IV Push once  dextrose 50% Injectable 25 Gram(s) IV Push once  furosemide   Injectable 80 milliGRAM(s) IV Push every 12 hours  heparin  Injectable 7500 Unit(s) SubCutaneous every 8 hours  insulin lispro (HumaLOG) corrective regimen sliding scale   SubCutaneous Before meals and at bedtime  losartan 25 milliGRAM(s) Oral daily    MEDICATIONS  (PRN):  dextrose 40% Gel 15 Gram(s) Oral once PRN Blood Glucose LESS THAN 70 milliGRAM(s)/deciliter  glucagon  Injectable 1 milliGRAM(s) IntraMuscular once PRN Glucose LESS THAN 70 milligrams/deciliter      ALLERGIES:  Allergies    No Known Drug Allergies  shellfish (Anaphylaxis)    Intolerances        LABS:                        10.0   6.54  )-----------( 316      ( 09 Nov 2019 08:11 )             37.3     11-09    136  |  89<L>  |  13  ----------------------------<  184<H>  4.0   |  37<H>  |  0.83    Ca    9.3      09 Nov 2019 08:11  Phos  3.7     11-08  Mg     2.0     11-09          CAPILLARY BLOOD GLUCOSE      POCT Blood Glucose.: 166 mg/dL (09 Nov 2019 06:47)      RADIOLOGY & ADDITIONAL TESTS: Reviewed. Interval HPI/overnight events: No acute events reported overnight. Patient was seen and examined at bedside, son at bedside. Reports her breathing has improved. Denies chest pain, palpitations, SOB, fever, chills, abdominal pain, nausea/vomiting, diarrhea/constipation.     VITAL SIGNS:  Vital Signs Last 24 Hrs  T(C): 36.8 (09 Nov 2019 05:23), Max: 37.1 (08 Nov 2019 13:32)  T(F): 98.3 (09 Nov 2019 05:23), Max: 98.7 (08 Nov 2019 13:32)  HR: 94 (09 Nov 2019 08:25) (88 - 98)  BP: 105/74 (09 Nov 2019 08:25) (90/61 - 128/61)  BP(mean): 94 (09 Nov 2019 08:25) (76 - 97)  RR: 20 (09 Nov 2019 08:25) (18 - 27)  SpO2: 92% (09 Nov 2019 08:25) (89% - 98%)    I&O's Summary    08 Nov 2019 07:01  -  09 Nov 2019 07:00  --------------------------------------------------------  IN: 0 mL / OUT: 7940 mL / NET: -7940 mL    09 Nov 2019 07:01  -  09 Nov 2019 09:34  --------------------------------------------------------  IN: 0 mL / OUT: 1400 mL / NET: -1400 mL    PHYSICAL EXAM:  General: morbidly obese, in NAD, lying comfortably in bed; on NC  HEENT: normocephalic, atraumatic; PERRL, anicteric sclera; MMM  Neck: supple, no JVD, no thyromegaly, no lymphadenopathy  Cardiovascular: +S1/S2, RRR, no M/G/R  Respiratory: breath sounds apically clear, diminished breath sounds limited by body habitus, no use of accessory muscles  Gastrointestinal: soft, NT/ND; +BSx4, no organomegaly  Extremities: WWP; non-pitting edema b/l, +lipodermatosclerosis  Vascular: 2+ radial, difficult to palpate DP/PT pulses B/L  Neurological: AAOx3; no focal deficits    MEDICATIONS:  MEDICATIONS  (STANDING):  acetaZOLAMIDE    Tablet 250 milliGRAM(s) Oral two times a day  albuterol/ipratropium for Nebulization 3 milliLiter(s) Nebulizer every 6 hours  amLODIPine   Tablet 5 milliGRAM(s) Oral daily  aspirin enteric coated 81 milliGRAM(s) Oral daily  atorvastatin 80 milliGRAM(s) Oral at bedtime  colchicine 0.6 milliGRAM(s) Oral two times a day  dextrose 5%. 1000 milliLiter(s) (50 mL/Hr) IV Continuous <Continuous>  dextrose 50% Injectable 12.5 Gram(s) IV Push once  dextrose 50% Injectable 25 Gram(s) IV Push once  dextrose 50% Injectable 25 Gram(s) IV Push once  furosemide   Injectable 80 milliGRAM(s) IV Push every 12 hours  heparin  Injectable 7500 Unit(s) SubCutaneous every 8 hours  insulin lispro (HumaLOG) corrective regimen sliding scale   SubCutaneous Before meals and at bedtime  losartan 25 milliGRAM(s) Oral daily    MEDICATIONS  (PRN):  dextrose 40% Gel 15 Gram(s) Oral once PRN Blood Glucose LESS THAN 70 milliGRAM(s)/deciliter  glucagon  Injectable 1 milliGRAM(s) IntraMuscular once PRN Glucose LESS THAN 70 milligrams/deciliter      ALLERGIES:  Allergies    No Known Drug Allergies  shellfish (Anaphylaxis)    Intolerances        LABS:                        10.0   6.54  )-----------( 316      ( 09 Nov 2019 08:11 )             37.3     11-09    136  |  89<L>  |  13  ----------------------------<  184<H>  4.0   |  37<H>  |  0.83    Ca    9.3      09 Nov 2019 08:11  Phos  3.7     11-08  Mg     2.0     11-09          CAPILLARY BLOOD GLUCOSE      POCT Blood Glucose.: 166 mg/dL (09 Nov 2019 06:47)      RADIOLOGY & ADDITIONAL TESTS: Reviewed.

## 2019-11-09 NOTE — PROGRESS NOTE ADULT - PROBLEM SELECTOR PLAN 2
Has not been taking lasix x 1 month with weight gain and progressive RODRIGUEZ, more hypoxic in ED, Rx BiPAP and Lasix now improved.   - continue BiPAP at night   - Diamox 250mg BID initiated for increased bicarb levels  - pulmonary following, appreciate jaynas  - abran prn Has not been taking lasix x 1 month with weight gain and progressive RODRIGUEZ, more hypoxic in ED, Rx BiPAP and Lasix now improved  - continue BiPAP at night   - Diamox 250mg BID initiated for increased bicarb levels  - pulmonary following, appreciate addie osullivan prn

## 2019-11-09 NOTE — PROGRESS NOTE ADULT - PROBLEM SELECTOR PLAN 1
Presented with increasing SOB and LE edema. Acute on chronic diastolic HF likely in setting of medication non compliance (has not taken lasix in one month).   - ECHO 11/6: normal L/R ventricular systolic function, RV dilated, mild pulm HTN, PAP 39.4  - c/w IV lasix 80mg BID with plan to switch to PO Torsemide 40mg QD upon discharge   - s/p Metolazone 5mg x 1 on 11/8  - c/w Diamox 250mg BID  - c/w Losartan 25mg QD  - HF consulted, appreciate recs.   - BL LE US 11/7 (-) DVT  - strict I/O's, daily weight. goal net neg 2-3 L /day  - DASH/TLC/CC 1L fluid restriction

## 2019-11-09 NOTE — PROGRESS NOTE ADULT - PROBLEM SELECTOR PLAN 6
- continue BiPAP QHS  - wean to oxygen as patient diureses   - appreciate pulm input - continue BiPAP QHS  - c/w NC during the day   - OOB to chair  - pt noted to have rising HCO3 likely due to contraction alkalosis, added diamox  - pulm on board, follow recs

## 2019-11-10 LAB
ALBUMIN SERPL ELPH-MCNC: 4.3 G/DL — SIGNIFICANT CHANGE UP (ref 3.3–5)
ALP SERPL-CCNC: 91 U/L — SIGNIFICANT CHANGE UP (ref 40–120)
ALT FLD-CCNC: 26 U/L — SIGNIFICANT CHANGE UP (ref 10–45)
ANION GAP SERPL CALC-SCNC: 11 MMOL/L — SIGNIFICANT CHANGE UP (ref 5–17)
ANION GAP SERPL CALC-SCNC: 14 MMOL/L — SIGNIFICANT CHANGE UP (ref 5–17)
APPEARANCE UR: CLEAR — SIGNIFICANT CHANGE UP
AST SERPL-CCNC: 32 U/L — SIGNIFICANT CHANGE UP (ref 10–40)
BILIRUB SERPL-MCNC: 0.8 MG/DL — SIGNIFICANT CHANGE UP (ref 0.2–1.2)
BILIRUB UR-MCNC: ABNORMAL
BUN SERPL-MCNC: 17 MG/DL — SIGNIFICANT CHANGE UP (ref 7–23)
BUN SERPL-MCNC: 19 MG/DL — SIGNIFICANT CHANGE UP (ref 7–23)
CALCIUM SERPL-MCNC: 9.5 MG/DL — SIGNIFICANT CHANGE UP (ref 8.4–10.5)
CALCIUM SERPL-MCNC: 9.9 MG/DL — SIGNIFICANT CHANGE UP (ref 8.4–10.5)
CHLORIDE SERPL-SCNC: 85 MMOL/L — LOW (ref 96–108)
CHLORIDE SERPL-SCNC: 89 MMOL/L — LOW (ref 96–108)
CO2 SERPL-SCNC: 36 MMOL/L — HIGH (ref 22–31)
CO2 SERPL-SCNC: 37 MMOL/L — HIGH (ref 22–31)
COLOR SPEC: YELLOW — SIGNIFICANT CHANGE UP
CREAT SERPL-MCNC: 0.94 MG/DL — SIGNIFICANT CHANGE UP (ref 0.5–1.3)
CREAT SERPL-MCNC: 1.16 MG/DL — SIGNIFICANT CHANGE UP (ref 0.5–1.3)
DIFF PNL FLD: ABNORMAL
GLUCOSE BLDC GLUCOMTR-MCNC: 139 MG/DL — HIGH (ref 70–99)
GLUCOSE BLDC GLUCOMTR-MCNC: 184 MG/DL — HIGH (ref 70–99)
GLUCOSE BLDC GLUCOMTR-MCNC: 209 MG/DL — HIGH (ref 70–99)
GLUCOSE BLDC GLUCOMTR-MCNC: 213 MG/DL — HIGH (ref 70–99)
GLUCOSE SERPL-MCNC: 140 MG/DL — HIGH (ref 70–99)
GLUCOSE SERPL-MCNC: 186 MG/DL — HIGH (ref 70–99)
GLUCOSE UR QL: NEGATIVE — SIGNIFICANT CHANGE UP
HCT VFR BLD CALC: 39.1 % — SIGNIFICANT CHANGE UP (ref 34.5–45)
HGB BLD-MCNC: 10.6 G/DL — LOW (ref 11.5–15.5)
KETONES UR-MCNC: ABNORMAL MG/DL
LEUKOCYTE ESTERASE UR-ACNC: ABNORMAL
MAGNESIUM SERPL-MCNC: 2 MG/DL — SIGNIFICANT CHANGE UP (ref 1.6–2.6)
MAGNESIUM SERPL-MCNC: 2.1 MG/DL — SIGNIFICANT CHANGE UP (ref 1.6–2.6)
MCHC RBC-ENTMCNC: 24.1 PG — LOW (ref 27–34)
MCHC RBC-ENTMCNC: 27.1 GM/DL — LOW (ref 32–36)
MCV RBC AUTO: 89.1 FL — SIGNIFICANT CHANGE UP (ref 80–100)
NITRITE UR-MCNC: NEGATIVE — SIGNIFICANT CHANGE UP
NRBC # BLD: 0 /100 WBCS — SIGNIFICANT CHANGE UP (ref 0–0)
PH UR: 7.5 — SIGNIFICANT CHANGE UP (ref 5–8)
PHOSPHATE SERPL-MCNC: 4.2 MG/DL — SIGNIFICANT CHANGE UP (ref 2.5–4.5)
PHOSPHATE SERPL-MCNC: 5.3 MG/DL — HIGH (ref 2.5–4.5)
PLATELET # BLD AUTO: 311 K/UL — SIGNIFICANT CHANGE UP (ref 150–400)
POTASSIUM SERPL-MCNC: 3.9 MMOL/L — SIGNIFICANT CHANGE UP (ref 3.5–5.3)
POTASSIUM SERPL-MCNC: 4.2 MMOL/L — SIGNIFICANT CHANGE UP (ref 3.5–5.3)
POTASSIUM SERPL-SCNC: 3.9 MMOL/L — SIGNIFICANT CHANGE UP (ref 3.5–5.3)
POTASSIUM SERPL-SCNC: 4.2 MMOL/L — SIGNIFICANT CHANGE UP (ref 3.5–5.3)
PROT SERPL-MCNC: 9.6 G/DL — HIGH (ref 6–8.3)
PROT UR-MCNC: ABNORMAL MG/DL
RBC # BLD: 4.39 M/UL — SIGNIFICANT CHANGE UP (ref 3.8–5.2)
RBC # FLD: 16.7 % — HIGH (ref 10.3–14.5)
SODIUM SERPL-SCNC: 135 MMOL/L — SIGNIFICANT CHANGE UP (ref 135–145)
SODIUM SERPL-SCNC: 137 MMOL/L — SIGNIFICANT CHANGE UP (ref 135–145)
SP GR SPEC: 1.01 — SIGNIFICANT CHANGE UP (ref 1–1.03)
UROBILINOGEN FLD QL: 2 E.U./DL
WBC # BLD: 6.46 K/UL — SIGNIFICANT CHANGE UP (ref 3.8–10.5)
WBC # FLD AUTO: 6.46 K/UL — SIGNIFICANT CHANGE UP (ref 3.8–10.5)

## 2019-11-10 PROCEDURE — 99232 SBSQ HOSP IP/OBS MODERATE 35: CPT

## 2019-11-10 RX ORDER — ACETAMINOPHEN 500 MG
650 TABLET ORAL ONCE
Refills: 0 | Status: COMPLETED | OUTPATIENT
Start: 2019-11-10 | End: 2019-11-10

## 2019-11-10 RX ADMIN — LOSARTAN POTASSIUM 25 MILLIGRAM(S): 100 TABLET, FILM COATED ORAL at 07:00

## 2019-11-10 RX ADMIN — ACETAZOLAMIDE 250 MILLIGRAM(S): 250 TABLET ORAL at 07:00

## 2019-11-10 RX ADMIN — AMLODIPINE BESYLATE 5 MILLIGRAM(S): 2.5 TABLET ORAL at 07:00

## 2019-11-10 RX ADMIN — ATORVASTATIN CALCIUM 80 MILLIGRAM(S): 80 TABLET, FILM COATED ORAL at 21:43

## 2019-11-10 RX ADMIN — ACETAZOLAMIDE 250 MILLIGRAM(S): 250 TABLET ORAL at 18:28

## 2019-11-10 RX ADMIN — Medication 80 MILLIGRAM(S): at 07:00

## 2019-11-10 RX ADMIN — Medication 0.6 MILLIGRAM(S): at 18:28

## 2019-11-10 RX ADMIN — NYSTATIN CREAM 1 APPLICATION(S): 100000 CREAM TOPICAL at 07:01

## 2019-11-10 RX ADMIN — Medication 3 MILLILITER(S): at 03:11

## 2019-11-10 RX ADMIN — Medication 80 MILLIGRAM(S): at 18:28

## 2019-11-10 RX ADMIN — HEPARIN SODIUM 7500 UNIT(S): 5000 INJECTION INTRAVENOUS; SUBCUTANEOUS at 21:43

## 2019-11-10 RX ADMIN — HEPARIN SODIUM 7500 UNIT(S): 5000 INJECTION INTRAVENOUS; SUBCUTANEOUS at 07:00

## 2019-11-10 RX ADMIN — NYSTATIN CREAM 1 APPLICATION(S): 100000 CREAM TOPICAL at 18:28

## 2019-11-10 RX ADMIN — Medication 2: at 07:04

## 2019-11-10 RX ADMIN — HEPARIN SODIUM 7500 UNIT(S): 5000 INJECTION INTRAVENOUS; SUBCUTANEOUS at 13:10

## 2019-11-10 RX ADMIN — Medication 4: at 11:20

## 2019-11-10 RX ADMIN — Medication 3 MILLILITER(S): at 10:35

## 2019-11-10 RX ADMIN — Medication 650 MILLIGRAM(S): at 15:10

## 2019-11-10 RX ADMIN — Medication 81 MILLIGRAM(S): at 11:21

## 2019-11-10 RX ADMIN — Medication 4: at 21:44

## 2019-11-10 RX ADMIN — Medication 3 MILLILITER(S): at 15:55

## 2019-11-10 RX ADMIN — Medication 650 MILLIGRAM(S): at 16:10

## 2019-11-10 RX ADMIN — Medication 3 MILLILITER(S): at 21:43

## 2019-11-10 RX ADMIN — Medication 0.6 MILLIGRAM(S): at 07:00

## 2019-11-10 NOTE — PROGRESS NOTE ADULT - SUBJECTIVE AND OBJECTIVE BOX
Vital Signs Last 12 Hrs  T(F): 98.3 (11-10-19 @ 05:23), Max: 98.8 (11-09-19 @ 22:50)  HR: 93 (11-10-19 @ 06:15) (86 - 96)  BP: 119/69 (11-10-19 @ 05:08) (99/60 - 119/69)  BP(mean): 85 (11-10-19 @ 05:08) (67 - 85)  RR: 21 (11-10-19 @ 06:15) (12 - 23)  SpO2: 90% (11-10-19 @ 06:15) (90% - 97%)  I&O's Summary    09 Nov 2019 07:01  -  10 Nov 2019 07:00  --------------------------------------------------------  IN: 240 mL / OUT: 5950 mL / NET: -5710 mL        PHYSICAL EXAM:    General: morbidly obese, in NAD, lying comfortably in bed; on NC  HEENT: normocephalic, atraumatic; PERRL, anicteric sclera; MMM  Neck: supple, no JVD, no thyromegaly, no lymphadenopathy  Cardiovascular: +S1/S2, RRR, no M/G/R  Respiratory: breath sounds apically clear, diminished breath sounds limited by body habitus, no use of accessory muscles  Gastrointestinal: soft, NT/ND; +BSx4, no organomegaly  Extremities: WWP; non-pitting edema b/l, +lipodermatosclerosis  Vascular: 2+ radial, difficult to palpate DP/PT pulses B/L  Neurological: AAOx3; no focal deficits        LABS:                        10.6   6.46  )-----------( 311      ( 10 Nov 2019 07:13 )             39.1     11-09    136  |  89<L>  |  13  ----------------------------<  184<H>  4.0   |  37<H>  |  0.83    Ca    9.3      09 Nov 2019 08:11  Mg     2.0     11-09            RADIOLOGY & ADDITIONAL TESTS:    MEDICATIONS  (STANDING):  acetaZOLAMIDE    Tablet 250 milliGRAM(s) Oral two times a day  albuterol/ipratropium for Nebulization 3 milliLiter(s) Nebulizer every 6 hours  amLODIPine   Tablet 5 milliGRAM(s) Oral daily  aspirin enteric coated 81 milliGRAM(s) Oral daily  atorvastatin 80 milliGRAM(s) Oral at bedtime  colchicine 0.6 milliGRAM(s) Oral two times a day  dextrose 5%. 1000 milliLiter(s) (50 mL/Hr) IV Continuous <Continuous>  dextrose 50% Injectable 12.5 Gram(s) IV Push once  dextrose 50% Injectable 25 Gram(s) IV Push once  dextrose 50% Injectable 25 Gram(s) IV Push once  furosemide   Injectable 80 milliGRAM(s) IV Push every 12 hours  heparin  Injectable 7500 Unit(s) SubCutaneous every 8 hours  insulin lispro (HumaLOG) corrective regimen sliding scale   SubCutaneous Before meals and at bedtime  losartan 25 milliGRAM(s) Oral daily  nystatin Powder 1 Application(s) Topical two times a day    MEDICATIONS  (PRN):  dextrose 40% Gel 15 Gram(s) Oral once PRN Blood Glucose LESS THAN 70 milliGRAM(s)/deciliter  glucagon  Injectable 1 milliGRAM(s) IntraMuscular once PRN Glucose LESS THAN 70 milligrams/deciliter INTERVAL EVENTS:  Patient seen and examined at bedside No acute events overnight. Vital signs stable. BIPAP overnight. Now on 2L NC. Denies fevers, chills, CP, SOB, abdominal pain, N/V/D, urinary symptoms.     Vital Signs Last 12 Hrs  T(F): 98.3 (11-10-19 @ 05:23), Max: 98.8 (11-09-19 @ 22:50)  HR: 93 (11-10-19 @ 06:15) (86 - 96)  BP: 119/69 (11-10-19 @ 05:08) (99/60 - 119/69)  BP(mean): 85 (11-10-19 @ 05:08) (67 - 85)  RR: 21 (11-10-19 @ 06:15) (12 - 23)  SpO2: 90% (11-10-19 @ 06:15) (90% - 97%)  I&O's Summary    09 Nov 2019 07:01  -  10 Nov 2019 07:00  --------------------------------------------------------  IN: 240 mL / OUT: 5950 mL / NET: -5710 mL        PHYSICAL EXAM:    General: morbidly obese, in NAD, lying comfortably in bed; on NC  HEENT: normocephalic, atraumatic; PERRL, anicteric sclera; MMM  Neck: supple, no JVD, no thyromegaly, no lymphadenopathy  Cardiovascular: +S1/S2, RRR, no M/G/R  Respiratory: breath sounds apically clear, diminished breath sounds limited by body habitus, no use of accessory muscles  Gastrointestinal: soft, NT/ND; +BSx4, no organomegaly  Extremities: WWP; non-pitting edema b/l, +lipodermatosclerosis  Vascular: 2+ radial, difficult to palpate DP/PT pulses B/L  Neurological: AAOx3; no focal deficits        LABS:                        10.6   6.46  )-----------( 311      ( 10 Nov 2019 07:13 )             39.1     11-09    136  |  89<L>  |  13  ----------------------------<  184<H>  4.0   |  37<H>  |  0.83    Ca    9.3      09 Nov 2019 08:11  Mg     2.0     11-09            RADIOLOGY & ADDITIONAL TESTS:    MEDICATIONS  (STANDING):  acetaZOLAMIDE    Tablet 250 milliGRAM(s) Oral two times a day  albuterol/ipratropium for Nebulization 3 milliLiter(s) Nebulizer every 6 hours  amLODIPine   Tablet 5 milliGRAM(s) Oral daily  aspirin enteric coated 81 milliGRAM(s) Oral daily  atorvastatin 80 milliGRAM(s) Oral at bedtime  colchicine 0.6 milliGRAM(s) Oral two times a day  dextrose 5%. 1000 milliLiter(s) (50 mL/Hr) IV Continuous <Continuous>  dextrose 50% Injectable 12.5 Gram(s) IV Push once  dextrose 50% Injectable 25 Gram(s) IV Push once  dextrose 50% Injectable 25 Gram(s) IV Push once  furosemide   Injectable 80 milliGRAM(s) IV Push every 12 hours  heparin  Injectable 7500 Unit(s) SubCutaneous every 8 hours  insulin lispro (HumaLOG) corrective regimen sliding scale   SubCutaneous Before meals and at bedtime  losartan 25 milliGRAM(s) Oral daily  nystatin Powder 1 Application(s) Topical two times a day    MEDICATIONS  (PRN):  dextrose 40% Gel 15 Gram(s) Oral once PRN Blood Glucose LESS THAN 70 milliGRAM(s)/deciliter  glucagon  Injectable 1 milliGRAM(s) IntraMuscular once PRN Glucose LESS THAN 70 milligrams/deciliter

## 2019-11-10 NOTE — PROGRESS NOTE ADULT - PROBLEM SELECTOR PLAN 7
F: no IVF   E: Keep K>4, Mag>2  N: DASH/TLC/CC/1L fluid restriction    PT Eval - home with home PT   SW will need to see patient for home care/RN reinstatement    DISPO: 5L  Code: FULL

## 2019-11-10 NOTE — PROVIDER CONTACT NOTE (CHANGE IN STATUS NOTIFICATION) - RECOMMENDATIONS
Pt assisted safely back to bed, STAT UA, STAT CMP, STAT MAG, STAT Phos ordered. MD offered IV Toradol pt refused stating "I'd rather have Tylenol." STAT Tyelnol ordered.

## 2019-11-10 NOTE — PROGRESS NOTE ADULT - ASSESSMENT
A 45 y/o F, severe morbid obesity, PMH DM2, HTN, HLD, chronic diastolic CHF (EF 55%), MANUEL (On home O2 24/7 3L mobile, 2L sitting, & CPAP QHS 12/5), obesity hypoventilation syndrome presented 11/6 with progressive SOB and increasing LE edema, admitted for acute on chronic diastolic heart failure in the setting of medication non-compliance, now being diuresed with IV Lasix.

## 2019-11-10 NOTE — PROVIDER CONTACT NOTE (CHANGE IN STATUS NOTIFICATION) - ACTION/TREATMENT ORDERED:
All orders completed. Awaiting results. Pt observed currently resting comfortably in bed. Will cont to closely monitor.

## 2019-11-10 NOTE — PROGRESS NOTE ADULT - PROBLEM SELECTOR PLAN 6
- continue BiPAP QHS  - c/w NC during the day   - OOB to chair  - pt noted to have rising HCO3 likely due to contraction alkalosis, added diamox  - pulm on board, follow recs

## 2019-11-10 NOTE — PROGRESS NOTE ADULT - PROBLEM SELECTOR PLAN 2
Has not been taking lasix x 1 month with weight gain and progressive RODRIGUEZ, more hypoxic in ED, Rx BiPAP and Lasix now improved  - continue BiPAP at night   - Diamox 250mg BID initiated for increased bicarb levels  - Pulmonary following, appreciate recs  - Duoneb prn

## 2019-11-11 ENCOUNTER — TRANSCRIPTION ENCOUNTER (OUTPATIENT)
Age: 44
End: 2019-11-11

## 2019-11-11 LAB
ANION GAP SERPL CALC-SCNC: 11 MMOL/L — SIGNIFICANT CHANGE UP (ref 5–17)
BUN SERPL-MCNC: 27 MG/DL — HIGH (ref 7–23)
CALCIUM SERPL-MCNC: 9.4 MG/DL — SIGNIFICANT CHANGE UP (ref 8.4–10.5)
CHLORIDE SERPL-SCNC: 87 MMOL/L — LOW (ref 96–108)
CO2 SERPL-SCNC: 36 MMOL/L — HIGH (ref 22–31)
CREAT SERPL-MCNC: 0.96 MG/DL — SIGNIFICANT CHANGE UP (ref 0.5–1.3)
GLUCOSE BLDC GLUCOMTR-MCNC: 182 MG/DL — HIGH (ref 70–99)
GLUCOSE BLDC GLUCOMTR-MCNC: 202 MG/DL — HIGH (ref 70–99)
GLUCOSE BLDC GLUCOMTR-MCNC: 214 MG/DL — HIGH (ref 70–99)
GLUCOSE BLDC GLUCOMTR-MCNC: 220 MG/DL — HIGH (ref 70–99)
GLUCOSE SERPL-MCNC: 261 MG/DL — HIGH (ref 70–99)
HCT VFR BLD CALC: 41.4 % — SIGNIFICANT CHANGE UP (ref 34.5–45)
HGB BLD-MCNC: 11.3 G/DL — LOW (ref 11.5–15.5)
MAGNESIUM SERPL-MCNC: 2 MG/DL — SIGNIFICANT CHANGE UP (ref 1.6–2.6)
MCHC RBC-ENTMCNC: 24.1 PG — LOW (ref 27–34)
MCHC RBC-ENTMCNC: 27.3 GM/DL — LOW (ref 32–36)
MCV RBC AUTO: 88.3 FL — SIGNIFICANT CHANGE UP (ref 80–100)
NRBC # BLD: 0 /100 WBCS — SIGNIFICANT CHANGE UP (ref 0–0)
PLATELET # BLD AUTO: 381 K/UL — SIGNIFICANT CHANGE UP (ref 150–400)
POTASSIUM SERPL-MCNC: 3.6 MMOL/L — SIGNIFICANT CHANGE UP (ref 3.5–5.3)
POTASSIUM SERPL-SCNC: 3.6 MMOL/L — SIGNIFICANT CHANGE UP (ref 3.5–5.3)
RBC # BLD: 4.69 M/UL — SIGNIFICANT CHANGE UP (ref 3.8–5.2)
RBC # FLD: 16.6 % — HIGH (ref 10.3–14.5)
SODIUM SERPL-SCNC: 134 MMOL/L — LOW (ref 135–145)
WBC # BLD: 7.2 K/UL — SIGNIFICANT CHANGE UP (ref 3.8–10.5)
WBC # FLD AUTO: 7.2 K/UL — SIGNIFICANT CHANGE UP (ref 3.8–10.5)

## 2019-11-11 PROCEDURE — 99232 SBSQ HOSP IP/OBS MODERATE 35: CPT

## 2019-11-11 PROCEDURE — 99233 SBSQ HOSP IP/OBS HIGH 50: CPT | Mod: GC

## 2019-11-11 PROCEDURE — 71045 X-RAY EXAM CHEST 1 VIEW: CPT | Mod: 26

## 2019-11-11 RX ORDER — POTASSIUM CHLORIDE 20 MEQ
40 PACKET (EA) ORAL ONCE
Refills: 0 | Status: COMPLETED | OUTPATIENT
Start: 2019-11-11 | End: 2019-11-11

## 2019-11-11 RX ADMIN — ACETAZOLAMIDE 250 MILLIGRAM(S): 250 TABLET ORAL at 06:47

## 2019-11-11 RX ADMIN — LOSARTAN POTASSIUM 25 MILLIGRAM(S): 100 TABLET, FILM COATED ORAL at 06:47

## 2019-11-11 RX ADMIN — Medication 0.6 MILLIGRAM(S): at 17:45

## 2019-11-11 RX ADMIN — NYSTATIN CREAM 1 APPLICATION(S): 100000 CREAM TOPICAL at 17:47

## 2019-11-11 RX ADMIN — Medication 3 MILLILITER(S): at 21:00

## 2019-11-11 RX ADMIN — NYSTATIN CREAM 1 APPLICATION(S): 100000 CREAM TOPICAL at 06:56

## 2019-11-11 RX ADMIN — Medication 4: at 22:09

## 2019-11-11 RX ADMIN — HEPARIN SODIUM 7500 UNIT(S): 5000 INJECTION INTRAVENOUS; SUBCUTANEOUS at 21:01

## 2019-11-11 RX ADMIN — Medication 4: at 11:42

## 2019-11-11 RX ADMIN — Medication 40 MILLIEQUIVALENT(S): at 14:43

## 2019-11-11 RX ADMIN — AMLODIPINE BESYLATE 5 MILLIGRAM(S): 2.5 TABLET ORAL at 06:47

## 2019-11-11 RX ADMIN — Medication 80 MILLIGRAM(S): at 06:47

## 2019-11-11 RX ADMIN — Medication 2: at 07:38

## 2019-11-11 RX ADMIN — Medication 81 MILLIGRAM(S): at 10:36

## 2019-11-11 RX ADMIN — Medication 0.6 MILLIGRAM(S): at 06:47

## 2019-11-11 RX ADMIN — Medication 3 MILLILITER(S): at 15:19

## 2019-11-11 RX ADMIN — HEPARIN SODIUM 7500 UNIT(S): 5000 INJECTION INTRAVENOUS; SUBCUTANEOUS at 13:34

## 2019-11-11 RX ADMIN — Medication 3 MILLILITER(S): at 10:35

## 2019-11-11 RX ADMIN — HEPARIN SODIUM 7500 UNIT(S): 5000 INJECTION INTRAVENOUS; SUBCUTANEOUS at 06:47

## 2019-11-11 RX ADMIN — ATORVASTATIN CALCIUM 80 MILLIGRAM(S): 80 TABLET, FILM COATED ORAL at 21:01

## 2019-11-11 RX ADMIN — Medication 4: at 17:06

## 2019-11-11 NOTE — PROGRESS NOTE ADULT - ASSESSMENT
45 yo F with morbid obesity, OHS (on home BPAP 12/5), chronic hypoxemia (on 2-3L NC at home during night), and HFpEF, who presented to [facility] ED for shortness of breath.

## 2019-11-11 NOTE — PROGRESS NOTE ADULT - ASSESSMENT
A 43 y/o F, severe morbid obesity, PMH DM2, HTN, HLD, chronic diastolic CHF (EF 55%), MANUEL (On home O2 24/7 3L mobile, 2L sitting, & CPAP QHS 12/5), obesity hypoventilation syndrome presented 11/6 with progressive SOB and increasing LE edema, admitted for acute on chronic diastolic heart failure in the setting of medication non-compliance

## 2019-11-11 NOTE — PROGRESS NOTE ADULT - SUBJECTIVE AND OBJECTIVE BOX
CARDIOLOGY NP PROGRESS NOTE    Subjective:   Remainder ROS otherwise negative.    Overnight Events:     TELEMETRY:    EKG:      VITAL SIGNS:  T(C): 37.1 (11-11-19 @ 04:54), Max: 37.2 (11-10-19 @ 22:20)  HR: 72 (11-11-19 @ 09:05) (72 - 100)  BP: 106/68 (11-11-19 @ 08:03) (97/52 - 136/93)  RR: 18 (11-11-19 @ 09:05) (18 - 29)  SpO2: 98% (11-11-19 @ 09:05) (91% - 100%)  Wt(kg): --    I&O's Summary    10 Nov 2019 07:01  -  11 Nov 2019 07:00  --------------------------------------------------------  IN: 0 mL / OUT: 3450 mL / NET: -3450 mL          PHYSICAL EXAM:    General: A/ox 3, No acute Distress  Neck: Supple, NO JVD  Cardiac: S1 S2, No M/R/G  Pulmonary: CTAB, Breathing unlabored, No Rhonchi/Rales/Wheezing  Abdomen: Soft, Non -tender, +BS x 4 quads  Extremities: No Rashes, No edema  Neuro: A/o x 3, No focal deficits          LABS:                          10.6   6.46  )-----------( 311      ( 10 Nov 2019 07:13 )             39.1                              11-10    135  |  85<L>  |  19  ----------------------------<  140<H>  4.2   |  36<H>  |  1.16    Ca    9.9      10 Nov 2019 15:03  Phos  4.2     11-10  Mg     2.0     11-10    TPro  9.6<H>  /  Alb  4.3  /  TBili  0.8  /  DBili  x   /  AST  32  /  ALT  26  /  AlkPhos  91  11-10    LIVER FUNCTIONS - ( 10 Nov 2019 15:03 )  Alb: 4.3 g/dL / Pro: 9.6 g/dL / ALK PHOS: 91 U/L / ALT: 26 U/L / AST: 32 U/L / GGT: x                                   CAPILLARY BLOOD GLUCOSE      POCT Blood Glucose.: 202 mg/dL (11 Nov 2019 11:26)  POCT Blood Glucose.: 182 mg/dL (11 Nov 2019 07:15)  POCT Blood Glucose.: 209 mg/dL (10 Nov 2019 21:31)  POCT Blood Glucose.: 139 mg/dL (10 Nov 2019 16:16)            Allergies:  No Known Drug Allergies  shellfish (Anaphylaxis)    MEDICATIONS  (STANDING):  albuterol/ipratropium for Nebulization 3 milliLiter(s) Nebulizer every 6 hours  amLODIPine   Tablet 5 milliGRAM(s) Oral daily  aspirin enteric coated 81 milliGRAM(s) Oral daily  atorvastatin 80 milliGRAM(s) Oral at bedtime  colchicine 0.6 milliGRAM(s) Oral two times a day  dextrose 5%. 1000 milliLiter(s) (50 mL/Hr) IV Continuous <Continuous>  dextrose 50% Injectable 12.5 Gram(s) IV Push once  dextrose 50% Injectable 25 Gram(s) IV Push once  dextrose 50% Injectable 25 Gram(s) IV Push once  heparin  Injectable 7500 Unit(s) SubCutaneous every 8 hours  insulin lispro (HumaLOG) corrective regimen sliding scale   SubCutaneous Before meals and at bedtime  losartan 25 milliGRAM(s) Oral daily  nystatin Powder 1 Application(s) Topical two times a day    MEDICATIONS  (PRN):  dextrose 40% Gel 15 Gram(s) Oral once PRN Blood Glucose LESS THAN 70 milliGRAM(s)/deciliter  glucagon  Injectable 1 milliGRAM(s) IntraMuscular once PRN Glucose LESS THAN 70 milligrams/deciliter        DIAGNOSTIC TESTS: CARDIOLOGY NP PROGRESS NOTE    Subjective: Patient seen and examined at bedside. Patient in NAD. C/O intermittent BL LE pain and right lower back pain 2/2 muscle spasm. Denies CP/SOB, dizziness/diaphoresis, n/v, palpitations.   Remainder ROS otherwise negative.    Overnight Events:     TELEMETRY:    EKG:      VITAL SIGNS:  T(C): 37.1 (11-11-19 @ 04:54), Max: 37.2 (11-10-19 @ 22:20)  HR: 72 (11-11-19 @ 09:05) (72 - 100)  BP: 106/68 (11-11-19 @ 08:03) (97/52 - 136/93)  RR: 18 (11-11-19 @ 09:05) (18 - 29)  SpO2: 98% (11-11-19 @ 09:05) (91% - 100%)  Wt(kg): --    I&O's Summary    10 Nov 2019 07:01  -  11 Nov 2019 07:00  --------------------------------------------------------  IN: 0 mL / OUT: 3450 mL / NET: -3450 mL          PHYSICAL EXAM:    General: A/ox 3, No acute Distress  Neck: Supple, NO JVD  Cardiac: S1 S2, No M/R/G  Pulmonary: CTAB, Breathing unlabored, No Rhonchi/Rales/Wheezing  Abdomen: Soft, Non -tender, +BS x 4 quads  Extremities: No Rashes, No edema  Neuro: A/o x 3, No focal deficits          LABS:                          10.6   6.46  )-----------( 311      ( 10 Nov 2019 07:13 )             39.1                              11-10    135  |  85<L>  |  19  ----------------------------<  140<H>  4.2   |  36<H>  |  1.16    Ca    9.9      10 Nov 2019 15:03  Phos  4.2     11-10  Mg     2.0     11-10    TPro  9.6<H>  /  Alb  4.3  /  TBili  0.8  /  DBili  x   /  AST  32  /  ALT  26  /  AlkPhos  91  11-10    LIVER FUNCTIONS - ( 10 Nov 2019 15:03 )  Alb: 4.3 g/dL / Pro: 9.6 g/dL / ALK PHOS: 91 U/L / ALT: 26 U/L / AST: 32 U/L / GGT: x                                   CAPILLARY BLOOD GLUCOSE      POCT Blood Glucose.: 202 mg/dL (11 Nov 2019 11:26)  POCT Blood Glucose.: 182 mg/dL (11 Nov 2019 07:15)  POCT Blood Glucose.: 209 mg/dL (10 Nov 2019 21:31)  POCT Blood Glucose.: 139 mg/dL (10 Nov 2019 16:16)            Allergies:  No Known Drug Allergies  shellfish (Anaphylaxis)    MEDICATIONS  (STANDING):  albuterol/ipratropium for Nebulization 3 milliLiter(s) Nebulizer every 6 hours  amLODIPine   Tablet 5 milliGRAM(s) Oral daily  aspirin enteric coated 81 milliGRAM(s) Oral daily  atorvastatin 80 milliGRAM(s) Oral at bedtime  colchicine 0.6 milliGRAM(s) Oral two times a day  dextrose 5%. 1000 milliLiter(s) (50 mL/Hr) IV Continuous <Continuous>  dextrose 50% Injectable 12.5 Gram(s) IV Push once  dextrose 50% Injectable 25 Gram(s) IV Push once  dextrose 50% Injectable 25 Gram(s) IV Push once  heparin  Injectable 7500 Unit(s) SubCutaneous every 8 hours  insulin lispro (HumaLOG) corrective regimen sliding scale   SubCutaneous Before meals and at bedtime  losartan 25 milliGRAM(s) Oral daily  nystatin Powder 1 Application(s) Topical two times a day    MEDICATIONS  (PRN):  dextrose 40% Gel 15 Gram(s) Oral once PRN Blood Glucose LESS THAN 70 milliGRAM(s)/deciliter  glucagon  Injectable 1 milliGRAM(s) IntraMuscular once PRN Glucose LESS THAN 70 milligrams/deciliter        DIAGNOSTIC TESTS: CARDIOLOGY NP PROGRESS NOTE    Subjective: Patient seen and examined at bedside. Patient in NAD. C/O intermittent BL LE pain and right lower back pain 2/2 muscle spasm. Denies CP/SOB, dizziness/diaphoresis, n/v, palpitations.   Remainder ROS otherwise negative.    Overnight Events: No acute events overnight     TELEMETRY: SR (80s-90s)      VITAL SIGNS:  T(C): 37.1 (11-11-19 @ 04:54), Max: 37.2 (11-10-19 @ 22:20)  HR: 72 (11-11-19 @ 09:05) (72 - 100)  BP: 106/68 (11-11-19 @ 08:03) (97/52 - 136/93)  RR: 18 (11-11-19 @ 09:05) (18 - 29)  SpO2: 98% (11-11-19 @ 09:05) (91% - 100%)  Wt(kg): --    I&O's Summary    10 Nov 2019 07:01  -  11 Nov 2019 07:00  --------------------------------------------------------  IN: 0 mL / OUT: 3450 mL / NET: -3450 mL          PHYSICAL EXAM:    General: A/ox 3, No acute Distress  Neck: Supple, NO JVD  Cardiac: S1 S2, No M/R/G  Pulmonary: Diminished breath sounds, Breathing unlabored, No Rhonchi/Rales/Wheezing  Abdomen: Obese, Soft, Non -tender, +BS x 4 quads  Extremities: No Rashes, +3 pitting edema BL LE, significantly improved   Neuro: A/o x 3, No focal deficits          LABS:                          10.6   6.46  )-----------( 311      ( 10 Nov 2019 07:13 )             39.1                              11-10    135  |  85<L>  |  19  ----------------------------<  140<H>  4.2   |  36<H>  |  1.16    Ca    9.9      10 Nov 2019 15:03  Phos  4.2     11-10  Mg     2.0     11-10    TPro  9.6<H>  /  Alb  4.3  /  TBili  0.8  /  DBili  x   /  AST  32  /  ALT  26  /  AlkPhos  91  11-10    LIVER FUNCTIONS - ( 10 Nov 2019 15:03 )  Alb: 4.3 g/dL / Pro: 9.6 g/dL / ALK PHOS: 91 U/L / ALT: 26 U/L / AST: 32 U/L / GGT: x                                   CAPILLARY BLOOD GLUCOSE      POCT Blood Glucose.: 202 mg/dL (11 Nov 2019 11:26)  POCT Blood Glucose.: 182 mg/dL (11 Nov 2019 07:15)  POCT Blood Glucose.: 209 mg/dL (10 Nov 2019 21:31)  POCT Blood Glucose.: 139 mg/dL (10 Nov 2019 16:16)            Allergies:  No Known Drug Allergies  shellfish (Anaphylaxis)    MEDICATIONS  (STANDING):  albuterol/ipratropium for Nebulization 3 milliLiter(s) Nebulizer every 6 hours  amLODIPine   Tablet 5 milliGRAM(s) Oral daily  aspirin enteric coated 81 milliGRAM(s) Oral daily  atorvastatin 80 milliGRAM(s) Oral at bedtime  colchicine 0.6 milliGRAM(s) Oral two times a day  dextrose 5%. 1000 milliLiter(s) (50 mL/Hr) IV Continuous <Continuous>  dextrose 50% Injectable 12.5 Gram(s) IV Push once  dextrose 50% Injectable 25 Gram(s) IV Push once  dextrose 50% Injectable 25 Gram(s) IV Push once  heparin  Injectable 7500 Unit(s) SubCutaneous every 8 hours  insulin lispro (HumaLOG) corrective regimen sliding scale   SubCutaneous Before meals and at bedtime  losartan 25 milliGRAM(s) Oral daily  nystatin Powder 1 Application(s) Topical two times a day    MEDICATIONS  (PRN):  dextrose 40% Gel 15 Gram(s) Oral once PRN Blood Glucose LESS THAN 70 milliGRAM(s)/deciliter  glucagon  Injectable 1 milliGRAM(s) IntraMuscular once PRN Glucose LESS THAN 70 milligrams/deciliter        DIAGNOSTIC TESTS:

## 2019-11-11 NOTE — PROGRESS NOTE ADULT - PROBLEM SELECTOR PLAN 6
- continue BiPAP QHS  - c/w NC during the day   - OOB to chair  - pt noted to have rising HCO3 likely due to contraction alkalosis, added diamox, now discontinued   - pulm on board, follow recs

## 2019-11-11 NOTE — DISCHARGE NOTE PROVIDER - NSDCFUADDAPPT_GEN_ALL_CORE_FT
1. Please follow up with Dr. Oscar on Monday, November 18th at 1230 PM.   [address] [id number]  Phone: [phone]

## 2019-11-11 NOTE — PROGRESS NOTE ADULT - PROBLEM SELECTOR PROBLEM 8
Transition of care performed with sharing of clinical summary
Nutrition, metabolism, and development symptoms
Transition of care performed with sharing of clinical summary
Transition of care performed with sharing of clinical summary

## 2019-11-11 NOTE — PROGRESS NOTE ADULT - PROBLEM SELECTOR PLAN 1
secondary to discontinuation of lasix. Her hypoxemic respiratory failure is likely 2/2 CHF exacerbation and fluid overload. Her hypoxemic respiratory failure is likely 2/2 CHF exacerbation and fluid overload.

## 2019-11-11 NOTE — DISCHARGE NOTE PROVIDER - NSDCCPCAREPLAN_GEN_ALL_CORE_FT
PRINCIPAL DISCHARGE DIAGNOSIS  Diagnosis: Acute on chronic diastolic heart failure  Assessment and Plan of Treatment: You have a history of diastolic heart failure. Heart failure is a condition in which the heart does not pump or fill with blood well. This causes the heart to lag behind in its job of moving blood throughout the body. This can lead to symptoms such as swelling, trouble breathing, and feeling tired. You were given intravenous Lasix to get rid of the fluid in your lungs and to help you breathe better. Please take Torsemide  as prescribed WITHOUT MISSING DOSES.  Maintain a low salt diet and weigh yourself daily. For any significant increases in daily weight with associated swelling in the legs or abdomen with shortness of breath, please call your doctor or go to the emergency room.

## 2019-11-11 NOTE — DISCHARGE NOTE PROVIDER - NSDCMRMEDTOKEN_GEN_ALL_CORE_FT
amLODIPine 5 mg oral tablet: 1 tab(s) orally once a day  Aspirin Enteric Coated 81 mg oral delayed release tablet: 1 tab(s) orally once a day  atorvastatin 80 mg oral tablet: 1 tab(s) orally once a day  ipratropium-albuterol 0.5 mg-2.5 mg/3 mLinhalation solution: 3 milliliter(s) inhaled every 6 hours, As needed, Shortness of Breath  Lasix 80 mg oral tablet: 1 tab(s) orally every 12 hours  losartan 25 mg oral tablet: 1 tab(s) orally once a day  metFORMIN 1000 mg oral tablet: 1 tab(s) orally 2 times a day  potassium chloride 20 mEq oral tablet, extended release: 1 tab(s) orally once a day amLODIPine 5 mg oral tablet: 1 tab(s) orally once a day  Aspirin Enteric Coated 81 mg oral delayed release tablet: 1 tab(s) orally once a day  atorvastatin 80 mg oral tablet: 1 tab(s) orally once a day  ipratropium-albuterol 0.5 mg-2.5 mg/3 mLinhalation solution: 3 milliliter(s) inhaled every 6 hours, As needed, Shortness of Breath  losartan 25 mg oral tablet: 1 tab(s) orally once a day  metFORMIN 1000 mg oral tablet: 1 tab(s) orally 2 times a day  potassium chloride 20 mEq oral tablet, extended release: 1 tab(s) orally once a day  torsemide 20 mg oral tablet: 2 tab(s) orally once a day

## 2019-11-11 NOTE — DISCHARGE NOTE PROVIDER - NSDCFUSCHEDAPPT_GEN_ALL_CORE_FT
EVONNE JACKSON ; 11/15/2019 ; N[facility] [address]   EVONNE JACKSON ; 11/18/2019 ; NPP [facility] [address]  [facility] ; 11/15/2019 ; NPP [facility] [address] [facility] ; 11/18/2019 ; NPP [facility] [address]  [facility] ; 11/15/2019 ; N[facility] [address] [facility] ; 11/18/2019 ; NPP [facility] [address]  [facility] ; 11/15/2019 ; NPP [facility] [address] [facility] ; 11/18/2019 ; NPP [facility] [address]  [facility] ; 11/15/2019 ; NPP [facility] [address] [facility] ; 11/18/2019 ; NPP [facility] [address]

## 2019-11-11 NOTE — PROGRESS NOTE ADULT - ATTENDING COMMENTS
On IV Lasix 80mg BID and Diamox 250mg PO over the weekend. -27L since admission      44 y/oF, severe morbid obesity, PMH DM2, HTN, HLD, chronic diastolic CHF (EF 55%), MANUEL (On home O2 24/7 3L mobile, 2L sitting, & CPAP QHS 12/5), obesity hypoventilation syndrome presented 11/6 with progressive SOB and increasing LE edema, admitted for acute on chronic diastolic heart failure in the setting of medication non-compliance, now being diuresed with IV    --Would switch pt to Torsemide 40mg PO daily. DC her home Lasix.  --Agree to continue with Diamox 250mg BID. Monitor pt's Bicarbonate.  --Cont with home amlodipine and losartan.   --Pt counseled on weight loss, increasing activity and medication compliance.    I have personally provided 30 minutes of critical care time concurrently with the fellow.  I have reviewed the fellow’s documentation and I agree with the fellow's assessment and plan of care.  JEFFREY Ramirez
44-year-old female, severe morbid obesity, PMH DM2, HTN, HLD, chronic diastolic CHF (EF 55%), MANUEL (On home O2 24/7 3L mobile, 2L sitting, & CPAP QHS 12/5), obesity hypoventilation syndrome, who presented to [facility] for progressive SOB and increasing LE edema in the setting of not taking her prescribed lasix x 1 month. Pt reports that she does not like to walk much and the medication was making her go to the restroom often. She is on home lasix 80mg BID.    Exam: Morbidly obese and Ext: 1+ pitting edema in dependent regions  Clear lungs  Creat 0.8  admitted for acute on chronic diastolic heart failure in the setting of medication non-compliance, now being diuresed with IV    --Continue with IV diuresis with Lasix IV 80mg BID. Give 1x dose of Metolazone 5mg prior to next dose of IV diuretics.   --Cont with home amlodipine and losartan.   --When ready to DC switch pt to Torsemide 40 mg PO daily.   --Pt counseled on weight loss, increasing activity and medication compliance.    I have personally provided 30 minutes of critical care time concurrently with the fellow.  I have reviewed the fellow’s documentation and I agree with the fellow's assessment and plan of care.  JEFFREY Ramirez
Chronic hypercapnic respiratory failure secondary to obesity hypoventilation syndrome (on BIPAP 20-5 at home) with acute on chronic CHF with acute cardiogenic pulmonary edema. Patient has chronic respiratory acidosis with metabolic compensation but diuresis has led to added metabolic alkalosis.   Plan:  - Continue diuresis with lasix but add another dose of Diamox today.  - BIPAP (20/5) to be continued during night time  - Nasal cannula 6 liter/min to continue, titrate back to home dose of 4l/min for saturation above 88%  - Rest as above
Chronic hypercapnic respiratory failure secondary to obesity hypoventilation syndrome (on BIPAP 20-5 at home) with acute on chronic CHF with acute cardiogenic pulmonary edema. Patient is on 6l/min oxygen and saturating to 89%. However, her oxygen saturation increases to 97% with hyperventilation.  Plan:  - Continue diuresis  - BIPAP (20/5) to be continued during night time  - Nasal cannula 6 liter/min to continue, titrate back to home dose of 4l/min for saturation above 88%  - Rest as above
Patient's respiratory status at baseline. Metabolic alkalosis has resolved. D/c Diamox. Continue BIPAP 20/5. Diuresis as per cardiology. Rest as above.
Assessment: Patient personally seen and examined myself during rounds with the   Nurse Practitioner  ON DATE 11/8/19  Note read, including vitals, physical findings, laboratory data, and radiological reports.   Agree with above with revisions, if any included below.   - As Above  - Plan of care: continuous telemetry monitoring, frequent hemodynamic checks, daily weights, I/Os, daily 12 Lead ECG, add K and Mg to labs, cycle troponin to peak, consider Adv HF / EP / CTS / Interv. Cardio consultation if intervention is needed.
Assessment: Patient personally seen and examined myself during rounds with the   Resident  ON DATE 11/10/19  Note read, including vitals, physical findings, laboratory data, and radiological reports.   Agree with above with revisions, if any included below.   - As Above  - Plan of care: continuous telemetry monitoring, frequent hemodynamic checks, daily weights, I/Os, daily 12 Lead ECG, add K and Mg to labs, cycle troponin to peak, consider Adv HF / EP / CTS / Interv. Cardio consultation if intervention is needed.
Assessment: Patient personally seen and examined myself during rounds with the   Nurse Practitioner  ON DATE 11/11/19  Note read, including vitals, physical findings, laboratory data, and radiological reports.   Agree with above with revisions, if any included below.   - As Above  - Plan of care: continuous telemetry monitoring, frequent hemodynamic checks, daily weights, I/Os, daily 12 Lead ECG, add K and Mg to labs, cycle troponin to peak, consider Adv HF / EP / CTS / Interv. Cardio consultation if intervention is needed.
Assessment: Patient personally seen and examined myself during rounds with the   Resident  ON DATE 11/9/19  Note read, including vitals, physical findings, laboratory data, and radiological reports.   Agree with above with revisions, if any included below.   - As Above  - Plan of care: continuous telemetry monitoring, frequent hemodynamic checks, daily weights, I/Os, daily 12 Lead ECG, add K and Mg to labs, cycle troponin to peak, consider Adv HF / EP / CTS / Interv. Cardio consultation if intervention is needed.
Assessment: Patient personally seen and examined myself during rounds with the   Nurse Practitioner  ON DATE 11/7/19  Note read, including vitals, physical findings, laboratory data, and radiological reports.   Agree with above with revisions, if any included below.   - As Above  - Plan of care: continuous telemetry monitoring, frequent hemodynamic checks, daily weights, I/Os, daily 12 Lead ECG, add K and Mg to labs, cycle troponin to peak, consider Adv HF / EP / CTS / Interv. Cardio consultation if intervention is needed.

## 2019-11-11 NOTE — DISCHARGE NOTE PROVIDER - CARE PROVIDER_API CALL
Ricardo Oscar)  Cardiovascular Disease; Internal Medicine  [address] [id number]  Phone: [phone]  Fax: [phone]  Follow Up Time:

## 2019-11-11 NOTE — PROGRESS NOTE ADULT - SUBJECTIVE AND OBJECTIVE BOX
Pt seen and examined at bedside. No acute events overnight. On IV Lasix 80mg BID and Diamox 250mg PO over the weekend. -27L since admission    Vital Signs Last 24 Hrs  T(C): 37.1 (11 Nov 2019 04:54), Max: 37.2 (10 Nov 2019 22:20)  T(F): 98.7 (11 Nov 2019 04:54), Max: 98.9 (10 Nov 2019 22:20)  HR: 72 (11 Nov 2019 09:05) (72 - 100)  BP: 106/68 (11 Nov 2019 08:03) (97/52 - 136/93)  BP(mean): 84 (11 Nov 2019 08:03) (74 - 108)  RR: 18 (11 Nov 2019 09:05) (18 - 29)  SpO2: 98% (11 Nov 2019 09:05) (91% - 100%)    General: Awake, alert, cooperative and in NAD.      HEENT: RANDA     Chest: dry cracles at the bases b/l     CVS: S1 S2 of normal intensity; tachycardic; No murmurs appreciated     Abd: Soft, NT, ND, BS present     Ext: Trace pitting edema in depended regions      No Labs present today    ECG: Pending for today. Previously NSR    Tele: intermittent PVCs    A/P: 44 y/oF, severe morbid obesity, PMH DM2, HTN, HLD, chronic diastolic CHF (EF 55%), MANUEL (On home O2 24/7 3L mobile, 2L sitting, & CPAP QHS 12/5), obesity hypoventilation syndrome presented 11/6 with progressive SOB and increasing LE edema, admitted for acute on chronic diastolic heart failure in the setting of medication non-compliance, now being diuresed with IV    --Would switch pt to Torsemide 40mg PO daily. DC her home Lasix.  --Agree to continue with Diamox 250mg BID. Monitor pt's Bicarbonate.  --Cont with home amlodipine and losartan.   --Pt counseled on weight loss, increasing activity and medication compliance.  --Daily weights  --Strict Is and Os    Will sign off for now. Reconsult as necessary. Pt to follow with Dr Oscar within one week of DC and if deemed necessary can follow with HF NP Renata Winn as outpt.

## 2019-11-11 NOTE — PROGRESS NOTE ADULT - PROBLEM SELECTOR PROBLEM 2
Hypoxia
Hypoxia
Obesity hypoventilation syndrome
Hypoxia

## 2019-11-11 NOTE — DISCHARGE NOTE NURSING/CASE MANAGEMENT/SOCIAL WORK - PATIENT PORTAL LINK FT
You can access the FollowMyHealth Patient Portal offered by [facility] by registering at the following website: [email or web address]. By joining [employer]’s FollowMyHealth portal, you will also be able to view your health information using other applications (apps) compatible with our system.

## 2019-11-11 NOTE — PROGRESS NOTE ADULT - PROBLEM SELECTOR PLAN 1
Presented with increasing SOB and LE edema. Acute on chronic diastolic HF likely in setting of medication non compliance (has not taken lasix in one month).   - ECHO 11/6: normal L/R ventricular systolic function, RV dilated, mild pulm HTN, PAP 39.4  - c/w IV lasix 80mg BID d/c'd; will start Torsemide 40mg QD tomorrow (11/12)  - s/p Metolazone 5mg x 1 on 11/8  - c/w Losartan 25mg QD.  - Diamox d/c'd  - HF consulted, appreciate recs.   - BL LE US 11/7 (-) DVT  - strict I/O's, daily weight. goal net neg 2-3 L /day.  -28L since admission  - DASH/TLC/CC 1L fluid restriction

## 2019-11-11 NOTE — DISCHARGE NOTE PROVIDER - INSTRUCTIONS
Limit salt intake on a daily basis. Salt is hidden in many foods including fast food, processed foods, deli foods and deli meats. Cook at home as much as you can and DO NOT use salt. Limit liquid intake to 1 liter a day (includes water, any drinks and soup).

## 2019-11-11 NOTE — DISCHARGE NOTE PROVIDER - HOSPITAL COURSE
Ms. Mabry is a very pleasant 44-year-old female, severe morbid obesity, PMH DM2, HTN, HLD, chronic diastolic CHF (EF 55%), MANUEL (On home O2 24/7 3L mobile, 2L sitting, & CPAP QHS 12/5), obesity hypoventilation syndrome, s/p admission 8/2018 at [facility] for SOB, volume overload and pericardial effusion s/p IR drain (~1.6L; cx's NEG), who presented to [facility] today for progressive SOB and increasing LE edema in the setting of not taking her prescribed lasix x 1 month. In the ED she was found to be tachypneic and overloaded on CXR with sats ~80%. She was given 80mg IV lasix and started on Bipap. A moreno catheter was placed. Bedside TTE showed no pericardial effusion. Sats improved and patient was more comfortable. Pulmonary consulted on patient and decision was made to admit to  for telemetry monitoring and further management.         On exam, patient is on Bipap 10/5, 40%, pulling adequate VT. Her sats are around 78-83%. She awakens to voice and sats rise to ~95%. She is talking coherently and states that she has stopped taking her lasix for around 1 month given the frequency of urination. Moreover, it is difficult for her to get to the bathroom quickly so she decided to stop. Per patient, she will take lasix off and on. Since stopping, she has noticed more progressive SOB and marked bilateral LE swelling. She is SOB going from her bedroom to the living room with her Rollator. She denies CP/palpitations/dizziness/syncope/nausea/diaphoresis with these episodes. Of note, upon DC from [facility] 8/2018, she went to [place] [facility] for 3 months, and was in [facility] in October 2018 with PNA. I verified patients medications with her pharmacy and she is supposed to be on 13 units of Lantus QHS, which she does not report taking.         Per pulmonary notes in [employer]: patient's base line oxygen saturation is normal with oxygen supplementation, 92% with 3 L NC. Pt desaturates to 86% with ambulation and Increased with increase in oxygen supplementation, 92% after 2 mins on 4 L NC.         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 45 yo/oF severe morbid obesity, PMH DM2, HTN, HLD, chronic diastolic CHF (EF 55%), MANUEL (On home O2 24/7 3L mobile, 2L sitting, & CPAP QHS 12/5), obesity hypoventilation syndrome, s/p admission 8/2018 at [facility] for SOB, volume overload and pericardial effusion s/p IR drain (~1.6L; cx's NEG), presented to [facility] 11/6/19 for progressive SOB and increasing LE edema in the setting of not taking her prescribed lasix x 1 month. In the ED she was found to be tachypneic and overloaded on CXR with sats ~80%. She was given 80mg IV lasix and started on Bipap. Bedside TTE showed no pericardial effusion. Sats improved and patient was more comfortable. Pulmonary consulted on patient admitted to [facility] for further management. BL LE US (-) DVT. 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 However, patient was asymptomatic in which she did not complain of CP, there were no suggestive changed on EKG, and no pericardial friction rub was present. During hospitalization, she was adequately diuresed with IV Lasix 80mg BID for a total of net neg 29,660 Liters. Diamox was added to regimen for increased bicarb, which was later on discontinued. Per PT eval - rec home with home PT. She remains hemodynamically stable and is to be discharged to home today. She will be discharged on    She has a follow up appointment 45 yo/oF severe morbid obesity, PMH DM2, HTN, HLD, chronic diastolic CHF (EF 55%), MANUEL (On home O2 24/7 3L mobile, 2L sitting, & CPAP QHS 12/5), obesity hypoventilation syndrome, s/p admission 8/2018 at [facility] for SOB, volume overload and pericardial effusion s/p IR drain (~1.6L; cx's NEG), presented to [facility] 11/6/19 for progressive SOB and increasing LE edema in the setting of not taking her prescribed lasix x 1 month. In the ED she was found to be tachypneic and overloaded on CXR with sats ~80%. She was given 80mg IV lasix and started on Bipap. Bedside TTE showed no pericardial effusion. Sats improved and patient was more comfortable. Pulmonary consulted on patient admitted to [facility] for further management. BL LE US (-) DVT. 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 However, patient was asymptomatic in which she did not complain of CP, there were no suggestive changed on EKG, and no pericardial friction rub was present. During hospitalization, she was adequately diuresed with IV Lasix 80mg BID for a total of net neg 29,660 Liters. Diamox was added to regimen for increased bicarb, which was later on discontinued. Per PT eval - rec home with home PT. She remains hemodynamically stable and is to be discharged to home today. She will be discharged on Amlodipine 5mg qd, Losartan 25mg qd, torsemide 40mg qd, ASA 81mg qd and atorvastatin 80mg qd.     She has a follow up appointment  Dr. Oscar on Monday, November 18th at 1230 PM. 43 yo/oF severe morbid obesity, PMH DM2, HTN, HLD, chronic diastolic CHF (EF 55%), MANUEL (On home O2 24/7 3L mobile, 2L sitting, & CPAP QHS 12/5), obesity hypoventilation syndrome, s/p admission 8/2018 at [facility] for SOB, volume overload and pericardial effusion s/p IR drain (~1.6L; cx's NEG), presented to [facility] 11/6/19 for progressive SOB and increasing LE edema in the setting of not taking her prescribed lasix x 1 month. In the ED she was found to be tachypneic and overloaded on CXR with sats ~80%. She was given 80mg IV lasix and started on Bipap. Bedside TTE showed no pericardial effusion. Sats improved and patient was more comfortable. Pulmonary consulted on patient admitted to [facility] for further management. BL LE US (-) DVT. 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 However, patient was asymptomatic in which she did not complain of CP, there were no suggestive changed on EKG, and no pericardial friction rub was present. No need for colchicine upon discharge. During hospitalization, she was adequately diuresed with IV Lasix 80mg BID for a total of net neg 29,660 Liters. Diamox was added to regimen for increased bicarb, which was later on discontinued. Per PT eval - rec home with home PT. She remains hemodynamically stable and is to be discharged to home today. She will be discharged on Amlodipine 5mg qd, Losartan 25mg qd, torsemide 40mg qd, ASA 81mg qd and atorvastatin 80mg qd. She has a follow up appointment  Dr. Oscar on Monday, November 18th at 1230 PM.

## 2019-11-11 NOTE — CHART NOTE - NSCHARTNOTEFT_GEN_A_CORE
Admitting Diagnosis:   Patient is a 44y old  Female who presents with a chief complaint of SOB (11 Nov 2019 13:46)      PAST MEDICAL & SURGICAL HISTORY:  Chronic diastolic heart failure: Chronic diastolic CHF (congestive heart failure)  Edema: Anasarca  Type 2 diabetes mellitus: Insulin Requiring  Essential hypertension: HTN (hypertension)  Morbid obesity: Morbid obesity  Disease of pericardium: Pericardial effusion  Obstructive sleep apnea syndrome: MANUEL on CPAP  Cytomegalovirus infection not present: No significant past surgical history      Current Nutrition Order: DASH/TLC, Consistent Carbohydrate with evening snack, 1000ml fluid restriction        PO Intake: Good (%) [   ]  Fair (50-75%) [   ] Poor (<25%) [   ]    GI Issues:     Pain: None per flow sheets     Skin Integrity: 2+ (left leg;  right leg) - edema varying during admission; No pressure ulcers     Labs:   11-11    134<L>  |  87<L>  |  27<H>  ----------------------------<  261<H>  3.6   |  36<H>  |  0.96    Ca    9.4      11 Nov 2019 12:51  Phos  4.2     11-10  Mg     2.0     11-11    TPro  9.6<H>  /  Alb  4.3  /  TBili  0.8  /  DBili  x   /  AST  32  /  ALT  26  /  AlkPhos  91  11-10    CAPILLARY BLOOD GLUCOSE      POCT Blood Glucose.: 202 mg/dL (11 Nov 2019 11:26)  POCT Blood Glucose.: 182 mg/dL (11 Nov 2019 07:15)  POCT Blood Glucose.: 209 mg/dL (10 Nov 2019 21:31)  POCT Blood Glucose.: 139 mg/dL (10 Nov 2019 16:16)      Medications:  MEDICATIONS  (STANDING):  albuterol/ipratropium for Nebulization 3 milliLiter(s) Nebulizer every 6 hours  amLODIPine   Tablet 5 milliGRAM(s) Oral daily  aspirin enteric coated 81 milliGRAM(s) Oral daily  atorvastatin 80 milliGRAM(s) Oral at bedtime  colchicine 0.6 milliGRAM(s) Oral two times a day  dextrose 5%. 1000 milliLiter(s) (50 mL/Hr) IV Continuous <Continuous>  dextrose 50% Injectable 12.5 Gram(s) IV Push once  dextrose 50% Injectable 25 Gram(s) IV Push once  dextrose 50% Injectable 25 Gram(s) IV Push once  heparin  Injectable 7500 Unit(s) SubCutaneous every 8 hours  insulin lispro (HumaLOG) corrective regimen sliding scale   SubCutaneous Before meals and at bedtime  losartan 25 milliGRAM(s) Oral daily  nystatin Powder 1 Application(s) Topical two times a day    MEDICATIONS  (PRN):  dextrose 40% Gel 15 Gram(s) Oral once PRN Blood Glucose LESS THAN 70 milliGRAM(s)/deciliter  glucagon  Injectable 1 milliGRAM(s) IntraMuscular once PRN Glucose LESS THAN 70 milligrams/deciliter      Weight:  11/6 430 pounds  11/10 401.4 pounds  11/11 403.4 pounds   Lasix order noted, edema during admission noted   Height 67" ;  pounds; %IBW %298.5 BMI: 64.05kg/m2      Nutrition Focused Physical Exam: Completed [   ]  Not Pertinent [  x ]  Muscle Wasting- Temporal [   ]  Clavicle/Pectoral [   ]  Shoulder/Deltoid [   ]  Scapula [   ]  Interosseous [   ]  Quadriceps [   ]  Gastrocnemius [   ]  Fat Wasting- Orbital [   ]  Buccal [   ]  Triceps [   ]  Rib [   ]  Suspect [PCM] 2/2 to physical assessment, [poor intake], and [wt loss]; please see malnutrition chart note.    Estimated energy needs:   Ideal body weight used for calculations as pt >120% of IBW.   Nutrient needs based on [facility] standards of care for maintenance in adults. Needs adjusted for obesity. Defer fluids to team 2/2 fluid overload.      Subjective:   44y F with severe morbid obesity, DM2, HTN, HLD, chronic diastolic CHF (EF 55%), obesity hypoventilation syndrome. Pt admitted to [facility] 8/2018 for SOB, volume overload and pericardial effusion, requiring an IR drain, Acute on chronic respiratory failure with hypoxemia, Obesity hypoventilation syndrome. Noted SOB and increasing LE edema d/t noncompliance with prescribed lasix x 1 month. She was found to be tachypneic upon admission. Per rounds d/c pending with home care. Pt currently on DASH/TLC, cons CHO with evening snack diet + 1000ml fluid restrction (11/9).     Previous Nutrition Diagnosis:  1. Overweight/Obesity r/t excessive intake of energy dense foods AEB %% and BMI kg/m2 67.4 kg/m2.   On Going (x)   2. Limited adherence to nutrition related Recs RT lack of value of diet compliance AEB verbal reports of not following diet despite knowing restrictions.   Goal:    Recommendations:    Education:     Risk Level: High [   ] Moderate [   ] Low [   ] Admitting Diagnosis:   Patient is a 44y old  Female who presents with a chief complaint of SOB (11 Nov 2019 13:46)      PAST MEDICAL & SURGICAL HISTORY:  Chronic diastolic heart failure: Chronic diastolic CHF (congestive heart failure)  Edema: Anasarca  Type 2 diabetes mellitus: Insulin Requiring  Essential hypertension: HTN (hypertension)  Morbid obesity: Morbid obesity  Disease of pericardium: Pericardial effusion  Obstructive sleep apnea syndrome: MANUEL on CPAP  Cytomegalovirus infection not present: No significant past surgical history      Current Nutrition Order: DASH/TLC, Consistent Carbohydrate with evening snack, 1000ml fluid restriction        PO Intake: Good (%) [  x ]  Fair (50-75%) [   ] Poor (<25%) [   ]    GI Issues: +BM (11/9) per flow sheets     Pain: None per flow sheets     Skin Integrity: 2+ (left leg;  right leg) - edema varying during admission; No pressure ulcers     Labs:   11-11    134<L>  |  87<L>  |  27<H>  ----------------------------<  261<H>  3.6   |  36<H>  |  0.96    Ca    9.4      11 Nov 2019 12:51  Phos  4.2     11-10  Mg     2.0     11-11    TPro  9.6<H>  /  Alb  4.3  /  TBili  0.8  /  DBili  x   /  AST  32  /  ALT  26  /  AlkPhos  91  11-10    CAPILLARY BLOOD GLUCOSE      POCT Blood Glucose.: 202 mg/dL (11 Nov 2019 11:26)  POCT Blood Glucose.: 182 mg/dL (11 Nov 2019 07:15)  POCT Blood Glucose.: 209 mg/dL (10 Nov 2019 21:31)  POCT Blood Glucose.: 139 mg/dL (10 Nov 2019 16:16)      Medications:  MEDICATIONS  (STANDING):  albuterol/ipratropium for Nebulization 3 milliLiter(s) Nebulizer every 6 hours  amLODIPine   Tablet 5 milliGRAM(s) Oral daily  aspirin enteric coated 81 milliGRAM(s) Oral daily  atorvastatin 80 milliGRAM(s) Oral at bedtime  colchicine 0.6 milliGRAM(s) Oral two times a day  dextrose 5%. 1000 milliLiter(s) (50 mL/Hr) IV Continuous <Continuous>  dextrose 50% Injectable 12.5 Gram(s) IV Push once  dextrose 50% Injectable 25 Gram(s) IV Push once  dextrose 50% Injectable 25 Gram(s) IV Push once  heparin  Injectable 7500 Unit(s) SubCutaneous every 8 hours  insulin lispro (HumaLOG) corrective regimen sliding scale   SubCutaneous Before meals and at bedtime  losartan 25 milliGRAM(s) Oral daily  nystatin Powder 1 Application(s) Topical two times a day    MEDICATIONS  (PRN):  dextrose 40% Gel 15 Gram(s) Oral once PRN Blood Glucose LESS THAN 70 milliGRAM(s)/deciliter  glucagon  Injectable 1 milliGRAM(s) IntraMuscular once PRN Glucose LESS THAN 70 milligrams/deciliter      Weight:  11/6 430 pounds  11/10 401.4 pounds  11/11 403.4 pounds   Lasix order noted, edema during admission noted   Height 67" ;  pounds; %IBW %298.5 BMI: 64.05kg/m2      Nutrition Focused Physical Exam: Completed [   ]  Not Pertinent [  x ]  Muscle Wasting- Temporal [   ]  Clavicle/Pectoral [   ]  Shoulder/Deltoid [   ]  Scapula [   ]  Interosseous [   ]  Quadriceps [   ]  Gastrocnemius [   ]  Fat Wasting- Orbital [   ]  Buccal [   ]  Triceps [   ]  Rib [   ]  Suspect [PCM] 2/2 to physical assessment, [poor intake], and [wt loss]; please see malnutrition chart note.    Estimated energy needs:   Ideal body weight used for calculations as pt >120% of IBW (61kg)   Nutrient needs based on [facility] standards of care for maintenance in adults. Needs adjusted for obesity. Defer fluids to team 2/2 fluid overload.  30-35kcal/kg 1836-2142kcal/day           Subjective:   44y F with severe morbid obesity, DM2, HTN, HLD, chronic diastolic CHF (EF 55%), obesity hypoventilation syndrome. Pt admitted to [facility] 8/2018 for SOB, volume overload and pericardial effusion, requiring an IR drain, Acute on chronic respiratory failure with hypoxemia, Obesity hypoventilation syndrome. Noted SOB and increasing LE edema d/t noncompliance with prescribed lasix x 1 month. She was found to be tachypneic upon admission. Per rounds d/c pending with home care. Pt currently on DASH/TLC, cons CHO with evening snack diet + 1000ml fluid restrction (11/9).     Previous Nutrition Diagnosis:  1. Overweight/Obesity r/t excessive intake of energy dense foods AEB %% and BMI kg/m2 67.4 kg/m2.   On Going (x)   2. Limited adherence to nutrition related Recs RT lack of value of diet compliance AEB verbal reports of not following diet despite knowing restrictions.   Goal:    Recommendations:    Education:     Risk Level: High [   ] Moderate [   ] Low [   ] Admitting Diagnosis:   Patient is a 44y old  Female who presents with a chief complaint of SOB (11 Nov 2019 13:46)      PAST MEDICAL & SURGICAL HISTORY:  Chronic diastolic heart failure: Chronic diastolic CHF (congestive heart failure)  Edema: Anasarca  Type 2 diabetes mellitus: Insulin Requiring  Essential hypertension: HTN (hypertension)  Morbid obesity: Morbid obesity  Disease of pericardium: Pericardial effusion  Obstructive sleep apnea syndrome: MANUEL on CPAP  Cytomegalovirus infection not present: No significant past surgical history      Current Nutrition Order: DASH/TLC, Consistent Carbohydrate with evening snack, 1000ml fluid restriction        PO Intake: Good (%) [  x ]  Fair (50-75%) [   ] Poor (<25%) [   ]    GI Issues: +BM (11/9) per flow sheets     Pain: None per flow sheets     Skin Integrity: 2+ (left leg;  right leg) - edema varying during admission; No pressure ulcers     Labs:   11-11    134<L>  |  87<L>  |  27<H>  ----------------------------<  261<H>  3.6   |  36<H>  |  0.96    Ca    9.4      11 Nov 2019 12:51  Phos  4.2     11-10  Mg     2.0     11-11    TPro  9.6<H>  /  Alb  4.3  /  TBili  0.8  /  DBili  x   /  AST  32  /  ALT  26  /  AlkPhos  91  11-10    CAPILLARY BLOOD GLUCOSE      POCT Blood Glucose.: 202 mg/dL (11 Nov 2019 11:26)  POCT Blood Glucose.: 182 mg/dL (11 Nov 2019 07:15)  POCT Blood Glucose.: 209 mg/dL (10 Nov 2019 21:31)  POCT Blood Glucose.: 139 mg/dL (10 Nov 2019 16:16)      Medications:  MEDICATIONS  (STANDING):  albuterol/ipratropium for Nebulization 3 milliLiter(s) Nebulizer every 6 hours  amLODIPine   Tablet 5 milliGRAM(s) Oral daily  aspirin enteric coated 81 milliGRAM(s) Oral daily  atorvastatin 80 milliGRAM(s) Oral at bedtime  colchicine 0.6 milliGRAM(s) Oral two times a day  dextrose 5%. 1000 milliLiter(s) (50 mL/Hr) IV Continuous <Continuous>  dextrose 50% Injectable 12.5 Gram(s) IV Push once  dextrose 50% Injectable 25 Gram(s) IV Push once  dextrose 50% Injectable 25 Gram(s) IV Push once  heparin  Injectable 7500 Unit(s) SubCutaneous every 8 hours  insulin lispro (HumaLOG) corrective regimen sliding scale   SubCutaneous Before meals and at bedtime  losartan 25 milliGRAM(s) Oral daily  nystatin Powder 1 Application(s) Topical two times a day    MEDICATIONS  (PRN):  dextrose 40% Gel 15 Gram(s) Oral once PRN Blood Glucose LESS THAN 70 milliGRAM(s)/deciliter  glucagon  Injectable 1 milliGRAM(s) IntraMuscular once PRN Glucose LESS THAN 70 milligrams/deciliter      Weight:  11/6 430 pounds  11/10 401.4 pounds  11/11 403.4 pounds   Lasix order noted, edema during admission noted   Height 67" ;  pounds; %IBW %298.5 BMI: 64.05kg/m2      Nutrition Focused Physical Exam: Completed [   ]  Not Pertinent [  x ]  Muscle Wasting- Temporal [   ]  Clavicle/Pectoral [   ]  Shoulder/Deltoid [   ]  Scapula [   ]  Interosseous [   ]  Quadriceps [   ]  Gastrocnemius [   ]  Fat Wasting- Orbital [   ]  Buccal [   ]  Triceps [   ]  Rib [   ]  Suspect [PCM] 2/2 to physical assessment, [poor intake], and [wt loss]; please see malnutrition chart note.    Estimated energy needs:   Ideal body weight used for calculations as pt >120% of IBW (61kg)   Nutrient needs based on [facility] standards of care for maintenance in adults. Needs adjusted for obesity. Defer fluids to team 2/2 fluid overload.  30-35kcal/kg 1836-2142kcal/day   92-122gm/day 1.5-2.0gm/kg           Subjective:   44y F with severe morbid obesity, DM2, HTN, HLD, chronic diastolic CHF (EF 55%), obesity hypoventilation syndrome. Pt admitted to [facility] 8/2018 for SOB, volume overload and pericardial effusion, requiring an IR drain, Acute on chronic respiratory failure with hypoxemia, Obesity hypoventilation syndrome. Noted SOB and increasing LE edema d/t noncompliance with prescribed lasix x 1 month. She was found to be tachypneic upon admission. Per rounds d/c pending with home care. Pt currently on DASH/TLC, cons CHO with evening snack diet + 1000ml fluid restriction (11/9). Pt reports eating well, without issues, No issues chewing/swallowing at this time.     Previous Nutrition Diagnosis:  1. Overweight/Obesity r/t excessive intake of energy dense foods AEB %% and BMI kg/m2 67.4 kg/m2.   On Going (x)   2. Limited adherence to nutrition related Recs RT lack of value of diet compliance AEB verbal reports of not following diet despite knowing restrictions.   Resolving (x)     Recommendations:  1. Recommend DASH/TLC, Consistent Carbohydrate - no snack, fluids per MD.   2. Monitor PO intake/appetite, GI distress, diet tolerance, labs, daily weights, Skin.  3. Honor pt food preferences as able.  4. RD to remain available for additional nutrition interventions as needed.   Pending order placed.     Education: Pt visited today for additional education; pt reports she knows she needs to make change s/p d/c. Reviewed prior education with pt; pt stated understanding and presented as receptive.     Risk Level: High [   ] Moderate [x] Low [   ]

## 2019-11-11 NOTE — PROGRESS NOTE ADULT - PROBLEM SELECTOR PLAN 2
Has not been taking lasix x 1 month with weight gain and progressive RODRIGUEZ, more hypoxic in ED, Rx BiPAP and Lasix now improved  - continue BiPAP at night   - Diamox d/c'd.   - Pulmonary following, appreciate recs  - Duoneb prn

## 2019-11-11 NOTE — PROGRESS NOTE ADULT - PROBLEM SELECTOR PLAN 4
Recommendations:   - Ok to stop diamox today.   - BiPAP therapy QHS    patient's resp status is at baseline.   Pulmonology team will sign off at this point. Please call us back with any questions or changes in the patient status. We appreciate the opportunity provided to take part in the patient's care.     The patient was s/e/d with Dr Rodriguez.

## 2019-11-11 NOTE — PROGRESS NOTE ADULT - SUBJECTIVE AND OBJECTIVE BOX
PULMONARY CONSULT FOLLOW-UP NOTE    INTERVAL HPI:  Reviewed chart and overnight events; patient seen and examined at bedside. Patient feels like she is back to her baseline breathing. she denies any fever/chills currently.     MEDICATIONS:  Pulmonary:  albuterol/ipratropium for Nebulization 3 milliLiter(s) Nebulizer every 6 hours    Antimicrobials:    Anticoagulants:  aspirin enteric coated 81 milliGRAM(s) Oral daily  heparin  Injectable 7500 Unit(s) SubCutaneous every 8 hours    Cardiac:  amLODIPine   Tablet 5 milliGRAM(s) Oral daily  losartan 25 milliGRAM(s) Oral daily      Allergies    No Known Drug Allergies  shellfish (Anaphylaxis)    Intolerances        Vital Signs Last 24 Hrs  T(C): 37.1 (2019 04:54), Max: 37.2 (10 Nov 2019 22:20)  T(F): 98.7 (2019 04:54), Max: 98.9 (10 Nov 2019 22:20)  HR: 92 (2019 13:30) (72 - 100)  BP: 91/53 (2019 13:30) (91/53 - 136/93)  BP(mean): 69 (2019 13:30) (69 - 108)  RR: 18 (2019 13:30) (18 - 29)  SpO2: 90% (2019 13:30) (90% - 100%)    11-10 @ 07:01  -   @ 07:00  --------------------------------------------------------  IN: 0 mL / OUT: 3450 mL / NET: -3450 mL          PHYSICAL EXAM:  Constitutional: morbidly obese female on 4-5LPM O2.   HEENT: NC/AT; PERRL, anicteric sclera; MMM  Neck: supple  Cardiovascular: +S1/S2, RRR  Respiratory: CTA B/L; no W/R/R  Gastrointestinal: soft, NT/ND; +BSx4  Extremities: WWP; no edema, clubbing or cyanosis  Vascular: 2+ radial, DP/PT pulses B/L  Neurological: AAOx3; no focal deficits    LABS:      CBC Full  -  ( 2019 12:51 )  WBC Count : 7.20 K/uL  RBC Count : 4.69 M/uL  Hemoglobin : 11.3 g/dL  Hematocrit : 41.4 %  Platelet Count - Automated : 381 K/uL  Mean Cell Volume : 88.3 fl  Mean Cell Hemoglobin : 24.1 pg  Mean Cell Hemoglobin Concentration : 27.3 gm/dL  Auto Neutrophil # : x  Auto Lymphocyte # : x  Auto Monocyte # : x  Auto Eosinophil # : x  Auto Basophil # : x  Auto Neutrophil % : x  Auto Lymphocyte % : x  Auto Monocyte % : x  Auto Eosinophil % : x  Auto Basophil % : x    11-11    134<L>  |  87<L>  |  27<H>  ----------------------------<  261<H>  3.6   |  36<H>  |  0.96    Ca    9.4      2019 12:51  Phos  4.2     11-10  Mg     2.0         TPro  9.6<H>  /  Alb  4.3  /  TBili  0.8  /  DBili  x   /  AST  32  /  ALT  26  /  AlkPhos  91  11-10          Urinalysis Basic - ( 10 Nov 2019 15:10 )    Color: Yellow / Appearance: Clear / S.010 / pH: x  Gluc: x / Ketone: Trace mg/dL  / Bili: Small / Urobili: 2.0 E.U./dL   Blood: x / Protein: Trace mg/dL / Nitrite: NEGATIVE   Leuk Esterase: Large / RBC: < 5 /HPF / WBC Many /HPF   Sq Epi: x / Non Sq Epi: Many /HPF / Bacteria: Many /HPF                1.	RADIOLOGY & ADDITIONAL STUDIES:

## 2019-11-11 NOTE — PROGRESS NOTE ADULT - PROBLEM SELECTOR PLAN 8
1) PCP Contacted on Admission: (Y/N) --> Name & Phone #:  2) Date of Contact with PCP:  3) PCP Contacted at Discharge: (Y/N)  4) Summary of Handoff Given to PCP:   5) Post-Discharge Appointment Date and Location:
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
1) PCP Contacted on Admission: (Y/N) --> Name & Phone #:  2) Date of Contact with PCP:  3) PCP Contacted at Discharge: (Y/N)  4) Summary of Handoff Given to PCP:   5) Post-Discharge Appointment Date and Location:
1) PCP Contacted on Admission: (Y/N) --> Name & Phone #:  2) Date of Contact with PCP:  3) PCP Contacted at Discharge: (Y/N)  4) Summary of Handoff Given to PCP:   5) Post-Discharge Appointment Date and Location:

## 2019-11-12 ENCOUNTER — INBOUND DOCUMENT (OUTPATIENT)
Age: 44
End: 2019-11-12

## 2019-11-12 VITALS — RESPIRATION RATE: 23 BRPM | HEART RATE: 95 BPM | OXYGEN SATURATION: 97 %

## 2019-11-12 LAB
ANION GAP SERPL CALC-SCNC: 11 MMOL/L — SIGNIFICANT CHANGE UP (ref 5–17)
BUN SERPL-MCNC: 31 MG/DL — HIGH (ref 7–23)
CALCIUM SERPL-MCNC: 9.9 MG/DL — SIGNIFICANT CHANGE UP (ref 8.4–10.5)
CHLORIDE SERPL-SCNC: 90 MMOL/L — LOW (ref 96–108)
CO2 SERPL-SCNC: 36 MMOL/L — HIGH (ref 22–31)
CREAT SERPL-MCNC: 1.02 MG/DL — SIGNIFICANT CHANGE UP (ref 0.5–1.3)
CRP SERPL-MCNC: 0.6 MG/DL — HIGH (ref 0–0.4)
ERYTHROCYTE [SEDIMENTATION RATE] IN BLOOD: 32 MM/HR — HIGH
GLUCOSE BLDC GLUCOMTR-MCNC: 182 MG/DL — HIGH (ref 70–99)
GLUCOSE SERPL-MCNC: 202 MG/DL — HIGH (ref 70–99)
HCT VFR BLD CALC: 42.8 % — SIGNIFICANT CHANGE UP (ref 34.5–45)
HGB BLD-MCNC: 11.8 G/DL — SIGNIFICANT CHANGE UP (ref 11.5–15.5)
MAGNESIUM SERPL-MCNC: 2.4 MG/DL — SIGNIFICANT CHANGE UP (ref 1.6–2.6)
MCHC RBC-ENTMCNC: 24.3 PG — LOW (ref 27–34)
MCHC RBC-ENTMCNC: 27.6 GM/DL — LOW (ref 32–36)
MCV RBC AUTO: 88.1 FL — SIGNIFICANT CHANGE UP (ref 80–100)
NRBC # BLD: 0 /100 WBCS — SIGNIFICANT CHANGE UP (ref 0–0)
PLATELET # BLD AUTO: 317 K/UL — SIGNIFICANT CHANGE UP (ref 150–400)
POTASSIUM SERPL-MCNC: 4 MMOL/L — SIGNIFICANT CHANGE UP (ref 3.5–5.3)
POTASSIUM SERPL-SCNC: 4 MMOL/L — SIGNIFICANT CHANGE UP (ref 3.5–5.3)
RBC # BLD: 4.86 M/UL — SIGNIFICANT CHANGE UP (ref 3.8–5.2)
RBC # FLD: 16.6 % — HIGH (ref 10.3–14.5)
SODIUM SERPL-SCNC: 137 MMOL/L — SIGNIFICANT CHANGE UP (ref 135–145)
WBC # BLD: 6.33 K/UL — SIGNIFICANT CHANGE UP (ref 3.8–10.5)
WBC # FLD AUTO: 6.33 K/UL — SIGNIFICANT CHANGE UP (ref 3.8–10.5)

## 2019-11-12 PROCEDURE — 81001 URINALYSIS AUTO W/SCOPE: CPT

## 2019-11-12 PROCEDURE — 96374 THER/PROPH/DIAG INJ IV PUSH: CPT

## 2019-11-12 PROCEDURE — 93970 EXTREMITY STUDY: CPT

## 2019-11-12 PROCEDURE — 97161 PT EVAL LOW COMPLEX 20 MIN: CPT

## 2019-11-12 PROCEDURE — 97116 GAIT TRAINING THERAPY: CPT

## 2019-11-12 PROCEDURE — 82550 ASSAY OF CK (CPK): CPT

## 2019-11-12 PROCEDURE — 36415 COLL VENOUS BLD VENIPUNCTURE: CPT

## 2019-11-12 PROCEDURE — 99285 EMERGENCY DEPT VISIT HI MDM: CPT | Mod: 25

## 2019-11-12 PROCEDURE — 80053 COMPREHEN METABOLIC PANEL: CPT

## 2019-11-12 PROCEDURE — 85027 COMPLETE CBC AUTOMATED: CPT

## 2019-11-12 PROCEDURE — 80048 BASIC METABOLIC PNL TOTAL CA: CPT

## 2019-11-12 PROCEDURE — 94660 CPAP INITIATION&MGMT: CPT

## 2019-11-12 PROCEDURE — 93005 ELECTROCARDIOGRAM TRACING: CPT

## 2019-11-12 PROCEDURE — 82553 CREATINE MB FRACTION: CPT

## 2019-11-12 PROCEDURE — 85025 COMPLETE CBC W/AUTO DIFF WBC: CPT

## 2019-11-12 PROCEDURE — 83735 ASSAY OF MAGNESIUM: CPT

## 2019-11-12 PROCEDURE — 86140 C-REACTIVE PROTEIN: CPT

## 2019-11-12 PROCEDURE — 83880 ASSAY OF NATRIURETIC PEPTIDE: CPT

## 2019-11-12 PROCEDURE — 93306 TTE W/DOPPLER COMPLETE: CPT

## 2019-11-12 PROCEDURE — 83036 HEMOGLOBIN GLYCOSYLATED A1C: CPT

## 2019-11-12 PROCEDURE — 94640 AIRWAY INHALATION TREATMENT: CPT

## 2019-11-12 PROCEDURE — 82962 GLUCOSE BLOOD TEST: CPT

## 2019-11-12 PROCEDURE — 82803 BLOOD GASES ANY COMBINATION: CPT

## 2019-11-12 PROCEDURE — 85610 PROTHROMBIN TIME: CPT

## 2019-11-12 PROCEDURE — 80061 LIPID PANEL: CPT

## 2019-11-12 PROCEDURE — 81003 URINALYSIS AUTO W/O SCOPE: CPT

## 2019-11-12 PROCEDURE — 84484 ASSAY OF TROPONIN QUANT: CPT

## 2019-11-12 PROCEDURE — ZZZZZ: CPT

## 2019-11-12 PROCEDURE — 85652 RBC SED RATE AUTOMATED: CPT

## 2019-11-12 PROCEDURE — 84100 ASSAY OF PHOSPHORUS: CPT

## 2019-11-12 PROCEDURE — 85730 THROMBOPLASTIN TIME PARTIAL: CPT

## 2019-11-12 PROCEDURE — 71045 X-RAY EXAM CHEST 1 VIEW: CPT

## 2019-11-12 PROCEDURE — 97530 THERAPEUTIC ACTIVITIES: CPT

## 2019-11-12 PROCEDURE — 87086 URINE CULTURE/COLONY COUNT: CPT

## 2019-11-12 RX ADMIN — Medication 2: at 07:19

## 2019-11-12 RX ADMIN — AMLODIPINE BESYLATE 5 MILLIGRAM(S): 2.5 TABLET ORAL at 07:19

## 2019-11-12 RX ADMIN — LOSARTAN POTASSIUM 25 MILLIGRAM(S): 100 TABLET, FILM COATED ORAL at 07:19

## 2019-11-12 RX ADMIN — NYSTATIN CREAM 1 APPLICATION(S): 100000 CREAM TOPICAL at 07:21

## 2019-11-12 RX ADMIN — Medication 40 MILLIGRAM(S): at 07:19

## 2019-11-12 RX ADMIN — Medication 0.6 MILLIGRAM(S): at 07:20

## 2019-11-12 RX ADMIN — Medication 3 MILLILITER(S): at 10:34

## 2019-11-12 RX ADMIN — Medication 81 MILLIGRAM(S): at 10:34

## 2019-11-14 DIAGNOSIS — Z79.4 LONG TERM (CURRENT) USE OF INSULIN: ICD-10-CM

## 2019-11-14 DIAGNOSIS — I11.0 HYPERTENSIVE HEART DISEASE WITH HEART FAILURE: ICD-10-CM

## 2019-11-14 DIAGNOSIS — E87.4 MIXED DISORDER OF ACID-BASE BALANCE: ICD-10-CM

## 2019-11-14 DIAGNOSIS — Z91.14 PATIENT'S OTHER NONCOMPLIANCE WITH MEDICATION REGIMEN: ICD-10-CM

## 2019-11-14 DIAGNOSIS — Z87.891 PERSONAL HISTORY OF NICOTINE DEPENDENCE: ICD-10-CM

## 2019-11-14 DIAGNOSIS — J96.21 ACUTE AND CHRONIC RESPIRATORY FAILURE WITH HYPOXIA: ICD-10-CM

## 2019-11-14 DIAGNOSIS — E66.2 MORBID (SEVERE) OBESITY WITH ALVEOLAR HYPOVENTILATION: ICD-10-CM

## 2019-11-14 DIAGNOSIS — Z91.013 ALLERGY TO SEAFOOD: ICD-10-CM

## 2019-11-14 DIAGNOSIS — I50.33 ACUTE ON CHRONIC DIASTOLIC (CONGESTIVE) HEART FAILURE: ICD-10-CM

## 2019-11-14 DIAGNOSIS — E11.9 TYPE 2 DIABETES MELLITUS WITHOUT COMPLICATIONS: ICD-10-CM

## 2019-11-14 DIAGNOSIS — Z82.49 FAMILY HISTORY OF ISCHEMIC HEART DISEASE AND OTHER DISEASES OF THE CIRCULATORY SYSTEM: ICD-10-CM

## 2019-11-14 DIAGNOSIS — I27.20 PULMONARY HYPERTENSION, UNSPECIFIED: ICD-10-CM

## 2019-11-14 DIAGNOSIS — Z99.89 DEPENDENCE ON OTHER ENABLING MACHINES AND DEVICES: ICD-10-CM

## 2019-11-14 DIAGNOSIS — Z99.81 DEPENDENCE ON SUPPLEMENTAL OXYGEN: ICD-10-CM

## 2019-11-15 ENCOUNTER — APPOINTMENT (OUTPATIENT)
Dept: PULMONOLOGY | Facility: CLINIC | Age: 44
End: 2019-11-15

## 2019-11-15 ENCOUNTER — RX RENEWAL (OUTPATIENT)
Age: 44
End: 2019-11-15

## 2019-11-25 ENCOUNTER — APPOINTMENT (OUTPATIENT)
Dept: PULMONOLOGY | Facility: CLINIC | Age: 44
End: 2019-11-25

## 2019-11-26 ENCOUNTER — APPOINTMENT (OUTPATIENT)
Dept: HEART AND VASCULAR | Facility: CLINIC | Age: 44
End: 2019-11-26

## 2019-11-27 ENCOUNTER — RX RENEWAL (OUTPATIENT)
Age: 44
End: 2019-11-27

## 2019-12-04 ENCOUNTER — APPOINTMENT (OUTPATIENT)
Dept: PULMONOLOGY | Facility: CLINIC | Age: 44
End: 2019-12-04

## 2019-12-12 ENCOUNTER — RX RENEWAL (OUTPATIENT)
Age: 44
End: 2019-12-12

## 2019-12-12 ENCOUNTER — APPOINTMENT (OUTPATIENT)
Dept: INTERNAL MEDICINE | Facility: CLINIC | Age: 44
End: 2019-12-12
Payer: MEDICARE

## 2019-12-12 VITALS
DIASTOLIC BLOOD PRESSURE: 79 MMHG | HEIGHT: 66 IN | SYSTOLIC BLOOD PRESSURE: 139 MMHG | TEMPERATURE: 97.3 F | WEIGHT: 293 LBS | BODY MASS INDEX: 47.09 KG/M2 | OXYGEN SATURATION: 94 % | HEART RATE: 103 BPM

## 2019-12-12 DIAGNOSIS — Z12.39 ENCOUNTER FOR OTHER SCREENING FOR MALIGNANT NEOPLASM OF BREAST: ICD-10-CM

## 2019-12-12 PROCEDURE — 99214 OFFICE O/P EST MOD 30 MIN: CPT | Mod: GC

## 2019-12-12 RX ORDER — FUROSEMIDE 80 MG/1
80 TABLET ORAL
Qty: 30 | Refills: 0 | Status: DISCONTINUED | COMMUNITY
Start: 2019-11-27 | End: 2019-12-12

## 2019-12-12 RX ORDER — ACETIC ACID 20 MG/ML
2 SOLUTION AURICULAR (OTIC) DAILY
Qty: 1 | Refills: 0 | Status: DISCONTINUED | COMMUNITY
Start: 2019-10-22 | End: 2019-12-12

## 2019-12-12 RX ORDER — POLYMYXIN B SULFATE AND TRIMETHOPRIM 10000; 1 [USP'U]/ML; MG/ML
10000-0.1 SOLUTION OPHTHALMIC 4 TIMES DAILY
Qty: 1 | Refills: 0 | Status: DISCONTINUED | COMMUNITY
Start: 2019-10-22 | End: 2019-12-12

## 2020-01-10 ENCOUNTER — MEDICATION RENEWAL (OUTPATIENT)
Age: 45
End: 2020-01-10

## 2020-01-10 NOTE — ED ADULT NURSE NOTE - PLAN OF CARE
86 yo M hx Afib on Coumadin, DM2, CAD, HFrEF, severe AS, recent admit for TAVR c/b AIHA underwent balloon aortic valvuloplasty (12/26) and started on prednisone 1mg/kg, discharged to rehab (1/3) returned to ER on 1/5 due to due to symptomatic anemia related to reported bleeding from L groin access site currently, Hgb of 5.3 on admission, currently in the CT ICU, Hematology consulted for further management of AIHA and has required 4 units of pRBCs this admission so far.    1. AIHA: IgG and C3 positive with elevated LDH and low haptoglobin with elevated reticulocyte count during last admission, cold agglutinin titer negative, consistent with warm AIHA during last admission  - started prednisone 1 mg/kg daily on 12/21  - currently anemia could be combination of AIHA and blood loss  - peripheral blood smear shows lots of agglutination, reticulocytes, some nucleated RBCs, no schistocytes  - would repeat LDH, haptoglobin, Direct Maribell test, and cold agglutinin titer  - continue with prednisone 1 mg/kg daily. Pt has now been on steroids for over 2 weeks, if H/H still continues to downtrend with no further evidence of bleeding, will have to consider Rituxan  - Continue folic acid  - monitor daily CBC, reticulocyte count, LDH, fractionate total bilirubin  - CT C/A/P during last admission unremarkable for malignancy  - for now can transfuse to keep Hgb > 7 if bleeding. Otherwise, for hemolytic anemia transfusion is only given if pt has symptoms    INCOMPLETE NOTE 84 yo M hx Afib on Coumadin, DM2, CAD, HFrEF, severe AS, recent admit for TAVR c/b AIHA underwent balloon aortic valvuloplasty (12/26) and started on prednisone 1mg/kg, discharged to rehab (1/3) returned to ER on 1/5 due to due to symptomatic anemia related to reported bleeding from L groin access site currently, Hgb of 5.3 on admission. Hematology consulted for further management of AIHA. Pt now s/p L groin pseudoaneurysm thrombin injection on 1/9. Hgb continues to drop (6.7 o/n; 7.9 post-transfusion). Haptoglobin 842 and LDH <20 on 1/8 indicating continued hemolysis. Total bilirubin 6.3 (2.0 direct/4.3 indirect). Pt c/w meropenem for +blood cx. Pt transferred from CTU to floor on 1/10.     1. AIHA: IgG and C3 positive with elevated LDH and low haptoglobin with elevated reticulocyte count during last admission, cold agglutinin titer negative, consistent with warm AIHA during last admission  - started prednisone 1 mg/kg daily on 12/21  - continued anemia likely due to hemolysis as pt has no active source of bleeding  - peripheral blood smear shows agglutination, reticulocytes, some nucleated RBCs, no schistocytes  - f/u Direct Maribell test and cold agglutinin titer  - continue with prednisone 1 mg/kg daily. Pt has now been on steroids for over 2 weeks, if H/H still continues to downtrend with no further evidence of bleeding, will have to consider Rituxan  - Continue folic acid  - monitor daily CBC, reticulocyte count  - trend platelets and consider w/u for thrombocytopenia  - CT C/A/P during last admission unremarkable for malignancy  - for now can transfuse to keep Hgb > 7 if bleeding. Otherwise, for hemolytic anemia transfusion is only given if pt has symptoms    INCOMPLETE NOTE 84 yo M hx Afib on Coumadin, DM2, CAD, HFrEF, severe AS, recent admit for TAVR c/b AIHA underwent balloon aortic valvuloplasty (12/26) and started on prednisone 1mg/kg, discharged to rehab (1/3) returned to ER on 1/5 due to due to symptomatic anemia related to reported bleeding from L groin access site currently, Hgb of 5.3 on admission. Hematology consulted for further management of AIHA. Pt now s/p L groin pseudoaneurysm thrombin injection on 1/9. Hgb continues to drop (6.7 o/n; 7.9 post-transfusion). Haptoglobin 842 and LDH <20 on 1/8 indicating continued hemolysis. Total bilirubin 6.3 (2.0 direct/4.3 indirect). Pt c/w meropenem for +blood cx. Pt transferred from CTU to floor on 1/10.     1. AIHA: IgG and C3 positive with elevated LDH and low haptoglobin with elevated reticulocyte count during last admission, cold agglutinin titer negative, consistent with warm AIHA during last admission  - started prednisone 1 mg/kg daily on 12/21  - continued anemia likely due to hemolysis as pt has no active source of bleeding  - peripheral blood smear shows agglutination, reticulocytes, some nucleated RBCs, no schistocytes  - f/u Direct Maribell test and cold agglutinin titer  - continue with prednisone 1 mg/kg daily. Pt has now been on steroids for over 2 weeks, if H/H still continues to downtrend with no further evidence of bleeding, will have to consider Rituxan  - Please do not transfuse the patient unless absolutely necessary since his anemia is hemolytic in nature (unless pt is having symptomatic anemia or active bleeding)   - Please start MEPRON for PCP prophylaxis  - Continue folic acid  - monitor daily CBC, reticulocyte count  - trend platelets and consider w/u for thrombocytopenia  - CT C/A/P during last admission unremarkable for malignancy    Freda Correa MD  Hematology Oncology Fellow, PGY-4  [facility] Pager: [phone]/ [facility] Pager: [phone] 86 yo M hx Afib on Coumadin, DM2, CAD, HFrEF, severe AS, recent admit for TAVR c/b AIHA underwent balloon aortic valvuloplasty (12/26) and started on prednisone 1mg/kg, discharged to rehab (1/3) returned to ER on 1/5 due to due to symptomatic anemia related to reported bleeding from L groin access site currently, Hgb of 5.3 on admission. Hematology consulted for further management of AIHA. Pt now s/p L groin pseudoaneurysm thrombin injection on 1/9. Hgb continues to drop (6.7 o/n; 7.9 post-transfusion). Haptoglobin 842 and LDH <20 on 1/8 indicating continued hemolysis. Total bilirubin 6.3 (2.0 direct/4.3 indirect). Pt c/w meropenem for +blood cx. Pt transferred from CTU to floor on 1/10.     1. AIHA: IgG and C3 positive with elevated LDH and low haptoglobin with elevated reticulocyte count during last admission, cold agglutinin titer negative, consistent with warm AIHA during last admission  - started prednisone 1 mg/kg daily on 12/21  - continued anemia likely due to hemolysis as pt has no active source of bleeding  - peripheral blood smear shows agglutination, reticulocytes, some nucleated RBCs, no schistocytes  - f/u Direct Maribell test and cold agglutinin titer  - continue with prednisone 1 mg/kg daily. Will consider Rituxan, however given pt's recent bacteremia would need to discuss with ID of using Rituximab in setting of infection   - Continue folic acid  - Please do not transfuse the patient unless absolutely necessary since his anemia is hemolytic in nature (unless pt is having symptomatic anemia or active bleeding)   - Please start MEPRON for PCP prophylaxis  - Continue folic acid  - monitor daily CBC, reticulocyte count  - CT C/A/P during last admission unremarkable for malignancy    Freda Correa MD  Hematology Oncology Fellow, PGY-4  [facility] Pager: [phone]/ [facility] Pager: [phone] 86 yo M hx Afib on Coumadin, DM2, CAD, HFrEF, severe AS, recent admit for TAVR c/b AIHA underwent balloon aortic valvuloplasty (12/26) and started on prednisone 1mg/kg, discharged to rehab (1/3) returned to ER on 1/5 due to due to symptomatic anemia related to reported bleeding from L groin access site currently, Hgb of 5.3 on admission. Hematology consulted for further management of AIHA. Pt now s/p L groin pseudoaneurysm thrombin injection on 1/9. Hgb continues to drop (6.7 o/n; 7.9 post-transfusion). Haptoglobin 842 and LDH <20 on 1/8 indicating continued hemolysis. Total bilirubin 6.3 (2.0 direct/4.3 indirect). Pt c/w meropenem for +blood cx. Pt transferred from CTU to floor on 1/10.     1. AIHA: IgG and C3 positive with elevated LDH and low haptoglobin with elevated reticulocyte count during last admission, cold agglutinin titer negative, consistent with warm AIHA during last admission  - started prednisone 1 mg/kg daily on 12/21  - continued anemia likely due to hemolysis as pt has no active source of bleeding  - peripheral blood smear shows agglutination, reticulocytes, some nucleated RBCs, no schistocytes  - f/u Direct Maribell test and cold agglutinin titer  - continue with prednisone 1 mg/kg daily. Will consider Rituxan, however given pt's recent bacteremia would need to discuss with ID of using Rituximab in setting of infection - Continue folic acid  - Please do not transfuse the patient unless absolutely necessary since his anemia is hemolytic in nature (unless pt is having symptomatic anemia or active bleeding)   - Please start MEPRON for PCP prophylaxis  - Continue folic acid  - monitor daily CBC, reticulocyte count  - CT C/A/P during last admission unremarkable for malignancy    Addendum: When we discussed the plan with patient and family at bedside, they informed us that patient is now DNR/DNI and is planning for hospice so Rituxan will be unlikely especially in the setting of infection and hospice planning.       Freda Correa MD  Hematology Oncology Fellow, PGY-4  [facility] Pager: [phone]/ [facility] Pager: [phone] Explanation of exam/test/Fall precautions/Position of comfort/Bedside visitors

## 2020-01-23 NOTE — PROGRESS NOTE ADULT - PROBLEM SELECTOR PLAN 3
Patient with h/o dCHF, taking 60mg lasix BID, with subtherapeutic response. Approximately 6L removed during admission.   -c/w lasix 60 IV BID.  -Daily weights, strict I&OS Admelog 100 units/mL injectable solution: 10 unit(s) injectable 3 times a day  ALPRAZolam 2 mg oral tablet: 1 tab(s) orally once a day MDD:4mg  aluminum hydroxide/magnesium hydroxide/simethicone 200 mg-200 mg-20 mg/5 mL oral suspension: 30 milliliter(s) orally every 4 hours, As Needed   famotidine 20 mg oral tablet: 1 tab(s) orally 2 times a day  insulin glargine 100 units/mL subcutaneous solution: 13 unit(s) subcutaneous 2 times a day   metoclopramide 10 mg oral tablet, disintegratin tab(s) orally 4 times a day (before meals and at bedtime)  ondansetron 8 mg oral tablet, disintegratin tab(s) orally 2 times a day, As Needed - for nausea  oxycodone-acetaminophen 5 mg-325 mg oral tablet: 1 tab(s) orally every 6 hours, As Needed -Severe Pain (7 - 10) - for severe pain MDD:4 tabs.  ProAir HFA 90 mcg/inh inhalation aerosol: 2 puff(s) inhaled 4 times a day, As Needed -for shortness of breath and/or wheezing   Protonix 40 mg oral delayed release tablet: 1 tab(s) orally once a day   sucralfate 1 g oral tablet: 1 tab(s) orally 4 times a day Patient with h/o dCHF, taking 60mg lasix BID, with subtherapeutic response. Approximately 8L removed during admission.   -c/w lasix 80 PO BID.  -Daily weights, strict I&OS

## 2020-02-18 ENCOUNTER — RX RENEWAL (OUTPATIENT)
Age: 45
End: 2020-02-18

## 2020-02-21 NOTE — HISTORY OF PRESENT ILLNESS
[FreeTextEntry1] : 43 yr old female with PMH of MANUEL (on non invasive ventilator), Hypoxemia (Home oxygen) and morbid obesity.  \par \par She is not feeling well.  She has not been out of the house since May the last time she saw me.  She had not been to the ER or hospitalization.  She is on 4 L NC and she is saturating at 90%.  She is having increased SOB with walking and is having limited mobility.  She gained weight without any signs of fluid overload.\par \par Average BS under 200-116.\par \par  She is using the non invasive ventilator at night she is unable to sleep without it.  She is benefiting from the non-invasive machine \par \par She is scheduled to her her PCP on the 27th of this month with Dr. Garcia.  \par \par Denies coughing, wheezing, fever.

## 2020-02-21 NOTE — ASSESSMENT
[FreeTextEntry1] :  Dyspnea, unspecified\par \par  The patient has increased with weight gain since last visit.  She is rarely requiring her PATY.   The PFT was consistent with combined obstructive and restrictive lung disease and decrease in the diffusion capacity. Compared to the previous one, there was further decrease in the flow volumes and diffusion capacity. The base line oxygen saturation is normal with oxygen supplementation, 90 - 92% with 3 L NC. Pt desaturates with ambulation. She is to repeat the ECHO to evaluate for increase in PA pressures which may need to be addressed due to severe decrease in DLCO.  Pt never went for repeat echo in May and instructed to do so this visit.   I discussed with the patient the need for weight loss to improve her condition.  If PSAP pressure is increased will follow with VQ scan.\par \par Hypoxemia\par  Patient is to continue her 4L/min nasal cannula oxygen. Says she is able to walk with the oxygen and may need to increase the oxygen supplementation if required. Ordered portable oxygen concentrator for pt last visitand will follow up with insurance on coverage.\par      \par Other alveolar and parieto-alveolar conditions\par \par The patient is to continue on the current bronchodilators. Pulmonary function tests as previously noted. \par    \par Obstructive sleep apnea \par \par The patient has been compliant with her non invasive ventilation and tolerating. Continue to use non invasive ventilation at night for at least 4 plus hours.\par \par Morbid (severe) obesity due to excess calories\par \par The patient is not  nutritionist and recording her calorie intake. Continue diuretics a is. Follow with repeat CXR. Discussed weight loss and it affects on dyspnea. 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 Rheumatological work up was considered. Patient was not considered for an intervention at the time per cardiology. To follow with  Echo and cardiology MD Cancino. \par \par Pt given flu vaccine without any side effects, egg allergy or prior reaction to the flu vaccine.

## 2020-02-24 NOTE — DISCHARGE NOTE ADULT - NURSING SECTION COMPLETE
Happy Birthday to your 4year old ! So glad he is going to the dentist today, great idea, and ENT recheck  Ears look good without obvious inflammation or fluid today  SO very glad that irritability especially around bathtime resolved, it sounds like you are making a little progress with sleep !  John Vallejo
Patient/Caregiver provided printed discharge information.

## 2020-03-04 ENCOUNTER — RX RENEWAL (OUTPATIENT)
Age: 45
End: 2020-03-04

## 2020-03-05 ENCOUNTER — APPOINTMENT (OUTPATIENT)
Dept: INTERNAL MEDICINE | Facility: CLINIC | Age: 45
End: 2020-03-05

## 2020-03-09 ENCOUNTER — RX RENEWAL (OUTPATIENT)
Age: 45
End: 2020-03-09

## 2020-03-19 ENCOUNTER — APPOINTMENT (OUTPATIENT)
Dept: MAMMOGRAPHY | Facility: HOSPITAL | Age: 45
End: 2020-03-19

## 2020-05-19 ENCOUNTER — RX RENEWAL (OUTPATIENT)
Age: 45
End: 2020-05-19

## 2020-06-15 ENCOUNTER — RX RENEWAL (OUTPATIENT)
Age: 45
End: 2020-06-15

## 2020-06-29 ENCOUNTER — RX RENEWAL (OUTPATIENT)
Age: 45
End: 2020-06-29

## 2020-08-13 ENCOUNTER — RX RENEWAL (OUTPATIENT)
Age: 45
End: 2020-08-13

## 2020-08-28 ENCOUNTER — LABORATORY RESULT (OUTPATIENT)
Age: 45
End: 2020-08-28

## 2020-08-28 ENCOUNTER — APPOINTMENT (OUTPATIENT)
Dept: INTERNAL MEDICINE | Facility: CLINIC | Age: 45
End: 2020-08-28
Payer: MEDICARE

## 2020-08-28 VITALS
BODY MASS INDEX: 47.09 KG/M2 | WEIGHT: 293 LBS | TEMPERATURE: 99 F | DIASTOLIC BLOOD PRESSURE: 60 MMHG | HEART RATE: 110 BPM | OXYGEN SATURATION: 96 % | SYSTOLIC BLOOD PRESSURE: 107 MMHG | RESPIRATION RATE: 15 BRPM | HEIGHT: 66 IN

## 2020-08-28 DIAGNOSIS — Z11.59 ENCOUNTER FOR SCREENING FOR OTHER VIRAL DISEASES: ICD-10-CM

## 2020-08-28 DIAGNOSIS — F41.9 ANXIETY DISORDER, UNSPECIFIED: ICD-10-CM

## 2020-08-28 PROCEDURE — 99214 OFFICE O/P EST MOD 30 MIN: CPT

## 2020-08-28 PROCEDURE — 36415 COLL VENOUS BLD VENIPUNCTURE: CPT

## 2020-08-31 ENCOUNTER — RX RENEWAL (OUTPATIENT)
Age: 45
End: 2020-08-31

## 2020-09-01 LAB
ALBUMIN SERPL ELPH-MCNC: 4.2 G/DL
ALP BLD-CCNC: 95 U/L
ALT SERPL-CCNC: 16 U/L
ANION GAP SERPL CALC-SCNC: 14 MMOL/L
AST SERPL-CCNC: 13 U/L
BASOPHILS # BLD AUTO: 0.04 K/UL
BASOPHILS NFR BLD AUTO: 0.4 %
BILIRUB SERPL-MCNC: 0.4 MG/DL
BUN SERPL-MCNC: 10 MG/DL
CALCIUM SERPL-MCNC: 9.8 MG/DL
CHLORIDE SERPL-SCNC: 100 MMOL/L
CHOLEST SERPL-MCNC: 81 MG/DL
CHOLEST/HDLC SERPL: 1.9 RATIO
CO2 SERPL-SCNC: 24 MMOL/L
CREAT SERPL-MCNC: 0.75 MG/DL
EOSINOPHIL # BLD AUTO: 0.11 K/UL
EOSINOPHIL NFR BLD AUTO: 1 %
ESTIMATED AVERAGE GLUCOSE: 166 MG/DL
GLUCOSE SERPL-MCNC: 175 MG/DL
HBA1C MFR BLD HPLC: 7.4 %
HCT VFR BLD CALC: 35.6 %
HDLC SERPL-MCNC: 42 MG/DL
HGB BLD-MCNC: 9.5 G/DL
IMM GRANULOCYTES NFR BLD AUTO: 0.2 %
LDLC SERPL CALC-MCNC: 29 MG/DL
LYMPHOCYTES # BLD AUTO: 1.94 K/UL
LYMPHOCYTES NFR BLD AUTO: 18.2 %
MAN DIFF?: NORMAL
MCHC RBC-ENTMCNC: 24.1 PG
MCHC RBC-ENTMCNC: 26.7 GM/DL
MCV RBC AUTO: 90.1 FL
MONOCYTES # BLD AUTO: 0.7 K/UL
MONOCYTES NFR BLD AUTO: 6.6 %
NEUTROPHILS # BLD AUTO: 7.86 K/UL
NEUTROPHILS NFR BLD AUTO: 73.6 %
PLATELET # BLD AUTO: 403 K/UL
POTASSIUM SERPL-SCNC: 4.3 MMOL/L
PROT SERPL-MCNC: 7.3 G/DL
RBC # BLD: 3.95 M/UL
RBC # FLD: 17.4 %
SARS-COV-2 IGG SERPL IA-ACNC: 0.09 INDEX
SARS-COV-2 IGG SERPL QL IA: NEGATIVE
SODIUM SERPL-SCNC: 138 MMOL/L
TRIGL SERPL-MCNC: 50 MG/DL
WBC # FLD AUTO: 10.67 K/UL

## 2020-09-02 NOTE — ED ADULT NURSE NOTE - CAS DISCH CONDITION
Patient states finger is healing well and there is no blistering. Patient was treated with an antibiotic last month for cellulitis of the finger.  No longer red, but is still somewhat swollen.  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    Patient agreed to call back if finger worsens. Explained to patient each day it should look better, less swelling, etc.  He will also apply ice TID x 20 minutes.    Bobo Alexandre RN       Stable

## 2020-09-09 ENCOUNTER — APPOINTMENT (OUTPATIENT)
Dept: INTERNAL MEDICINE | Facility: CLINIC | Age: 45
End: 2020-09-09

## 2020-09-25 ENCOUNTER — APPOINTMENT (OUTPATIENT)
Dept: INTERNAL MEDICINE | Facility: CLINIC | Age: 45
End: 2020-09-25
Payer: MEDICARE

## 2020-09-25 VITALS
DIASTOLIC BLOOD PRESSURE: 80 MMHG | WEIGHT: 293 LBS | TEMPERATURE: 98.1 F | BODY MASS INDEX: 66.98 KG/M2 | HEART RATE: 106 BPM | SYSTOLIC BLOOD PRESSURE: 132 MMHG | OXYGEN SATURATION: 96 %

## 2020-09-25 PROCEDURE — 99214 OFFICE O/P EST MOD 30 MIN: CPT

## 2020-10-15 ENCOUNTER — APPOINTMENT (OUTPATIENT)
Dept: INTERNAL MEDICINE | Facility: CLINIC | Age: 45
End: 2020-10-15
Payer: MEDICARE

## 2020-10-15 ENCOUNTER — MED ADMIN CHARGE (OUTPATIENT)
Age: 45
End: 2020-10-15

## 2020-10-15 VITALS
RESPIRATION RATE: 16 BRPM | HEART RATE: 113 BPM | WEIGHT: 293 LBS | SYSTOLIC BLOOD PRESSURE: 98 MMHG | DIASTOLIC BLOOD PRESSURE: 56 MMHG | BODY MASS INDEX: 47.09 KG/M2 | TEMPERATURE: 97.6 F | OXYGEN SATURATION: 96 % | HEIGHT: 66 IN

## 2020-10-15 DIAGNOSIS — Z23 ENCOUNTER FOR IMMUNIZATION: ICD-10-CM

## 2020-10-15 PROCEDURE — 99214 OFFICE O/P EST MOD 30 MIN: CPT | Mod: GC,25

## 2020-10-15 PROCEDURE — 90686 IIV4 VACC NO PRSV 0.5 ML IM: CPT

## 2020-10-15 PROCEDURE — G0008: CPT

## 2020-10-15 RX ORDER — SULFAMETHOXAZOLE AND TRIMETHOPRIM 800; 160 MG/1; MG/1
800-160 TABLET ORAL TWICE DAILY
Qty: 10 | Refills: 0 | Status: DISCONTINUED | COMMUNITY
Start: 2020-09-25 | End: 2020-10-15

## 2020-10-29 ENCOUNTER — APPOINTMENT (OUTPATIENT)
Dept: INTERNAL MEDICINE | Facility: CLINIC | Age: 45
End: 2020-10-29

## 2020-11-06 ENCOUNTER — NON-APPOINTMENT (OUTPATIENT)
Age: 45
End: 2020-11-06

## 2020-11-06 ENCOUNTER — APPOINTMENT (OUTPATIENT)
Dept: OPHTHALMOLOGY | Facility: CLINIC | Age: 45
End: 2020-11-06
Payer: MEDICARE

## 2020-11-06 PROCEDURE — 99072 ADDL SUPL MATRL&STAF TM PHE: CPT

## 2020-11-06 PROCEDURE — 92004 COMPRE OPH EXAM NEW PT 1/>: CPT

## 2020-11-10 ENCOUNTER — NON-APPOINTMENT (OUTPATIENT)
Age: 45
End: 2020-11-10

## 2020-11-19 ENCOUNTER — APPOINTMENT (OUTPATIENT)
Dept: INTERNAL MEDICINE | Facility: CLINIC | Age: 45
End: 2020-11-19
Payer: MEDICARE

## 2020-11-19 PROCEDURE — 99214 OFFICE O/P EST MOD 30 MIN: CPT | Mod: GC,95

## 2020-11-24 ENCOUNTER — NON-APPOINTMENT (OUTPATIENT)
Age: 45
End: 2020-11-24

## 2020-11-24 ENCOUNTER — APPOINTMENT (OUTPATIENT)
Dept: INTERNAL MEDICINE | Facility: CLINIC | Age: 45
End: 2020-11-24

## 2020-11-24 LAB
BASOPHILS # BLD AUTO: 0.03 K/UL
BASOPHILS NFR BLD AUTO: 0.3 %
EOSINOPHIL # BLD AUTO: 0.16 K/UL
EOSINOPHIL NFR BLD AUTO: 1.5 %
ESTIMATED AVERAGE GLUCOSE: 166 MG/DL
HBA1C MFR BLD HPLC: 7.4 %
HCT VFR BLD CALC: 35.1 %
HGB BLD-MCNC: 9.4 G/DL
IMM GRANULOCYTES NFR BLD AUTO: 0.4 %
LYMPHOCYTES # BLD AUTO: 1.99 K/UL
LYMPHOCYTES NFR BLD AUTO: 18.5 %
MAN DIFF?: NORMAL
MCHC RBC-ENTMCNC: 23.4 PG
MCHC RBC-ENTMCNC: 26.8 GM/DL
MCV RBC AUTO: 87.3 FL
MONOCYTES # BLD AUTO: 0.76 K/UL
MONOCYTES NFR BLD AUTO: 7.1 %
NEUTROPHILS # BLD AUTO: 7.8 K/UL
NEUTROPHILS NFR BLD AUTO: 72.2 %
PLATELET # BLD AUTO: 422 K/UL
RBC # BLD: 4.02 M/UL
RBC # FLD: 18 %
WBC # FLD AUTO: 10.78 K/UL

## 2020-12-10 ENCOUNTER — APPOINTMENT (OUTPATIENT)
Dept: INTERNAL MEDICINE | Facility: CLINIC | Age: 45
End: 2020-12-10
Payer: MEDICARE

## 2020-12-10 PROCEDURE — 99213 OFFICE O/P EST LOW 20 MIN: CPT | Mod: GC,95

## 2021-01-20 ENCOUNTER — RX RENEWAL (OUTPATIENT)
Age: 46
End: 2021-01-20

## 2021-01-22 ENCOUNTER — RX RENEWAL (OUTPATIENT)
Age: 46
End: 2021-01-22

## 2021-01-29 ENCOUNTER — APPOINTMENT (OUTPATIENT)
Dept: INTERNAL MEDICINE | Facility: CLINIC | Age: 46
End: 2021-01-29
Payer: MEDICARE

## 2021-01-29 ENCOUNTER — RX RENEWAL (OUTPATIENT)
Age: 46
End: 2021-01-29

## 2021-01-29 PROCEDURE — 99213 OFFICE O/P EST LOW 20 MIN: CPT | Mod: 95

## 2021-01-29 NOTE — END OF VISIT
[Time Spent: ___ minutes] : I have spent [unfilled] minutes of time on the encounter. [FreeTextEntry3] : 44 yo female for TEB visit for cellulitis of abdomen and foot.  Reports both are much improved.  c/o muscle cramping.\par \par 1) muscle cramps - when bending over, possibly 2/2 diuretic, electrolytes abnormalities, check cmp home draw.

## 2021-01-29 NOTE — ASSESSMENT
[FreeTextEntry1] : 45F w/pmhx of morbid obesity, HTN, NIDDM, HFpEF, anxiety, MANUEL/OHS on NC all day and CPAP at night presents with muscle cramps and follow-up after completing a 5 day course of keflex in 12/21 for cellulitis on the abdominal wall.

## 2021-01-29 NOTE — HISTORY OF PRESENT ILLNESS
[Home] : at home, [unfilled] , at the time of the visit. [Medical Office: ([facility])___] : at the medical office located in  [Verbal consent obtained from patient] : the patient, [unfilled] [FreeTextEntry1] : Muscle cramps  [de-identified] : 45F w/pmhx of morbid obesity, HTN, NIDDM, HFpEF, anxiety, MANUEL/OHS on NC all day and CPAP at night presents with muscle cramps and follow-up after completing a 5 day course of keflex in 12/21 for cellulitis on the abdominal wall. The patient has noticed resolution of the redness that was initially bothering her back in December and denies any additional fevers or chills. The patient is also stating that she has had worsening muscle cramps primarily in her abdomen after movement and requires a warm pack for the muscle cramp to dissipate. The patient has been taking 20mg QD of Torsemide since Mid 2020 and takes 20 mEq of KCl a day for supplementation. The patient is also limited to 1L of fluid intake a day in which she states almost all of that is water. The patient denies any cp, worsening sob, increased O2 demand, worsening LE edema, abdominal pain, dysuria, N/V/D/C, lightheadedness or syncopal episodes. Pt also states she needs a new shower bench as her old one has become damaged.

## 2021-01-29 NOTE — PLAN
[FreeTextEntry1] : #Muscle cramps\par Likely in setting of dehydration and possible electrolyte disturbances from the torsemide and fluid restriction. Pt is currently taking 20 mEq of KCl daily. \par - Sending for home blood draw of CMP, Mg and Phos\par - If K or other electrolytes found to be low will increase or start supplementation, respectively\par \par #Cellulitis of the abdominal wall\par - Resolved s/p Keflex 500mg BID x5 days\par \par #HCM\par - Referral for new shower bench \par \par Will need full labs in April

## 2021-02-02 ENCOUNTER — NON-APPOINTMENT (OUTPATIENT)
Age: 46
End: 2021-02-02

## 2021-02-04 LAB
ALBUMIN SERPL ELPH-MCNC: 3.9 G/DL
ALP BLD-CCNC: 97 U/L
ALT SERPL-CCNC: 21 U/L
ANION GAP SERPL CALC-SCNC: 14 MMOL/L
AST SERPL-CCNC: 16 U/L
BILIRUB SERPL-MCNC: 0.5 MG/DL
BUN SERPL-MCNC: 14 MG/DL
CALCIUM SERPL-MCNC: 9.5 MG/DL
CHLORIDE SERPL-SCNC: 100 MMOL/L
CO2 SERPL-SCNC: 26 MMOL/L
CREAT SERPL-MCNC: 0.9 MG/DL
GLUCOSE SERPL-MCNC: 112 MG/DL
MAGNESIUM SERPL-MCNC: 1.6 MG/DL
PHOSPHATE SERPL-MCNC: 3.6 MG/DL
POTASSIUM SERPL-SCNC: 5.1 MMOL/L
PROT SERPL-MCNC: 7.7 G/DL
SODIUM SERPL-SCNC: 140 MMOL/L

## 2021-03-18 NOTE — HISTORY OF PRESENT ILLNESS
[TextBox_4] : 43 yr old female with PMH of MANUEL (on non invasive ventilator), Hypoxemia (Home oxygen) and morbid obesity.  \par \par She is not feeling well.  She has not been out of the house since May the last time she saw me.  She had not been to the ER or hospitalization.  She is on 4 L NC and she is saturating at 90%.  She is having increased SOB with walking and is having limited mobility.  She gained weight without any signs of fluid overload.\par \par Average BS under 200-116.\par \par  She is using the non invasive ventilator at night she is unable to sleep without it.  She is benefiting from the non-invasive machine \par \par She is scheduled to her her PCP on the 27th of this month with Dr. Garcia.  \par \par Denies coughing, wheezing, fever.   [FreeTextEntry1] : 43 yr old female with PMH of MANUEL (on non invasive ventilator), Hypoxemia (Home oxygen) and morbid obesity.  \par \par She is not feeling well.  She has not been out of the house since May the last time she saw me.  She had not been to the ER or hospitalization.  She is on 4 L NC and she is saturating at 90%.  She is having increased SOB with walking and is having limited mobility.  She gained weight without any signs of fluid overload.\par \par Average BS under 200-116.\par \par  She is using the non invasive ventilator at night she is unable to sleep without it.  She is benefiting from the non-invasive machine \par \par She is scheduled to her her PCP on the 27th of this month with Dr. Garcia.  \par \par Denies coughing, wheezing, fever.

## 2021-03-18 NOTE — REVIEW OF SYSTEMS
[Recent Wt Gain (___ Lbs)] : recent [unfilled] ~Ulb weight gain [Dyspnea] : dyspnea [Negative] : Sleep Disorder

## 2021-04-19 ENCOUNTER — APPOINTMENT (OUTPATIENT)
Dept: PULMONOLOGY | Facility: CLINIC | Age: 46
End: 2021-04-19

## 2021-04-20 ENCOUNTER — RX RENEWAL (OUTPATIENT)
Age: 46
End: 2021-04-20

## 2021-04-29 ENCOUNTER — APPOINTMENT (OUTPATIENT)
Dept: INTERNAL MEDICINE | Facility: CLINIC | Age: 46
End: 2021-04-29

## 2021-05-06 ENCOUNTER — APPOINTMENT (OUTPATIENT)
Dept: INTERNAL MEDICINE | Facility: CLINIC | Age: 46
End: 2021-05-06
Payer: MEDICARE

## 2021-05-06 ENCOUNTER — NON-APPOINTMENT (OUTPATIENT)
Age: 46
End: 2021-05-06

## 2021-05-06 ENCOUNTER — RX RENEWAL (OUTPATIENT)
Age: 46
End: 2021-05-06

## 2021-05-06 VITALS
BODY MASS INDEX: 47.09 KG/M2 | SYSTOLIC BLOOD PRESSURE: 135 MMHG | DIASTOLIC BLOOD PRESSURE: 82 MMHG | TEMPERATURE: 97.4 F | RESPIRATION RATE: 15 BRPM | HEIGHT: 66 IN | WEIGHT: 293 LBS | OXYGEN SATURATION: 90 % | HEART RATE: 104 BPM

## 2021-05-06 DIAGNOSIS — Z11.4 ENCOUNTER FOR SCREENING FOR HUMAN IMMUNODEFICIENCY VIRUS [HIV]: ICD-10-CM

## 2021-05-06 DIAGNOSIS — Z11.59 ENCOUNTER FOR SCREENING FOR OTHER VIRAL DISEASES: ICD-10-CM

## 2021-05-06 PROCEDURE — 36415 COLL VENOUS BLD VENIPUNCTURE: CPT | Mod: GC

## 2021-05-06 PROCEDURE — 99072 ADDL SUPL MATRL&STAF TM PHE: CPT

## 2021-05-06 RX ORDER — CEPHALEXIN 500 MG/1
500 CAPSULE ORAL
Qty: 14 | Refills: 0 | Status: DISCONTINUED | COMMUNITY
Start: 2020-10-15 | End: 2021-05-06

## 2021-05-06 RX ORDER — DIPHENHYDRAMINE HYDROCHLORIDE, ZINC ACETATE 2; .1 G/100G; G/100G
2-0.1 CREAM TOPICAL
Qty: 1 | Refills: 1 | Status: DISCONTINUED | COMMUNITY
Start: 2019-04-05 | End: 2021-05-06

## 2021-05-06 RX ORDER — CLOTRIMAZOLE 10 MG/G
1 CREAM TOPICAL 3 TIMES DAILY
Qty: 1 | Refills: 1 | Status: DISCONTINUED | COMMUNITY
Start: 2020-08-28 | End: 2021-05-06

## 2021-05-06 RX ORDER — CEPHALEXIN 500 MG/1
500 CAPSULE ORAL
Qty: 10 | Refills: 0 | Status: DISCONTINUED | COMMUNITY
Start: 2020-12-10 | End: 2021-05-06

## 2021-05-07 ENCOUNTER — NON-APPOINTMENT (OUTPATIENT)
Age: 46
End: 2021-05-07

## 2021-05-07 LAB
ALBUMIN SERPL ELPH-MCNC: 4.1 G/DL
ALP BLD-CCNC: 93 U/L
ALT SERPL-CCNC: 19 U/L
ANION GAP SERPL CALC-SCNC: 13 MMOL/L
AST SERPL-CCNC: 10 U/L
BILIRUB SERPL-MCNC: 0.4 MG/DL
BUN SERPL-MCNC: 14 MG/DL
CALCIUM SERPL-MCNC: 10 MG/DL
CHLORIDE SERPL-SCNC: 102 MMOL/L
CO2 SERPL-SCNC: 28 MMOL/L
CREAT SERPL-MCNC: 0.87 MG/DL
ESTIMATED AVERAGE GLUCOSE: 171 MG/DL
GLUCOSE SERPL-MCNC: 193 MG/DL
HBA1C MFR BLD HPLC: 7.6 %
HCV AB SER QL: NONREACTIVE
HCV S/CO RATIO: 0.11 S/CO
HIV1+2 AB SPEC QL IA.RAPID: NONREACTIVE
POTASSIUM SERPL-SCNC: 4.6 MMOL/L
PROT SERPL-MCNC: 8 G/DL
SODIUM SERPL-SCNC: 142 MMOL/L

## 2021-05-10 RX ORDER — PEN NEEDLE, DIABETIC 32 GX 1/4"
32G X 6 MM NEEDLE, DISPOSABLE MISCELLANEOUS
Qty: 1 | Refills: 0 | Status: ACTIVE | COMMUNITY
Start: 2021-05-10 | End: 1900-01-01

## 2021-05-11 ENCOUNTER — TRANSCRIPTION ENCOUNTER (OUTPATIENT)
Age: 46
End: 2021-05-11

## 2021-05-11 ENCOUNTER — RX RENEWAL (OUTPATIENT)
Age: 46
End: 2021-05-11

## 2021-05-11 RX ORDER — ISOPROPYL ALCOHOL 0.7 ML/ML
SWAB TOPICAL
Qty: 1 | Refills: 3 | Status: ACTIVE | COMMUNITY
Start: 2021-05-11 | End: 1900-01-01

## 2021-05-11 RX ORDER — BLOOD-GLUCOSE METER
KIT MISCELLANEOUS
Qty: 1 | Refills: 0 | Status: ACTIVE | COMMUNITY
Start: 2018-01-22 | End: 1900-01-01

## 2021-05-20 ENCOUNTER — RX RENEWAL (OUTPATIENT)
Age: 46
End: 2021-05-20

## 2021-05-20 ENCOUNTER — NON-APPOINTMENT (OUTPATIENT)
Age: 46
End: 2021-05-20

## 2021-06-02 ENCOUNTER — APPOINTMENT (OUTPATIENT)
Dept: PULMONOLOGY | Facility: CLINIC | Age: 46
End: 2021-06-02
Payer: MEDICARE

## 2021-06-02 ENCOUNTER — INPATIENT (INPATIENT)
Facility: HOSPITAL | Age: 46
LOS: 5 days | Discharge: HOME HEALTH | DRG: 189 | End: 2021-06-08
Payer: COMMERCIAL

## 2021-06-02 VITALS
DIASTOLIC BLOOD PRESSURE: 81 MMHG | HEART RATE: 99 BPM | HEIGHT: 67 IN | SYSTOLIC BLOOD PRESSURE: 147 MMHG | TEMPERATURE: 99 F | WEIGHT: 293 LBS | RESPIRATION RATE: 24 BRPM | OXYGEN SATURATION: 98 %

## 2021-06-02 VITALS
TEMPERATURE: 97.9 F | SYSTOLIC BLOOD PRESSURE: 135 MMHG | BODY MASS INDEX: 47.09 KG/M2 | OXYGEN SATURATION: 92 % | WEIGHT: 293 LBS | HEIGHT: 66 IN | DIASTOLIC BLOOD PRESSURE: 72 MMHG | HEART RATE: 101 BPM

## 2021-06-02 DIAGNOSIS — M79.89 OTHER SPECIFIED SOFT TISSUE DISORDERS: ICD-10-CM

## 2021-06-02 DIAGNOSIS — E11.9 TYPE 2 DIABETES MELLITUS WITHOUT COMPLICATIONS: ICD-10-CM

## 2021-06-02 DIAGNOSIS — Z29.9 ENCOUNTER FOR PROPHYLACTIC MEASURES, UNSPECIFIED: ICD-10-CM

## 2021-06-02 DIAGNOSIS — I10 ESSENTIAL (PRIMARY) HYPERTENSION: ICD-10-CM

## 2021-06-02 DIAGNOSIS — G47.33 OBSTRUCTIVE SLEEP APNEA (ADULT) (PEDIATRIC): ICD-10-CM

## 2021-06-02 DIAGNOSIS — I50.32 CHRONIC DIASTOLIC (CONGESTIVE) HEART FAILURE: ICD-10-CM

## 2021-06-02 DIAGNOSIS — E66.01 MORBID (SEVERE) OBESITY DUE TO EXCESS CALORIES: ICD-10-CM

## 2021-06-02 DIAGNOSIS — R06.02 SHORTNESS OF BREATH: ICD-10-CM

## 2021-06-02 DIAGNOSIS — R05 COUGH: ICD-10-CM

## 2021-06-02 LAB
ALBUMIN SERPL ELPH-MCNC: 3.9 G/DL — SIGNIFICANT CHANGE UP (ref 3.3–5)
ALP SERPL-CCNC: 95 U/L — SIGNIFICANT CHANGE UP (ref 40–120)
ALT FLD-CCNC: 25 U/L — SIGNIFICANT CHANGE UP (ref 10–45)
ANION GAP SERPL CALC-SCNC: 10 MMOL/L — SIGNIFICANT CHANGE UP (ref 5–17)
APPEARANCE UR: CLEAR — SIGNIFICANT CHANGE UP
APTT BLD: 34.9 SEC — SIGNIFICANT CHANGE UP (ref 27.5–35.5)
AST SERPL-CCNC: 19 U/L — SIGNIFICANT CHANGE UP (ref 10–40)
BACTERIA # UR AUTO: SIGNIFICANT CHANGE UP /HPF
BASE EXCESS BLDV CALC-SCNC: 5.7 MMOL/L — HIGH (ref -2–3)
BASOPHILS # BLD AUTO: 0.03 K/UL — SIGNIFICANT CHANGE UP (ref 0–0.2)
BASOPHILS NFR BLD AUTO: 0.3 % — SIGNIFICANT CHANGE UP (ref 0–2)
BILIRUB SERPL-MCNC: 0.6 MG/DL — SIGNIFICANT CHANGE UP (ref 0.2–1.2)
BILIRUB UR-MCNC: NEGATIVE — SIGNIFICANT CHANGE UP
BUN SERPL-MCNC: 8 MG/DL — SIGNIFICANT CHANGE UP (ref 7–23)
CA-I SERPL-SCNC: 1.23 MMOL/L — SIGNIFICANT CHANGE UP (ref 1.15–1.33)
CALCIUM SERPL-MCNC: 9.3 MG/DL — SIGNIFICANT CHANGE UP (ref 8.4–10.5)
CHLORIDE SERPL-SCNC: 104 MMOL/L — SIGNIFICANT CHANGE UP (ref 96–108)
CO2 BLDV-SCNC: 36 MMOL/L — HIGH (ref 22–26)
CO2 SERPL-SCNC: 30 MMOL/L — SIGNIFICANT CHANGE UP (ref 22–31)
COLOR SPEC: YELLOW — SIGNIFICANT CHANGE UP
CREAT SERPL-MCNC: 0.66 MG/DL — SIGNIFICANT CHANGE UP (ref 0.5–1.3)
DIFF PNL FLD: ABNORMAL
EOSINOPHIL # BLD AUTO: 0.28 K/UL — SIGNIFICANT CHANGE UP (ref 0–0.5)
EOSINOPHIL NFR BLD AUTO: 2.7 % — SIGNIFICANT CHANGE UP (ref 0–6)
EPI CELLS # UR: SIGNIFICANT CHANGE UP /HPF (ref 0–5)
GAS PNL BLDV: 139 MMOL/L — SIGNIFICANT CHANGE UP (ref 136–145)
GAS PNL BLDV: SIGNIFICANT CHANGE UP
GLUCOSE BLDC GLUCOMTR-MCNC: 202 MG/DL — HIGH (ref 70–99)
GLUCOSE SERPL-MCNC: 167 MG/DL — HIGH (ref 70–99)
GLUCOSE UR QL: NEGATIVE — SIGNIFICANT CHANGE UP
HCG SERPL-ACNC: <0 MIU/ML — SIGNIFICANT CHANGE UP
HCO3 BLDV-SCNC: 34 MMOL/L — HIGH (ref 22–29)
HCT VFR BLD CALC: 32.9 % — LOW (ref 34.5–45)
HGB BLD-MCNC: 9.1 G/DL — LOW (ref 11.5–15.5)
IMM GRANULOCYTES NFR BLD AUTO: 0.3 % — SIGNIFICANT CHANGE UP (ref 0–1.5)
INR BLD: 1.32 — HIGH (ref 0.88–1.16)
KETONES UR-MCNC: NEGATIVE — SIGNIFICANT CHANGE UP
LEUKOCYTE ESTERASE UR-ACNC: NEGATIVE — SIGNIFICANT CHANGE UP
LYMPHOCYTES # BLD AUTO: 1.55 K/UL — SIGNIFICANT CHANGE UP (ref 1–3.3)
LYMPHOCYTES # BLD AUTO: 14.7 % — SIGNIFICANT CHANGE UP (ref 13–44)
MCHC RBC-ENTMCNC: 23.6 PG — LOW (ref 27–34)
MCHC RBC-ENTMCNC: 27.7 GM/DL — LOW (ref 32–36)
MCV RBC AUTO: 85.2 FL — SIGNIFICANT CHANGE UP (ref 80–100)
MONOCYTES # BLD AUTO: 0.58 K/UL — SIGNIFICANT CHANGE UP (ref 0–0.9)
MONOCYTES NFR BLD AUTO: 5.5 % — SIGNIFICANT CHANGE UP (ref 2–14)
NEUTROPHILS # BLD AUTO: 8.04 K/UL — HIGH (ref 1.8–7.4)
NEUTROPHILS NFR BLD AUTO: 76.5 % — SIGNIFICANT CHANGE UP (ref 43–77)
NITRITE UR-MCNC: NEGATIVE — SIGNIFICANT CHANGE UP
NRBC # BLD: 0 /100 WBCS — SIGNIFICANT CHANGE UP (ref 0–0)
PCO2 BLDV: 66 MMHG — HIGH (ref 39–42)
PH BLDV: 7.32 — SIGNIFICANT CHANGE UP (ref 7.32–7.43)
PH UR: 7 — SIGNIFICANT CHANGE UP (ref 5–8)
PLATELET # BLD AUTO: 400 K/UL — SIGNIFICANT CHANGE UP (ref 150–400)
PO2 BLDV: 37 MMHG — SIGNIFICANT CHANGE UP
POTASSIUM BLDV-SCNC: 4 MMOL/L — SIGNIFICANT CHANGE UP (ref 3.5–5.1)
POTASSIUM SERPL-MCNC: 4.2 MMOL/L — SIGNIFICANT CHANGE UP (ref 3.5–5.3)
POTASSIUM SERPL-SCNC: 4.2 MMOL/L — SIGNIFICANT CHANGE UP (ref 3.5–5.3)
PROT SERPL-MCNC: 8.2 G/DL — SIGNIFICANT CHANGE UP (ref 6–8.3)
PROT UR-MCNC: NEGATIVE MG/DL — SIGNIFICANT CHANGE UP
PROTHROM AB SERPL-ACNC: 15.6 SEC — HIGH (ref 10.6–13.6)
RBC # BLD: 3.86 M/UL — SIGNIFICANT CHANGE UP (ref 3.8–5.2)
RBC # FLD: 19 % — HIGH (ref 10.3–14.5)
RBC CASTS # UR COMP ASSIST: < 5 /HPF — SIGNIFICANT CHANGE UP
SAO2 % BLDV: 60.2 % — SIGNIFICANT CHANGE UP
SARS-COV-2 RNA SPEC QL NAA+PROBE: SIGNIFICANT CHANGE UP
SODIUM SERPL-SCNC: 144 MMOL/L — SIGNIFICANT CHANGE UP (ref 135–145)
SP GR SPEC: 1.01 — SIGNIFICANT CHANGE UP (ref 1–1.03)
UROBILINOGEN FLD QL: 0.2 E.U./DL — SIGNIFICANT CHANGE UP
WBC # BLD: 10.51 K/UL — HIGH (ref 3.8–10.5)
WBC # FLD AUTO: 10.51 K/UL — HIGH (ref 3.8–10.5)
WBC UR QL: < 5 /HPF — SIGNIFICANT CHANGE UP

## 2021-06-02 PROCEDURE — 99214 OFFICE O/P EST MOD 30 MIN: CPT

## 2021-06-02 PROCEDURE — 71275 CT ANGIOGRAPHY CHEST: CPT | Mod: 26,ME

## 2021-06-02 PROCEDURE — G1004: CPT

## 2021-06-02 PROCEDURE — 71045 X-RAY EXAM CHEST 1 VIEW: CPT | Mod: 26

## 2021-06-02 PROCEDURE — 99285 EMERGENCY DEPT VISIT HI MDM: CPT | Mod: 25

## 2021-06-02 PROCEDURE — 93971 EXTREMITY STUDY: CPT | Mod: 26,RT

## 2021-06-02 PROCEDURE — 93010 ELECTROCARDIOGRAM REPORT: CPT

## 2021-06-02 RX ORDER — ASPIRIN/CALCIUM CARB/MAGNESIUM 324 MG
81 TABLET ORAL DAILY
Refills: 0 | Status: DISCONTINUED | OUTPATIENT
Start: 2021-06-02 | End: 2021-06-03

## 2021-06-02 RX ORDER — LIRAGLUTIDE 6 MG/ML
1.2 INJECTION SUBCUTANEOUS
Qty: 0 | Refills: 0 | DISCHARGE

## 2021-06-02 RX ORDER — DEXTROSE 50 % IN WATER 50 %
15 SYRINGE (ML) INTRAVENOUS ONCE
Refills: 0 | Status: DISCONTINUED | OUTPATIENT
Start: 2021-06-02 | End: 2021-06-08

## 2021-06-02 RX ORDER — DEXTROSE 50 % IN WATER 50 %
25 SYRINGE (ML) INTRAVENOUS ONCE
Refills: 0 | Status: DISCONTINUED | OUTPATIENT
Start: 2021-06-02 | End: 2021-06-08

## 2021-06-02 RX ORDER — ATORVASTATIN CALCIUM 80 MG/1
80 TABLET, FILM COATED ORAL AT BEDTIME
Refills: 0 | Status: DISCONTINUED | OUTPATIENT
Start: 2021-06-02 | End: 2021-06-08

## 2021-06-02 RX ORDER — DEXTROSE 50 % IN WATER 50 %
12.5 SYRINGE (ML) INTRAVENOUS ONCE
Refills: 0 | Status: DISCONTINUED | OUTPATIENT
Start: 2021-06-02 | End: 2021-06-08

## 2021-06-02 RX ORDER — SODIUM CHLORIDE 9 MG/ML
1000 INJECTION, SOLUTION INTRAVENOUS
Refills: 0 | Status: DISCONTINUED | OUTPATIENT
Start: 2021-06-02 | End: 2021-06-08

## 2021-06-02 RX ORDER — LOSARTAN POTASSIUM 100 MG/1
25 TABLET, FILM COATED ORAL DAILY
Refills: 0 | Status: DISCONTINUED | OUTPATIENT
Start: 2021-06-02 | End: 2021-06-08

## 2021-06-02 RX ORDER — FUROSEMIDE 40 MG
40 TABLET ORAL EVERY 12 HOURS
Refills: 0 | Status: DISCONTINUED | OUTPATIENT
Start: 2021-06-03 | End: 2021-06-07

## 2021-06-02 RX ORDER — INSULIN LISPRO 100/ML
VIAL (ML) SUBCUTANEOUS
Refills: 0 | Status: DISCONTINUED | OUTPATIENT
Start: 2021-06-02 | End: 2021-06-08

## 2021-06-02 RX ORDER — ENOXAPARIN SODIUM 100 MG/ML
40 INJECTION SUBCUTANEOUS EVERY 24 HOURS
Refills: 0 | Status: DISCONTINUED | OUTPATIENT
Start: 2021-06-02 | End: 2021-06-05

## 2021-06-02 RX ORDER — FUROSEMIDE 40 MG
80 TABLET ORAL ONCE
Refills: 0 | Status: COMPLETED | OUTPATIENT
Start: 2021-06-02 | End: 2021-06-02

## 2021-06-02 RX ORDER — GLUCAGON INJECTION, SOLUTION 0.5 MG/.1ML
1 INJECTION, SOLUTION SUBCUTANEOUS ONCE
Refills: 0 | Status: DISCONTINUED | OUTPATIENT
Start: 2021-06-02 | End: 2021-06-08

## 2021-06-02 RX ORDER — AMLODIPINE BESYLATE 2.5 MG/1
5 TABLET ORAL DAILY
Refills: 0 | Status: DISCONTINUED | OUTPATIENT
Start: 2021-06-02 | End: 2021-06-08

## 2021-06-02 RX ADMIN — Medication 4: at 21:52

## 2021-06-02 RX ADMIN — ATORVASTATIN CALCIUM 80 MILLIGRAM(S): 80 TABLET, FILM COATED ORAL at 20:52

## 2021-06-02 RX ADMIN — ENOXAPARIN SODIUM 40 MILLIGRAM(S): 100 INJECTION SUBCUTANEOUS at 21:52

## 2021-06-02 RX ADMIN — Medication 80 MILLIGRAM(S): at 13:44

## 2021-06-02 NOTE — ED PROVIDER NOTE - OBJECTIVE STATEMENT
46yo female with pmhx of obesity hypoventilation syndrome and chronic hypoxemia (per pt uses 2L O2 via NC at rest, 3L when mobile and bipap at night), HFpEF, pericardial effusion in 8/2018 requiring IR drainage, DM2, HTN, HLD, MANUEL presents with shortness of breath and increased O2 requirement. Pt reports that over the past month she has increased home O2 due to shortness of breath. She reports she feels more comfortable using 3L at rest. She endorses decreased exercise tolerance, states she often has to stop and sit down and then use bipap to feel like she has fully caught her breath. She also reports increased BLE edema, right > left, and ~30lb weight gain. She states she is non-compliant with home diuretics. She denies fever, chills, cough, chest pain, abdominal pain, vomiting, diarrhea, h/o DVT/PE. She has received one COVID vaccination. She was sent from Dr. Luo's office today when she was noted to have O2 saturation 86% RA.

## 2021-06-02 NOTE — ED ADULT NURSE NOTE - OBJECTIVE STATEMENT
pt received into spot 4 A&Ox3 ambulatory short distances arrives via wheel chair from MD Luo's office for routine appt states the staff in the office brought her in a wheel chair because her O2 sat was low and her legs were swollen/ Pt reports hx of chronic home o2 use 3L while sitting 4 while standing and sometimes 6 while sleeping also used cpap at night sometimes. Pt compliant with lasix reports chronic LE swelling but increasing difficulty ambulating. pt has HHA with her. Pt speaks clear full sentences sating 95% on 3l NC. abd soft nondistended. 12 lead ekg done nsr noted to ccm. 18G placed to LAC labs drawn and sent pt in nad

## 2021-06-02 NOTE — ED ADULT TRIAGE NOTE - CHIEF COMPLAINT QUOTE
pt Brought in by staff member from MD Luo's office c/o SOB, increased Bilateral LE edema. as per RN pt O2 sat in room air was 88% to R/o PE. . Pt endorsed using 3-4 L O2 at home. Denies cp. ekg in progress.

## 2021-06-02 NOTE — H&P ADULT - NSHPPHYSICALEXAM_GEN_ALL_CORE
PHYSICAL EXAM:  GENERAL: NAD, speaks in full sentences, no signs of respiratory distress  HEAD:  Atraumatic, Normocephalic  EYES: EOMI, PERRLA, conjunctiva and sclera clear  NECK: Supple, No JVD  CHEST/LUNG: Clear to auscultation bilaterally; No wheeze; No crackles; No accessory muscles used  HEART: Regular rate and rhythm; No murmurs;   ABDOMEN: Soft, Nontender, Nondistended; Bowel sounds present; No guarding  EXTREMITIES:  2+ Peripheral Pulses, No cyanosis or edema  PSYCH: AAOx3  NEUROLOGY: non-focal  SKIN: No rashes or lesions PHYSICAL EXAM:  GENERAL: obese, NAD, speaks in full sentences, no signs of respiratory distress  HEAD:  Atraumatic, Normocephalic  EYES: conjunctiva and sclera clear  NECK: Supple, No JVD  CHEST/LUNG: No wheeze; No crackles; No accessory muscles used  HEART: Regular rate and rhythm; No murmurs;   ABDOMEN: Soft, Nontender, distended; Bowel sounds present; No guarding  EXTREMITIES:  + edema; 2+ Peripheral Pulses, No cyanosis  PSYCH: AAOx3  NEUROLOGY: non-focal  SKIN: No rashes or lesions

## 2021-06-02 NOTE — H&P ADULT - PROBLEM SELECTOR PLAN 3
Patient w/ swelling of b/l LE, R>>L. RLE ttp. Chronic venous stasis skin changes noted on exam.  - LE doppler negative for DVT

## 2021-06-02 NOTE — H&P ADULT - PROBLEM SELECTOR PLAN 2
Chronic diastolic CHF (EF 55%) in November 2019. On torsemide 40 BID at home. However, reports noncompliance 2/2 increased urinary frequency.  - c/w lasix 40 BID  - f/u echo  - HF consult (Dr. Luo to call Dr. Ramirez)

## 2021-06-02 NOTE — ED PROVIDER NOTE - PHYSICAL EXAMINATION
VITAL SIGNS: I have reviewed nursing notes and confirm.  CONSTITUTIONAL: Well-developed; well-nourished; in no acute distress.   SKIN:  warm and dry, no acute rash.   HEAD:  normocephalic, atraumatic.  EYES: PERRL, EOM intact; conjunctiva and sclera clear.  ENT: No nasal discharge; airway clear.   NECK: Supple; non tender.  CARD: S1, S2 normal; no murmurs, gallops, or rubs. Regular rate and rhythm.   RESP:  Clear to auscultation b/l, no wheezes, rales or rhonchi. Resting comfortable on 4L via NC without increased effort.   ABD: Normal bowel sounds; soft; non-distended; non-tender; no guarding/ rebound.  EXT: Normal ROM. She has edema of the BLE, right > left. No clubbing, cyanosis. 2+ pulses to b/l ue/le.  NEURO: Alert, oriented, grossly unremarkable  PSYCH: Cooperative, mood and affect appropriate.

## 2021-06-02 NOTE — ASSESSMENT
[FreeTextEntry1] :  Dyspnea, unspecified\par \par  The patient has increased with weight gain since last visit.  She is rarely requiring her PATY.   The PFT was consistent with combined obstructive and restrictive lung disease and decrease in the diffusion capacity. Compared to the previous one, there was further decrease in the flow volumes and diffusion capacity. The base line oxygen saturation is normal with oxygen supplementation, 90 - 92% with 2 L NC. Pt desaturates with ambulation and at rest pulse ox 85% without oxygen. \par \par Discussed she needs follow up echo and will have further evaluation for dyspnea in the ER.  Concerned for fluid overload and/or PE.  To follow with CTA in the ER.  She is having swelling to her right lower ext. SOB and tachycardia.  \par \par Hypoxemia\par \par Patient is to continue her 4L/min nasal cannula oxygen. Patient has needed to increase the oxygen supplementation due to SOB up to 6 L/min. \par      \par Other alveolar and parieto-alveolar conditions\par \par The patient is to continue on the current bronchodilators. Pulmonary function tests as previously noted.  Will need to repeat PFT with next visit. \par    \par Obstructive sleep apnea \par \par The patient has been compliant with her non invasive ventilation and tolerating. Continue to use non invasive ventilation at night for at least 4 plus hours.\par \par Morbid (severe) obesity due to excess calories\par \par Discussed weight loss and it affects on dyspnea. \par \par Disease of pericardium, unspecified\par  \par Referred to Dr. Carrasco for cardiology and sent to ER for further evaluation of dyspnea.\par \par

## 2021-06-02 NOTE — ED ADULT NURSE REASSESSMENT NOTE - NS ED NURSE REASSESS COMMENT FT1
pt to US at this time VSS I&O as noted post IV Lasix. Due to multiple stroke codes in the department, delay in Ct completion, will send upon return from US

## 2021-06-02 NOTE — ED ADULT NURSE NOTE - NSIMPLEMENTINTERV_GEN_ALL_ED
Implemented All Fall Risk Interventions:  [place] to call system. Call bell, personal items and telephone within reach. Instruct patient to call for assistance. Room bathroom lighting operational. Non-slip footwear when patient is off stretcher. Physically safe environment: no spills, clutter or unnecessary equipment. Stretcher in lowest position, wheels locked, appropriate side rails in place. Provide visual cue, wrist band, yellow gown, etc. Monitor gait and stability. Monitor for mental status changes and reorient to person, place, and time. Review medications for side effects contributing to fall risk. Reinforce activity limits and safety measures with patient and family.

## 2021-06-02 NOTE — H&P ADULT - PROBLEM SELECTOR PLAN 7
F: tolerating PO, no IVF  E: replete K<4, Mg<2  N: Dash/TLC    VTE Prophylaxis: Lovenox 40 Q24H  GI: not needed  C: Full Code  D: RMF BMI 64.8. Reports additional weight gain in past few months with increased SOB and decreasing exercise tolerance.   -  patient on healthy eating  - consider nutrition consult

## 2021-06-02 NOTE — H&P ADULT - NSICDXPASTSURGICALHX_GEN_ALL_CORE_FT
PAST SURGICAL HISTORY:  Cytomegalovirus infection not present No significant past surgical history

## 2021-06-02 NOTE — H&P ADULT - NSHPSOCIALHISTORY_GEN_ALL_CORE
Tobacco use: Denies   Alcohol use: occasional  Recreational drug use: Denies   Living situation: Lives at home with 11th grade son

## 2021-06-02 NOTE — HISTORY OF PRESENT ILLNESS
[TextBox_4] : 43 yr old female with PMH of MANUEL (on non invasive ventilator), Hypoxemia (Home oxygen) and morbid obesity.  \par \par She has been having tachycardia with ambulation.  The past month her breathing has gotten worse and needs to use the wheelchair.  She has gasping for air after walking.  She not able to walk to bathroom without SOB.  Denies coughing, wheezing, fever.  \par \par She is not using her proair.  She has not been taking her torsemide 2 tablets daily for the past 2 week.  HBAIC 7.5\par \par She used to use 2 L with sitting but put on 4 L NC.  Sometimes needs her NIV during the day for more air.  She sleep with NC 6 L and NIV \par She is not feeling well. She is having increased SOB with walking and is having limited mobility.

## 2021-06-02 NOTE — H&P ADULT - PROBLEM SELECTOR PLAN 1
Decreased exercise tolerance and increased O2 requirements for past 1.5 months. Likely 2/2 home diuretic noncompliance. CT PE negative for PE but c/f pulmonary artery hypertension. S/p lasix 80 in the ED.  - c/w lasix 40 BID  - c/w O2   - CPAP at night  - f/u AM CXR

## 2021-06-02 NOTE — ED PROVIDER NOTE - ATTENDING CONTRIBUTION TO CARE
46 yo female with PMH of obesity hypoventilation syndrome and chronic hypoxemia (per pt uses 2L O2 via NC at rest, 3L when mobile and bipap at night), HFpEF, pericardial effusion in 8/2018 requiring IR drainage, DM2, HTN, HLD, MANUEL presents with shortness of breath and increased O2 requirements at home. Reports she feels more comfortable using 3L at rest. She endorses decreased exercise tolerance, states she often has to stop and sit down and then use bipap to feel like she has fully caught her breath. She also reports increased BLE edema, right > left, and ~30lb weight gain. She states she is non-compliant with home diuretics. She denies fever, chills, cough, chest pain, abdominal pain, vomiting, diarrhea, h/o DVT/PE. She has received one COVID vaccination. She was sent from Dr. Luo's office today when she was noted to have O2 saturation 86% RA. Pt is morbidly obese, afebrile and hemodynamically stable while in ED. She is resting comfortably with O2 saturation 97% on 4L via NC. She has no increased respiratory effort. (+) crackles b/l. She has significant BLE edema, right > left. She admits to non-compliance with prescribed diuretics. ECG with NSR 83bpm, no ST changes. Troponin and CKMB unremarkable. Pt clinically appears volume overloaded and has bilateral opacities on CXR. She has normal renal function and K+ so given lasix 60mg IV while in the ED. CBC with mild leukocytosis (WBC 10.5), mild anemia. US venous RLE was limited but did not show evidence of DVT. CT did not show acute pulmonary embolism but showed persistent dilatation of the main pulmonary artery measuring up to 3.7 cm compatible with pulmonary artery hypertension. There is bilateral mosaic attenuation pattern unchanged from prior imaging which may suggest chronic thromboembolic disease. Abbreviated differential for the mosaic attenuation pattern includes congestive heart failure, air trapping from underlying small airways inflammation, and hypersensitivity pneumonitis. To be admitted under Dr. Luo for diuresis and medical optimization.

## 2021-06-02 NOTE — H&P ADULT - ASSESSMENT
46yo female with pmhx of obesity hypoventilation syndrome and chronic hypoxemia (per pt uses 2L O2 via NC at rest, 3L when mobile and bipap at night), HFpEF, pericardial effusion in 8/2018 requiring IR drainage, chronic diastolic CHF (EF 55%),  DM2, HTN, HLD, MANUEL presents from Dr. Luo's office with shortness of breath and increased O2 requirement, found to be hypoxic on room air.

## 2021-06-02 NOTE — H&P ADULT - NSHPLABSRESULTS_GEN_ALL_CORE
LABS:                        9.1    10.51 )-----------( 400      ( 2021 12:39 )             32.9     06-02    144  |  104  |  8   ----------------------------<  167<H>  4.2   |  30  |  0.66    Ca    9.3      2021 12:39    TPro  8.2  /  Alb  3.9  /  TBili  0.6  /  DBili  x   /  AST  19  /  ALT  25  /  AlkPhos  95  06-02      PT/INR - ( 2021 12:59 )   PT: 15.6 sec;   INR: 1.32          PTT - ( 2021 12:59 )  PTT:34.9 sec  CAPILLARY BLOOD GLUCOSE          CARDIAC MARKERS ( 2021 12:39 )  x     / 0.01 ng/mL / 43 U/L / x     / <1.0 ng/mL          Urinalysis Basic - ( 2021 15:25 )    Color: Yellow / Appearance: Clear / S.015 / pH: x  Gluc: x / Ketone: NEGATIVE  / Bili: Negative / Urobili: 0.2 E.U./dL   Blood: x / Protein: NEGATIVE mg/dL / Nitrite: NEGATIVE   Leuk Esterase: NEGATIVE / RBC: < 5 /HPF / WBC < 5 /HPF   Sq Epi: x / Non Sq Epi: 0-5 /HPF / Bacteria: None /HPF          LIVER FUNCTIONS - ( 2021 12:39 )  Alb: 3.9 g/dL / Pro: 8.2 g/dL / ALK PHOS: 95 U/L / ALT: 25 U/L / AST: 19 U/L / GGT: x                     I & O Summary:    21 @ 07:01  -  21 @ 19:19  --------------------------------------------------------  IN: 0 mL / OUT: 1600 mL / NET: -1600 mL        Microbiology:        RADIOLOGY, EKG AND ADDITIONAL TESTS: Reviewed.    EXAM:  CT ANGIO CHEST PULM ART [facility]                        PROCEDURE DATE:  2021  IMPRESSION:    1. No evidence of main, lobar or proximal segmental branch pulmonary artery embolism. The study is limited for the assessment of more distal segmental branch emboli due to respiratory motion degradation and suboptimal intra-arterial bolus.  2. Persistent dilatation of the main pulmonary artery measuring up to 3.7 cm compatible with pulmonary artery hypertension. There is bilateral mosaic attenuation pattern unchanged from prior imaging which may suggest chronic thromboembolic disease. Further imaging to include V/Q scintigraphy and/or HRCT to be performed at end inspiration/end expiration. Abbreviated differential for the mosaic attenuation pattern includes congestive heart failure, air trapping from underlying small airways inflammation, and hypersensitivity pneumonitis.  3. Cardiomegaly. Near complete resolution of pericardial effusion with trace residual pericardial fluid.    EXAM:  US DPLX LWR EXT VEINS LTD RT                        PROCEDURE DATE:  2021  IMPRESSION:  Limited study, as right popliteal vein and peroneal veins unable to be visualized. No deep venous thrombosis in the visualized veins.    EXAM:  XR CHEST PORTABLE URGENT 1V                        PROCEDURE DATE:  2021  Findings/  impression: Stable cardiomegaly. Bilateral opacities. Stable bony structures. Elevation of the right hemidiaphragm

## 2021-06-02 NOTE — ED PROVIDER NOTE - CLINICAL SUMMARY MEDICAL DECISION MAKING FREE TEXT BOX
Pt given lasix 60mg IV while in the ED Pt is afebrile and hemodynamically stable while in ED. She is resting comfortably with O2 saturation 97% on 4L via NC. She has no increased respiratory effort.  She has significant BLE edema, right > left. She admits to non-compliance with prescribed diuretics.     ECG  Troponin and CKMB unremarkable  ; however, pt clinically appears volume overloaded and has bilateral opacities on CXR. She has normal renal function and K+ so given lasix 60mg IV while in the ED.  CBC with mild leukocytosis (WBC 10.5) or anemia  She is COVID negative Pt is afebrile and hemodynamically stable while in ED. She is resting comfortably with O2 saturation 97% on 4L via NC. She has no increased respiratory effort.  She has significant BLE edema, right > left. She admits to non-compliance with prescribed diuretics.     ECG with NSR 83bpm, no ST changes  Troponin and CKMB unremarkable  ; however, pt clinically appears volume overloaded and has bilateral opacities on CXR. She has normal renal function and K+ so given lasix 60mg IV while in the ED.  CBC with mild leukocytosis (WBC 10.5), mild anemia  She is COVID negative    Her US venous RLE was limited but did not show evidence of DVT  Her CT did not show acute pulmonary embolism but showed persistent dilatation of the main pulmonary artery measuring up to 3.7 cm compatible with pulmonary artery hypertension. There is bilateral mosaic attenuation pattern unchanged from prior imaging which may suggest chronic thromboembolic disease. Abbreviated differential for the mosaic attenuation pattern includes congestive heart failure, air trapping from underlying small airways inflammation, and hypersensitivity pneumonitis    Will admit pt under Dr. Luo for diuresis and medical optimization.

## 2021-06-02 NOTE — H&P ADULT - HISTORY OF PRESENT ILLNESS
44yo female with pmhx of obesity hypoventilation syndrome and chronic hypoxemia (per pt uses 2L O2 via NC at rest, 3L when mobile and bipap at night), HFpEF, pericardial effusion in 8/2018 requiring IR drainage, DM2, HTN, HLD, MANUEL presents from Dr. Luo's office with shortness of breath and increased O2 requirement. She was sent from Dr. Luo's office today when she was noted to have O2 saturation 86% RA. Pt reports that over the past 1.5 months she has increased home O2 due to shortness of breath. She reports she feels more comfortable using 3L at rest. She endorses decreased exercise tolerance, states she often has to stop and sit down and then use bipap to feel like she has fully caught her breath. She also reports increased BLE edema, right > left, and ~30lb weight gain. She states she is non-compliant with home diuretics because she does not like the frequency of urination. She denies fever, chills, cough, chest pain, abdominal pain, vomiting, diarrhea, h/o DVT/PE. She has received one COVID vaccination.     In the ED  Vitals T 98.8 HR 99 /81 O2 sat 98% on 4L NC  Labs notable for WBC 10.51, Hgb 9.1, PT/INR 15.6/1.32  VBG pH 7.32   Ua negative  EKG NSR     Imaging  CT PE - negative for PE; Persistent dilatation of the main pulmonary artery measuring up to 3.7 cm compatible with pulmonary artery hypertension. There is bilateral mosaic attenuation pattern unchanged from prior imaging which may suggest chronic thromboembolic disease. Further imaging to include V/Q scintigraphy and/or HRCT to be performed at end inspiration/end expiration  LE Doppler - negative for DVT  CXR- Stable cardiomegaly. Bilateral opacities. Stable bony structures. Elevation of the right hemidiaphragm      Medications given: Lasix 80mg  46yo female with pmhx of obesity hypoventilation syndrome and chronic hypoxemia (per pt uses 2L O2 via NC at rest, 3L when mobile and bipap at night), HFpEF, pericardial effusion in 8/2018 requiring IR drainage, chronic diastolic CHF (EF 55%),  DM2, HTN, HLD, MANUEL presents from Dr. Luo's office with shortness of breath and increased O2 requirement. She was sent from Dr. Luo's office today when she was noted to have O2 saturation 86% RA. Pt reports that over the past 1.5 months she has increased home O2 due to shortness of breath. She reports she feels more comfortable using 3L at rest. She endorses decreased exercise tolerance, states she often has to stop and sit down and then use bipap to feel like she has fully caught her breath. She also reports increased BLE edema, right > left, and ~30lb weight gain. She states she is non-compliant with home diuretics because she does not like the frequency of urination. She denies fever, chills, cough, chest pain, abdominal pain, vomiting, diarrhea, h/o DVT/PE. She has received one COVID vaccination.     In the ED  Vitals T 98.8 HR 99 /81 O2 sat 98% on 4L NC  Labs notable for WBC 10.51, Hgb 9.1, PT/INR 15.6/1.32  VBG pH 7.32   Ua negative  EKG NSR     Imaging  CT PE - negative for PE; Persistent dilatation of the main pulmonary artery measuring up to 3.7 cm compatible with pulmonary artery hypertension. There is bilateral mosaic attenuation pattern unchanged from prior imaging which may suggest chronic thromboembolic disease. Further imaging to include V/Q scintigraphy and/or HRCT to be performed at end inspiration/end expiration  LE Doppler - negative for DVT  CXR- Stable cardiomegaly. Bilateral opacities. Stable bony structures. Elevation of the right hemidiaphragm      Medications given: Lasix 80mg

## 2021-06-02 NOTE — ED PROVIDER NOTE - NS ED ROS FT
Constitutional: No fever. No chills.  Eyes: No redness. No discharge. No vision change.   ENT: No sore throat. No ear pain.  Cardiovascular: No chest pain. No leg swelling.  Respiratory: No cough. +shortness of breath.  GI: No abdominal pain. No vomiting. No diarrhea.   MSK: No joint pain. No back pain. +extremity swelling.  Skin: No rash. No abrasions.   Neuro: No numbness. No weakness.   Psych: No known mental health issues.

## 2021-06-03 ENCOUNTER — NON-APPOINTMENT (OUTPATIENT)
Age: 46
End: 2021-06-03

## 2021-06-03 LAB
A1C WITH ESTIMATED AVERAGE GLUCOSE RESULT: 7.7 % — HIGH (ref 4–5.6)
ANION GAP SERPL CALC-SCNC: 10 MMOL/L — SIGNIFICANT CHANGE UP (ref 5–17)
BUN SERPL-MCNC: 9 MG/DL — SIGNIFICANT CHANGE UP (ref 7–23)
CALCIUM SERPL-MCNC: 8.9 MG/DL — SIGNIFICANT CHANGE UP (ref 8.4–10.5)
CHLORIDE SERPL-SCNC: 100 MMOL/L — SIGNIFICANT CHANGE UP (ref 96–108)
CO2 SERPL-SCNC: 32 MMOL/L — HIGH (ref 22–31)
COVID-19 SPIKE DOMAIN AB INTERP: POSITIVE
COVID-19 SPIKE DOMAIN ANTIBODY RESULT: 12.6 U/ML — HIGH
CREAT SERPL-MCNC: 0.74 MG/DL — SIGNIFICANT CHANGE UP (ref 0.5–1.3)
D DIMER BLD IA.RAPID-MCNC: 203 NG/ML DDU — SIGNIFICANT CHANGE UP
ESTIMATED AVERAGE GLUCOSE: 174 MG/DL — HIGH (ref 68–114)
GLUCOSE BLDC GLUCOMTR-MCNC: 174 MG/DL — HIGH (ref 70–99)
GLUCOSE BLDC GLUCOMTR-MCNC: 184 MG/DL — HIGH (ref 70–99)
GLUCOSE BLDC GLUCOMTR-MCNC: 202 MG/DL — HIGH (ref 70–99)
GLUCOSE BLDC GLUCOMTR-MCNC: 212 MG/DL — HIGH (ref 70–99)
GLUCOSE SERPL-MCNC: 161 MG/DL — HIGH (ref 70–99)
HCT VFR BLD CALC: 29.2 % — LOW (ref 34.5–45)
HGB BLD-MCNC: 8.1 G/DL — LOW (ref 11.5–15.5)
MAGNESIUM SERPL-MCNC: 1.5 MG/DL — LOW (ref 1.6–2.6)
MCHC RBC-ENTMCNC: 23 PG — LOW (ref 27–34)
MCHC RBC-ENTMCNC: 27.7 GM/DL — LOW (ref 32–36)
MCV RBC AUTO: 83 FL — SIGNIFICANT CHANGE UP (ref 80–100)
NRBC # BLD: 0 /100 WBCS — SIGNIFICANT CHANGE UP (ref 0–0)
PHOSPHATE SERPL-MCNC: 3.8 MG/DL — SIGNIFICANT CHANGE UP (ref 2.5–4.5)
PLATELET # BLD AUTO: 358 K/UL — SIGNIFICANT CHANGE UP (ref 150–400)
POTASSIUM SERPL-MCNC: 4 MMOL/L — SIGNIFICANT CHANGE UP (ref 3.5–5.3)
POTASSIUM SERPL-SCNC: 4 MMOL/L — SIGNIFICANT CHANGE UP (ref 3.5–5.3)
RBC # BLD: 3.52 M/UL — LOW (ref 3.8–5.2)
RBC # FLD: 19 % — HIGH (ref 10.3–14.5)
SARS-COV-2 IGG+IGM SERPL QL IA: 12.6 U/ML — HIGH
SARS-COV-2 IGG+IGM SERPL QL IA: POSITIVE
SODIUM SERPL-SCNC: 142 MMOL/L — SIGNIFICANT CHANGE UP (ref 135–145)
WBC # BLD: 8.75 K/UL — SIGNIFICANT CHANGE UP (ref 3.8–10.5)
WBC # FLD AUTO: 8.75 K/UL — SIGNIFICANT CHANGE UP (ref 3.8–10.5)

## 2021-06-03 PROCEDURE — 93010 ELECTROCARDIOGRAM REPORT: CPT

## 2021-06-03 PROCEDURE — 99223 1ST HOSP IP/OBS HIGH 75: CPT | Mod: GC

## 2021-06-03 PROCEDURE — 93306 TTE W/DOPPLER COMPLETE: CPT | Mod: 26

## 2021-06-03 PROCEDURE — 99221 1ST HOSP IP/OBS SF/LOW 40: CPT | Mod: GC

## 2021-06-03 RX ORDER — MAGNESIUM SULFATE 500 MG/ML
4 VIAL (ML) INJECTION ONCE
Refills: 0 | Status: COMPLETED | OUTPATIENT
Start: 2021-06-03 | End: 2021-06-03

## 2021-06-03 RX ORDER — AMLODIPINE BESYLATE 2.5 MG/1
1 TABLET ORAL
Qty: 0 | Refills: 0 | DISCHARGE

## 2021-06-03 RX ORDER — POTASSIUM CHLORIDE 20 MEQ
1 PACKET (EA) ORAL
Qty: 0 | Refills: 0 | DISCHARGE

## 2021-06-03 RX ORDER — LOSARTAN POTASSIUM 100 MG/1
1 TABLET, FILM COATED ORAL
Qty: 0 | Refills: 0 | DISCHARGE

## 2021-06-03 RX ORDER — ATORVASTATIN CALCIUM 80 MG/1
1 TABLET, FILM COATED ORAL
Qty: 0 | Refills: 0 | DISCHARGE

## 2021-06-03 RX ORDER — LIRAGLUTIDE 6 MG/ML
1.2 INJECTION SUBCUTANEOUS
Qty: 0 | Refills: 0 | DISCHARGE

## 2021-06-03 RX ORDER — ASPIRIN/CALCIUM CARB/MAGNESIUM 324 MG
1 TABLET ORAL
Qty: 0 | Refills: 0 | DISCHARGE

## 2021-06-03 RX ADMIN — ATORVASTATIN CALCIUM 80 MILLIGRAM(S): 80 TABLET, FILM COATED ORAL at 22:02

## 2021-06-03 RX ADMIN — AMLODIPINE BESYLATE 5 MILLIGRAM(S): 2.5 TABLET ORAL at 06:33

## 2021-06-03 RX ADMIN — Medication 40 MILLIGRAM(S): at 06:34

## 2021-06-03 RX ADMIN — Medication 2: at 14:22

## 2021-06-03 RX ADMIN — ENOXAPARIN SODIUM 40 MILLIGRAM(S): 100 INJECTION SUBCUTANEOUS at 22:01

## 2021-06-03 RX ADMIN — Medication 40 MILLIGRAM(S): at 17:10

## 2021-06-03 RX ADMIN — Medication 100 GRAM(S): at 09:42

## 2021-06-03 RX ADMIN — Medication 4: at 22:05

## 2021-06-03 RX ADMIN — Medication 2: at 08:31

## 2021-06-03 RX ADMIN — Medication 4: at 17:25

## 2021-06-03 RX ADMIN — LOSARTAN POTASSIUM 25 MILLIGRAM(S): 100 TABLET, FILM COATED ORAL at 06:33

## 2021-06-03 NOTE — CONSULT NOTE ADULT - ASSESSMENT
ASSESSMENT:  45F PMH OHS, MANUEL on home O2, HFpEF (55%), pericardial effusion 2018 s/p IR drainage, DM2, HTN presented from Dr. Luo's office with worsening shortness of breath and LE edema. Heart failure consulted for further management of HFpEF.     PLAN:  HFpEF (55%)  TTE (2019): Normal left and right ventricular size and systolic function, The right ventricle is dilated with probably normal right ventricular systolic function, Mild pulmonary hypertension, PASP is 39.4 mmHg, There is a small sized circumferential pericardial effusion, Diastolic septal bounce is noted and the IVC is plethoric      INCOMPLETE ASSESSMENT:  45F PMH OHS, MANUEL on home O2, HFpEF (55%), pericardial effusion 2018 s/p IR drainage, DM2, HTN presented from Dr. Luo's office with worsening shortness of breath and LE edema. Heart failure consulted for further management of HFpEF.     PLAN:  HFpEF (55%)  TTE (2019): Normal left and right ventricular size and systolic function, The right ventricle is dilated with probably normal right ventricular systolic function, Mild pulmonary hypertension, PASP is 39.4 mmHg, There is a small sized circumferential pericardial effusion, Diastolic septal bounce is noted and the IVC is plethoric  - Repeat echocardiogram  - Continue with IV diuresis, agree with 40mg IV BID  - Strict I/O monitoring and daily weights  - Monitor electrolytes closely with active diuresis, maintain K> 4 and Mg>2    Discussed with consult attending, Dr. Ramirez.

## 2021-06-03 NOTE — PROGRESS NOTE ADULT - SUBJECTIVE AND OBJECTIVE BOX
OVERNIGHT EVENTS: ADDY     SUBJECTIVE / INTERVAL HPI: Patient seen and examined at bedside. Patient reports feeling generally better than yesterday. Used her BiPAP overnight and is now on nasal cannula. Denies any active SOB at rest, chest pain, N/V/D. Reports right lower leg/calf discomfort and swelling that she has noticed for a month.     VITAL SIGNS:  Vital Signs Last 24 Hrs  T(C): 36.8 (2021 08:58), Max: 37.1 (2021 12:04)  T(F): 98.3 (2021 08:58), Max: 98.8 (2021 12:04)  HR: 100 (2021 08:58) (74 - 100)  BP: 128/74 (2021 08:58) (101/68 - 158/75)  BP(mean): 100 (2021 20:19) (100 - 100)  RR: 20 (2021 08:58) (2 - 26)  SpO2: 92% (2021 08:58) (88% - 100%)    PHYSICAL EXAM:    General: obese female resting in bed, in NAD   HEENT: NC/AT; PERRL, anicteric sclera; MMM. Nasal cannula in place   Neck: supple  Cardiovascular: +S1/S2; RRR. No murmurs apprecaited   Respiratory: b/l expiratory wheezes appreciated. Patient on NC, saturating well and breathing comfortably. No accessory muscle use, speaking in full sentences, in no respiratory distress.  Gastrointestinal: obese, soft, NT/ND; +BSx4. No rigidity or guarding   Extremities: b/l edema, worst on the right w/ swelling and mild warmth. No erythema noted. Discomfort to palpation in right calf.   Neurological: AAOx3; no focal deficits    MEDICATIONS:  MEDICATIONS  (STANDING):  amLODIPine   Tablet 5 milliGRAM(s) Oral daily  aspirin enteric coated 81 milliGRAM(s) Oral daily  atorvastatin 80 milliGRAM(s) Oral at bedtime  dextrose 40% Gel 15 Gram(s) Oral once  dextrose 5%. 1000 milliLiter(s) (50 mL/Hr) IV Continuous <Continuous>  dextrose 5%. 1000 milliLiter(s) (100 mL/Hr) IV Continuous <Continuous>  dextrose 50% Injectable 25 Gram(s) IV Push once  dextrose 50% Injectable 12.5 Gram(s) IV Push once  dextrose 50% Injectable 25 Gram(s) IV Push once  enoxaparin Injectable 40 milliGRAM(s) SubCutaneous every 24 hours  furosemide   Injectable 40 milliGRAM(s) IV Push every 12 hours  glucagon  Injectable 1 milliGRAM(s) IntraMuscular once  insulin lispro (ADMELOG) corrective regimen sliding scale   SubCutaneous Before meals and at bedtime  losartan 25 milliGRAM(s) Oral daily    MEDICATIONS  (PRN):      ALLERGIES:  Allergies    No Known Drug Allergies  shellfish (Anaphylaxis)    Intolerances        LABS:                        8.1    8.75  )-----------( 358      ( 2021 06:04 )             29.2     06-03    142  |  100  |  9   ----------------------------<  161<H>  4.0   |  32<H>  |  0.74    Ca    8.9      2021 06:04  Phos  3.8     06-03  Mg     1.5     06-03    TPro  8.2  /  Alb  3.9  /  TBili  0.6  /  DBili  x   /  AST  19  /  ALT  25  /  AlkPhos  95  06-02    PT/INR - ( 2021 12:59 )   PT: 15.6 sec;   INR: 1.32          PTT - ( 2021 12:59 )  PTT:34.9 sec  Urinalysis Basic - ( 2021 15:25 )    Color: Yellow / Appearance: Clear / S.015 / pH: x  Gluc: x / Ketone: NEGATIVE  / Bili: Negative / Urobili: 0.2 E.U./dL   Blood: x / Protein: NEGATIVE mg/dL / Nitrite: NEGATIVE   Leuk Esterase: NEGATIVE / RBC: < 5 /HPF / WBC < 5 /HPF   Sq Epi: x / Non Sq Epi: 0-5 /HPF / Bacteria: None /HPF      CAPILLARY BLOOD GLUCOSE      POCT Blood Glucose.: 174 mg/dL (2021 08:13)      RADIOLOGY & ADDITIONAL TESTS: Reviewed.    < from: CT Angio Chest PE Protocol w/ IV Cont (21 @ 16:59) >  IMPRESSION:    1. No evidence of main, lobar orproximal segmental branch pulmonary artery embolism. The study is limited for the assessment of more distal segmental branch emboli due to respiratory motion degradation and suboptimal intra-arterial bolus.  2. Persistent dilatation of the main pulmonary artery measuring up to 3.7 cm compatible with pulmonary artery hypertension. There is bilateral mosaic attenuation pattern unchanged from prior imaging which may suggest chronic thromboembolic disease. Further imaging to include V/Q scintigraphy and/or HRCT to be performed at end inspiration/end expiration. Abbreviated differential for the mosaic attenuation pattern includes congestive heart failure, air trapping from underlying small airways inflammation, and hypersensitivity pneumonitis.  3. Cardiomegaly. Near complete resolution of pericardial effusion with trace residual pericardial fluid.    < end of copied text >  < from: US Duplex Venous Lower Ext Ltd, Right (21 @ 15:41) >  IMPRESSION:  Limited study, as right popliteal vein and peroneal veins unable to be visualized. No deep venous thrombosis in the visualized veins.      < end of copied text >

## 2021-06-03 NOTE — CONSULT NOTE ADULT - ATTENDING COMMENTS
Known form 2019 admission where she diuresed over 27 liters.  45F PMH OHS, MANUEL on home O2, HFpEF (55%), pericardial effusion 2018 s/p IR drainage, DM2, HTN presented from Dr. Luo's office with worsening shortness of breath and LE edema. Heart failure consulted for further management of HFpEF.     ECHO to re-evaluate diastolic function and r/o pericardial effusion  Continue IV diuresis with Lasix. Has pitting edema in presence of her obese legs.    I have personally provided 40 minutes of clinical care time concurrently with the fellow.  I have reviewed the fellow’s documentation and I agree with the fellow's assessment and plan of care.  JEFFREY Ramirez

## 2021-06-03 NOTE — CONSULT NOTE ADULT - TIME BILLING
I have personally provided 40 minutes of clinical care time concurrently with the fellow.  I have reviewed the fellow’s documentation and I agree with the fellow's assessment and plan of care.  JEFFREY Ramirez

## 2021-06-03 NOTE — CONSULT NOTE ADULT - SUBJECTIVE AND OBJECTIVE BOX
Heart Failure Consult    HPI: 45F PMH OHS, MANUEL on home O2, HFpEF (55%), pericardial effusion 2018 s/p IR drainage, DM2, HTN presented from Dr. Luo's office with worsening shortness of breath and increasing O2 requirements. Also reports worsening LE edema.     OBJECTIVE  Vitals:  T(C): 36.8 (21 @ 05:41), Max: 37.1 (21 @ 12:04)  HR: 92 (21 @ 06:19) (74 - 100)  BP: 101/68 (21 @ 05:41) (101/68 - 158/75)  RR: 22 (21 @ 06:19) (20 - 26)  SpO2: 94% (21 @ 06:19) (88% - 100%)  Wt(kg): --    I/O:  I&O's Summary    2021 07:01  -  2021 07:00  --------------------------------------------------------  IN: 0 mL / OUT: 2600 mL / NET: -2600 mL        PHYSICAL EXAM:  Appearance: NAD. Speaking in full sentences.   HEENT: No pallor noted.  Conjunctiva clear b/l. Moist oral mucosa.  Cardiovascular: RRR with no murmurs.  Respiratory: Lungs CTAB.   Gastrointestinal:  Soft, nontender. Non-distended. Non-rigid.	  Extremities: No edema b/l. No erythema b/l. LE WWP b/l.  Vascular: DP intact  Neurologic:  Alert and awake. Moving all extremities. Following commands.   	  LABS:                        8.1    8.75  )-----------( 358      ( 2021 06:04 )             29.2     06-03    142  |  100  |  9   ----------------------------<  161<H>  4.0   |  32<H>  |  0.74    Ca    8.9      2021 06:04  Phos  3.8     06-03  Mg     1.5     06-03    TPro  8.2  /  Alb  3.9  /  TBili  0.6  /  DBili  x   /  AST  19  /  ALT  25  /  AlkPhos  95  06-02    PT/INR - ( 2021 12:59 )   PT: 15.6 sec;   INR: 1.32          PTT - ( 2021 12:59 )  PTT:34.9 sec  Urinalysis Basic - ( 2021 15:25 )    Color: Yellow / Appearance: Clear / S.015 / pH: x  Gluc: x / Ketone: NEGATIVE  / Bili: Negative / Urobili: 0.2 E.U./dL   Blood: x / Protein: NEGATIVE mg/dL / Nitrite: NEGATIVE   Leuk Esterase: NEGATIVE / RBC: < 5 /HPF / WBC < 5 /HPF   Sq Epi: x / Non Sq Epi: 0-5 /HPF / Bacteria: None /HPF        RADIOLOGY & ADDITIONAL TESTS:  Reviewed .    MEDICATIONS  (STANDING):  amLODIPine   Tablet 5 milliGRAM(s) Oral daily  aspirin enteric coated 81 milliGRAM(s) Oral daily  atorvastatin 80 milliGRAM(s) Oral at bedtime  dextrose 40% Gel 15 Gram(s) Oral once  dextrose 5%. 1000 milliLiter(s) (50 mL/Hr) IV Continuous <Continuous>  dextrose 5%. 1000 milliLiter(s) (100 mL/Hr) IV Continuous <Continuous>  dextrose 50% Injectable 25 Gram(s) IV Push once  dextrose 50% Injectable 12.5 Gram(s) IV Push once  dextrose 50% Injectable 25 Gram(s) IV Push once  enoxaparin Injectable 40 milliGRAM(s) SubCutaneous every 24 hours  furosemide   Injectable 40 milliGRAM(s) IV Push every 12 hours  glucagon  Injectable 1 milliGRAM(s) IntraMuscular once  insulin lispro (ADMELOG) corrective regimen sliding scale   SubCutaneous Before meals and at bedtime  losartan 25 milliGRAM(s) Oral daily    MEDICATIONS  (PRN):     Heart Failure Consult    HPI: 45F PMH OHS, MANUEL on home O2, HFpEF (55%), pericardial effusion 2018 s/p IR drainage, DM2, HTN presented from Dr. Luo's office with worsening shortness of breath and increasing O2 requirements. Also reports worsening LE edema. Patient admitted to [facility] for hypoxic respiratory failure. Started on diuresis s/p IV lasix 80mg x1 then 40mg IV BID with good response. Patient denies any headaches, dizziness. No LOC. Improved respiratory status with diuresis.   PMH :MANUEL Bipap, OHS, HFpEF, DM, HTN  Meds: Amlodipine 5mg, Asa 81, lipitor 80mg, losartan 25, metformin 1000mg bid, torsemide 40mg qd  Allergies: NKDA      OBJECTIVE  Vitals:  T(C): 36.8 (21 @ 05:41), Max: 37.1 (21 @ 12:04)  HR: 92 (21 @ 06:19) (74 - 100)  BP: 101/68 (21 @ 05:41) (101/68 - 158/75)  RR: 22 (21 @ 06:19) (20 - 26)  SpO2: 94% (21 @ 06:19) (88% - 100%)  Wt(kg): --    I/O:  I&O's Summary    2021 07:01  -  2021 07:00  --------------------------------------------------------  IN: 0 mL / OUT: 2600 mL / NET: -2600 mL        PHYSICAL EXAM:  Appearance: NAD. Speaking in full sentences.   HEENT: No pallor noted.  Conjunctiva clear b/l. Moist oral mucosa.  Cardiovascular: RRR with no murmurs.  Respiratory: Lungs CTAB.   Gastrointestinal:  Soft, nontender. Non-distended. Non-rigid.	  Extremities: No edema b/l. No erythema b/l. LE WWP b/l.  Vascular: DP intact  Neurologic:  Alert and awake. Moving all extremities. Following commands.   	  LABS:                        8.1    8.75  )-----------( 358      ( 2021 06:04 )             29.2     06-03    142  |  100  |  9   ----------------------------<  161<H>  4.0   |  32<H>  |  0.74    Ca    8.9      2021 06:04  Phos  3.8     06-03  Mg     1.5     06-03    TPro  8.2  /  Alb  3.9  /  TBili  0.6  /  DBili  x   /  AST  19  /  ALT  25  /  AlkPhos  95  06-02    PT/INR - ( 2021 12:59 )   PT: 15.6 sec;   INR: 1.32          PTT - ( 2021 12:59 )  PTT:34.9 sec  Urinalysis Basic - ( 2021 15:25 )    Color: Yellow / Appearance: Clear / S.015 / pH: x  Gluc: x / Ketone: NEGATIVE  / Bili: Negative / Urobili: 0.2 E.U./dL   Blood: x / Protein: NEGATIVE mg/dL / Nitrite: NEGATIVE   Leuk Esterase: NEGATIVE / RBC: < 5 /HPF / WBC < 5 /HPF   Sq Epi: x / Non Sq Epi: 0-5 /HPF / Bacteria: None /HPF        RADIOLOGY & ADDITIONAL TESTS:  Reviewed .    MEDICATIONS  (STANDING):  amLODIPine   Tablet 5 milliGRAM(s) Oral daily  aspirin enteric coated 81 milliGRAM(s) Oral daily  atorvastatin 80 milliGRAM(s) Oral at bedtime  dextrose 40% Gel 15 Gram(s) Oral once  dextrose 5%. 1000 milliLiter(s) (50 mL/Hr) IV Continuous <Continuous>  dextrose 5%. 1000 milliLiter(s) (100 mL/Hr) IV Continuous <Continuous>  dextrose 50% Injectable 25 Gram(s) IV Push once  dextrose 50% Injectable 12.5 Gram(s) IV Push once  dextrose 50% Injectable 25 Gram(s) IV Push once  enoxaparin Injectable 40 milliGRAM(s) SubCutaneous every 24 hours  furosemide   Injectable 40 milliGRAM(s) IV Push every 12 hours  glucagon  Injectable 1 milliGRAM(s) IntraMuscular once  insulin lispro (ADMELOG) corrective regimen sliding scale   SubCutaneous Before meals and at bedtime  losartan 25 milliGRAM(s) Oral daily    MEDICATIONS  (PRN):

## 2021-06-03 NOTE — PROGRESS NOTE ADULT - PROBLEM SELECTOR PLAN 5
Uses BiPAP at night   - c/w BiPAP Uses BiPAP at night   - c/w BiPAP    #Anemia: Hgb 8.1 this morning. Per chart review, patient has a history of anemia w/ baseline Hgb 9-10  -F/u iron studies in the AM   -Maintain active T&S   -Transfuse if needed

## 2021-06-03 NOTE — PROGRESS NOTE ADULT - PROBLEM SELECTOR PLAN 2
Chronic diastolic CHF (EF 55%) in November 2019. On torsemide 40 BID at home. However, reports noncompliance 2/2 increased urinary frequency.  - c/w lasix 40 BID  - f/u echo  - f/u HF consult (Dr. Luo to call Dr. Ramirez)

## 2021-06-03 NOTE — PROGRESS NOTE ADULT - PROBLEM SELECTOR PLAN 4
A1c 7.8 in November 2019. On victoza 1.2 and metformin 1000 BID at home.  - c/w mISS  - A1c 7.7  -F/u daily FS

## 2021-06-03 NOTE — PROGRESS NOTE ADULT - PROBLEM SELECTOR PLAN 1
Decreased exercise tolerance and increased O2 requirements for past 1.5 months. Likely 2/2 home diuretic noncompliance. CT PE negative for PE but c/f pulmonary artery hypertension. S/p lasix 80 in the ED.  - c/w lasix 40 BID  - c/w O2   - CPAP at night  -See below   -Heart failure consulted - appreciate recs   -Dopplers negative for DVT but difficult due to body habitus. Will order D-dimer

## 2021-06-04 LAB
ALBUMIN SERPL ELPH-MCNC: 3.5 G/DL — SIGNIFICANT CHANGE UP (ref 3.3–5)
ALP SERPL-CCNC: 82 U/L — SIGNIFICANT CHANGE UP (ref 40–120)
ALT FLD-CCNC: 22 U/L — SIGNIFICANT CHANGE UP (ref 10–45)
ANION GAP SERPL CALC-SCNC: 9 MMOL/L — SIGNIFICANT CHANGE UP (ref 5–17)
AST SERPL-CCNC: 18 U/L — SIGNIFICANT CHANGE UP (ref 10–40)
BILIRUB SERPL-MCNC: 0.6 MG/DL — SIGNIFICANT CHANGE UP (ref 0.2–1.2)
BLD GP AB SCN SERPL QL: NEGATIVE — SIGNIFICANT CHANGE UP
BUN SERPL-MCNC: 11 MG/DL — SIGNIFICANT CHANGE UP (ref 7–23)
CALCIUM SERPL-MCNC: 8.7 MG/DL — SIGNIFICANT CHANGE UP (ref 8.4–10.5)
CHLORIDE SERPL-SCNC: 97 MMOL/L — SIGNIFICANT CHANGE UP (ref 96–108)
CO2 SERPL-SCNC: 34 MMOL/L — HIGH (ref 22–31)
CREAT SERPL-MCNC: 0.79 MG/DL — SIGNIFICANT CHANGE UP (ref 0.5–1.3)
FERRITIN SERPL-MCNC: 62 NG/ML — SIGNIFICANT CHANGE UP (ref 15–150)
GLUCOSE BLDC GLUCOMTR-MCNC: 200 MG/DL — HIGH (ref 70–99)
GLUCOSE BLDC GLUCOMTR-MCNC: 211 MG/DL — HIGH (ref 70–99)
GLUCOSE BLDC GLUCOMTR-MCNC: 212 MG/DL — HIGH (ref 70–99)
GLUCOSE BLDC GLUCOMTR-MCNC: 230 MG/DL — HIGH (ref 70–99)
GLUCOSE SERPL-MCNC: 199 MG/DL — HIGH (ref 70–99)
HCT VFR BLD CALC: 31.1 % — LOW (ref 34.5–45)
HGB BLD-MCNC: 8.5 G/DL — LOW (ref 11.5–15.5)
IRON SATN MFR SERPL: 10 % — LOW (ref 14–50)
IRON SATN MFR SERPL: 29 UG/DL — LOW (ref 30–160)
MAGNESIUM SERPL-MCNC: 1.9 MG/DL — SIGNIFICANT CHANGE UP (ref 1.6–2.6)
MCHC RBC-ENTMCNC: 23.1 PG — LOW (ref 27–34)
MCHC RBC-ENTMCNC: 27.3 GM/DL — LOW (ref 32–36)
MCV RBC AUTO: 84.5 FL — SIGNIFICANT CHANGE UP (ref 80–100)
NRBC # BLD: 0 /100 WBCS — SIGNIFICANT CHANGE UP (ref 0–0)
PHOSPHATE SERPL-MCNC: 3.9 MG/DL — SIGNIFICANT CHANGE UP (ref 2.5–4.5)
PLATELET # BLD AUTO: 363 K/UL — SIGNIFICANT CHANGE UP (ref 150–400)
POTASSIUM SERPL-MCNC: 4.1 MMOL/L — SIGNIFICANT CHANGE UP (ref 3.5–5.3)
POTASSIUM SERPL-SCNC: 4.1 MMOL/L — SIGNIFICANT CHANGE UP (ref 3.5–5.3)
PROT SERPL-MCNC: 7.3 G/DL — SIGNIFICANT CHANGE UP (ref 6–8.3)
RBC # BLD: 3.68 M/UL — LOW (ref 3.8–5.2)
RBC # FLD: 18.9 % — HIGH (ref 10.3–14.5)
RH IG SCN BLD-IMP: POSITIVE — SIGNIFICANT CHANGE UP
SODIUM SERPL-SCNC: 140 MMOL/L — SIGNIFICANT CHANGE UP (ref 135–145)
TIBC SERPL-MCNC: 282 UG/DL — SIGNIFICANT CHANGE UP (ref 220–430)
UIBC SERPL-MCNC: 253 UG/DL — SIGNIFICANT CHANGE UP (ref 110–370)
WBC # BLD: 8.94 K/UL — SIGNIFICANT CHANGE UP (ref 3.8–10.5)
WBC # FLD AUTO: 8.94 K/UL — SIGNIFICANT CHANGE UP (ref 3.8–10.5)

## 2021-06-04 PROCEDURE — 99232 SBSQ HOSP IP/OBS MODERATE 35: CPT | Mod: GC

## 2021-06-04 RX ORDER — ASPIRIN/CALCIUM CARB/MAGNESIUM 324 MG
81 TABLET ORAL EVERY 24 HOURS
Refills: 0 | Status: DISCONTINUED | OUTPATIENT
Start: 2021-06-04 | End: 2021-06-08

## 2021-06-04 RX ADMIN — ENOXAPARIN SODIUM 40 MILLIGRAM(S): 100 INJECTION SUBCUTANEOUS at 21:34

## 2021-06-04 RX ADMIN — AMLODIPINE BESYLATE 5 MILLIGRAM(S): 2.5 TABLET ORAL at 06:40

## 2021-06-04 RX ADMIN — Medication 40 MILLIGRAM(S): at 17:30

## 2021-06-04 RX ADMIN — ATORVASTATIN CALCIUM 80 MILLIGRAM(S): 80 TABLET, FILM COATED ORAL at 21:34

## 2021-06-04 RX ADMIN — Medication 2: at 12:31

## 2021-06-04 RX ADMIN — Medication 4: at 08:49

## 2021-06-04 RX ADMIN — Medication 81 MILLIGRAM(S): at 07:51

## 2021-06-04 RX ADMIN — Medication 4: at 18:19

## 2021-06-04 RX ADMIN — LOSARTAN POTASSIUM 25 MILLIGRAM(S): 100 TABLET, FILM COATED ORAL at 06:40

## 2021-06-04 RX ADMIN — Medication 40 MILLIGRAM(S): at 06:41

## 2021-06-04 RX ADMIN — Medication 4: at 22:49

## 2021-06-04 NOTE — PROGRESS NOTE ADULT - PROBLEM SELECTOR PLAN 2
Chronic diastolic CHF (EF 55%) in November 2019. On torsemide 40 BID at home. However, reports noncompliance 2/2 increased urinary frequency.  - c/w lasix 40 BID  -c/w ASA 81 daily   -c/w atorvastatin 81 mg qd    - Echo from 6/3:  2. Normal left and right ventricular size and systolic function.   3. Dilated right ventricular size.   4. Probably normal right ventricular systolic function.   5. No significant valvular disease.   6. Pulmonary hypertension present, pulmonary artery systolic pressure is 65 mmHg.   7. Moderate pericardial effusion without echocardiographic evidence of cardiac tamponade physiology.   8. Compared to the previous TTE performed on 11/6/2019, there is now moderate to severe pulmonary hypertension.  - f/u HF consult (Dr. Luo to call Dr. Ramirez)  -C/w current regimen, UOP goal 4-5 L. Patient net neg 4.5 L

## 2021-06-04 NOTE — PROGRESS NOTE ADULT - PROBLEM SELECTOR PLAN 1
Decreased exercise tolerance and increased O2 requirements for past 1.5 months. Likely 2/2 home diuretic noncompliance. CT PE negative for PE but c/f pulmonary artery hypertension. S/p lasix 80 in the ED. Likely due to severe pulmonary HTN and fluid overload from medication non-compliance.   - c/w lasix 40 BID  - c/w O2 (baseline 3 L NC at home - currently on 5-6 L)   - BiPAP overnight   -See below   -Heart failure consulted - appreciate recs   -Dopplers negative for DVT but difficult due to body habitus. Will order D-dimer  -Echo from 6/3:    2. Normal left and right ventricular size and systolic function.   3. Dilated right ventricular size.   4. Probably normal right ventricular systolic function.   5. No significant valvular disease.   6. Pulmonary hypertension present, pulmonary artery systolic pressure is 65 mmHg.   7. Moderate pericardial effusion without echocardiographic evidence of cardiac tamponade physiology.   8. Compared to the previous TTE performed on 11/6/2019, there is now moderate to severe pulmonary hypertension.

## 2021-06-04 NOTE — PROVIDER CONTACT NOTE (OTHER) - ASSESSMENT
A+Ox4. Pt noted to be saturating btwn 86-87% on 5LNC. Oxygen increased to 6L with minimal improvement, pt also endorsed "slight chest tightness" but denied chest pain, no wheezing on ausculation. Pt requested for washed up, during that time pt noted with increased WOB, tachypneic and saturation in low 80s, placed on 15L with O2 saturation increasing to 95%  VS: 97.9, HR-99, BP-134/82, RR- 21, O2 sat -87% on 5LNC

## 2021-06-04 NOTE — PHYSICAL THERAPY INITIAL EVALUATION ADULT - ADDITIONAL COMMENTS
Pt reports she lives in elevator building with ramp to enter w/ 16yo son. Pt reports she has a HHA 5hrs x5days/wk. Pt reports she ambulates short distances within home independently with rollator; pt does not ambulate in community.

## 2021-06-04 NOTE — PROGRESS NOTE ADULT - PROBLEM SELECTOR PLAN 5
Uses BiPAP at night   - c/w BiPAP    #Anemia: Hgb 8.1 this morning. Per chart review, patient has a history of anemia w/ baseline Hgb 9-10  -F/u iron studies  -Maintain active T&S   -Transfuse if needed

## 2021-06-04 NOTE — PROGRESS NOTE ADULT - ASSESSMENT
ASSESSMENT:  45F PMH OHS, MANUEL on home O2, HFpEF (55%), pericardial effusion 2018 s/p IR drainage, DM2, HTN presented from Dr. Luo's office with worsening shortness of breath and LE edema. Heart failure consulted for further management of HFpEF.     PLAN:  HFpEF (55%)  TTE (6/3/21): Normal left (60-65%) and right ventricular size and systolic function, dilated right ventricular size, Probably normal right ventricular systolic function, No significant valvular disease, Pulmonary hypertension present, pulmonary artery systolic pressure is 65 mmHg, moderate pericardial effusion without echocardiographic evidence of cardiac tamponade physiology  Net neg -4.5L        INCOMPLETE ASSESSMENT:  45F PMH OHS, MANUEL on home O2, HFpEF (55%), pericardial effusion 2018 s/p IR drainage, DM2, HTN presented from Dr. Luo's office with worsening shortness of breath and LE edema. Heart failure consulted for further management of HFpEF.     PLAN:  HFpEF (55%)  TTE (6/3/21): Normal left (60-65%) and right ventricular size and systolic function, dilated right ventricular size, Probably normal right ventricular systolic function, No significant valvular disease, Pulmonary hypertension present, pulmonary artery systolic pressure is 65 mmHg, moderate pericardial effusion without echocardiographic evidence of cardiac tamponade physiology  Net neg -4.25L        INCOMPLETE ASSESSMENT:  45F PMH OHS, MANUEL on home O2, HFpEF (55%), pericardial effusion 2018 s/p IR drainage, DM2, HTN presented from Dr. Luo's office with worsening shortness of breath and LE edema. Heart failure consulted for further management of HFpEF.     PLAN:  HFpEF (55%)  TTE (6/3/21): Normal left (60-65%) and right ventricular size and systolic function, dilated right ventricular size, Probably normal right ventricular systolic function, No significant valvular disease, Pulmonary hypertension present, pulmonary artery systolic pressure is 65 mmHg, moderate pericardial effusion without echocardiographic evidence of cardiac tamponade physiology  Net neg -4.25L on 40mg IV BID  Remains overloaded with 2+ edema, decreased breath sounds  - Continue diuresis with lasix 40mg IV BID, would continue at current rate- goal of approx net neg -3L over 24 hours- as increasing diuresis rate can cause more rapid volume shifts that can worsen kidney function or affect hemodynamic stability  - Strict I/O and daily standing weights  - Monitor electrolytes closely to maintain K>4 and Mg>2    Discussed with Dr. Ramirez.

## 2021-06-04 NOTE — PROGRESS NOTE ADULT - SUBJECTIVE AND OBJECTIVE BOX
OVERNIGHT EVENTS: overnight, patient was dyspneic and placed on NRB. Patient weaned off to 6 L NC and then placed on BiPAP with improvement.    SUBJECTIVE / INTERVAL HPI: Patient seen and examined at bedside. Reports denies     VITAL SIGNS:  Vital Signs Last 24 Hrs  T(C): 36.8 (2021 09:03), Max: 36.9 (2021 15:47)  T(F): 98.3 (2021 09:03), Max: 98.5 (2021 15:47)  HR: 91 (2021 09:03) (87 - 99)  BP: 118/82 (2021 09:03) (114/73 - 134/82)  BP(mean): 96 (2021 03:02) (96 - 99)  RR: 22 (2021 09:03) (19 - 25)  SpO2: 92% (2021 09:03) (87% - 95%)    PHYSICAL EXAM:    General: WDWN  HEENT: NC/AT; PERRL, anicteric sclera; MMM  Neck: supple  Cardiovascular: +S1/S2; RRR  Respiratory: CTA B/L; no W/R/R  Gastrointestinal: soft, NT/ND; +BSx4  Extremities: WWP; no edema, clubbing or cyanosis  Vascular: 2+ radial, DP/PT pulses B/L  Neurological: AAOx3; no focal deficits    MEDICATIONS:  MEDICATIONS  (STANDING):  amLODIPine   Tablet 5 milliGRAM(s) Oral daily  aspirin  chewable 81 milliGRAM(s) Oral every 24 hours  atorvastatin 80 milliGRAM(s) Oral at bedtime  dextrose 40% Gel 15 Gram(s) Oral once  dextrose 5%. 1000 milliLiter(s) (50 mL/Hr) IV Continuous <Continuous>  dextrose 5%. 1000 milliLiter(s) (100 mL/Hr) IV Continuous <Continuous>  dextrose 50% Injectable 25 Gram(s) IV Push once  dextrose 50% Injectable 12.5 Gram(s) IV Push once  dextrose 50% Injectable 25 Gram(s) IV Push once  enoxaparin Injectable 40 milliGRAM(s) SubCutaneous every 24 hours  furosemide   Injectable 40 milliGRAM(s) IV Push every 12 hours  glucagon  Injectable 1 milliGRAM(s) IntraMuscular once  insulin lispro (ADMELOG) corrective regimen sliding scale   SubCutaneous Before meals and at bedtime  losartan 25 milliGRAM(s) Oral daily    MEDICATIONS  (PRN):      ALLERGIES:  Allergies    No Known Drug Allergies  shellfish (Anaphylaxis)    Intolerances        LABS:                        8.5    8.94  )-----------( 363      ( 2021 06:04 )             31.1     06-04    140  |  97  |  11  ----------------------------<  199<H>  4.1   |  34<H>  |  0.79    Ca    8.7      2021 06:04  Phos  3.9     06-04  Mg     1.9     06-04    TPro  7.3  /  Alb  3.5  /  TBili  0.6  /  DBili  x   /  AST  18  /  ALT  22  /  AlkPhos  82  06-04    PT/INR - ( 2021 12:59 )   PT: 15.6 sec;   INR: 1.32          PTT - ( 2021 12:59 )  PTT:34.9 sec  Urinalysis Basic - ( 2021 15:25 )    Color: Yellow / Appearance: Clear / S.015 / pH: x  Gluc: x / Ketone: NEGATIVE  / Bili: Negative / Urobili: 0.2 E.U./dL   Blood: x / Protein: NEGATIVE mg/dL / Nitrite: NEGATIVE   Leuk Esterase: NEGATIVE / RBC: < 5 /HPF / WBC < 5 /HPF   Sq Epi: x / Non Sq Epi: 0-5 /HPF / Bacteria: None /HPF      CAPILLARY BLOOD GLUCOSE      POCT Blood Glucose.: 211 mg/dL (2021 08:35)      RADIOLOGY & ADDITIONAL TESTS: Reviewed.    ASSESSMENT:    PLAN:  OVERNIGHT EVENTS: overnight, patient was dyspneic and placed on NRB. Patient weaned off to 6 L NC and then placed on BiPAP with improvement.    SUBJECTIVE / INTERVAL HPI: Patient seen and examined at bedside. Denies any new complaints this morning, reports her breathing has improved and she is comfortable on nasal cannula. Denies any chest pain, SOB, N/V/D, or abdominal pain. Also reports her right leg pain and swelling have improved. Patient net neg 4.5 L.     VITAL SIGNS:  Vital Signs Last 24 Hrs  T(C): 36.8 (2021 09:03), Max: 36.9 (2021 15:47)  T(F): 98.3 (2021 09:03), Max: 98.5 (2021 15:47)  HR: 91 (2021 09:03) (87 - 99)  BP: 118/82 (2021 09:03) (114/73 - 134/82)  BP(mean): 96 (2021 03:02) (96 - 99)  RR: 22 (2021 09:03) (19 - 25)  SpO2: 92% (2021 09:03) (87% - 95%)    PHYSICAL EXAM:    General: obese female resting in bed, in NAD   HEENT: NC/AT; PERRL, anicteric sclera; MMM. Nasal cannula in place   Neck: supple  Cardiovascular: +S1/S2; RRR. No murmurs apprecaited   Respiratory: CTABL, no W/R/R. Patient on NC, saturating well and breathing comfortably. No accessory muscle use, speaking in full sentences, in no respiratory distress.  Gastrointestinal: obese, soft, NT/ND; +BSx4. No rigidity or guarding   Extremities: b/l edema, worst on the right w/ swelling and mild warmth. No erythema noted. Mild discomfort to palpation in right calf, but improved from yesterday.    Neurological: AAOx3; no focal deficits    MEDICATIONS:  MEDICATIONS  (STANDING):  amLODIPine   Tablet 5 milliGRAM(s) Oral daily  aspirin  chewable 81 milliGRAM(s) Oral every 24 hours  atorvastatin 80 milliGRAM(s) Oral at bedtime  dextrose 40% Gel 15 Gram(s) Oral once  dextrose 5%. 1000 milliLiter(s) (50 mL/Hr) IV Continuous <Continuous>  dextrose 5%. 1000 milliLiter(s) (100 mL/Hr) IV Continuous <Continuous>  dextrose 50% Injectable 25 Gram(s) IV Push once  dextrose 50% Injectable 12.5 Gram(s) IV Push once  dextrose 50% Injectable 25 Gram(s) IV Push once  enoxaparin Injectable 40 milliGRAM(s) SubCutaneous every 24 hours  furosemide   Injectable 40 milliGRAM(s) IV Push every 12 hours  glucagon  Injectable 1 milliGRAM(s) IntraMuscular once  insulin lispro (ADMELOG) corrective regimen sliding scale   SubCutaneous Before meals and at bedtime  losartan 25 milliGRAM(s) Oral daily    MEDICATIONS  (PRN):      ALLERGIES:  Allergies    No Known Drug Allergies  shellfish (Anaphylaxis)    Intolerances        LABS:                        8.5    8.94  )-----------( 363      ( 2021 06:04 )             31.1     06-04    140  |  97  |  11  ----------------------------<  199<H>  4.1   |  34<H>  |  0.79    Ca    8.7      2021 06:04  Phos  3.9     06-04  Mg     1.9     06-04    TPro  7.3  /  Alb  3.5  /  TBili  0.6  /  DBili  x   /  AST  18  /  ALT  22  /  AlkPhos  82  06-04    PT/INR - ( 2021 12:59 )   PT: 15.6 sec;   INR: 1.32          PTT - ( 2021 12:59 )  PTT:34.9 sec  Urinalysis Basic - ( 2021 15:25 )    Color: Yellow / Appearance: Clear / S.015 / pH: x  Gluc: x / Ketone: NEGATIVE  / Bili: Negative / Urobili: 0.2 E.U./dL   Blood: x / Protein: NEGATIVE mg/dL / Nitrite: NEGATIVE   Leuk Esterase: NEGATIVE / RBC: < 5 /HPF / WBC < 5 /HPF   Sq Epi: x / Non Sq Epi: 0-5 /HPF / Bacteria: None /HPF      CAPILLARY BLOOD GLUCOSE      POCT Blood Glucose.: 211 mg/dL (2021 08:35)      RADIOLOGY & ADDITIONAL TESTS: Reviewed.    < from: TTE Echo Complete w/o Contrast w/ Doppler (21 @ 12:34) >  CONCLUSIONS:     1. Technically difficult study.   2. Normal left and right ventricular size and systolic function.   3. Dilated right ventricular size.   4. Probably normal right ventricular systolic function.   5. No significant valvular disease.   6. Pulmonary hypertension present, pulmonary artery systolic pressure is 65 mmHg.   7. Moderate pericardial effusion without echocardiographic evidence of cardiac tamponade physiology.   8. Compared to the previous TTE performed on 2019, there is now moderate to severe pulmonary hypertension.    < end of copied text >   OVERNIGHT EVENTS: overnight, patient was dyspneic and placed on NRB. Patient weaned off to 6 L NC and then placed on BiPAP with improvement.    SUBJECTIVE / INTERVAL HPI: Patient seen and examined at bedside. Denies any new complaints this morning, reports her breathing has improved and she is comfortable on nasal cannula. Denies any chest pain, SOB, N/V/D, or abdominal pain. Also reports her right leg pain and swelling have improved. Patient net neg 4.5 L.     VITAL SIGNS:  Vital Signs Last 24 Hrs  T(C): 36.8 (2021 09:03), Max: 36.9 (2021 15:47)  T(F): 98.3 (2021 09:03), Max: 98.5 (2021 15:47)  HR: 91 (2021 09:03) (87 - 99)  BP: 118/82 (2021 09:03) (114/73 - 134/82)  BP(mean): 96 (2021 03:02) (96 - 99)  RR: 22 (2021 09:03) (19 - 25)  SpO2: 92% (2021 09:03) (87% - 95%)    PHYSICAL EXAM:    General: obese female resting in bed, in NAD   HEENT: NC/AT; PERRL, anicteric sclera; MMM. Nasal cannula in place   Neck: supple  Cardiovascular: +S1/S2; RRR. No murmurs apprecaited   Respiratory: decreased breath sounds b/l, no W/R/R. Patient on NC, saturating well and breathing comfortably. No accessory muscle use, speaking in full sentences, in no respiratory distress.  Gastrointestinal: obese, soft, NT/ND; +BSx4. No rigidity or guarding   Extremities: b/l 2+ pitting edema, worst on the right w/ swelling and mild warmth. No erythema noted. Mild discomfort to palpation in right calf, but improved from yesterday.    Neurological: AAOx3; no focal deficits    MEDICATIONS:  MEDICATIONS  (STANDING):  amLODIPine   Tablet 5 milliGRAM(s) Oral daily  aspirin  chewable 81 milliGRAM(s) Oral every 24 hours  atorvastatin 80 milliGRAM(s) Oral at bedtime  dextrose 40% Gel 15 Gram(s) Oral once  dextrose 5%. 1000 milliLiter(s) (50 mL/Hr) IV Continuous <Continuous>  dextrose 5%. 1000 milliLiter(s) (100 mL/Hr) IV Continuous <Continuous>  dextrose 50% Injectable 25 Gram(s) IV Push once  dextrose 50% Injectable 12.5 Gram(s) IV Push once  dextrose 50% Injectable 25 Gram(s) IV Push once  enoxaparin Injectable 40 milliGRAM(s) SubCutaneous every 24 hours  furosemide   Injectable 40 milliGRAM(s) IV Push every 12 hours  glucagon  Injectable 1 milliGRAM(s) IntraMuscular once  insulin lispro (ADMELOG) corrective regimen sliding scale   SubCutaneous Before meals and at bedtime  losartan 25 milliGRAM(s) Oral daily    MEDICATIONS  (PRN):      ALLERGIES:  Allergies    No Known Drug Allergies  shellfish (Anaphylaxis)    Intolerances        LABS:                        8.5    8.94  )-----------( 363      ( 2021 06:04 )             31.1     06-04    140  |  97  |  11  ----------------------------<  199<H>  4.1   |  34<H>  |  0.79    Ca    8.7      2021 06:04  Phos  3.9     06-04  Mg     1.9     06-04    TPro  7.3  /  Alb  3.5  /  TBili  0.6  /  DBili  x   /  AST  18  /  ALT  22  /  AlkPhos  82  06-04    PT/INR - ( 2021 12:59 )   PT: 15.6 sec;   INR: 1.32          PTT - ( 2021 12:59 )  PTT:34.9 sec  Urinalysis Basic - ( 2021 15:25 )    Color: Yellow / Appearance: Clear / S.015 / pH: x  Gluc: x / Ketone: NEGATIVE  / Bili: Negative / Urobili: 0.2 E.U./dL   Blood: x / Protein: NEGATIVE mg/dL / Nitrite: NEGATIVE   Leuk Esterase: NEGATIVE / RBC: < 5 /HPF / WBC < 5 /HPF   Sq Epi: x / Non Sq Epi: 0-5 /HPF / Bacteria: None /HPF      CAPILLARY BLOOD GLUCOSE      POCT Blood Glucose.: 211 mg/dL (2021 08:35)      RADIOLOGY & ADDITIONAL TESTS: Reviewed.    < from: TTE Echo Complete w/o Contrast w/ Doppler (21 @ 12:34) >  CONCLUSIONS:     1. Technically difficult study.   2. Normal left and right ventricular size and systolic function.   3. Dilated right ventricular size.   4. Probably normal right ventricular systolic function.   5. No significant valvular disease.   6. Pulmonary hypertension present, pulmonary artery systolic pressure is 65 mmHg.   7. Moderate pericardial effusion without echocardiographic evidence of cardiac tamponade physiology.   8. Compared to the previous TTE performed on 2019, there is now moderate to severe pulmonary hypertension.    < end of copied text >

## 2021-06-04 NOTE — PROGRESS NOTE ADULT - SUBJECTIVE AND OBJECTIVE BOX
Heart Failure Consult    O/N: Desaturation to 86-87% while on 5L, placed on NRB then bipap  Interval History:      OBJECTIVE  Vitals:  T(C): 36.8 (21 @ 06:06), Max: 36.9 (21 @ 15:47)  HR: 99 (21 @ 06:20) (87 - 100)  BP: 114/73 (21 @ 06:06) (114/73 - 134/82)  RR: 25 (21 @ 06:20) (2 - 25)  SpO2: 92% (21 @ 06:20) (87% - 95%)  Wt(kg): --    I/O:  I&O's Summary    2021 07:01  -  2021 07:00  --------------------------------------------------------  IN: 500 mL / OUT: 4750 mL / NET: -4250 mL        PHYSICAL EXAM:  Appearance: NAD. Speaking in full sentences.   HEENT: No pallor noted.  Conjunctiva clear b/l. Moist oral mucosa.  Cardiovascular: RRR with no murmurs.  Respiratory: Lungs CTAB.   Gastrointestinal:  Soft, nontender. Non-distended. Non-rigid.	  Extremities: No edema b/l. No erythema b/l. LE WWP b/l.  Vascular: DP intact  Neurologic:  Alert and awake. Moving all extremities. Following commands.   	  LABS:                        8.5    8.94  )-----------( 363      ( 2021 06:04 )             31.1     06-04    140  |  97  |  11  ----------------------------<  199<H>  4.1   |  34<H>  |  0.79    Ca    8.7      2021 06:04  Phos  3.9     06-04  Mg     1.9     06-04    TPro  7.3  /  Alb  3.5  /  TBili  0.6  /  DBili  x   /  AST  18  /  ALT  22  /  AlkPhos  82  06-04    PT/INR - ( 2021 12:59 )   PT: 15.6 sec;   INR: 1.32          PTT - ( 2021 12:59 )  PTT:34.9 sec  Urinalysis Basic - ( 2021 15:25 )    Color: Yellow / Appearance: Clear / S.015 / pH: x  Gluc: x / Ketone: NEGATIVE  / Bili: Negative / Urobili: 0.2 E.U./dL   Blood: x / Protein: NEGATIVE mg/dL / Nitrite: NEGATIVE   Leuk Esterase: NEGATIVE / RBC: < 5 /HPF / WBC < 5 /HPF   Sq Epi: x / Non Sq Epi: 0-5 /HPF / Bacteria: None /HPF        RADIOLOGY & ADDITIONAL TESTS:  Reviewed .    MEDICATIONS  (STANDING):  amLODIPine   Tablet 5 milliGRAM(s) Oral daily  aspirin  chewable 81 milliGRAM(s) Oral every 24 hours  atorvastatin 80 milliGRAM(s) Oral at bedtime  dextrose 40% Gel 15 Gram(s) Oral once  dextrose 5%. 1000 milliLiter(s) (50 mL/Hr) IV Continuous <Continuous>  dextrose 5%. 1000 milliLiter(s) (100 mL/Hr) IV Continuous <Continuous>  dextrose 50% Injectable 25 Gram(s) IV Push once  dextrose 50% Injectable 12.5 Gram(s) IV Push once  dextrose 50% Injectable 25 Gram(s) IV Push once  enoxaparin Injectable 40 milliGRAM(s) SubCutaneous every 24 hours  furosemide   Injectable 40 milliGRAM(s) IV Push every 12 hours  glucagon  Injectable 1 milliGRAM(s) IntraMuscular once  insulin lispro (ADMELOG) corrective regimen sliding scale   SubCutaneous Before meals and at bedtime  losartan 25 milliGRAM(s) Oral daily    MEDICATIONS  (PRN):     Heart Failure Consult    O/N: Desaturation to 86-87% while on 5L, placed on NRB then bipap  Interval History: SOB improving. No chest pain. Persistent LE edema.      OBJECTIVE  Vitals:  T(C): 36.8 (21 @ 06:06), Max: 36.9 (21 @ 15:47)  HR: 99 (21 @ 06:20) (87 - 100)  BP: 114/73 (21 @ 06:06) (114/73 - 134/82)  RR: 25 (21 @ 06:20) (2 - 25)  SpO2: 92% (21 @ 06:20) (87% - 95%)  Wt(kg): --    I/O:  I&O's Summary    2021 07:01  -  2021 07:00  --------------------------------------------------------  IN: 500 mL / OUT: 4750 mL / NET: -4250 mL        PHYSICAL EXAM:  Appearance: NAD. Speaking in full sentences.   HEENT: Difficult assessing JVD due to habitus  Cardiovascular: RRR with no murmurs.  Respiratory: Lungs decreased  Extremities: 2+ edema b/l. No erythema b/l. LE WWP b/l.  Neurologic:  Alert and awake. Moving all extremities. Following commands.   	  LABS:                        8.5    8.94  )-----------( 363      ( 2021 06:04 )             31.1     06-04    140  |  97  |  11  ----------------------------<  199<H>  4.1   |  34<H>  |  0.79    Ca    8.7      2021 06:04  Phos  3.9     06-04  Mg     1.9     06-04    TPro  7.3  /  Alb  3.5  /  TBili  0.6  /  DBili  x   /  AST  18  /  ALT  22  /  AlkPhos  82  06-04    PT/INR - ( 2021 12:59 )   PT: 15.6 sec;   INR: 1.32          PTT - ( 2021 12:59 )  PTT:34.9 sec  Urinalysis Basic - ( 2021 15:25 )    Color: Yellow / Appearance: Clear / S.015 / pH: x  Gluc: x / Ketone: NEGATIVE  / Bili: Negative / Urobili: 0.2 E.U./dL   Blood: x / Protein: NEGATIVE mg/dL / Nitrite: NEGATIVE   Leuk Esterase: NEGATIVE / RBC: < 5 /HPF / WBC < 5 /HPF   Sq Epi: x / Non Sq Epi: 0-5 /HPF / Bacteria: None /HPF        RADIOLOGY & ADDITIONAL TESTS:  Reviewed .    MEDICATIONS  (STANDING):  amLODIPine   Tablet 5 milliGRAM(s) Oral daily  aspirin  chewable 81 milliGRAM(s) Oral every 24 hours  atorvastatin 80 milliGRAM(s) Oral at bedtime  dextrose 40% Gel 15 Gram(s) Oral once  dextrose 5%. 1000 milliLiter(s) (50 mL/Hr) IV Continuous <Continuous>  dextrose 5%. 1000 milliLiter(s) (100 mL/Hr) IV Continuous <Continuous>  dextrose 50% Injectable 25 Gram(s) IV Push once  dextrose 50% Injectable 12.5 Gram(s) IV Push once  dextrose 50% Injectable 25 Gram(s) IV Push once  enoxaparin Injectable 40 milliGRAM(s) SubCutaneous every 24 hours  furosemide   Injectable 40 milliGRAM(s) IV Push every 12 hours  glucagon  Injectable 1 milliGRAM(s) IntraMuscular once  insulin lispro (ADMELOG) corrective regimen sliding scale   SubCutaneous Before meals and at bedtime  losartan 25 milliGRAM(s) Oral daily    MEDICATIONS  (PRN):

## 2021-06-04 NOTE — CONSULT NOTE ADULT - SUBJECTIVE AND OBJECTIVE BOX
Patient is a 45y old  Female who presents with a chief complaint of      HPI:  46yo female with pmhx of obesity hypoventilation syndrome and chronic hypoxemia (per pt uses 2L O2 via NC at rest, 3L when mobile and bipap at night), HFpEF, pericardial effusion in 2018 requiring IR drainage, chronic diastolic CHF (EF 55%),  DM2, HTN, HLD, MANUEL presents from Dr. Luo's office with shortness of breath and increased O2 requirement. She was sent from Dr. Luo's office today when she was noted to have O2 saturation 86% RA. Pt reports that over the past 1.5 months she has increased home O2 due to shortness of breath. She reports she feels more comfortable using 3L at rest. She endorses decreased exercise tolerance, states she often has to stop and sit down and then use bipap to feel like she has fully caught her breath. She also reports increased BLE edema, right > left, and ~30lb weight gain. She states she is non-compliant with home diuretics because she does not like the frequency of urination. She denies fever, chills, cough, chest pain, abdominal pain, vomiting, diarrhea, h/o DVT/PE. She has received one COVID vaccination.     In the ED  Vitals T 98.8 HR 99 /81 O2 sat 98% on 4L NC  Labs notable for WBC 10.51, Hgb 9.1, PT/INR 15.6/1.32  VBG pH 7.32   Ua negative  EKG NSR     Imaging  CT PE - negative for PE; Persistent dilatation of the main pulmonary artery measuring up to 3.7 cm compatible with pulmonary artery hypertension. There is bilateral mosaic attenuation pattern unchanged from prior imaging which may suggest chronic thromboembolic disease. Further imaging to include V/Q scintigraphy and/or HRCT to be performed at end inspiration/end expiration  LE Doppler - negative for DVT  CXR- Stable cardiomegaly. Bilateral opacities. Stable bony structures. Elevation of the right hemidiaphragm      Medications given: Lasix 80mg  (2021 19:14)      PAST MEDICAL & SURGICAL HISTORY:  Obstructive sleep apnea syndrome  MANUEL on CPAP    Morbid obesity    Disease of pericardium  Pericardial effusion    Type 2 diabetes mellitus  Insulin Requiring    Essential hypertension  HTN (hypertension)    Chronic diastolic CHF (congestive heart failure)    Edema  Anasarca    Cytomegalovirus infection not present  No significant past surgical history        MEDICATIONS  (STANDING):  amLODIPine   Tablet 5 milliGRAM(s) Oral daily  aspirin  chewable 81 milliGRAM(s) Oral every 24 hours  atorvastatin 80 milliGRAM(s) Oral at bedtime  dextrose 40% Gel 15 Gram(s) Oral once  dextrose 5%. 1000 milliLiter(s) (50 mL/Hr) IV Continuous <Continuous>  dextrose 5%. 1000 milliLiter(s) (100 mL/Hr) IV Continuous <Continuous>  dextrose 50% Injectable 25 Gram(s) IV Push once  dextrose 50% Injectable 12.5 Gram(s) IV Push once  dextrose 50% Injectable 25 Gram(s) IV Push once  enoxaparin Injectable 40 milliGRAM(s) SubCutaneous every 24 hours  furosemide   Injectable 40 milliGRAM(s) IV Push every 12 hours  glucagon  Injectable 1 milliGRAM(s) IntraMuscular once  insulin lispro (ADMELOG) corrective regimen sliding scale   SubCutaneous Before meals and at bedtime  losartan 25 milliGRAM(s) Oral daily    MEDICATIONS  (PRN):        Social History:                -  Home Living Status:  lives with her 2 children in an elevator accessible apartment building         -  Prior Home Care Services:   5 days x 5 hours        Baseline Functional Level Prior to Admission:             - ADL's/ IADL's:  states she is independent         - ambulatory status PTA:  walked with  a bariatric rollator    FAMILY HISTORY:  Family history of acute congestive heart failure (Mother)        CBC Full  -  ( 2021 06:04 )  WBC Count : 8.94 K/uL  RBC Count : 3.68 M/uL  Hemoglobin : 8.5 g/dL  Hematocrit : 31.1 %  Platelet Count - Automated : 363 K/uL  Mean Cell Volume : 84.5 fl  Mean Cell Hemoglobin : 23.1 pg  Mean Cell Hemoglobin Concentration : 27.3 gm/dL  Auto Neutrophil # : x  Auto Lymphocyte # : x  Auto Monocyte # : x  Auto Eosinophil # : x  Auto Basophil # : x  Auto Neutrophil % : x  Auto Lymphocyte % : x  Auto Monocyte % : x  Auto Eosinophil % : x  Auto Basophil % : x      06-04    140  |  97  |  11  ----------------------------<  199<H>  4.1   |  34<H>  |  0.79    Ca    8.7      2021 06:04  Phos  3.9     06-04  Mg     1.9     06-04    TPro  7.3  /  Alb  3.5  /  TBili  0.6  /  DBili  x   /  AST  18  /  ALT  22  /  AlkPhos  82  06-04      Urinalysis Basic - ( 2021 15:25 )    Color: Yellow / Appearance: Clear / S.015 / pH: x  Gluc: x / Ketone: NEGATIVE  / Bili: Negative / Urobili: 0.2 E.U./dL   Blood: x / Protein: NEGATIVE mg/dL / Nitrite: NEGATIVE   Leuk Esterase: NEGATIVE / RBC: < 5 /HPF / WBC < 5 /HPF   Sq Epi: x / Non Sq Epi: 0-5 /HPF / Bacteria: None /HPF          Radiology:    < from: CT Angio Chest PE Protocol w/ IV Cont (21 @ 16:59) >  EXAM:  CT ANGIO CHEST PULM [facility]                          PROCEDURE DATE:  2021          INTERPRETATION:  CT Pulmonary Angiography (CTPA) of the CHEST dated 2021 4:59 PM    INDICATION: PE suspected, high pretest prob. Shortness of breath, history of heart failure with increasing right lower extremity swelling.    TECHNIQUE: CT angiography of the pulmonary arteries was performed during rapid bolus injection of intravenous contrast.  Post-processing including the production of axialand coronal multiplanar reformatted images and coronal and axial maximum intensity projections (MIPs) was performed.    CONTRAST: Rapid bolused uneventful intravenous administration of 108 cc of Isovue-370.    PRIOR STUDY: 2018    FINDINGS:    LUNGS: No pleural effusions are seen. Bilateral mosaic attenuation pattern and abbreviated differential includes mild congestive heart failure, small airway disease inflammation with air trapping, chronic thromboembolic disease and hypersensitivity pneumonitis. This is unchanged from prior imaging. Interval improvement in a left lower lobe linear parenchymal band central airways are patent.    MEDIASTINUM: Persistent dilatation of the main pulmonary artery measuring 3.7 cm slightly decreased in size from examination dated 2018. Study is limited due to respiratory motion degradation and suboptimal intra-arterial bolus. Within that limitation however there is no evidence of main, lobar or proximal segmental branch pulmonary artery embolism. The study is limited for the assessment of more distal segmental branch emboli especially in the lung bases where motion degradation is greatest. Cardiomegaly..  There has been interval decrease in the size of a pericardial effusion which is now mild in extent. The thoracic aorta is normal in appearance. There is no mediastinal or hilar lymphadenopathy. Continued evidence of increased number of axillary lymph nodes which are nonenlarged left greater than right.    UPPER ABDOMEN, SOFT TISSUES AND BONES: Limited evaluation of the upper abdomen demonstrates persistent mild right hemidiaphragm elevation.. Mild degenerative changes of the axial skeleton with a mild S-shaped thoracolumbar scoliosis.    IMPRESSION:    1. No evidence of main, lobar orproximal segmental branch pulmonary artery embolism. The study is limited for the assessment of more distal segmental branch emboli due to respiratory motion degradation and suboptimal intra-arterial bolus.  2. Persistent dilatation of the main pulmonary artery measuring up to 3.7 cm compatible with pulmonary artery hypertension. There is bilateral mosaic attenuation pattern unchanged from prior imaging which may suggest chronic thromboembolic disease. Further imaging to include V/Q scintigraphy and/or HRCT to be performed at end inspiration/end expiration. Abbreviated differential for the mosaic attenuation pattern includes congestive heart failure, air trapping from underlying small airways inflammation, and hypersensitivity pneumonitis.  3. Cardiomegaly. Near complete resolution of pericardial effusion with trace residual pericardial fluid.                Vital Signs Last 24 Hrs  T(C): 36.8 (2021 09:03), Max: 36.9 (2021 15:47)  T(F): 98.3 (2021 09:03), Max: 98.5 (2021 15:47)  HR: 91 (2021 09:03) (87 - 99)  BP: 118/82 (2021 09:03) (114/73 - 134/82)  BP(mean): 96 (2021 03:02) (96 - 99)  RR: 22 (2021 09:03) (19 - 25)  SpO2: 92% (2021 09:03) (87% - 95%)        REVIEW OF SYSTEMS:    CONSTITUTIONAL: No fever, weight loss, or fatigue  EYES: No eye pain, visual disturbances, or discharge  ENMT:  No difficulty hearing, tinnitus, vertigo; No sinus or throat pain  NECK: No pain or stiffness  BREASTS: No pain, masses, or nipple discharge  RESPIRATORY: shortness of breath  CARDIOVASCULAR: No chest pain, palpitations, dizziness, or leg swelling  GASTROINTESTINAL: No abdominal or epigastric pain. No nausea, vomiting, or hematemesis; No diarrhea or constipation. No melena or hematochezia.  GENITOURINARY: No dysuria, frequency, hematuria, or incontinence  NEUROLOGICAL: No headaches, memory loss, loss of strength, numbness, or tremors  SKIN: No itching, burning, rashes, or lesions   LYMPH NODES: No enlarged glands  ENDOCRINE: No heat or cold intolerance; No hair loss  MUSCULOSKELETAL: No joint pain or swelling; No muscle, back, or extremity pain  PSYCHIATRIC: No depression, anxiety, mood swings, or difficulty sleeping  HEME/LYMPH: No easy bruising, or bleeding gums  ALLERGY AND IMMUNOLOGIC: No hives or eczema  VASCULAR: no swelling, erythema,   : no dysuria, hematuria, frequency        Physical Exam:  morbidly obese 46 yo AA woman lying in semi Quinn's position, reports feeling better      Neurologic Exam:    Alert and oriented to person, place, date/year, speech fluent w/o dysarthria    Motor Exam:    Right UE:          no focal weakness > 3+/5    Left UE:              no focal weakness > 3+/5      Right LE:             no focal weakness > 3+/5    Left LE:            no focal weakness > 3+/5               Sensation:         intact to light touch x 4 extremities                                            DTR:                  biceps/brachioradialis: equal bilaterally                                                   patella/ankle: equal bilaterally                        Gait:  not tested        PM&R Impression:    1) deconditioned  2) no focal weakness    Plan:    1) Physical therapy focusing on therapeutic exercises, bed mobility/transfer out of bed evaluation, progressive ambulation with assistive devices prn.    2) Anticipated Disposition Plan/Recs:    probable d/c home

## 2021-06-05 LAB
ANION GAP SERPL CALC-SCNC: 9 MMOL/L — SIGNIFICANT CHANGE UP (ref 5–17)
BLD GP AB SCN SERPL QL: NEGATIVE — SIGNIFICANT CHANGE UP
BUN SERPL-MCNC: 13 MG/DL — SIGNIFICANT CHANGE UP (ref 7–23)
CALCIUM SERPL-MCNC: 9.2 MG/DL — SIGNIFICANT CHANGE UP (ref 8.4–10.5)
CHLORIDE SERPL-SCNC: 99 MMOL/L — SIGNIFICANT CHANGE UP (ref 96–108)
CO2 SERPL-SCNC: 33 MMOL/L — HIGH (ref 22–31)
CREAT SERPL-MCNC: 0.79 MG/DL — SIGNIFICANT CHANGE UP (ref 0.5–1.3)
GLUCOSE BLDC GLUCOMTR-MCNC: 204 MG/DL — HIGH (ref 70–99)
GLUCOSE BLDC GLUCOMTR-MCNC: 214 MG/DL — HIGH (ref 70–99)
GLUCOSE BLDC GLUCOMTR-MCNC: 219 MG/DL — HIGH (ref 70–99)
GLUCOSE BLDC GLUCOMTR-MCNC: 258 MG/DL — HIGH (ref 70–99)
GLUCOSE SERPL-MCNC: 211 MG/DL — HIGH (ref 70–99)
HCT VFR BLD CALC: 33.4 % — LOW (ref 34.5–45)
HGB BLD-MCNC: 8.9 G/DL — LOW (ref 11.5–15.5)
MAGNESIUM SERPL-MCNC: 1.9 MG/DL — SIGNIFICANT CHANGE UP (ref 1.6–2.6)
MCHC RBC-ENTMCNC: 22.9 PG — LOW (ref 27–34)
MCHC RBC-ENTMCNC: 26.6 GM/DL — LOW (ref 32–36)
MCV RBC AUTO: 85.9 FL — SIGNIFICANT CHANGE UP (ref 80–100)
NRBC # BLD: 0 /100 WBCS — SIGNIFICANT CHANGE UP (ref 0–0)
PHOSPHATE SERPL-MCNC: 3.9 MG/DL — SIGNIFICANT CHANGE UP (ref 2.5–4.5)
PLATELET # BLD AUTO: 390 K/UL — SIGNIFICANT CHANGE UP (ref 150–400)
POTASSIUM SERPL-MCNC: 4.4 MMOL/L — SIGNIFICANT CHANGE UP (ref 3.5–5.3)
POTASSIUM SERPL-SCNC: 4.4 MMOL/L — SIGNIFICANT CHANGE UP (ref 3.5–5.3)
RBC # BLD: 3.89 M/UL — SIGNIFICANT CHANGE UP (ref 3.8–5.2)
RBC # FLD: 18.9 % — HIGH (ref 10.3–14.5)
RH IG SCN BLD-IMP: POSITIVE — SIGNIFICANT CHANGE UP
SODIUM SERPL-SCNC: 141 MMOL/L — SIGNIFICANT CHANGE UP (ref 135–145)
WBC # BLD: 9.68 K/UL — SIGNIFICANT CHANGE UP (ref 3.8–10.5)
WBC # FLD AUTO: 9.68 K/UL — SIGNIFICANT CHANGE UP (ref 3.8–10.5)

## 2021-06-05 PROCEDURE — 99232 SBSQ HOSP IP/OBS MODERATE 35: CPT

## 2021-06-05 RX ORDER — INSULIN LISPRO 100/ML
2 VIAL (ML) SUBCUTANEOUS
Refills: 0 | Status: DISCONTINUED | OUTPATIENT
Start: 2021-06-05 | End: 2021-06-06

## 2021-06-05 RX ORDER — MAGNESIUM SULFATE 500 MG/ML
1 VIAL (ML) INJECTION ONCE
Refills: 0 | Status: COMPLETED | OUTPATIENT
Start: 2021-06-05 | End: 2021-06-05

## 2021-06-05 RX ORDER — INSULIN GLARGINE 100 [IU]/ML
5 INJECTION, SOLUTION SUBCUTANEOUS AT BEDTIME
Refills: 0 | Status: DISCONTINUED | OUTPATIENT
Start: 2021-06-05 | End: 2021-06-06

## 2021-06-05 RX ORDER — ACETAMINOPHEN 500 MG
650 TABLET ORAL EVERY 6 HOURS
Refills: 0 | Status: DISCONTINUED | OUTPATIENT
Start: 2021-06-05 | End: 2021-06-08

## 2021-06-05 RX ORDER — ENOXAPARIN SODIUM 100 MG/ML
40 INJECTION SUBCUTANEOUS EVERY 12 HOURS
Refills: 0 | Status: DISCONTINUED | OUTPATIENT
Start: 2021-06-05 | End: 2021-06-08

## 2021-06-05 RX ADMIN — ENOXAPARIN SODIUM 40 MILLIGRAM(S): 100 INJECTION SUBCUTANEOUS at 22:03

## 2021-06-05 RX ADMIN — Medication 6: at 12:29

## 2021-06-05 RX ADMIN — Medication 40 MILLIGRAM(S): at 17:25

## 2021-06-05 RX ADMIN — Medication 4: at 09:30

## 2021-06-05 RX ADMIN — Medication 650 MILLIGRAM(S): at 06:27

## 2021-06-05 RX ADMIN — AMLODIPINE BESYLATE 5 MILLIGRAM(S): 2.5 TABLET ORAL at 06:26

## 2021-06-05 RX ADMIN — Medication 40 MILLIGRAM(S): at 06:26

## 2021-06-05 RX ADMIN — Medication 81 MILLIGRAM(S): at 06:27

## 2021-06-05 RX ADMIN — Medication 4: at 22:03

## 2021-06-05 RX ADMIN — Medication 4: at 17:24

## 2021-06-05 RX ADMIN — ATORVASTATIN CALCIUM 80 MILLIGRAM(S): 80 TABLET, FILM COATED ORAL at 22:03

## 2021-06-05 RX ADMIN — ENOXAPARIN SODIUM 40 MILLIGRAM(S): 100 INJECTION SUBCUTANEOUS at 12:29

## 2021-06-05 RX ADMIN — Medication 100 GRAM(S): at 09:30

## 2021-06-05 RX ADMIN — Medication 2 UNIT(S): at 17:25

## 2021-06-05 RX ADMIN — INSULIN GLARGINE 5 UNIT(S): 100 INJECTION, SOLUTION SUBCUTANEOUS at 22:04

## 2021-06-05 RX ADMIN — LOSARTAN POTASSIUM 25 MILLIGRAM(S): 100 TABLET, FILM COATED ORAL at 06:26

## 2021-06-05 NOTE — PROGRESS NOTE ADULT - SUBJECTIVE AND OBJECTIVE BOX
OVERNIGHT EVENTS: ADDY     SUBJECTIVE / INTERVAL HPI: Patient seen and examined at bedside. Patient feels well this morning, with no acute complaints. Denies SOB, chest pain, difficulty breathing, N/V/D. Patient reports the swelling in her right leg has improved, although there is still tenderness to palpation in the calf.     VITAL SIGNS:  Vital Signs Last 24 Hrs  T(C): 36.9 (05 Jun 2021 06:07), Max: 36.9 (05 Jun 2021 06:07)  T(F): 98.5 (05 Jun 2021 06:07), Max: 98.5 (05 Jun 2021 06:07)  HR: 109 (05 Jun 2021 06:07) (90 - 109)  BP: 119/77 (05 Jun 2021 06:07) (119/77 - 129/79)  BP(mean): --  RR: 18 (05 Jun 2021 06:07) (12 - 19)  SpO2: 93% (05 Jun 2021 06:07) (90% - 96%)    PHYSICAL EXAM:  General: obese female resting in bed, in NAD   HEENT: NC/AT; PERRL, anicteric sclera; MMM. Nasal cannula in place   Neck: supple  Cardiovascular: +S1/S2; RRR. No murmurs appreciated   Respiratory: decreased breath sounds b/l but otherwise CTAL, no W/R/R. Patient on 6 NC, saturating well and breathing comfortably. No accessory muscle use, speaking in full sentences, in no respiratory distress.  Gastrointestinal: obese, soft, NT/ND; +BSx4. No rigidity or guarding   Extremities: b/l 2+ pitting edema, worst on the right w/ swelling that has improved from prior exams. No erythema noted. Discomfort to palpation in right calf.   Neurological: AAOx3; no focal deficits    MEDICATIONS:  MEDICATIONS  (STANDING):  amLODIPine   Tablet 5 milliGRAM(s) Oral daily  aspirin  chewable 81 milliGRAM(s) Oral every 24 hours  atorvastatin 80 milliGRAM(s) Oral at bedtime  dextrose 40% Gel 15 Gram(s) Oral once  dextrose 5%. 1000 milliLiter(s) (50 mL/Hr) IV Continuous <Continuous>  dextrose 5%. 1000 milliLiter(s) (100 mL/Hr) IV Continuous <Continuous>  dextrose 50% Injectable 25 Gram(s) IV Push once  dextrose 50% Injectable 12.5 Gram(s) IV Push once  dextrose 50% Injectable 25 Gram(s) IV Push once  enoxaparin Injectable 40 milliGRAM(s) SubCutaneous every 24 hours  furosemide   Injectable 40 milliGRAM(s) IV Push every 12 hours  glucagon  Injectable 1 milliGRAM(s) IntraMuscular once  insulin lispro (ADMELOG) corrective regimen sliding scale   SubCutaneous Before meals and at bedtime  losartan 25 milliGRAM(s) Oral daily    MEDICATIONS  (PRN):  acetaminophen   Tablet .. 650 milliGRAM(s) Oral every 6 hours PRN Temp greater or equal to 38C (100.4F), Mild Pain (1 - 3)      ALLERGIES:  Allergies    No Known Drug Allergies  shellfish (Anaphylaxis)    Intolerances        LABS:                        8.9    9.68  )-----------( 390      ( 05 Jun 2021 06:15 )             33.4     06-05    141  |  99  |  13  ----------------------------<  211<H>  4.4   |  33<H>  |  0.79    Ca    9.2      05 Jun 2021 06:15  Phos  3.9     06-05  Mg     1.9     06-05    TPro  7.3  /  Alb  3.5  /  TBili  0.6  /  DBili  x   /  AST  18  /  ALT  22  /  AlkPhos  82  06-04        CAPILLARY BLOOD GLUCOSE      POCT Blood Glucose.: 219 mg/dL (05 Jun 2021 09:01)      RADIOLOGY & ADDITIONAL TESTS: Reviewed.

## 2021-06-05 NOTE — PROGRESS NOTE ADULT - PROBLEM SELECTOR PLAN 8
F: tolerating PO, no IVF  E: replete K<4, Mg<2  N: Dash/TLC    VTE Prophylaxis: Lovenox 40 Q24H  GI: not needed  C: Full Code  D: RMF F: tolerating PO, no IVF  E: replete K<4, Mg<2  N: Dash/TLC    VTE Prophylaxis: Lovenox 40 Q12 (based on weight)   GI: not needed  C: Full Code  D: RMF

## 2021-06-05 NOTE — PROGRESS NOTE ADULT - SUBJECTIVE AND OBJECTIVE BOX
Interval Events: Reviewed  Patient seen and examined at bedside.    Patient is a 45y old  Female who presents with a chief complaint of SOB (04 Jun 2021 11:19)  no acute event overnight, bipap overnight      PAST MEDICAL & SURGICAL HISTORY:  Obstructive sleep apnea syndrome  MANUEL on CPAP    Morbid obesity  Morbid obesity    Disease of pericardium  Pericardial effusion    Type 2 diabetes mellitus  Insulin Requiring    Essential hypertension  HTN (hypertension)    Chronic diastolic heart failure  Chronic diastolic CHF (congestive heart failure)    Edema  Anasarca    Cytomegalovirus infection not present  No significant past surgical history        MEDICATIONS:  Pulmonary:    Antimicrobials:    Anticoagulants:  aspirin  chewable 81 milliGRAM(s) Oral every 24 hours  enoxaparin Injectable 40 milliGRAM(s) SubCutaneous every 24 hours    Cardiac:  amLODIPine   Tablet 5 milliGRAM(s) Oral daily  furosemide   Injectable 40 milliGRAM(s) IV Push every 12 hours  losartan 25 milliGRAM(s) Oral daily      Allergies    No Known Drug Allergies  shellfish (Anaphylaxis)    Intolerances        Vital Signs Last 24 Hrs  T(C): 36.5 (04 Jun 2021 20:42), Max: 36.8 (04 Jun 2021 09:03)  T(F): 97.7 (04 Jun 2021 20:42), Max: 98.3 (04 Jun 2021 09:03)  HR: 98 (04 Jun 2021 23:05) (91 - 100)  BP: 129/79 (04 Jun 2021 20:42) (118/82 - 129/79)  BP(mean): --  RR: 12 (04 Jun 2021 23:05) (12 - 25)  SpO2: 96% (04 Jun 2021 23:05) (90% - 96%)    06-03 @ 07:01  -  06-04 @ 07:00  --------------------------------------------------------  IN: 500 mL / OUT: 4750 mL / NET: -4250 mL    06-04 @ 07:01  -  06-05 @ 06:14  --------------------------------------------------------  IN: 1680 mL / OUT: 3200 mL / NET: -1520 mL          Review of Systems:   •	General: negative  •	Skin/Breast: negative  •	Ophthalmologic: negative  •	ENMT: negative  •	Respiratory and Thorax: negative  •	Cardiovascular: negative  •	Gastrointestinal: negative  •	Genitourinary: negative  •	Musculoskeletal: negative  •	Neurological: negative  •	Psychiatric: negative  •	Hematology/Lymphatics: negative  •	Endocrine: negative  •	Allergic/Immunologic: negative    Physical Exam:         • Constitutional:	Well-developed, well nourished, obese  • Eyes:	EOMI; PERRL; no drainage or redness  • ENMT:	No oral lesions; no gross abnormalities  • Neck	no thyromegaly or nodules  • Breasts:	not examined  • Back:	No deformity or limitation of movement  • Respiratory:	Breath Sounds equal & clear to auscultation, no accessory muscle use  • Cardiovascular:	Regular rate & rhythm, normal S1, S2; no murmurs, gallops or rubs; no S3, S4  • Gastrointestinal:	Soft, non-tender, no hepatosplenomegaly, normal bowel sounds  • Genitourinary:	not examined  • Rectal: not examined  • Extremities:	No cyanosis, clubbing or edema  • Vascular:	Equal and normal pulses (dorsalis pedis)  • Neurologica:l	not examined  • Skin:	No lesions; no rash  • Lymph Nodes:	No lymphadedenopathy  • Musculoskeletal:	No joint pain, swelling or deformity; no limitation of movement        LABS:      CBC Full  -  ( 04 Jun 2021 06:04 )  WBC Count : 8.94 K/uL  RBC Count : 3.68 M/uL  Hemoglobin : 8.5 g/dL  Hematocrit : 31.1 %  Platelet Count - Automated : 363 K/uL  Mean Cell Volume : 84.5 fl  Mean Cell Hemoglobin : 23.1 pg  Mean Cell Hemoglobin Concentration : 27.3 gm/dL  Auto Neutrophil # : x  Auto Lymphocyte # : x  Auto Monocyte # : x  Auto Eosinophil # : x  Auto Basophil # : x  Auto Neutrophil % : x  Auto Lymphocyte % : x  Auto Monocyte % : x  Auto Eosinophil % : x  Auto Basophil % : x    06-04    140  |  97  |  11  ----------------------------<  199<H>  4.1   |  34<H>  |  0.79    Ca    8.7      04 Jun 2021 06:04  Phos  3.9     06-04  Mg     1.9     06-04    TPro  7.3  /  Alb  3.5  /  TBili  0.6  /  DBili  x   /  AST  18  /  ALT  22  /  AlkPhos  82  06-04                        RADIOLOGY & ADDITIONAL STUDIES (The following images were personally reviewed):  Alamo:                                     No  Urine output:                       adequate  DVT prophylaxis:                 Yes  Flattus:                                  Yes  Bowel movement:              No

## 2021-06-05 NOTE — PROGRESS NOTE ADULT - PROBLEM SELECTOR PLAN 2
Chronic diastolic CHF (EF 55%) in November 2019. On torsemide 40 BID at home. However, reports noncompliance 2/2 increased urinary frequency.  - c/w Lasix 40 BID  -c/w ASA 81 daily   -c/w atorvastatin 81 mg qd    - Echo from 6/3:  2. Normal left and right ventricular size and systolic function.   3. Dilated right ventricular size.   4. Probably normal right ventricular systolic function.   5. No significant valvular disease.   6. Pulmonary hypertension present, pulmonary artery systolic pressure is 65 mmHg.   7. Moderate pericardial effusion without echocardiographic evidence of cardiac tamponade physiology.   8. Compared to the previous TTE performed on 11/6/2019, there is now moderate to severe pulmonary hypertension.  - f/u HF consult (Dr. Luo to call Dr. Ramirez)  -C/w current regimen, UOP goal 4-5 L. Patient net neg 3.9 L.

## 2021-06-05 NOTE — PROGRESS NOTE ADULT - PROBLEM SELECTOR PLAN 5
Uses BiPAP at night   - c/w BiPAP    #Anemia: Hgb 8.1 this morning. Per chart review, patient has a history of anemia w/ baseline Hgb 9-10  -Maintain active T&S   -Transfuse if needed

## 2021-06-06 LAB
ALBUMIN SERPL ELPH-MCNC: 3.7 G/DL — SIGNIFICANT CHANGE UP (ref 3.3–5)
ALP SERPL-CCNC: 90 U/L — SIGNIFICANT CHANGE UP (ref 40–120)
ALT FLD-CCNC: 25 U/L — SIGNIFICANT CHANGE UP (ref 10–45)
ANION GAP SERPL CALC-SCNC: 9 MMOL/L — SIGNIFICANT CHANGE UP (ref 5–17)
AST SERPL-CCNC: 18 U/L — SIGNIFICANT CHANGE UP (ref 10–40)
BILIRUB SERPL-MCNC: 0.6 MG/DL — SIGNIFICANT CHANGE UP (ref 0.2–1.2)
BUN SERPL-MCNC: 13 MG/DL — SIGNIFICANT CHANGE UP (ref 7–23)
CALCIUM SERPL-MCNC: 9.5 MG/DL — SIGNIFICANT CHANGE UP (ref 8.4–10.5)
CHLORIDE SERPL-SCNC: 97 MMOL/L — SIGNIFICANT CHANGE UP (ref 96–108)
CO2 SERPL-SCNC: 35 MMOL/L — HIGH (ref 22–31)
CREAT SERPL-MCNC: 0.78 MG/DL — SIGNIFICANT CHANGE UP (ref 0.5–1.3)
GLUCOSE BLDC GLUCOMTR-MCNC: 186 MG/DL — HIGH (ref 70–99)
GLUCOSE BLDC GLUCOMTR-MCNC: 235 MG/DL — HIGH (ref 70–99)
GLUCOSE BLDC GLUCOMTR-MCNC: 248 MG/DL — HIGH (ref 70–99)
GLUCOSE BLDC GLUCOMTR-MCNC: 257 MG/DL — HIGH (ref 70–99)
GLUCOSE SERPL-MCNC: 245 MG/DL — HIGH (ref 70–99)
HCT VFR BLD CALC: 33.9 % — LOW (ref 34.5–45)
HGB BLD-MCNC: 9.1 G/DL — LOW (ref 11.5–15.5)
MAGNESIUM SERPL-MCNC: 1.9 MG/DL — SIGNIFICANT CHANGE UP (ref 1.6–2.6)
MCHC RBC-ENTMCNC: 22.7 PG — LOW (ref 27–34)
MCHC RBC-ENTMCNC: 26.8 GM/DL — LOW (ref 32–36)
MCV RBC AUTO: 84.5 FL — SIGNIFICANT CHANGE UP (ref 80–100)
NRBC # BLD: 0 /100 WBCS — SIGNIFICANT CHANGE UP (ref 0–0)
PHOSPHATE SERPL-MCNC: 4.2 MG/DL — SIGNIFICANT CHANGE UP (ref 2.5–4.5)
PLATELET # BLD AUTO: 401 K/UL — HIGH (ref 150–400)
POTASSIUM SERPL-MCNC: 4.2 MMOL/L — SIGNIFICANT CHANGE UP (ref 3.5–5.3)
POTASSIUM SERPL-SCNC: 4.2 MMOL/L — SIGNIFICANT CHANGE UP (ref 3.5–5.3)
PROT SERPL-MCNC: 7.8 G/DL — SIGNIFICANT CHANGE UP (ref 6–8.3)
RBC # BLD: 4.01 M/UL — SIGNIFICANT CHANGE UP (ref 3.8–5.2)
RBC # FLD: 19.2 % — HIGH (ref 10.3–14.5)
SODIUM SERPL-SCNC: 141 MMOL/L — SIGNIFICANT CHANGE UP (ref 135–145)
WBC # BLD: 8.66 K/UL — SIGNIFICANT CHANGE UP (ref 3.8–10.5)
WBC # FLD AUTO: 8.66 K/UL — SIGNIFICANT CHANGE UP (ref 3.8–10.5)

## 2021-06-06 PROCEDURE — 99232 SBSQ HOSP IP/OBS MODERATE 35: CPT

## 2021-06-06 RX ORDER — INSULIN LISPRO 100/ML
4 VIAL (ML) SUBCUTANEOUS
Refills: 0 | Status: DISCONTINUED | OUTPATIENT
Start: 2021-06-06 | End: 2021-06-07

## 2021-06-06 RX ORDER — INSULIN GLARGINE 100 [IU]/ML
10 INJECTION, SOLUTION SUBCUTANEOUS AT BEDTIME
Refills: 0 | Status: DISCONTINUED | OUTPATIENT
Start: 2021-06-06 | End: 2021-06-07

## 2021-06-06 RX ADMIN — Medication 2 UNIT(S): at 17:32

## 2021-06-06 RX ADMIN — Medication 40 MILLIGRAM(S): at 17:47

## 2021-06-06 RX ADMIN — ATORVASTATIN CALCIUM 80 MILLIGRAM(S): 80 TABLET, FILM COATED ORAL at 22:27

## 2021-06-06 RX ADMIN — INSULIN GLARGINE 10 UNIT(S): 100 INJECTION, SOLUTION SUBCUTANEOUS at 22:27

## 2021-06-06 RX ADMIN — ENOXAPARIN SODIUM 40 MILLIGRAM(S): 100 INJECTION SUBCUTANEOUS at 09:53

## 2021-06-06 RX ADMIN — ENOXAPARIN SODIUM 40 MILLIGRAM(S): 100 INJECTION SUBCUTANEOUS at 22:26

## 2021-06-06 RX ADMIN — LOSARTAN POTASSIUM 25 MILLIGRAM(S): 100 TABLET, FILM COATED ORAL at 06:33

## 2021-06-06 RX ADMIN — Medication 4: at 12:28

## 2021-06-06 RX ADMIN — AMLODIPINE BESYLATE 5 MILLIGRAM(S): 2.5 TABLET ORAL at 06:33

## 2021-06-06 RX ADMIN — Medication 40 MILLIGRAM(S): at 06:33

## 2021-06-06 RX ADMIN — Medication 81 MILLIGRAM(S): at 06:34

## 2021-06-06 RX ADMIN — Medication 2: at 17:32

## 2021-06-06 RX ADMIN — Medication 6: at 08:57

## 2021-06-06 RX ADMIN — Medication 2 UNIT(S): at 08:56

## 2021-06-06 RX ADMIN — Medication 2 UNIT(S): at 12:28

## 2021-06-06 RX ADMIN — Medication 4: at 22:26

## 2021-06-06 NOTE — PROGRESS NOTE ADULT - SUBJECTIVE AND OBJECTIVE BOX
Interval Events: Reviewed  Patient seen and examined at bedside.    Patient is a 45y old  Female who presents with a chief complaint of SOB (05 Jun 2021 06:13)  no acute event overnight. on bipap at 40%      PAST MEDICAL & SURGICAL HISTORY:  Obstructive sleep apnea syndrome  MANUEL on CPAP    Morbid obesity  Morbid obesity    Disease of pericardium  Pericardial effusion    Type 2 diabetes mellitus  Insulin Requiring    Essential hypertension  HTN (hypertension)    Chronic diastolic heart failure  Chronic diastolic CHF (congestive heart failure)    Edema  Anasarca    Cytomegalovirus infection not present  No significant past surgical history        MEDICATIONS:  Pulmonary:    Antimicrobials:    Anticoagulants:  aspirin  chewable 81 milliGRAM(s) Oral every 24 hours  enoxaparin Injectable 40 milliGRAM(s) SubCutaneous every 12 hours    Cardiac:  amLODIPine   Tablet 5 milliGRAM(s) Oral daily  furosemide   Injectable 40 milliGRAM(s) IV Push every 12 hours  losartan 25 milliGRAM(s) Oral daily      Allergies    No Known Drug Allergies  shellfish (Anaphylaxis)    Intolerances        Vital Signs Last 24 Hrs  T(C): 36.7 (05 Jun 2021 21:00), Max: 36.9 (05 Jun 2021 06:07)  T(F): 98.1 (05 Jun 2021 21:00), Max: 98.5 (05 Jun 2021 06:07)  HR: 89 (06 Jun 2021 02:50) (85 - 109)  BP: 121/74 (05 Jun 2021 21:00) (119/77 - 121/74)  BP(mean): --  RR: 23 (06 Jun 2021 00:15) (18 - 23)  SpO2: 97% (06 Jun 2021 02:50) (91% - 97%)    06-04 @ 07:01  -  06-05 @ 07:00  --------------------------------------------------------  IN: 1680 mL / OUT: 3900 mL / NET: -2220 mL    06-05 @ 07:01  -  06-06 @ 06:04  --------------------------------------------------------  IN: 0 mL / OUT: 2000 mL / NET: -2000 mL          Review of Systems:   •	General: negative  •	Skin/Breast: negative  •	Ophthalmologic: negative  •	ENMT: negative  •	Respiratory and Thorax: negative  •	Cardiovascular: negative  •	Gastrointestinal: negative  •	Genitourinary: negative  •	Musculoskeletal: negative  •	Neurological: negative  •	Psychiatric: negative  •	Hematology/Lymphatics: negative  •	Endocrine: negative  •	Allergic/Immunologic: negative    Physical Exam:   • Constitutional:	Well-developed, well nourished, obese   • Eyes:	EOMI; PERRL; no drainage or redness  • ENMT:	No oral lesions; no gross abnormalities  • Neck	no thyromegaly or nodules  • Breasts:	not examined  • Back:	No deformity or limitation of movement  • Respiratory:	Breath Sounds equal & clear to  auscultation, no accessory muscle use  • Cardiovascular:	Regular rate & rhythm, normal S1, S2; no murmurs, gallops or rubs; no S3, S4  • Gastrointestinal:	Soft, non-tender, no hepatosplenomegaly, normal bowel sounds  • Genitourinary:	not examined  • Rectal: not examined  • Extremities:	No cyanosis, clubbing or edema  • Vascular:	Equal and normal pulses (dorsalis pedis)  • Neurologica:l	not examined  • Skin:	No lesions; no rash  • Lymph Nodes:	No lymphadedenopathy  • Musculoskeletal:	No joint pain, swelling or deformity; no limitation of movement        LABS:      CBC Full  -  ( 05 Jun 2021 06:15 )  WBC Count : 9.68 K/uL  RBC Count : 3.89 M/uL  Hemoglobin : 8.9 g/dL  Hematocrit : 33.4 %  Platelet Count - Automated : 390 K/uL  Mean Cell Volume : 85.9 fl  Mean Cell Hemoglobin : 22.9 pg  Mean Cell Hemoglobin Concentration : 26.6 gm/dL  Auto Neutrophil # : x  Auto Lymphocyte # : x  Auto Monocyte # : x  Auto Eosinophil # : x  Auto Basophil # : x  Auto Neutrophil % : x  Auto Lymphocyte % : x  Auto Monocyte % : x  Auto Eosinophil % : x  Auto Basophil % : x    06-05    141  |  99  |  13  ----------------------------<  211<H>  4.4   |  33<H>  |  0.79    Ca    9.2      05 Jun 2021 06:15  Phos  3.9     06-05  Mg     1.9     06-05                          RADIOLOGY & ADDITIONAL STUDIES (The following images were personally reviewed):  Alamo:                                     No  Urine output:                       adequate  DVT prophylaxis:                 Yes  Flattus:                                  Yes  Bowel movement:              No

## 2021-06-06 NOTE — PROGRESS NOTE ADULT - PROBLEM SELECTOR PLAN 8
F: tolerating PO, no IVF  E: replete K<4, Mg<2  N: Dash/TLC    VTE Prophylaxis: Lovenox 40 Q12 (based on weight)   GI: not needed  C: Full Code  D: RMF

## 2021-06-07 ENCOUNTER — TRANSCRIPTION ENCOUNTER (OUTPATIENT)
Age: 46
End: 2021-06-07

## 2021-06-07 LAB
ALBUMIN SERPL ELPH-MCNC: 3.7 G/DL — SIGNIFICANT CHANGE UP (ref 3.3–5)
ALP SERPL-CCNC: 88 U/L — SIGNIFICANT CHANGE UP (ref 40–120)
ALT FLD-CCNC: 25 U/L — SIGNIFICANT CHANGE UP (ref 10–45)
ANION GAP SERPL CALC-SCNC: 8 MMOL/L — SIGNIFICANT CHANGE UP (ref 5–17)
AST SERPL-CCNC: 19 U/L — SIGNIFICANT CHANGE UP (ref 10–40)
BILIRUB SERPL-MCNC: 0.6 MG/DL — SIGNIFICANT CHANGE UP (ref 0.2–1.2)
BUN SERPL-MCNC: 14 MG/DL — SIGNIFICANT CHANGE UP (ref 7–23)
CALCIUM SERPL-MCNC: 9.5 MG/DL — SIGNIFICANT CHANGE UP (ref 8.4–10.5)
CHLORIDE SERPL-SCNC: 95 MMOL/L — LOW (ref 96–108)
CO2 SERPL-SCNC: 37 MMOL/L — HIGH (ref 22–31)
CREAT SERPL-MCNC: 0.81 MG/DL — SIGNIFICANT CHANGE UP (ref 0.5–1.3)
GLUCOSE BLDC GLUCOMTR-MCNC: 208 MG/DL — HIGH (ref 70–99)
GLUCOSE BLDC GLUCOMTR-MCNC: 246 MG/DL — HIGH (ref 70–99)
GLUCOSE BLDC GLUCOMTR-MCNC: 247 MG/DL — HIGH (ref 70–99)
GLUCOSE BLDC GLUCOMTR-MCNC: 294 MG/DL — HIGH (ref 70–99)
GLUCOSE SERPL-MCNC: 233 MG/DL — HIGH (ref 70–99)
HCT VFR BLD CALC: 34.1 % — LOW (ref 34.5–45)
HGB BLD-MCNC: 9.1 G/DL — LOW (ref 11.5–15.5)
MAGNESIUM SERPL-MCNC: 1.8 MG/DL — SIGNIFICANT CHANGE UP (ref 1.6–2.6)
MCHC RBC-ENTMCNC: 23.1 PG — LOW (ref 27–34)
MCHC RBC-ENTMCNC: 26.7 GM/DL — LOW (ref 32–36)
MCV RBC AUTO: 86.5 FL — SIGNIFICANT CHANGE UP (ref 80–100)
NRBC # BLD: 0 /100 WBCS — SIGNIFICANT CHANGE UP (ref 0–0)
PHOSPHATE SERPL-MCNC: 4.1 MG/DL — SIGNIFICANT CHANGE UP (ref 2.5–4.5)
PLATELET # BLD AUTO: 374 K/UL — SIGNIFICANT CHANGE UP (ref 150–400)
POTASSIUM SERPL-MCNC: 4.2 MMOL/L — SIGNIFICANT CHANGE UP (ref 3.5–5.3)
POTASSIUM SERPL-SCNC: 4.2 MMOL/L — SIGNIFICANT CHANGE UP (ref 3.5–5.3)
PROT SERPL-MCNC: 7.8 G/DL — SIGNIFICANT CHANGE UP (ref 6–8.3)
RBC # BLD: 3.94 M/UL — SIGNIFICANT CHANGE UP (ref 3.8–5.2)
RBC # FLD: 18.7 % — HIGH (ref 10.3–14.5)
SODIUM SERPL-SCNC: 140 MMOL/L — SIGNIFICANT CHANGE UP (ref 135–145)
WBC # BLD: 8.94 K/UL — SIGNIFICANT CHANGE UP (ref 3.8–10.5)
WBC # FLD AUTO: 8.94 K/UL — SIGNIFICANT CHANGE UP (ref 3.8–10.5)

## 2021-06-07 PROCEDURE — 99232 SBSQ HOSP IP/OBS MODERATE 35: CPT | Mod: GC

## 2021-06-07 RX ORDER — INSULIN LISPRO 100/ML
6 VIAL (ML) SUBCUTANEOUS
Refills: 0 | Status: DISCONTINUED | OUTPATIENT
Start: 2021-06-07 | End: 2021-06-08

## 2021-06-07 RX ORDER — MAGNESIUM SULFATE 500 MG/ML
2 VIAL (ML) INJECTION ONCE
Refills: 0 | Status: COMPLETED | OUTPATIENT
Start: 2021-06-07 | End: 2021-06-07

## 2021-06-07 RX ORDER — FUROSEMIDE 40 MG
40 TABLET ORAL ONCE
Refills: 0 | Status: COMPLETED | OUTPATIENT
Start: 2021-06-07 | End: 2021-06-07

## 2021-06-07 RX ORDER — INSULIN GLARGINE 100 [IU]/ML
16 INJECTION, SOLUTION SUBCUTANEOUS AT BEDTIME
Refills: 0 | Status: DISCONTINUED | OUTPATIENT
Start: 2021-06-07 | End: 2021-06-08

## 2021-06-07 RX ADMIN — Medication 4: at 22:04

## 2021-06-07 RX ADMIN — Medication 4: at 17:28

## 2021-06-07 RX ADMIN — Medication 50 GRAM(S): at 12:08

## 2021-06-07 RX ADMIN — Medication 650 MILLIGRAM(S): at 01:21

## 2021-06-07 RX ADMIN — Medication 40 MILLIGRAM(S): at 17:28

## 2021-06-07 RX ADMIN — Medication 4 UNIT(S): at 08:44

## 2021-06-07 RX ADMIN — ENOXAPARIN SODIUM 40 MILLIGRAM(S): 100 INJECTION SUBCUTANEOUS at 11:17

## 2021-06-07 RX ADMIN — LOSARTAN POTASSIUM 25 MILLIGRAM(S): 100 TABLET, FILM COATED ORAL at 06:12

## 2021-06-07 RX ADMIN — Medication 6 UNIT(S): at 17:28

## 2021-06-07 RX ADMIN — Medication 6: at 12:08

## 2021-06-07 RX ADMIN — ATORVASTATIN CALCIUM 80 MILLIGRAM(S): 80 TABLET, FILM COATED ORAL at 22:03

## 2021-06-07 RX ADMIN — Medication 650 MILLIGRAM(S): at 00:05

## 2021-06-07 RX ADMIN — Medication 81 MILLIGRAM(S): at 06:12

## 2021-06-07 RX ADMIN — Medication 4: at 08:44

## 2021-06-07 RX ADMIN — Medication 40 MILLIGRAM(S): at 06:12

## 2021-06-07 RX ADMIN — AMLODIPINE BESYLATE 5 MILLIGRAM(S): 2.5 TABLET ORAL at 06:12

## 2021-06-07 RX ADMIN — INSULIN GLARGINE 16 UNIT(S): 100 INJECTION, SOLUTION SUBCUTANEOUS at 22:03

## 2021-06-07 RX ADMIN — ENOXAPARIN SODIUM 40 MILLIGRAM(S): 100 INJECTION SUBCUTANEOUS at 22:01

## 2021-06-07 RX ADMIN — Medication 4 UNIT(S): at 12:09

## 2021-06-07 NOTE — PROGRESS NOTE ADULT - PROBLEM SELECTOR PROBLEM 3
Swelling of both lower extremities

## 2021-06-07 NOTE — DIETITIAN INITIAL EVALUATION ADULT. - PROBLEM SELECTOR PLAN 7
BMI 64.8. Reports additional weight gain in past few months with increased SOB and decreasing exercise tolerance.   -  patient on healthy eating  - consider nutrition consult

## 2021-06-07 NOTE — DISCHARGE NOTE PROVIDER - NSDCCPCAREPLAN_GEN_ALL_CORE_FT
PRINCIPAL DISCHARGE DIAGNOSIS  Diagnosis: CHF (congestive heart failure)  Assessment and Plan of Treatment: During your hospitalization an echocardiogram (an ultrasound of the heart) was performed and showed that you have heart failure. You have a known diagnosis of heart failure. Heart failure is a disease where the heart is less efficient at pumping blood to the rest of the body. When this happens, fluid can "back up" into the lungs and the rest of the body. Heart failure can be caused by many things, in your case it was caused by your recent heart attack.   During your hospitalization you were treated with IV Lasix to help remove the excess fluid from your body.   Instructions:  -Please take your home medication Torsemide every day for your heart failure as prescribed.  -Weigh yourself daily.  If you notice a weight gain of 2-3lbs in 3 days, or 5lbs in a week call your Health Care Provider.  Eat a low sodium diet (less than 2 grams of sodium a day).  Call your Health Care Provider if you have any swelling or increased swelling in your feet, ankles, and/or stomach.  Take all of your medication as directed.  If you become dizzy call your Health Care Provider.  If you experience chest pain or shortness of breath, call an ambulance and come to the emergency department.        SECONDARY DISCHARGE DIAGNOSES  Diagnosis: Pulmonary hypertension  Assessment and Plan of Treatment: Pulmonary arterial hypertension (PAH) is a condition that increases blood pressure in your pulmonary artery. The pulmonary artery is the large blood vessel that brings blood from your heart to your lungs.  The following are common causes of associated PAH: certain conditions, such as tumors pressing on the pulmonary artery or sickle cell disease, problems in your heart, lungs, or blood vessels,  an infection, such as schistosomiasis or HIV, portal hypertension or cirrhosis, connective tissue diseases, such as scleroderma, lupus, or rheumatoid arthritis. The symptoms of PAH include feeling weak and tired, weight gain or lack of appetite, joint pain, shortness of breath with exercise, abdominal swelling, chest pain or heart palpitations (strong, fast heartbeats), dizziness or feeling faint.   DISCHARGE INSTRUCTIONS:  Call 911 for any of the following:   •You have chest pain or heart palpitations (strong, fast heartbeats).  •You have shortness of breath at rest, especially when you lie down.  Seek care immediately if:   •Your legs or ankles are swollen.   •You are vomiting and cannot eat or drink.  •You are confused or feel like you are going to faint.   Contact your healthcare provider if:   •You have a fever.  •Your symptoms keep you from doing your daily activities.  •Your joints are painful and swollen.  •Your fingers or toes are clubbed (the ends are round and thick).    Diagnosis: Diabetes mellitus  Assessment and Plan of Treatment: Type 2 diabetes (sometimes called type 2 "diabetes mellitus") is a disorder that disrupts the way your body uses sugar.  All the cells in your body need sugar to work normally. Sugar gets into the cells with the help of a hormone called insulin. If there is not enough insulin, or if the body stops responding to insulin, sugar builds up in the blood. That is what happens to people with diabetes.  There are 2 different types of diabetes. You have Type 2 diabetes, where the body's cells do not respond to insulin, the body does not make enough insulin, or both.  Even though type 2 diabetes might not make you feel sick, it can cause serious problems over time, if it is not treated. The disorder can lead to heart attacks, strokes, kidney disease, vision problems (or even blindness), pain or loss of feeling in the hands and feet, the need to have fingers, toes, or other body parts removed (amputated).   There are a few medicines that help control blood sugar. Some people need to take pills that help the body make more insulin or that help insulin do its job. Others need insulin shots.  Medicines are not the only tool to manage diabetes. Being active, losing weight, eating right, and not smoking can all help people with diabetes stay as healthy as possible. It's also important to get the flu vaccine every year. Some people also need a vaccine to prevent pneumonia, too.  Please continue to take your diabetes medication every day as prescibed.    Diagnosis: Hypertension  Assessment and Plan of Treatment: Hypertension is the medical term for high blood pressure. Blood pressure refers to the pressure that blood applies to the inner walls of the arteries. Arteries carry blood from the heart to other organs and parts of the body. Untreated high blood pressure increases the strain on the heart and arteries, eventually causing organ damage. High blood pressure increases the risk of heart failure, heart attack (myocardial infarction), stroke, and kidney failure. High blood pressure does not usually cause any symptoms. Treatment of hypertension usually begins with lifestyle changes. Making these lifestyle changes involves little or no risk. Recommended changes often include reducing the amount of salt in your diet, losing weight if you are overweight or obese, avoiding drinking too much alcohol, stopping smoking and exercising at least 30 minutes per day most days of the week. If you are prescribed medication for your hypertension it is important to take these as prescribed to prevent the possible complications of uncontrolled hypertension.  Please continue taking your home hypertensive medications as prescribed.

## 2021-06-07 NOTE — DISCHARGE NOTE PROVIDER - HOSPITAL COURSE
INCOMPLETE - DO NOT DELETE   46yo female with pmhx of obesity hypoventilation syndrome and chronic hypoxemia (per pt uses 2L O2 via NC at rest, 3L when mobile and bipap at night), HFpEF, pericardial effusion in 8/2018 requiring IR drainage, chronic diastolic CHF (EF 55%),  DM2, HTN, HLD, MANUEL presents from Dr. Luo's office with shortness of breath and increased O2 requirement, found to be hypoxic on room air. Patient treated with IV Lasix 40 BID and then transitioned to home Torsemide dose. Patient stable for discharge home with home services.     #Shortness of breath.  Plan: Decreased exercise tolerance and increased O2 requirements for past 1.5 months. Likely 2/2 home diuretic noncompliance. CT PE negative for PE but c/f pulmonary artery hypertension. S/p lasix 80 in the ED. Likely due to severe pulmonary HTN and fluid overload from medication non-compliance.   - c/w Lasix 40 BID  - c/w O2 (baseline 3 L NC at home) - currently weaned down to 4 L   - BiPAP overnight and when patient is sleeping   -See below   -Heart failure consulted - appreciate recs   -Dopplers negative for DVT but difficult due to body habitus. Will order D-dimer  -Echo from 6/3:    2. Normal left and right ventricular size and systolic function.   3. Dilated right ventricular size.   4. Probably normal right ventricular systolic function.   5. No significant valvular disease.   6. Pulmonary hypertension present, pulmonary artery systolic pressure is 65 mmHg.   7. Moderate pericardial effusion without echocardiographic evidence of cardiac tamponade physiology.   8. Compared to the previous TTE performed on 11/6/2019, there is now moderate to severe pulmonary hypertension.     #Chronic diastolic heart failure.  Plan: Chronic diastolic CHF (EF 55%) in November 2019. On torsemide 40 BID at home. However, reports noncompliance 2/2 increased urinary frequency.  - c/w Lasix 40 BID - will switch to home PO Torsemide tomorrow   -c/w ASA 81 daily   -c/w atorvastatin 81 mg qd    - Echo from 6/3:  2. Normal left and right ventricular size and systolic function.   3. Dilated right ventricular size.   4. Probably normal right ventricular systolic function.   5. No significant valvular disease.   6. Pulmonary hypertension present, pulmonary artery systolic pressure is 65 mmHg.   7. Moderate pericardial effusion without echocardiographic evidence of cardiac tamponade physiology.   8. Compared to the previous TTE performed on 11/6/2019, there is now moderate to severe pulmonary hypertension.  - f/u HF consult - appreciate recs   -C/w current regimen, UOP goal 4-5 L. Patient net neg 3.8 L yesterday.    #Swelling of both lower extremities.  Plan: Patient w/ swelling of b/l LE, R>>L. RLE ttp. Chronic venous stasis skin changes noted on exam.  - LE doppler negative for DVT but difficult to assess due to body habitus.  -Lower suspicion for cellulitis due to clinical presentation, afebrile, and no white count.   -D-dimer WNL   -Improving     #Pulmonary HTN: seen on echo from 6/4 - pulmonary artery systolic pressure is 65 mmHg. Moderate pericardial effusion without echocardiographic evidence of cardiac tamponade physiology. Compared to the previous TTE performed on 11/6/2019, there is now moderate to severe pulmonary hypertension.  -Likely due to Lasix non-compliance   -C/w Lasix 40 BID   -Plan as above.     #Type 2 diabetes mellitus.  Plan: A1c 7.8 in November 2019. On victoza 1.2 and metformin 1000 BID at home.  - c/w mISS  - A1c 7.7  -C/w Lantus 10 u and Lispro 4 U TID, adjust accordingly based on FS  -Will be discharged on home medications.    #Obstructive sleep apnea syndrome.  Plan: Uses BiPAP at night   - c/w BiPAP    #Anemia: Hgb 8.1 this morning. Per chart review, patient has a history of anemia w/ baseline Hgb 9-10  -Maintain active T&S   -Transfuse if needed.     #Essential hypertension. Plan: Home meds: amlodipine 5 mg qd and losartan 25 mg qd  - c/w home medications.    #Morbid obesity.  Plan: BMI 64.8. Reports additional weight gain in past few months with increased SOB and decreasing exercise tolerance.   -  patient on healthy eating  - consider nutrition consult  -Outpatient f/u.     Inpatient treatment course: as above     New medications:     Labs to be followed outpatient: CBC, CMP, A1c, finger stick    Exam to be followed outpatient: physical exam with PCP   46yo female with pmhx of obesity hypoventilation syndrome and chronic hypoxemia (per pt uses 2L O2 via NC at rest, 3L when mobile and bipap at night), HFpEF, pericardial effusion in 8/2018 requiring IR drainage, chronic diastolic CHF (EF 55%),  DM2, HTN, HLD, MANUEL presents from Dr. Luo's office with shortness of breath and increased O2 requirement, found to be hypoxic on room air. Patient treated with IV Lasix 40 BID and then transitioned to home Torsemide dose. Patient stable for discharge home with home services.     #Shortness of breath.  Plan: Decreased exercise tolerance and increased O2 requirements for past 1.5 months. Likely 2/2 home diuretic noncompliance. CT PE negative for PE but c/f pulmonary artery hypertension. S/p lasix 80 in the ED. Likely due to severe pulmonary HTN and fluid overload from medication non-compliance.   - c/w Lasix 40 BID  - c/w O2 (baseline 3 L NC at home) - currently weaned down to 4 L   - BiPAP overnight and when patient is sleeping   -See below   -Heart failure consulted - appreciate recs   -Dopplers negative for DVT but difficult due to body habitus. Will order D-dimer  -Echo from 6/3:    2. Normal left and right ventricular size and systolic function.   3. Dilated right ventricular size.   4. Probably normal right ventricular systolic function.   5. No significant valvular disease.   6. Pulmonary hypertension present, pulmonary artery systolic pressure is 65 mmHg.   7. Moderate pericardial effusion without echocardiographic evidence of cardiac tamponade physiology.   8. Compared to the previous TTE performed on 11/6/2019, there is now moderate to severe pulmonary hypertension.     #Chronic diastolic heart failure.  Plan: Chronic diastolic CHF (EF 55%) in November 2019. On torsemide 40 BID at home. However, reports noncompliance 2/2 increased urinary frequency.  - c/w Lasix 40 BID - will switch to home PO Torsemide tomorrow   -c/w ASA 81 daily   -c/w atorvastatin 81 mg qd    - Echo from 6/3:  2. Normal left and right ventricular size and systolic function.   3. Dilated right ventricular size.   4. Probably normal right ventricular systolic function.   5. No significant valvular disease.   6. Pulmonary hypertension present, pulmonary artery systolic pressure is 65 mmHg.   7. Moderate pericardial effusion without echocardiographic evidence of cardiac tamponade physiology.   8. Compared to the previous TTE performed on 11/6/2019, there is now moderate to severe pulmonary hypertension.  - f/u HF consult - appreciate recs   -C/w current regimen, UOP goal 4-5 L. Patient net neg 3.8 L yesterday.    #Swelling of both lower extremities.  Plan: Patient w/ swelling of b/l LE, R>>L. RLE ttp. Chronic venous stasis skin changes noted on exam.  - LE doppler negative for DVT but difficult to assess due to body habitus.  -Lower suspicion for cellulitis due to clinical presentation, afebrile, and no white count.   -D-dimer WNL   -Improving     #Pulmonary HTN: seen on echo from 6/4 - pulmonary artery systolic pressure is 65 mmHg. Moderate pericardial effusion without echocardiographic evidence of cardiac tamponade physiology. Compared to the previous TTE performed on 11/6/2019, there is now moderate to severe pulmonary hypertension.  -Likely due to Lasix non-compliance   -C/w Lasix 40 BID   -Plan as above.     #Type 2 diabetes mellitus.  Plan: A1c 7.8 in November 2019. On victoza 1.2 and metformin 1000 BID at home.  - c/w mISS  - A1c 7.7  -C/w Lantus 10 u and Lispro 4 U TID, adjust accordingly based on FS  -Will be discharged on home medications.    #Obstructive sleep apnea syndrome.  Plan: Uses BiPAP at night   - c/w BiPAP    #Anemia: Hgb 8.1 this morning. Per chart review, patient has a history of anemia w/ baseline Hgb 9-10  -Maintain active T&S   -Transfuse if needed.     #Essential hypertension. Plan: Home meds: amlodipine 5 mg qd and losartan 25 mg qd  - c/w home medications.    #Morbid obesity.  Plan: BMI 64.8. Reports additional weight gain in past few months with increased SOB and decreasing exercise tolerance.   -  patient on healthy eating  - consider nutrition consult  -Outpatient f/u.     Inpatient treatment course: as above     New medications: none     Labs to be followed outpatient: CBC, CMP, A1c, finger stick    Exam to be followed outpatient: physical exam with PCP

## 2021-06-07 NOTE — DISCHARGE NOTE PROVIDER - PROVIDER TOKENS
PROVIDER:[TOKEN:[[id number]],FOLLOWUP:[2 weeks]],PROVIDER:[TOKEN:[[id number]],FOLLOWUP:[2 weeks]] PROVIDER:[TOKEN:[[id number]],FOLLOWUP:[2 weeks]],PROVIDER:[TOKEN:[[id number]],SCHEDULEDAPPT:[06/16/2021],SCHEDULEDAPPTTIME:[02:00 PM]],FREE:[LAST:[Ashley],FIRST:[Ebonie SRINIVASAN],PHONE:[phone]],FAX:[phone]   [phone]    -],ADDRESS:[CARDIOLOGY  [address] [place] [address]],SCHEDULEDAPPT:[06/15/2021],SCHEDULEDAPPTTIME:[10:50 AM]]

## 2021-06-07 NOTE — PROGRESS NOTE ADULT - PROBLEM SELECTOR PLAN 4
A1c 7.8 in November 2019. On victoza 1.2 and metformin 1000 BID at home.  - c/w mISS  - A1c 7.7  -C/w Lantus 10 u and Lispro 4 U TID, adjust accordingly based on FS A1c 7.8 in November 2019. On victoza 1.2 and metformin 1000 BID at home.  - c/w mISS  - A1c 7.7  -C/w Lantus 10 u and Lispro 4 U TID, adjust accordingly based on FS  -Will be discharged on home medications

## 2021-06-07 NOTE — PROGRESS NOTE ADULT - SUBJECTIVE AND OBJECTIVE BOX
INTERVAL EVENTS:    PAST MEDICAL & SURGICAL HISTORY:  Obstructive sleep apnea syndrome  MANUEL on CPAP    Morbid obesity  Morbid obesity    Disease of pericardium  Pericardial effusion    Type 2 diabetes mellitus  Insulin Requiring    Essential hypertension  HTN (hypertension)    Chronic diastolic heart failure  Chronic diastolic CHF (congestive heart failure)    Edema  Anasarca    Cytomegalovirus infection not present  No significant past surgical history        MEDICATIONS  (STANDING):  amLODIPine   Tablet 5 milliGRAM(s) Oral daily  aspirin  chewable 81 milliGRAM(s) Oral every 24 hours  atorvastatin 80 milliGRAM(s) Oral at bedtime  dextrose 40% Gel 15 Gram(s) Oral once  dextrose 5%. 1000 milliLiter(s) (50 mL/Hr) IV Continuous <Continuous>  dextrose 5%. 1000 milliLiter(s) (100 mL/Hr) IV Continuous <Continuous>  dextrose 50% Injectable 25 Gram(s) IV Push once  dextrose 50% Injectable 12.5 Gram(s) IV Push once  dextrose 50% Injectable 25 Gram(s) IV Push once  enoxaparin Injectable 40 milliGRAM(s) SubCutaneous every 12 hours  furosemide   Injectable 40 milliGRAM(s) IV Push every 12 hours  glucagon  Injectable 1 milliGRAM(s) IntraMuscular once  insulin glargine Injectable (LANTUS) 10 Unit(s) SubCutaneous at bedtime  insulin lispro (ADMELOG) corrective regimen sliding scale   SubCutaneous Before meals and at bedtime  insulin lispro Injectable (ADMELOG) 4 Unit(s) SubCutaneous three times a day before meals  losartan 25 milliGRAM(s) Oral daily    MEDICATIONS  (PRN):  acetaminophen   Tablet .. 650 milliGRAM(s) Oral every 6 hours PRN Temp greater or equal to 38C (100.4F), Mild Pain (1 - 3)    Vital Signs Last 24 Hrs  T(C): 36.6 (07 Jun 2021 09:10), Max: 36.6 (06 Jun 2021 16:24)  T(F): 97.9 (07 Jun 2021 09:10), Max: 97.9 (06 Jun 2021 21:40)  HR: 100 (07 Jun 2021 09:10) (77 - 100)  BP: 124/73 (07 Jun 2021 09:10) (106/69 - 124/73)  BP(mean): --  RR: 20 (07 Jun 2021 09:10) (18 - 24)  SpO2: 90% (07 Jun 2021 09:10) (90% - 98%)    PHYSICAL EXAM:  GEN: NAD  HEENT: EOMI   RESP: CTA b/l  CV: RRR. Normal S1/S2. No m/r/g.  ABD: soft, non-distended  EXT: No edema   NEURO: alert and attentive    LABS:                        9.1    8.94  )-----------( 374      ( 07 Jun 2021 07:48 )             34.1     06-07    140  |  95<L>  |  14  ----------------------------<  233<H>  4.2   |  37<H>  |  0.81    Ca    9.5      07 Jun 2021 07:48  Phos  4.1     06-07  Mg     1.8     06-07    TPro  7.8  /  Alb  3.7  /  TBili  0.6  /  DBili  x   /  AST  19  /  ALT  25  /  AlkPhos  88  06-07            I&O's Summary    06 Jun 2021 07:01  -  07 Jun 2021 07:00  --------------------------------------------------------  IN: 600 mL / OUT: 3900 mL / NET: -3300 mL    07 Jun 2021 07:01  -  07 Jun 2021 09:22  --------------------------------------------------------  IN: 0 mL / OUT: 1400 mL / NET: -1400 mL               INTERVAL EVENTS: ADDY     PAST MEDICAL & SURGICAL HISTORY:  Obstructive sleep apnea syndrome  MANUEL on CPAP    Morbid obesity  Morbid obesity    Disease of pericardium  Pericardial effusion    Type 2 diabetes mellitus  Insulin Requiring    Essential hypertension  HTN (hypertension)    Chronic diastolic heart failure  Chronic diastolic CHF (congestive heart failure)    Edema  Anasarca    Cytomegalovirus infection not present  No significant past surgical history      MEDICATIONS  (STANDING):  amLODIPine   Tablet 5 milliGRAM(s) Oral daily  aspirin  chewable 81 milliGRAM(s) Oral every 24 hours  atorvastatin 80 milliGRAM(s) Oral at bedtime  dextrose 40% Gel 15 Gram(s) Oral once  dextrose 5%. 1000 milliLiter(s) (50 mL/Hr) IV Continuous <Continuous>  dextrose 5%. 1000 milliLiter(s) (100 mL/Hr) IV Continuous <Continuous>  dextrose 50% Injectable 25 Gram(s) IV Push once  dextrose 50% Injectable 12.5 Gram(s) IV Push once  dextrose 50% Injectable 25 Gram(s) IV Push once  enoxaparin Injectable 40 milliGRAM(s) SubCutaneous every 12 hours  furosemide   Injectable 40 milliGRAM(s) IV Push every 12 hours  glucagon  Injectable 1 milliGRAM(s) IntraMuscular once  insulin glargine Injectable (LANTUS) 10 Unit(s) SubCutaneous at bedtime  insulin lispro (ADMELOG) corrective regimen sliding scale   SubCutaneous Before meals and at bedtime  insulin lispro Injectable (ADMELOG) 4 Unit(s) SubCutaneous three times a day before meals  losartan 25 milliGRAM(s) Oral daily    MEDICATIONS  (PRN):  acetaminophen   Tablet .. 650 milliGRAM(s) Oral every 6 hours PRN Temp greater or equal to 38C (100.4F), Mild Pain (1 - 3)    Vital Signs Last 24 Hrs  T(C): 36.6 (07 Jun 2021 09:10), Max: 36.6 (06 Jun 2021 16:24)  T(F): 97.9 (07 Jun 2021 09:10), Max: 97.9 (06 Jun 2021 21:40)  HR: 100 (07 Jun 2021 09:10) (77 - 100)  BP: 124/73 (07 Jun 2021 09:10) (106/69 - 124/73)  BP(mean): --  RR: 20 (07 Jun 2021 09:10) (18 - 24)  SpO2: 90% (07 Jun 2021 09:10) (90% - 98%)    PHYSICAL EXAM:  GEN: NAD  HEENT: EOMI   RESP: CTA b/l  CV: RRR. Normal S1/S2. No m/r/g.  ABD: soft, non-distended  EXT: mild edema   NEURO: alert and attentive    LABS:                        9.1    8.94  )-----------( 374      ( 07 Jun 2021 07:48 )             34.1     06-07    140  |  95<L>  |  14  ----------------------------<  233<H>  4.2   |  37<H>  |  0.81    Ca    9.5      07 Jun 2021 07:48  Phos  4.1     06-07  Mg     1.8     06-07    TPro  7.8  /  Alb  3.7  /  TBili  0.6  /  DBili  x   /  AST  19  /  ALT  25  /  AlkPhos  88  06-07      I&O's Summary    06 Jun 2021 07:01  -  07 Jun 2021 07:00  --------------------------------------------------------  IN: 600 mL / OUT: 3900 mL / NET: -3300 mL    07 Jun 2021 07:01  -  07 Jun 2021 09:22  --------------------------------------------------------  IN: 0 mL / OUT: 1400 mL / NET: -1400 mL

## 2021-06-07 NOTE — PROGRESS NOTE ADULT - PROBLEM SELECTOR PROBLEM 5
Obstructive sleep apnea syndrome

## 2021-06-07 NOTE — PROGRESS NOTE ADULT - PROBLEM SELECTOR PLAN 2
Chronic diastolic CHF (EF 55%) in November 2019. On torsemide 40 BID at home. However, reports noncompliance 2/2 increased urinary frequency.  - c/w Lasix 40 BID  -c/w ASA 81 daily   -c/w atorvastatin 81 mg qd    - Echo from 6/3:  2. Normal left and right ventricular size and systolic function.   3. Dilated right ventricular size.   4. Probably normal right ventricular systolic function.   5. No significant valvular disease.   6. Pulmonary hypertension present, pulmonary artery systolic pressure is 65 mmHg.   7. Moderate pericardial effusion without echocardiographic evidence of cardiac tamponade physiology.   8. Compared to the previous TTE performed on 11/6/2019, there is now moderate to severe pulmonary hypertension.  - f/u HF consult - appreciate recs   -C/w current regimen, UOP goal 4-5 L. Patient net neg 3.8 L yesterday. Chronic diastolic CHF (EF 55%) in November 2019. On torsemide 40 BID at home. However, reports noncompliance 2/2 increased urinary frequency.  - c/w Lasix 40 BID - will switch to home PO Torsemide tomorrow   -c/w ASA 81 daily   -c/w atorvastatin 81 mg qd    - Echo from 6/3:  2. Normal left and right ventricular size and systolic function.   3. Dilated right ventricular size.   4. Probably normal right ventricular systolic function.   5. No significant valvular disease.   6. Pulmonary hypertension present, pulmonary artery systolic pressure is 65 mmHg.   7. Moderate pericardial effusion without echocardiographic evidence of cardiac tamponade physiology.   8. Compared to the previous TTE performed on 11/6/2019, there is now moderate to severe pulmonary hypertension.  - f/u HF consult - appreciate recs   -C/w current regimen, UOP goal 4-5 L. Patient net neg 3.8 L yesterday.

## 2021-06-07 NOTE — PROGRESS NOTE ADULT - SUBJECTIVE AND OBJECTIVE BOX
Physical Medicine and Rehabilitation Progress Note:    Patient is a 45y old  Female who presents with a chief complaint of sob (07 Jun 2021 13:04)      HPI:  44yo female with pmhx of obesity hypoventilation syndrome and chronic hypoxemia (per pt uses 2L O2 via NC at rest, 3L when mobile and bipap at night), HFpEF, pericardial effusion in 8/2018 requiring IR drainage, chronic diastolic CHF (EF 55%),  DM2, HTN, HLD, MANUEL presents from Dr. Luo's office with shortness of breath and increased O2 requirement. She was sent from Dr. Luo's office today when she was noted to have O2 saturation 86% RA. Pt reports that over the past 1.5 months she has increased home O2 due to shortness of breath. She reports she feels more comfortable using 3L at rest. She endorses decreased exercise tolerance, states she often has to stop and sit down and then use bipap to feel like she has fully caught her breath. She also reports increased BLE edema, right > left, and ~30lb weight gain. She states she is non-compliant with home diuretics because she does not like the frequency of urination. She denies fever, chills, cough, chest pain, abdominal pain, vomiting, diarrhea, h/o DVT/PE. She has received one COVID vaccination.     In the ED  Vitals T 98.8 HR 99 /81 O2 sat 98% on 4L NC  Labs notable for WBC 10.51, Hgb 9.1, PT/INR 15.6/1.32  VBG pH 7.32   Ua negative  EKG NSR     Imaging  CT PE - negative for PE; Persistent dilatation of the main pulmonary artery measuring up to 3.7 cm compatible with pulmonary artery hypertension. There is bilateral mosaic attenuation pattern unchanged from prior imaging which may suggest chronic thromboembolic disease. Further imaging to include V/Q scintigraphy and/or HRCT to be performed at end inspiration/end expiration  LE Doppler - negative for DVT  CXR- Stable cardiomegaly. Bilateral opacities. Stable bony structures. Elevation of the right hemidiaphragm      Medications given: Lasix 80mg  (02 Jun 2021 19:14)                            9.1    8.94  )-----------( 374      ( 07 Jun 2021 07:48 )             34.1       06-07    140  |  95<L>  |  14  ----------------------------<  233<H>  4.2   |  37<H>  |  0.81    Ca    9.5      07 Jun 2021 07:48  Phos  4.1     06-07  Mg     1.8     06-07    TPro  7.8  /  Alb  3.7  /  TBili  0.6  /  DBili  x   /  AST  19  /  ALT  25  /  AlkPhos  88  06-07    Vital Signs Last 24 Hrs  T(C): 36.6 (07 Jun 2021 09:10), Max: 36.6 (06 Jun 2021 16:24)  T(F): 97.9 (07 Jun 2021 09:10), Max: 97.9 (06 Jun 2021 21:40)  HR: 100 (07 Jun 2021 09:10) (77 - 100)  BP: 124/73 (07 Jun 2021 09:10) (106/69 - 124/73)  BP(mean): --  RR: 20 (07 Jun 2021 09:10) (18 - 24)  SpO2: 90% (07 Jun 2021 09:10) (90% - 98%)    MEDICATIONS  (STANDING):  amLODIPine   Tablet 5 milliGRAM(s) Oral daily  aspirin  chewable 81 milliGRAM(s) Oral every 24 hours  atorvastatin 80 milliGRAM(s) Oral at bedtime  dextrose 40% Gel 15 Gram(s) Oral once  dextrose 5%. 1000 milliLiter(s) (50 mL/Hr) IV Continuous <Continuous>  dextrose 5%. 1000 milliLiter(s) (100 mL/Hr) IV Continuous <Continuous>  dextrose 50% Injectable 25 Gram(s) IV Push once  dextrose 50% Injectable 12.5 Gram(s) IV Push once  dextrose 50% Injectable 25 Gram(s) IV Push once  enoxaparin Injectable 40 milliGRAM(s) SubCutaneous every 12 hours  furosemide   Injectable 40 milliGRAM(s) IV Push once  glucagon  Injectable 1 milliGRAM(s) IntraMuscular once  insulin glargine Injectable (LANTUS) 10 Unit(s) SubCutaneous at bedtime  insulin lispro (ADMELOG) corrective regimen sliding scale   SubCutaneous Before meals and at bedtime  insulin lispro Injectable (ADMELOG) 4 Unit(s) SubCutaneous three times a day before meals  losartan 25 milliGRAM(s) Oral daily    MEDICATIONS  (PRN):  acetaminophen   Tablet .. 650 milliGRAM(s) Oral every 6 hours PRN Temp greater or equal to 38C (100.4F), Mild Pain (1 - 3)    Currently Undergoing Physical/ Occupational Therapy at bedside.    Functional Status Assessment:         Cognitive/Perceptual/Neuro  Cognitive/Neuro/Behavioral [WDL Definition: Alert; opens eyes spontaneously; arouses to voice or touch; oriented x 4; follows commands; speech spontaneous, logical; purposeful motor response; behavior appropriate to situation]: WDL    Language Assistance  Preferred Language to Address Healthcare Preferred Language to Address Healthcare: English    Therapeutic Interventions      Sit-Stand Transfer Training  Transfer Training Sit-to-Stand Transfer: supervision;  full weight-bearing   rollator  Transfer Training Stand-to-Sit Transfer: supervision;  verbal cues;  Verbal cueing to lock rollator;  full weight-bearing   rollator  Sit-to-Stand Transfer Training Transfer Safety Analysis: decreased balance;  decreased strength;  impaired balance;  rollator    Gait Training  Gait Training: contact guard;  verbal cues;  full weight-bearing   125ft;  rollator  Gait Analysis: 2-point gait   decreased sahra;  increased time in double stance;  decreased hip/knee flexion;  impaired balance;  decreased strength;  125ft;  rollator  Gait Number of Times:: x 2  Type of Rest Type of Rest: sitting  Duration of Rest Duration of Rest: 2 min     Therapeutic Exercise  Therapeutic Exercise Detail: LAQ, ankle pumps x10           PM&R Impression: as above    Current Disposition Plan Recommendations:  d/c home, resume home care services

## 2021-06-07 NOTE — PROGRESS NOTE ADULT - PROBLEM SELECTOR PLAN 1
Decreased exercise tolerance and increased O2 requirements for past 1.5 months. Likely 2/2 home diuretic noncompliance. CT PE negative for PE but c/f pulmonary artery hypertension. S/p lasix 80 in the ED. Likely due to severe pulmonary HTN and fluid overload from medication non-compliance.   - c/w Lasix 40 BID  - c/w O2 (baseline 3 L NC at home - currently on 5-6 L). Wean down to baseline as tolerated   - BiPAP overnight and when patient is sleeping   -See below   -Heart failure consulted - appreciate recs   -Dopplers negative for DVT but difficult due to body habitus. Will order D-dimer  -Echo from 6/3:    2. Normal left and right ventricular size and systolic function.   3. Dilated right ventricular size.   4. Probably normal right ventricular systolic function.   5. No significant valvular disease.   6. Pulmonary hypertension present, pulmonary artery systolic pressure is 65 mmHg.   7. Moderate pericardial effusion without echocardiographic evidence of cardiac tamponade physiology.   8. Compared to the previous TTE performed on 11/6/2019, there is now moderate to severe pulmonary hypertension. Decreased exercise tolerance and increased O2 requirements for past 1.5 months. Likely 2/2 home diuretic noncompliance. CT PE negative for PE but c/f pulmonary artery hypertension. S/p lasix 80 in the ED. Likely due to severe pulmonary HTN and fluid overload from medication non-compliance.   - c/w Lasix 40 BID  - c/w O2 (baseline 3 L NC at home) - currently weaned down to 4 L   - BiPAP overnight and when patient is sleeping   -See below   -Heart failure consulted - appreciate recs   -Dopplers negative for DVT but difficult due to body habitus. Will order D-dimer  -Echo from 6/3:    2. Normal left and right ventricular size and systolic function.   3. Dilated right ventricular size.   4. Probably normal right ventricular systolic function.   5. No significant valvular disease.   6. Pulmonary hypertension present, pulmonary artery systolic pressure is 65 mmHg.   7. Moderate pericardial effusion without echocardiographic evidence of cardiac tamponade physiology.   8. Compared to the previous TTE performed on 11/6/2019, there is now moderate to severe pulmonary hypertension.

## 2021-06-07 NOTE — PROGRESS NOTE ADULT - ASSESSMENT
45F PMH OHS, MANUEL on home O2, HFpEF (55%), pericardial effusion 2018 s/p IR drainage, DM2, HTN presented from Dr. Luo's office with worsening shortness of breath and LE edema. Heart failure consulted for further management of HFpEF.     PLAN:  HFpEF (55%)  TTE (6/3/21): Normal left (60-65%) and right ventricular size and systolic function, dilated right ventricular size, Probably normal right ventricular systolic function, No significant valvular disease, Pulmonary hypertension present, pulmonary artery systolic pressure is 65 mmHg, moderate pericardial effusion without echocardiographic evidence of cardiac tamponade physiology  Remains overloaded with 2+ edema, decreased breath sounds  - Continue diuresis with lasix 40mg IV BID, would continue at current rate- goal of approx net neg -3L over 24 hours- as increasing diuresis rate can cause more rapid volume shifts that can worsen kidney function or affect hemodynamic stability  - Strict I/O and daily standing weights  - Monitor electrolytes closely to maintain K>4 and Mg>2      *Incomplete    45F PMH OHS, MANUEL on home O2, HFpEF (55%), pericardial effusion 2018 s/p IR drainage, DM2, HTN presented from Dr. Luo's office with worsening shortness of breath and LE edema. Heart failure consulted for further management of HFpEF.     PLAN:  HFpEF (55%)  TTE (6/3/21): Normal left (60-65%) and right ventricular size and systolic function, dilated right ventricular size, Probably normal right ventricular systolic function, No significant valvular disease, Pulmonary hypertension present, pulmonary artery systolic pressure is 65 mmHg, moderate pericardial effusion without echocardiographic evidence of cardiac tamponade physiology. Close to euvolemia now.   - Continue diuresis with lasix 40mg IV BID; switch to home torsemide 40mg PO daily tomorrow (6/8) and stop IV diuresis tomorrow   - Strict I/O and daily standing weights  - Monitor electrolytes closely to maintain K>4 and Mg>2

## 2021-06-07 NOTE — DISCHARGE NOTE PROVIDER - NSDCMRMEDTOKEN_GEN_ALL_CORE_FT
amLODIPine 5 mg oral tablet: 1 tab(s) orally once a day  Aspirin Enteric Coated 81 mg oral delayed release tablet: 1 tab(s) orally once a day  atorvastatin 80 mg oral tablet: 1 tab(s) orally once a day  losartan 25 mg oral tablet: 1 tab(s) orally once a day  metFORMIN 1000 mg oral tablet: 1 tab(s) orally 2 times a day  potassium chloride 20 mEq oral tablet, extended release: 1 tab(s) orally once a day  torsemide 20 mg oral tablet: 2 tab(s) orally once a day  Victoza 18 mg/3 mL subcutaneous solution: 1.2 milligram(s) subcutaneous once a day

## 2021-06-07 NOTE — DISCHARGE NOTE PROVIDER - CARE PROVIDER_API CALL
Sheridan Luo)  Critical Care Medicine; Pulmonary Disease  [address] [place] [address]  Phone: [phone]  Fax: [phone]  Follow Up Time: 2 weeks    Ebonie Ramirez  CARDIOLOGY  [address] [place] [address]  Phone: [phone]  Fax: [phone]  Follow Up Time: 2 weeks   Sheridan Luo)  Critical Care Medicine; Pulmonary Disease  [address], [address] [place] [address]  Phone: [phone]  Fax: [phone]  Follow Up Time: 2 weeks    Agueda Hawk)  Internal Medicine  [address] [place] [address]  Phone: [phone]  Fax: [phone]  Scheduled Appointment: 06/16/2021 02:00 PM    Ebonie Ramirez  CARDIOLOGY  [address] [place] [address]  Phone: [phone]  Fax: (   )    -  Scheduled Appointment: 06/15/2021 10:50 AM

## 2021-06-07 NOTE — DIETITIAN INITIAL EVALUATION ADULT. - ADD RECOMMEND
1. DASH, consistent carbohydrate diet 2. Trend wts 3. Monitor lytes, glucose, skin 4. Reinforce diet education prn

## 2021-06-07 NOTE — PROGRESS NOTE ADULT - PROBLEM SELECTOR PLAN 6
Home meds: amlodipine 5 and losartan 25.  - c/w home medications
Home meds: amlodipine 5 mg qd and losartan 25 mg qd  - c/w home medications

## 2021-06-07 NOTE — PROGRESS NOTE ADULT - PROBLEM SELECTOR PLAN 7
BMI 64.8. Reports additional weight gain in past few months with increased SOB and decreasing exercise tolerance.   -  patient on healthy eating  - consider nutrition consult
BMI 64.8. Reports additional weight gain in past few months with increased SOB and decreasing exercise tolerance.   -  patient on healthy eating  - consider nutrition consult  -Outpatient f/u

## 2021-06-07 NOTE — DISCHARGE NOTE PROVIDER - NSDCFUADDAPPT_GEN_ALL_CORE_FT
Please arrive 15 minutes early and bring your Insurance card, Photo ID and Discharge paperwork to the following appointments:    (1) Please follow up with your Cardiology Provider, Dr. Ebonie Ramirez at [address] [place] [address] on 06/15/2021 at 10:50am.    Appointment was scheduled by Ms. SOPHIA Simpson, Referral Coordinator.    (2) Please follow up with your Primary Care Provider, Dr. Kim Schoenfeld on behalf of Dr. Gloria Hawk at [address] [place] [address] on 06/16/2021 at 2:00pm.    Appointment was scheduled by Ms. SOPHIA Simpson, Referral Coordinator.    (3) Please note that Whit from the office of your Pulmonary Medicine Provider, Dr. Sheridan Luo will contact you from phone [phone] to schedule an appointment briefly following your hospital discharge.    Appointment was facilitated by Ms. SOPHIA Simpson, Referral Coordinator.

## 2021-06-07 NOTE — PROGRESS NOTE ADULT - SUBJECTIVE AND OBJECTIVE BOX
OVERNIGHT EVENTS: ADDY     SUBJECTIVE / INTERVAL HPI: Patient seen and examined at bedside. Patient reports feeling well this morning with no new complaints. Reports improvement of her breathing and has been using the BiPAP when sleeping and nasal cannula during the day. Reports continued right calf tenderness, but improved from prior; the swelling in her right leg has improved. Denies fevers, chills, chest pain, N/V/D, or difficulty in urination.    VITAL SIGNS:  Vital Signs Last 24 Hrs  T(C): 36.3 (07 Jun 2021 05:43), Max: 36.8 (06 Jun 2021 08:25)  T(F): 97.4 (07 Jun 2021 05:43), Max: 98.3 (06 Jun 2021 08:25)  HR: 95 (07 Jun 2021 05:43) (77 - 98)  BP: 123/80 (07 Jun 2021 05:43) (106/69 - 123/80)  BP(mean): --  RR: 20 (07 Jun 2021 05:43) (17 - 24)  SpO2: 93% (07 Jun 2021 05:43) (93% - 99%)    PHYSICAL EXAM:    General: WDWN  HEENT: NC/AT; PERRL, anicteric sclera; MMM  Neck: supple  Cardiovascular: +S1/S2; RRR  Respiratory: CTA B/L; no W/R/R  Gastrointestinal: soft, NT/ND; +BSx4  Extremities: WWP; no edema, clubbing or cyanosis  Vascular: 2+ radial, DP/PT pulses B/L  Neurological: AAOx3; no focal deficits    MEDICATIONS:  MEDICATIONS  (STANDING):  amLODIPine   Tablet 5 milliGRAM(s) Oral daily  aspirin  chewable 81 milliGRAM(s) Oral every 24 hours  atorvastatin 80 milliGRAM(s) Oral at bedtime  dextrose 40% Gel 15 Gram(s) Oral once  dextrose 5%. 1000 milliLiter(s) (50 mL/Hr) IV Continuous <Continuous>  dextrose 5%. 1000 milliLiter(s) (100 mL/Hr) IV Continuous <Continuous>  dextrose 50% Injectable 25 Gram(s) IV Push once  dextrose 50% Injectable 12.5 Gram(s) IV Push once  dextrose 50% Injectable 25 Gram(s) IV Push once  enoxaparin Injectable 40 milliGRAM(s) SubCutaneous every 12 hours  furosemide   Injectable 40 milliGRAM(s) IV Push every 12 hours  glucagon  Injectable 1 milliGRAM(s) IntraMuscular once  insulin glargine Injectable (LANTUS) 10 Unit(s) SubCutaneous at bedtime  insulin lispro (ADMELOG) corrective regimen sliding scale   SubCutaneous Before meals and at bedtime  insulin lispro Injectable (ADMELOG) 4 Unit(s) SubCutaneous three times a day before meals  losartan 25 milliGRAM(s) Oral daily    MEDICATIONS  (PRN):  acetaminophen   Tablet .. 650 milliGRAM(s) Oral every 6 hours PRN Temp greater or equal to 38C (100.4F), Mild Pain (1 - 3)      ALLERGIES:  Allergies    No Known Drug Allergies  shellfish (Anaphylaxis)    Intolerances        LABS:                        9.1    8.66  )-----------( 401      ( 06 Jun 2021 07:41 )             33.9     06-06    141  |  97  |  13  ----------------------------<  245<H>  4.2   |  35<H>  |  0.78    Ca    9.5      06 Jun 2021 07:41  Phos  4.2     06-06  Mg     1.9     06-06    TPro  7.8  /  Alb  3.7  /  TBili  0.6  /  DBili  x   /  AST  18  /  ALT  25  /  AlkPhos  90  06-06        CAPILLARY BLOOD GLUCOSE      POCT Blood Glucose.: 248 mg/dL (06 Jun 2021 21:49)      RADIOLOGY & ADDITIONAL TESTS: Reviewed.    ASSESSMENT:    PLAN:  OVERNIGHT EVENTS: ADDY     SUBJECTIVE / INTERVAL HPI: Patient seen and examined at bedside. Patient reports feeling well this morning with no new complaints. Reports improvement of her breathing and has been using the BiPAP when sleeping and nasal cannula during the day. Reports continued right calf tenderness, but improved from prior; the swelling in her right leg has improved. Denies fevers, chills, chest pain, N/V/D, or difficulty in urination.    VITAL SIGNS:  Vital Signs Last 24 Hrs  T(C): 36.3 (07 Jun 2021 05:43), Max: 36.8 (06 Jun 2021 08:25)  T(F): 97.4 (07 Jun 2021 05:43), Max: 98.3 (06 Jun 2021 08:25)  HR: 95 (07 Jun 2021 05:43) (77 - 98)  BP: 123/80 (07 Jun 2021 05:43) (106/69 - 123/80)  BP(mean): --  RR: 20 (07 Jun 2021 05:43) (17 - 24)  SpO2: 93% (07 Jun 2021 05:43) (93% - 99%)    PHYSICAL EXAM:    General: obese female resting in bed, in NAD. BiPAP in place.   HEENT: NC/AT; PERRL, anicteric sclera; MMM.   Neck: supple  Cardiovascular: +S1/S2; RRR. No murmurs appreciated   Respiratory: CTABL, no W/R/R. Patient saturating well and breathing comfortably. No accessory muscle use, speaking in full sentences, in no respiratory distress.  Gastrointestinal: obese, soft, NT/ND; +BSx4. No rigidity or guarding   Extremities: b/l 2+ pitting edema, worst on the right w/ swelling that has improved from prior exams. No erythema noted. Discomfort to light palpation in right calf. Chronic venous stasis changes of lower right foot/ankle area.   Neurological: AAOx3; no focal deficits    MEDICATIONS:  MEDICATIONS  (STANDING):  amLODIPine   Tablet 5 milliGRAM(s) Oral daily  aspirin  chewable 81 milliGRAM(s) Oral every 24 hours  atorvastatin 80 milliGRAM(s) Oral at bedtime  dextrose 40% Gel 15 Gram(s) Oral once  dextrose 5%. 1000 milliLiter(s) (50 mL/Hr) IV Continuous <Continuous>  dextrose 5%. 1000 milliLiter(s) (100 mL/Hr) IV Continuous <Continuous>  dextrose 50% Injectable 25 Gram(s) IV Push once  dextrose 50% Injectable 12.5 Gram(s) IV Push once  dextrose 50% Injectable 25 Gram(s) IV Push once  enoxaparin Injectable 40 milliGRAM(s) SubCutaneous every 12 hours  furosemide   Injectable 40 milliGRAM(s) IV Push every 12 hours  glucagon  Injectable 1 milliGRAM(s) IntraMuscular once  insulin glargine Injectable (LANTUS) 10 Unit(s) SubCutaneous at bedtime  insulin lispro (ADMELOG) corrective regimen sliding scale   SubCutaneous Before meals and at bedtime  insulin lispro Injectable (ADMELOG) 4 Unit(s) SubCutaneous three times a day before meals  losartan 25 milliGRAM(s) Oral daily    MEDICATIONS  (PRN):  acetaminophen   Tablet .. 650 milliGRAM(s) Oral every 6 hours PRN Temp greater or equal to 38C (100.4F), Mild Pain (1 - 3)      ALLERGIES:  Allergies    No Known Drug Allergies  shellfish (Anaphylaxis)    Intolerances        LABS:                        9.1    8.66  )-----------( 401      ( 06 Jun 2021 07:41 )             33.9     06-06    141  |  97  |  13  ----------------------------<  245<H>  4.2   |  35<H>  |  0.78    Ca    9.5      06 Jun 2021 07:41  Phos  4.2     06-06  Mg     1.9     06-06    TPro  7.8  /  Alb  3.7  /  TBili  0.6  /  DBili  x   /  AST  18  /  ALT  25  /  AlkPhos  90  06-06        CAPILLARY BLOOD GLUCOSE      POCT Blood Glucose.: 248 mg/dL (06 Jun 2021 21:49)      RADIOLOGY & ADDITIONAL TESTS: Reviewed.

## 2021-06-07 NOTE — DISCHARGE NOTE PROVIDER - CARE PROVIDERS DIRECT ADDRESSES
,[email or web address],DirectAddress_Unknown

## 2021-06-07 NOTE — PROGRESS NOTE ADULT - PROBLEM SELECTOR PLAN 3
Patient w/ swelling of b/l LE, R>>L. RLE ttp. Chronic venous stasis skin changes noted on exam.  - LE doppler negative for DVT but difficult to assess due to body habitus.  -Lower suspicion for cellulitis due to clinical presentation, afebrile, and no white count.   -Ordered D-dimer
Patient w/ swelling of b/l LE, R>>L. RLE ttp. Chronic venous stasis skin changes noted on exam.  - LE doppler negative for DVT but difficult to assess due to body habitus.  -Lower suspicion for cellulitis due to clinical presentation, afebrile, and no white count.   -D-dimer WNL     #Pulmonary HTN: seen on echo from 6/4 - pulmonary artery systolic pressure is 65 mmHg. Moderate pericardial effusion without echocardiographic evidence of cardiac tamponade physiology. Compared to the previous TTE performed on 11/6/2019, there is now moderate to severe pulmonary hypertension.  -Likely due to Lasix non-compliance   -C/w Lasix 40 BID   -Plan as above
Patient w/ swelling of b/l LE, R>>L. RLE ttp. Chronic venous stasis skin changes noted on exam.  - LE doppler negative for DVT but difficult to assess due to body habitus.  -Lower suspicion for cellulitis due to clinical presentation, afebrile, and no white count.   -D-dimer WNL   -Improving     #Pulmonary HTN: seen on echo from 6/4 - pulmonary artery systolic pressure is 65 mmHg. Moderate pericardial effusion without echocardiographic evidence of cardiac tamponade physiology. Compared to the previous TTE performed on 11/6/2019, there is now moderate to severe pulmonary hypertension.  -Likely due to Lasix non-compliance   -C/w Lasix 40 BID   -Plan as above

## 2021-06-07 NOTE — PROGRESS NOTE ADULT - ASSESSMENT
per Internal Medicine    44 yo female with pmhx of obesity hypoventilation syndrome and chronic hypoxemia (per pt uses 2L O2 via NC at rest, 3L when mobile and bipap at night), HFpEF, pericardial effusion in 8/2018 requiring IR drainage, chronic diastolic CHF (EF 55%),  DM2, HTN, HLD, MANUEL presents from Dr. Luo's office with shortness of breath and increased O2 requirement, found to be hypoxic on room air.     Problem/Plan - 1:  ·  Problem: Shortness of breath.  Plan: Decreased exercise tolerance and increased O2 requirements for past 1.5 months. Likely 2/2 home diuretic noncompliance. CT PE negative for PE but c/f pulmonary artery hypertension. S/p lasix 80 in the ED. Likely due to severe pulmonary HTN and fluid overload from medication non-compliance.   - c/w Lasix 40 BID  - c/w O2 (baseline 3 L NC at home) - currently weaned down to 4 L   - BiPAP overnight and when patient is sleeping   -See below   -Heart failure consulted - appreciate recs   -Dopplers negative for DVT but difficult due to body habitus. Will order D-dimer  -Echo from 6/3:    2. Normal left and right ventricular size and systolic function.   3. Dilated right ventricular size.   4. Probably normal right ventricular systolic function.   5. No significant valvular disease.   6. Pulmonary hypertension present, pulmonary artery systolic pressure is 65 mmHg.   7. Moderate pericardial effusion without echocardiographic evidence of cardiac tamponade physiology.   8. Compared to the previous TTE performed on 11/6/2019, there is now moderate to severe pulmonary hypertension.     Problem/Plan - 2:  ·  Problem: Chronic diastolic heart failure.  Plan: Chronic diastolic CHF (EF 55%) in November 2019. On torsemide 40 BID at home. However, reports noncompliance 2/2 increased urinary frequency.  - c/w Lasix 40 BID - will switch to home PO Torsemide tomorrow   -c/w ASA 81 daily   -c/w atorvastatin 81 mg qd    - Echo from 6/3:  2. Normal left and right ventricular size and systolic function.   3. Dilated right ventricular size.   4. Probably normal right ventricular systolic function.   5. No significant valvular disease.   6. Pulmonary hypertension present, pulmonary artery systolic pressure is 65 mmHg.   7. Moderate pericardial effusion without echocardiographic evidence of cardiac tamponade physiology.   8. Compared to the previous TTE performed on 11/6/2019, there is now moderate to severe pulmonary hypertension.  - f/u HF consult - appreciate recs   -C/w current regimen, UOP goal 4-5 L. Patient net neg 3.8 L yesterday.    Problem/Plan - 3:  ·  Problem: Swelling of both lower extremities.  Plan: Patient w/ swelling of b/l LE, R>>L. RLE ttp. Chronic venous stasis skin changes noted on exam.  - LE doppler negative for DVT but difficult to assess due to body habitus.  -Lower suspicion for cellulitis due to clinical presentation, afebrile, and no white count.   -D-dimer WNL   -Improving     #Pulmonary HTN: seen on echo from 6/4 - pulmonary artery systolic pressure is 65 mmHg. Moderate pericardial effusion without echocardiographic evidence of cardiac tamponade physiology. Compared to the previous TTE performed on 11/6/2019, there is now moderate to severe pulmonary hypertension.  -Likely due to Lasix non-compliance   -C/w Lasix 40 BID   -Plan as above.     Problem/Plan - 4:  ·  Problem: Type 2 diabetes mellitus.  Plan: A1c 7.8 in November 2019. On victoza 1.2 and metformin 1000 BID at home.  - c/w mISS  - A1c 7.7  -C/w Lantus 10 u and Lispro 4 U TID, adjust accordingly based on FS  -Will be discharged on home medications.    Problem/Plan - 5:  ·  Problem: Obstructive sleep apnea syndrome.  Plan: Uses BiPAP at night   - c/w BiPAP    #Anemia: Hgb 8.1 this morning. Per chart review, patient has a history of anemia w/ baseline Hgb 9-10  -Maintain active T&S   -Transfuse if needed.     Problem/Plan - 6:  Problem: Essential hypertension. Plan: Home meds: amlodipine 5 mg qd and losartan 25 mg qd  - c/w home medications.    Problem/Plan - 7:  ·  Problem: Morbid obesity.  Plan: BMI 64.8. Reports additional weight gain in past few months with increased SOB and decreasing exercise tolerance.   -  patient on healthy eating  - consider nutrition consult  -Outpatient f/u.     Problem/Plan - 8:  ·  Problem: Preventive measure.  Plan: F: tolerating PO, no IVF  E: replete K<4, Mg<2  N: Dash/TLC    VTE Prophylaxis: Lovenox 40 Q12 (based on weight)   GI: not needed  C: Full Code  D: RMF.

## 2021-06-07 NOTE — DISCHARGE NOTE PROVIDER - NSDCCPTREATMENT_GEN_ALL_CORE_FT
PRINCIPAL PROCEDURE  Procedure: TTE (transthoracic echocardiography)  Findings and Treatment:    1. Technically difficult study.   2. Normal left and right ventricular size and systolic function.   3. Dilated right ventricular size.   4. Probably normal right ventricular systolic function.   5. No significant valvular disease.   6. Pulmonary hypertension present, pulmonary artery systolic pressure is 65 mmHg.   7. Moderate pericardial effusion without echocardiographic evidence of cardiac tamponade physiology.   8. Compared to the previous TTE performed on 11/6/2019, there is now moderate to severe pulmonary hypertension.      SECONDARY PROCEDURE  Procedure: CT chest wo con  Findings and Treatment: IMPRESSION:  1. No evidence of main, lobar or proximal segmental branch pulmonary artery embolism. The study is limited for the assessment of more distal segmental branch emboli due to respiratory motion degradation and suboptimal intra-arterial bolus.  2. Persistent dilatation of the main pulmonary artery measuring up to 3.7 cm compatible with pulmonary artery hypertension. There is bilateral mosaic attenuation pattern unchanged from prior imaging which may suggest chronic thromboembolic disease. Further imaging to include V/Q scintigraphy and/or HRCT to be performed at end inspiration/end expiration. Abbreviated differential for the mosaic attenuation pattern includes congestive heart failure, air trapping from underlying small airways inflammation, and hypersensitivity pneumonitis.  3. Cardiomegaly. Near complete resolution of pericardial effusion with trace residual pericardial fluid.

## 2021-06-07 NOTE — PROGRESS NOTE ADULT - ASSESSMENT
44yo female with pmhx of obesity hypoventilation syndrome and chronic hypoxemia (per pt uses 2L O2 via NC at rest, 3L when mobile and bipap at night), HFpEF, pericardial effusion in 8/2018 requiring IR drainage, chronic diastolic CHF (EF 55%),  DM2, HTN, HLD, MANUEL presents from Dr. Luo's office with shortness of breath and increased O2 requirement, found to be hypoxic on room air.

## 2021-06-07 NOTE — DISCHARGE NOTE PROVIDER - NSDCFUSCHEDAPPT_GEN_ALL_CORE_FT
EVONNE JACKSON ; 06/14/2021 ; [facility] [address] [facility] ; [facility] [address] [facility] ; 06/16/2021 ; [facility] [address]  EVONNE JACKSON ; 06/14/2021 ; [facility] [address] [facility] ; [facility] [address] [facility] ; 07/13/2021 ; [facility] [address]

## 2021-06-08 ENCOUNTER — TRANSCRIPTION ENCOUNTER (OUTPATIENT)
Age: 46
End: 2021-06-08

## 2021-06-08 VITALS — SYSTOLIC BLOOD PRESSURE: 135 MMHG | DIASTOLIC BLOOD PRESSURE: 84 MMHG

## 2021-06-08 LAB
ALBUMIN SERPL ELPH-MCNC: 3.4 G/DL — SIGNIFICANT CHANGE UP (ref 3.3–5)
ALP SERPL-CCNC: 87 U/L — SIGNIFICANT CHANGE UP (ref 40–120)
ALT FLD-CCNC: 26 U/L — SIGNIFICANT CHANGE UP (ref 10–45)
ANION GAP SERPL CALC-SCNC: 12 MMOL/L — SIGNIFICANT CHANGE UP (ref 5–17)
AST SERPL-CCNC: 22 U/L — SIGNIFICANT CHANGE UP (ref 10–40)
BILIRUB SERPL-MCNC: 0.5 MG/DL — SIGNIFICANT CHANGE UP (ref 0.2–1.2)
BLD GP AB SCN SERPL QL: NEGATIVE — SIGNIFICANT CHANGE UP
BUN SERPL-MCNC: 14 MG/DL — SIGNIFICANT CHANGE UP (ref 7–23)
CALCIUM SERPL-MCNC: 9.1 MG/DL — SIGNIFICANT CHANGE UP (ref 8.4–10.5)
CHLORIDE SERPL-SCNC: 91 MMOL/L — LOW (ref 96–108)
CO2 SERPL-SCNC: 33 MMOL/L — HIGH (ref 22–31)
CREAT SERPL-MCNC: 0.72 MG/DL — SIGNIFICANT CHANGE UP (ref 0.5–1.3)
GLUCOSE BLDC GLUCOMTR-MCNC: 208 MG/DL — HIGH (ref 70–99)
GLUCOSE BLDC GLUCOMTR-MCNC: 247 MG/DL — HIGH (ref 70–99)
GLUCOSE BLDC GLUCOMTR-MCNC: 273 MG/DL — HIGH (ref 70–99)
GLUCOSE SERPL-MCNC: 246 MG/DL — HIGH (ref 70–99)
HCT VFR BLD CALC: 34.3 % — LOW (ref 34.5–45)
HGB BLD-MCNC: 9.4 G/DL — LOW (ref 11.5–15.5)
MAGNESIUM SERPL-MCNC: 1.9 MG/DL — SIGNIFICANT CHANGE UP (ref 1.6–2.6)
MCHC RBC-ENTMCNC: 23.3 PG — LOW (ref 27–34)
MCHC RBC-ENTMCNC: 27.4 GM/DL — LOW (ref 32–36)
MCV RBC AUTO: 85.1 FL — SIGNIFICANT CHANGE UP (ref 80–100)
NRBC # BLD: 0 /100 WBCS — SIGNIFICANT CHANGE UP (ref 0–0)
PHOSPHATE SERPL-MCNC: 4 MG/DL — SIGNIFICANT CHANGE UP (ref 2.5–4.5)
PLATELET # BLD AUTO: 380 K/UL — SIGNIFICANT CHANGE UP (ref 150–400)
POTASSIUM SERPL-MCNC: 4 MMOL/L — SIGNIFICANT CHANGE UP (ref 3.5–5.3)
POTASSIUM SERPL-SCNC: 4 MMOL/L — SIGNIFICANT CHANGE UP (ref 3.5–5.3)
PROT SERPL-MCNC: 7.8 G/DL — SIGNIFICANT CHANGE UP (ref 6–8.3)
RBC # BLD: 4.03 M/UL — SIGNIFICANT CHANGE UP (ref 3.8–5.2)
RBC # FLD: 18.6 % — HIGH (ref 10.3–14.5)
RH IG SCN BLD-IMP: POSITIVE — SIGNIFICANT CHANGE UP
SODIUM SERPL-SCNC: 136 MMOL/L — SIGNIFICANT CHANGE UP (ref 135–145)
WBC # BLD: 8.78 K/UL — SIGNIFICANT CHANGE UP (ref 3.8–10.5)
WBC # FLD AUTO: 8.78 K/UL — SIGNIFICANT CHANGE UP (ref 3.8–10.5)

## 2021-06-08 PROCEDURE — 96374 THER/PROPH/DIAG INJ IV PUSH: CPT

## 2021-06-08 PROCEDURE — 82728 ASSAY OF FERRITIN: CPT

## 2021-06-08 PROCEDURE — 84132 ASSAY OF SERUM POTASSIUM: CPT

## 2021-06-08 PROCEDURE — 82962 GLUCOSE BLOOD TEST: CPT

## 2021-06-08 PROCEDURE — 93971 EXTREMITY STUDY: CPT

## 2021-06-08 PROCEDURE — 85610 PROTHROMBIN TIME: CPT

## 2021-06-08 PROCEDURE — 83880 ASSAY OF NATRIURETIC PEPTIDE: CPT

## 2021-06-08 PROCEDURE — 83735 ASSAY OF MAGNESIUM: CPT

## 2021-06-08 PROCEDURE — 93306 TTE W/DOPPLER COMPLETE: CPT

## 2021-06-08 PROCEDURE — 36415 COLL VENOUS BLD VENIPUNCTURE: CPT

## 2021-06-08 PROCEDURE — 85025 COMPLETE CBC W/AUTO DIFF WBC: CPT

## 2021-06-08 PROCEDURE — 86900 BLOOD TYPING SEROLOGIC ABO: CPT

## 2021-06-08 PROCEDURE — 97161 PT EVAL LOW COMPLEX 20 MIN: CPT

## 2021-06-08 PROCEDURE — 81001 URINALYSIS AUTO W/SCOPE: CPT

## 2021-06-08 PROCEDURE — 86850 RBC ANTIBODY SCREEN: CPT

## 2021-06-08 PROCEDURE — 82550 ASSAY OF CK (CPK): CPT

## 2021-06-08 PROCEDURE — 83036 HEMOGLOBIN GLYCOSYLATED A1C: CPT

## 2021-06-08 PROCEDURE — 84702 CHORIONIC GONADOTROPIN TEST: CPT

## 2021-06-08 PROCEDURE — 94660 CPAP INITIATION&MGMT: CPT

## 2021-06-08 PROCEDURE — 84100 ASSAY OF PHOSPHORUS: CPT

## 2021-06-08 PROCEDURE — 83550 IRON BINDING TEST: CPT

## 2021-06-08 PROCEDURE — U0005: CPT

## 2021-06-08 PROCEDURE — 93005 ELECTROCARDIOGRAM TRACING: CPT

## 2021-06-08 PROCEDURE — 83540 ASSAY OF IRON: CPT

## 2021-06-08 PROCEDURE — 80048 BASIC METABOLIC PNL TOTAL CA: CPT

## 2021-06-08 PROCEDURE — 82330 ASSAY OF CALCIUM: CPT

## 2021-06-08 PROCEDURE — 84295 ASSAY OF SERUM SODIUM: CPT

## 2021-06-08 PROCEDURE — 99232 SBSQ HOSP IP/OBS MODERATE 35: CPT | Mod: GC

## 2021-06-08 PROCEDURE — U0003: CPT

## 2021-06-08 PROCEDURE — 84484 ASSAY OF TROPONIN QUANT: CPT

## 2021-06-08 PROCEDURE — 85379 FIBRIN DEGRADATION QUANT: CPT

## 2021-06-08 PROCEDURE — 71045 X-RAY EXAM CHEST 1 VIEW: CPT

## 2021-06-08 PROCEDURE — 82553 CREATINE MB FRACTION: CPT

## 2021-06-08 PROCEDURE — 71275 CT ANGIOGRAPHY CHEST: CPT

## 2021-06-08 PROCEDURE — 85730 THROMBOPLASTIN TIME PARTIAL: CPT

## 2021-06-08 PROCEDURE — 80053 COMPREHEN METABOLIC PANEL: CPT

## 2021-06-08 PROCEDURE — 82803 BLOOD GASES ANY COMBINATION: CPT

## 2021-06-08 PROCEDURE — 99285 EMERGENCY DEPT VISIT HI MDM: CPT | Mod: 25

## 2021-06-08 PROCEDURE — 86901 BLOOD TYPING SEROLOGIC RH(D): CPT

## 2021-06-08 PROCEDURE — 99239 HOSP IP/OBS DSCHRG MGMT >30: CPT | Mod: GC

## 2021-06-08 PROCEDURE — 85027 COMPLETE CBC AUTOMATED: CPT

## 2021-06-08 PROCEDURE — 86769 SARS-COV-2 COVID-19 ANTIBODY: CPT

## 2021-06-08 PROCEDURE — 97116 GAIT TRAINING THERAPY: CPT

## 2021-06-08 RX ADMIN — ENOXAPARIN SODIUM 40 MILLIGRAM(S): 100 INJECTION SUBCUTANEOUS at 10:55

## 2021-06-08 RX ADMIN — Medication 650 MILLIGRAM(S): at 07:10

## 2021-06-08 RX ADMIN — Medication 6 UNIT(S): at 08:30

## 2021-06-08 RX ADMIN — AMLODIPINE BESYLATE 5 MILLIGRAM(S): 2.5 TABLET ORAL at 07:10

## 2021-06-08 RX ADMIN — Medication 40 MILLIGRAM(S): at 07:10

## 2021-06-08 RX ADMIN — Medication 6: at 11:58

## 2021-06-08 RX ADMIN — Medication 650 MILLIGRAM(S): at 08:10

## 2021-06-08 RX ADMIN — Medication 4: at 08:30

## 2021-06-08 RX ADMIN — Medication 81 MILLIGRAM(S): at 07:10

## 2021-06-08 RX ADMIN — LOSARTAN POTASSIUM 25 MILLIGRAM(S): 100 TABLET, FILM COATED ORAL at 07:10

## 2021-06-08 RX ADMIN — Medication 6 UNIT(S): at 11:58

## 2021-06-08 RX ADMIN — Medication 4: at 17:47

## 2021-06-08 NOTE — DISCHARGE NOTE NURSING/CASE MANAGEMENT/SOCIAL WORK - CAREGIVER ADDRESS
Artness Cadena is a 37 y.o. female who presents today for  Chief Complaint   Patient presents with    Follow-up     C/O  migraine       HPI:  Chronic migraine managed by neurology. Has appointment today. She has had a migraine headache that has persisted for the past 3 to 4 days. She states she is getting no relief with her usual medication, Fioricet. She feels pressure in head all over. Ears have been ringing. Chronic neck and back pain. Needs referral to pain management. Had referred to [facility] pain but she would like to see pain management in [facility]. CKD stage III, has appointment with nephrology in the near future, Dr. Elisabeth Dale. She did not have labs drawn as previously ordered last month. Hypertension, stable on current medication. History of pericardial effusion, weight has been stable. No significant swelling in legs. Remains on furosemide. Has follow-up with cardiology in June. Request promethazine refill for nausea. She states she takes as needed, requests 25 mg as she has taken in the past.    Review of Systems   Constitutional: Negative for chills and fever. HENT: Negative for congestion, ear pain and sore throat. Respiratory: Negative for cough, chest tightness, shortness of breath and wheezing. Cardiovascular: Negative for chest pain. Gastrointestinal: Positive for nausea. Negative for abdominal pain, diarrhea and vomiting. Musculoskeletal: Positive for back pain (Chronic) and neck pain (Chronic). Negative for arthralgias and myalgias. Skin: Negative for rash. Neurological: Positive for headaches (Migraine).        Past Medical History:   Diagnosis Date    Arthritis     Chronic kidney disease     [facility]) [facility] [address] [facility]     CVA (cerebral vascular accident) [facility] [address] [facility]     HTN (hypertension)     Hyperlipidemia     Migraine     Thyroid disease        Current Outpatient Medications   Medication Sig Dispense Refill    promethazine (PHENERGAN) 25 MG tablet Take 1 tablet by mouth every 8 hours as needed for Nausea 20 tablet 0    gabapentin (NEURONTIN) 600 MG tablet Take 1 tablet by mouth 3 times daily for 180 days. 90 tablet 5    albuterol sulfate  (90 Base) MCG/ACT inhaler INHALE 2 PUFF(S) FOUR (4) TIMES A DAY BY INHALATION ROUTEAS NEEDED FOR 30 DAYS. 18 g 0    Multiple Vitamins-Iron (MULTIVITAMIN PLUS IRON ADULT) TABS Take 1 tablet by mouth daily 30 tablet 3    potassium chloride (KLOR-CON M) 20 MEQ extended release tablet Take 1 tablet by mouth twice daily x 3 days then 1 tablet daily 33 tablet 3    furosemide (LASIX) 20 MG tablet Take 2 tablets by mouth twice daily x 3 days then 2 tablets daily 66 tablet 3    fenofibrate (TRIGLIDE) 160 MG tablet Take 1 tablet by mouth daily 30 tablet 3    levothyroxine (SYNTHROID) 200 MCG tablet Take 1 tablet by mouth Daily 30 tablet 3    simvastatin (ZOCOR) 40 MG tablet Take 1 tablet by mouth nightly 30 tablet 3    nicotine (NICODERM CQ) 21 MG/24HR Place 1 patch onto the skin daily X 6 weeks, then start 14 mg patch 42 patch 0    omeprazole (PRILOSEC) 20 MG delayed release capsule Take 20 mg by mouth Daily      buprenorphine-naloxone (SUBOXONE) 8-2 MG SUBL SL tablet Place 1 tablet under the tongue 2 times daily.  butalbital-acetaminophen-caffeine (FIORICET, ESGIC) -40 MG per tablet TAKE 1 TABLET BY MOUTH EVERY 6 HOURS AS NEEDED FOR HEADACHES  60 tablet 5    DULoxetine (CYMBALTA) 30 MG extended release capsule 90 mg       loratadine (CLARITIN) 10 MG tablet       mirtazapine (REMERON) 15 MG tablet       dicyclomine (BENTYL) 10 MG capsule Take 10 mg by mouth as needed      clonazePAM (KLONOPIN) 1 MG tablet Take 1 mg by mouth 2 times daily as needed.  clopidogrel (PLAVIX) 75 MG tablet Take 1 tablet by mouth daily       No current facility-administered medications for this visit.         Allergies   Allergen Reactions    Sulfur        Past Surgical History:   Procedure Laterality Date    FOOT SURGERY      Trauma    PERICARDIUM SURGERY         Social History     Tobacco Use    Smoking status: Current Every Day Smoker     Packs/day: 0.50     Years: 20.00     Pack years: 10.00     Types: Cigarettes    Smokeless tobacco: Never Used   Substance Use Topics    Alcohol use: Not Currently    Drug use: Not Currently       Family History   Problem Relation Age of Onset    Coronary Art Dis Mother     Heart Attack Mother     Lung Cancer Mother     Lung Cancer Father     Heart Attack Maternal Grandfather     Heart Attack Paternal Grandfather        /80   Pulse 70   Temp 97 °F (36.1 °C)   Resp 18   Ht 5' 3\" (1.6 m)   Wt 162 lb (73.5 kg)   SpO2 97%   BMI 28.70 kg/m²     Physical Exam  Vitals signs reviewed. Constitutional:       General: She is not in acute distress. Appearance: Normal appearance. She is well-developed. HENT:      Head: Normocephalic. Eyes:      Conjunctiva/sclera: Conjunctivae normal.      Pupils: Pupils are equal, round, and reactive to light. Neck:      Musculoskeletal: Normal range of motion and neck supple. Thyroid: No thyromegaly. Vascular: No carotid bruit or JVD. Trachea: No tracheal deviation. Cardiovascular:      Rate and Rhythm: Normal rate and regular rhythm. Heart sounds: Normal heart sounds. No murmur. Pulmonary:      Effort: Pulmonary effort is normal. No respiratory distress. Breath sounds: Normal breath sounds. No wheezing or rhonchi. Musculoskeletal: Normal range of motion. Lymphadenopathy:      Cervical: No cervical adenopathy. Skin:     General: Skin is warm and dry. Findings: No rash. Neurological:      Mental Status: She is alert. Psychiatric:         Mood and Affect: Mood normal.         Behavior: Behavior normal.         Thought Content: Thought content normal.         ASSESSMENT/PLAN:  1.  Chronic migraine without aura without status migrainosus, not intractable  -Advised to discuss with neurology, has appointment today with Dr. Matt Cobb. 2. Chronic neck pain  -New referral will be sent to pain management and low note. - External Referral To Pain Clinic    3. Chronic midline low back pain without sciatica  -As above  - External Referral To Pain Clinic    4. Stage 3 chronic kidney disease, unspecified whether stage 3a or 3b CKD  -Continue daily adequate hydration. Advised to keep appointment with nephrology as scheduled. -Advised to get labs as previously ordered    5. Essential hypertension  -Stable, controlled. Continue current medication. 6. Chronic obstructive pulmonary disease, unspecified COPD type ([facility] [address]  -Stable, no symptoms of exacerbation. 7. Pericardial effusion  -Stable, compensated, continue furosemide at current dose. -Advised to keep follow-up appointment with cardiology. 8. Nausea  -I will refill promethazine and increase to 25 mg. Advised patient to take sparingly for nausea, not daily. Discussed potential drowsiness especially in combination with her other medication. - promethazine (PHENERGAN) 25 MG tablet; Take 1 tablet by mouth every 8 hours as needed for Nausea  Dispense: 20 tablet; Refill: 0    9. Screening mammogram, encounter for    - JOVANNA DIGITAL SCREEN W OR WO CAD BILATERAL; Future         Return in about 3 months (around 7/27/2021) for follow up, 30 minute visit. Lorren Schilder was seen today for follow-up. Diagnoses and all orders for this visit:    Screening mammogram, encounter for  -     JOVANNA DIGITAL SCREEN W OR WO CAD BILATERAL;  Future    Chronic migraine without aura without status migrainosus, not intractable    Chronic neck pain  -     External Referral To Pain Clinic    Chronic midline low back pain without sciatica  -     External Referral To Pain Clinic    Stage 3 chronic kidney disease, unspecified whether stage 3a or 3b CKD    Essential hypertension    Chronic obstructive pulmonary disease, unspecified COPD type ([facility])    Pericardial effusion    Nausea  -     promethazine (PHENERGAN) 25 MG tablet; Take 1 tablet by mouth every 8 hours as needed for Nausea      Medications Discontinued During This Encounter   Medication Reason    gabapentin (NEURONTIN) 400 MG capsule LIST CLEANUP    nicotine (NICODERM CQ) 7 MG/24HR LIST CLEANUP    nicotine (NICODERM CQ) 14 MG/24HR LIST CLEANUP    promethazine (PHENERGAN) 12.5 MG tablet REORDER     There are no Patient Instructions on file for this visit. Patient voicesunderstanding and agrees to plans along with risks and benefits of plan. Counseling:  Madison Mitchell's case, medications and options were discussed in detail. Patient was instructed to call the office if she questionsregarding her treatment. Should her conditions worsen, she should return to office to be reassessed by TE Marte. she Should to go the closest Emergency Department for any emergency. They verbalizedunderstanding the above instructions. Return in about 3 months (around 7/27/2021) for follow up, 30 minute visit. NA

## 2021-06-08 NOTE — PROGRESS NOTE ADULT - ASSESSMENT
45F PMH OHS, MANUEL on home O2, HFpEF (55%), pericardial effusion 2018 s/p IR drainage, DM2, HTN presented from Dr. Luo's office with worsening shortness of breath and LE edema. Heart failure consulted for further management of HFpEF.     PLAN:  HFpEF (55%)  TTE (6/3/21): Normal left (60-65%) and right ventricular size and systolic function, dilated right ventricular size, Probably normal right ventricular systolic function, No significant valvular disease, Pulmonary hypertension present, pulmonary artery systolic pressure is 65 mmHg, moderate pericardial effusion without echocardiographic evidence of cardiac tamponade physiology.   - continue with home torsemide 40mg daily   - Strict I/O and daily standing weights  - Monitor electrolytes closely to maintain K>4 and Mg>2 45F PMH OHS, MANUEL on home O2, HFpEF (55%), pericardial effusion 2018 s/p IR drainage, DM2, HTN presented from Dr. Luo's office with worsening shortness of breath and LE edema. Heart failure consulted for further management of HFpEF.     PLAN:  HFpEF (55%)  TTE (6/3/21): Normal left (60-65%) and right ventricular size and systolic function, dilated right ventricular size, Probably normal right ventricular systolic function, No significant valvular disease, Pulmonary hypertension present, pulmonary artery systolic pressure is 65 mmHg, moderate pericardial effusion without echocardiographic evidence of cardiac tamponade physiology.   - continue with home torsemide 40mg daily   - Strict I/O and daily standing weights  - Monitor electrolytes closely to maintain K>4 and Mg>2    Please make sure patient has an appointment with HF NP Renata Winn 1 week after patient is discharged.

## 2021-06-08 NOTE — DISCHARGE NOTE NURSING/CASE MANAGEMENT/SOCIAL WORK - NSDCPEPT PROEDHF_GEN_ALL_CORE
Call primary care provider for follow up after discharge/Activities as tolerated/Low salt diet/Monitor weight daily/Report signs and symptoms to primary care provider Improved

## 2021-06-08 NOTE — DISCHARGE NOTE NURSING/CASE MANAGEMENT/SOCIAL WORK - PATIENT PORTAL LINK FT
You can access the FollowMyHealth Patient Portal offered by [facility] by registering at the following website: [email or web address]. By joining [facility]’s FollowMyHealth portal, you will also be able to view your health information using other applications (apps) compatible with our system.

## 2021-06-08 NOTE — DISCHARGE NOTE NURSING/CASE MANAGEMENT/SOCIAL WORK - NSDCFUADDAPPT_GEN_ALL_CORE_FT
Please arrive 15 minutes early and bring your Insurance card, Photo ID and Discharge paperwork to the following appointments:    (1) Please follow up with your Cardiology Provider, Dr. Ebonie Ramirez at [address] [place] [address] on 06/15/2021 at 10:50am.    Appointment was scheduled by Ms. SOPHIA Simpson, Referral Coordinator.    (2) Please follow up with your Primary Care Provider, Dr. Kim Schoenfeld on behalf of Dr. Gloria Hawk at [address] [place] [address] on 06/16/2021 at 2:00pm.    Appointment was scheduled by Ms. SOPHIA Simpson, Referral Coordinator.    (3) Please note that Whit from the office of your Pulmonary Medicine Provider, Dr. Sheridan Luo will contact you from phone [phone] to schedule an appointment briefly following your hospital discharge.    Appointment was facilitated by Ms. SOPHIA Simpson, Referral Coordinator.

## 2021-06-08 NOTE — PROGRESS NOTE ADULT - SUBJECTIVE AND OBJECTIVE BOX
INTERVAL EVENTS: ADDY    PAST MEDICAL & SURGICAL HISTORY:  Obstructive sleep apnea syndrome  MANUEL on CPAP    Morbid obesity  Morbid obesity    Disease of pericardium  Pericardial effusion    Type 2 diabetes mellitus  Insulin Requiring    Essential hypertension  HTN (hypertension)    Chronic diastolic heart failure  Chronic diastolic CHF (congestive heart failure)    Edema  Anasarca    Cytomegalovirus infection not present  No significant past surgical history        MEDICATIONS  (STANDING):  amLODIPine   Tablet 5 milliGRAM(s) Oral daily  aspirin  chewable 81 milliGRAM(s) Oral every 24 hours  atorvastatin 80 milliGRAM(s) Oral at bedtime  dextrose 40% Gel 15 Gram(s) Oral once  dextrose 5%. 1000 milliLiter(s) (50 mL/Hr) IV Continuous <Continuous>  dextrose 5%. 1000 milliLiter(s) (100 mL/Hr) IV Continuous <Continuous>  dextrose 50% Injectable 25 Gram(s) IV Push once  dextrose 50% Injectable 12.5 Gram(s) IV Push once  dextrose 50% Injectable 25 Gram(s) IV Push once  enoxaparin Injectable 40 milliGRAM(s) SubCutaneous every 12 hours  glucagon  Injectable 1 milliGRAM(s) IntraMuscular once  insulin glargine Injectable (LANTUS) 16 Unit(s) SubCutaneous at bedtime  insulin lispro (ADMELOG) corrective regimen sliding scale   SubCutaneous Before meals and at bedtime  insulin lispro Injectable (ADMELOG) 6 Unit(s) SubCutaneous three times a day before meals  losartan 25 milliGRAM(s) Oral daily  torsemide 40 milliGRAM(s) Oral every 24 hours    MEDICATIONS  (PRN):  acetaminophen   Tablet .. 650 milliGRAM(s) Oral every 6 hours PRN Temp greater or equal to 38C (100.4F), Mild Pain (1 - 3)    Vital Signs Last 24 Hrs  T(C): 36.6 (08 Jun 2021 06:14), Max: 37.2 (07 Jun 2021 20:42)  T(F): 97.8 (08 Jun 2021 06:14), Max: 98.9 (07 Jun 2021 20:42)  HR: 93 (08 Jun 2021 06:14) (71 - 100)  BP: 101/64 (08 Jun 2021 06:14) (101/64 - 124/73)  BP(mean): --  RR: 20 (08 Jun 2021 06:14) (20 - 23)  SpO2: 93% (08 Jun 2021 06:14) (90% - 96%)    PHYSICAL EXAM:  GEN: NAD  HEENT: EOMI   RESP: CTA b/l  CV: RRR. Normal S1/S2. No m/r/g.  ABD: soft, non-distended  EXT: No edema   NEURO: alert and attentive    LABS:                        9.4    8.78  )-----------( 380      ( 08 Jun 2021 07:04 )             34.3     06-08    136  |  91<L>  |  14  ----------------------------<  246<H>  4.0   |  33<H>  |  0.72    Ca    9.1      08 Jun 2021 07:04  Phos  4.0     06-08  Mg     1.9     06-08    TPro  7.8  /  Alb  3.4  /  TBili  0.5  /  DBili  x   /  AST  22  /  ALT  26  /  AlkPhos  87  06-08            I&O's Summary    07 Jun 2021 07:01  -  08 Jun 2021 07:00  --------------------------------------------------------  IN: 0 mL / OUT: 2700 mL / NET: -2700 mL               INTERVAL EVENTS: ADDY    PAST MEDICAL & SURGICAL HISTORY:  Obstructive sleep apnea syndrome  MANUEL on CPAP    Morbid obesity  Morbid obesity    Disease of pericardium  Pericardial effusion    Type 2 diabetes mellitus  Insulin Requiring    Essential hypertension  HTN (hypertension)    Chronic diastolic heart failure  Chronic diastolic CHF (congestive heart failure)    Edema  Anasarca    Cytomegalovirus infection not present  No significant past surgical history      MEDICATIONS  (STANDING):  amLODIPine   Tablet 5 milliGRAM(s) Oral daily  aspirin  chewable 81 milliGRAM(s) Oral every 24 hours  atorvastatin 80 milliGRAM(s) Oral at bedtime  dextrose 40% Gel 15 Gram(s) Oral once  dextrose 5%. 1000 milliLiter(s) (50 mL/Hr) IV Continuous <Continuous>  dextrose 5%. 1000 milliLiter(s) (100 mL/Hr) IV Continuous <Continuous>  dextrose 50% Injectable 25 Gram(s) IV Push once  dextrose 50% Injectable 12.5 Gram(s) IV Push once  dextrose 50% Injectable 25 Gram(s) IV Push once  enoxaparin Injectable 40 milliGRAM(s) SubCutaneous every 12 hours  glucagon  Injectable 1 milliGRAM(s) IntraMuscular once  insulin glargine Injectable (LANTUS) 16 Unit(s) SubCutaneous at bedtime  insulin lispro (ADMELOG) corrective regimen sliding scale   SubCutaneous Before meals and at bedtime  insulin lispro Injectable (ADMELOG) 6 Unit(s) SubCutaneous three times a day before meals  losartan 25 milliGRAM(s) Oral daily  torsemide 40 milliGRAM(s) Oral every 24 hours    MEDICATIONS  (PRN):  acetaminophen   Tablet .. 650 milliGRAM(s) Oral every 6 hours PRN Temp greater or equal to 38C (100.4F), Mild Pain (1 - 3)    Vital Signs Last 24 Hrs  T(C): 36.6 (08 Jun 2021 06:14), Max: 37.2 (07 Jun 2021 20:42)  T(F): 97.8 (08 Jun 2021 06:14), Max: 98.9 (07 Jun 2021 20:42)  HR: 93 (08 Jun 2021 06:14) (71 - 100)  BP: 101/64 (08 Jun 2021 06:14) (101/64 - 124/73)  BP(mean): --  RR: 20 (08 Jun 2021 06:14) (20 - 23)  SpO2: 93% (08 Jun 2021 06:14) (90% - 96%)    PHYSICAL EXAM:  GEN: NAD  HEENT: EOMI   RESP: CTA b/l  CV: RRR. Normal S1/S2. No m/r/g.  Lungs: CTA b/l   ABD: soft, non-distended  EXT: No edema   NEURO: alert and attentive    LABS:                        9.4    8.78  )-----------( 380      ( 08 Jun 2021 07:04 )             34.3     06-08    136  |  91<L>  |  14  ----------------------------<  246<H>  4.0   |  33<H>  |  0.72    Ca    9.1      08 Jun 2021 07:04  Phos  4.0     06-08  Mg     1.9     06-08    TPro  7.8  /  Alb  3.4  /  TBili  0.5  /  DBili  x   /  AST  22  /  ALT  26  /  AlkPhos  87  06-08            I&O's Summary    07 Jun 2021 07:01  -  08 Jun 2021 07:00  --------------------------------------------------------  IN: 0 mL / OUT: 2700 mL / NET: -2700 mL

## 2021-06-08 NOTE — PROGRESS NOTE ADULT - TIME BILLING
I have personally provided 30 minutes of clinical care time concurrently with the fellow.  I have reviewed the fellow’s documentation and I agree with the fellow's assessment and plan of care.  JEFFREY Ramirez
Please make sure patient has an appointment with HF NP Renata Winn 1 week after patient is discharged. -   reinforced importance of taking torsemdie daily
I have personally provided 30 minutes of clinical care time concurrently with the fellow.  I have reviewed the fellow’s documentation and I agree with the fellow's assessment and plan of care.  JEFFREY Ramirez
Admit note    The patient is admitted with acute on top of chronic hypoxic respiratory failure secondary to pulmonary hypertension secondary to severe struct of sleep apnea/morbidly obesity and diastolic heart failure.  The echocardiogram was reviewed.  The patient was giving Lasix.  Continue bronchodilators continue noninvasive ventilation.  I discussed with the patient that she lost weight.  The CT scan of the chest was negative for thromboembolic disease but was consistent with mosaic appearance with this goes along with the pulmonary hypertension.  The venous Doppler was negative.  I am concerned that the patient might have cellulitis of the lower extremities but she is afebrile.  Patient was seen by the heart failure team and recommendation to continue diuresis at this point
Patient seen and examined with house-staff during bedside rounds.  Resident note read, including vitals, physical findings, laboratory data, and radiological reports.   Revisions included below.  Direct personal management at bed side and extensive interpretation of the data.  Plan was outlined and discussed in details with the housestaff.  Decision making of high complexity  Action taken for acute disease activity to reflect the level of care provided:  - medication reconciliation  - review laboratory data  With the patient.  The patient was noncompliant with the diuretics.  The patient has adequate response to diuretics and she is -8 L since admission.  The patient is compliant with noninvasive ventilation and on oxygen supplementation when she is off the BiPAP.  The echocardiogram was reviewed with the patient explained to her the increase in the pulmonary pressures.  I also reviewed the cardiology recommendation.  Continue current regimen.  The patient will require anemia work-up.
Patient seen and examined with house-staff during bedside rounds.  Resident note read, including vitals, physical findings, laboratory data, and radiological reports.   Revisions included below.  Direct personal management at bed side and extensive interpretation of the data.  Plan was outlined and discussed in details with the housestaff.  Decision making of high complexity  Action taken for acute disease activity to reflect the level of care provided:  - medication reconciliation  - review laboratory data  Patient lost 15 L of fluid with aggressive diuresis.  The leg edema decreased.  Patient pulmonary status improved.  Blood sugars uncontrolled and the insulin was increased.  Was discharged tomorrow

## 2021-06-08 NOTE — PROGRESS NOTE ADULT - ATTENDING COMMENTS
45F PMH OHS, MANUEL on home O2, HFpEF (55%), pericardial effusion 2018 s/p IR drainage, DM2, HTN presented from Dr. Luo's office with worsening shortness of breath and LE edema. Heart failure consulted for further management of HFpEF.     PLAN:  HFpEF (55%)  TTE (6/3/21): Normal left (60-65%) and right ventricular size and systolic function, dilated right ventricular size, Probably normal right ventricular systolic function, No significant valvular disease, Pulmonary hypertension present, pulmonary artery systolic pressure is 65 mmHg, moderate pericardial effusion without echocardiographic evidence of cardiac tamponade physiology.   - continue with home torsemide 40mg daily   Please make sure patient has an appointment with HF NP Renata Winn 1 week after patient is discharged. -   reinforced importance of taking torsemdie daily
45F PMH OHS, MANUEL on home O2, HFpEF (55%), pericardial effusion 2018 s/p IR drainage, DM2, HTN presented from Dr. Luo's office with worsening shortness of breath and LE edema. Heart failure consulted for further management of HFpEF.     PLAN:  HFpEF (55%)  TTE (6/3/21): Normal left (60-65%) and right ventricular size and systolic function, dilated right ventricular size, Probably normal right ventricular systolic function, No significant valvular disease, Pulmonary hypertension present, pulmonary artery systolic pressure is 65 mmHg, moderate pericardial effusion without echocardiographic evidence of cardiac tamponade physiology. Close to euvolemia now.   - Continue diuresis with lasix 40mg IV BID; switch to home torsemide 40mg PO daily tomorrow (6/8) and stop IV diuresis tomorrow  d/c planning  reinforced importance of taking torsemide daily    I have personally provided 30 minutes of clinical care time concurrently with the fellow.  I have reviewed the fellow’s documentation and I agree with the fellow's assessment and plan of care.  JEFFREY Ramirez
45F PMH OHS, MANUEL on home O2, HFpEF (55%), pericardial effusion 2018 s/p IR drainage, DM2, HTN presented from Dr. Luo's office with worsening shortness of breath and LE edema. Heart failure consulted for further management of HFpEF.     PLAN:  HFpEF (55%)  TTE (6/3/21): Normal left (60-65%) and right ventricular size and systolic function, dilated right ventricular size, Probably normal right ventricular systolic function, No significant valvular disease, Pulmonary hypertension present, pulmonary artery systolic pressure is 65 mmHg, moderate pericardial effusion without echocardiographic evidence of cardiac tamponade physiology  Net neg -4.25L on 40mg IV BID  Remains overloaded with 2+ edema, decreased breath sounds  - Continue diuresis with lasix 40mg IV BID, would continue at current rate- goal of approx net neg -3 to 4 Liters over 24 hours- as increasing diuresis rate can cause more rapid volume shifts that can worsen kidney function or affect hemodynamic stability  Continue IV diuresis with Lasix. Has pitting edema in presence of her obese legs.    I have personally provided 30 minutes of clinical care time concurrently with the fellow.  I have reviewed the fellow’s documentation and I agree with the fellow's assessment and plan of care.  JEFFREY Ramirez

## 2021-06-08 NOTE — PROGRESS NOTE ADULT - PROVIDER SPECIALTY LIST ADULT
Heart Failure
Internal Medicine
Internal Medicine
Heart Failure
Internal Medicine
Internal Medicine
Rehab Medicine
Heart Failure
Internal Medicine
Internal Medicine

## 2021-06-09 ENCOUNTER — NON-APPOINTMENT (OUTPATIENT)
Age: 46
End: 2021-06-09

## 2021-06-11 DIAGNOSIS — I27.20 PULMONARY HYPERTENSION, UNSPECIFIED: ICD-10-CM

## 2021-06-11 DIAGNOSIS — I50.32 CHRONIC DIASTOLIC (CONGESTIVE) HEART FAILURE: ICD-10-CM

## 2021-06-11 DIAGNOSIS — E11.9 TYPE 2 DIABETES MELLITUS WITHOUT COMPLICATIONS: ICD-10-CM

## 2021-06-11 DIAGNOSIS — D64.9 ANEMIA, UNSPECIFIED: ICD-10-CM

## 2021-06-11 DIAGNOSIS — J96.21 ACUTE AND CHRONIC RESPIRATORY FAILURE WITH HYPOXIA: ICD-10-CM

## 2021-06-11 DIAGNOSIS — Z99.89 DEPENDENCE ON OTHER ENABLING MACHINES AND DEVICES: ICD-10-CM

## 2021-06-11 DIAGNOSIS — I11.0 HYPERTENSIVE HEART DISEASE WITH HEART FAILURE: ICD-10-CM

## 2021-06-11 DIAGNOSIS — R06.02 SHORTNESS OF BREATH: ICD-10-CM

## 2021-06-11 DIAGNOSIS — Z20.822 CONTACT WITH AND (SUSPECTED) EXPOSURE TO COVID-19: ICD-10-CM

## 2021-06-11 DIAGNOSIS — Z79.84 LONG TERM (CURRENT) USE OF ORAL HYPOGLYCEMIC DRUGS: ICD-10-CM

## 2021-06-11 DIAGNOSIS — I87.8 OTHER SPECIFIED DISORDERS OF VEINS: ICD-10-CM

## 2021-06-11 DIAGNOSIS — Z79.82 LONG TERM (CURRENT) USE OF ASPIRIN: ICD-10-CM

## 2021-06-11 DIAGNOSIS — Z91.14 PATIENT'S OTHER NONCOMPLIANCE WITH MEDICATION REGIMEN: ICD-10-CM

## 2021-06-11 DIAGNOSIS — E66.2 MORBID (SEVERE) OBESITY WITH ALVEOLAR HYPOVENTILATION: ICD-10-CM

## 2021-06-11 DIAGNOSIS — E78.5 HYPERLIPIDEMIA, UNSPECIFIED: ICD-10-CM

## 2021-06-14 ENCOUNTER — APPOINTMENT (OUTPATIENT)
Dept: INTERNAL MEDICINE | Facility: CLINIC | Age: 46
End: 2021-06-14

## 2021-06-15 ENCOUNTER — APPOINTMENT (OUTPATIENT)
Dept: HEART AND VASCULAR | Facility: CLINIC | Age: 46
End: 2021-06-15

## 2021-06-16 ENCOUNTER — APPOINTMENT (OUTPATIENT)
Dept: INTERNAL MEDICINE | Facility: CLINIC | Age: 46
End: 2021-06-16

## 2021-07-13 ENCOUNTER — APPOINTMENT (OUTPATIENT)
Dept: PULMONOLOGY | Facility: CLINIC | Age: 46
End: 2021-07-13

## 2021-07-21 ENCOUNTER — RX RENEWAL (OUTPATIENT)
Age: 46
End: 2021-07-21

## 2021-08-02 ENCOUNTER — RX RENEWAL (OUTPATIENT)
Age: 46
End: 2021-08-02

## 2021-08-02 ENCOUNTER — APPOINTMENT (OUTPATIENT)
Dept: INTERNAL MEDICINE | Facility: CLINIC | Age: 46
End: 2021-08-02

## 2021-08-03 ENCOUNTER — RX RENEWAL (OUTPATIENT)
Age: 46
End: 2021-08-03

## 2021-08-05 ENCOUNTER — NON-APPOINTMENT (OUTPATIENT)
Age: 46
End: 2021-08-05

## 2021-08-05 ENCOUNTER — APPOINTMENT (OUTPATIENT)
Dept: INTERNAL MEDICINE | Facility: CLINIC | Age: 46
End: 2021-08-05
Payer: MEDICARE

## 2021-08-05 ENCOUNTER — EMERGENCY (EMERGENCY)
Facility: HOSPITAL | Age: 46
LOS: 1 days | Discharge: HOME | End: 2021-08-05
Attending: EMERGENCY MEDICINE | Admitting: EMERGENCY MEDICINE
Payer: COMMERCIAL

## 2021-08-05 VITALS
DIASTOLIC BLOOD PRESSURE: 80 MMHG | WEIGHT: 293 LBS | HEART RATE: 109 BPM | OXYGEN SATURATION: 91 % | TEMPERATURE: 98.4 F | SYSTOLIC BLOOD PRESSURE: 132 MMHG | BODY MASS INDEX: 67.79 KG/M2

## 2021-08-05 VITALS
HEART RATE: 80 BPM | SYSTOLIC BLOOD PRESSURE: 105 MMHG | RESPIRATION RATE: 22 BRPM | DIASTOLIC BLOOD PRESSURE: 65 MMHG | OXYGEN SATURATION: 95 % | TEMPERATURE: 98 F

## 2021-08-05 VITALS
HEIGHT: 67 IN | OXYGEN SATURATION: 100 % | HEART RATE: 102 BPM | WEIGHT: 293 LBS | DIASTOLIC BLOOD PRESSURE: 82 MMHG | TEMPERATURE: 97 F | RESPIRATION RATE: 16 BRPM | SYSTOLIC BLOOD PRESSURE: 165 MMHG

## 2021-08-05 DIAGNOSIS — Z79.899 OTHER LONG TERM (CURRENT) DRUG THERAPY: ICD-10-CM

## 2021-08-05 DIAGNOSIS — Z99.81 DEPENDENCE ON SUPPLEMENTAL OXYGEN: ICD-10-CM

## 2021-08-05 DIAGNOSIS — R06.02 SHORTNESS OF BREATH: ICD-10-CM

## 2021-08-05 DIAGNOSIS — I11.0 HYPERTENSIVE HEART DISEASE WITH HEART FAILURE: ICD-10-CM

## 2021-08-05 DIAGNOSIS — I50.30 UNSPECIFIED DIASTOLIC (CONGESTIVE) HEART FAILURE: ICD-10-CM

## 2021-08-05 DIAGNOSIS — Z20.822 CONTACT WITH AND (SUSPECTED) EXPOSURE TO COVID-19: ICD-10-CM

## 2021-08-05 DIAGNOSIS — G47.33 OBSTRUCTIVE SLEEP APNEA (ADULT) (PEDIATRIC): ICD-10-CM

## 2021-08-05 DIAGNOSIS — Z79.84 LONG TERM (CURRENT) USE OF ORAL HYPOGLYCEMIC DRUGS: ICD-10-CM

## 2021-08-05 DIAGNOSIS — Z91.013 ALLERGY TO SEAFOOD: ICD-10-CM

## 2021-08-05 DIAGNOSIS — E78.5 HYPERLIPIDEMIA, UNSPECIFIED: ICD-10-CM

## 2021-08-05 DIAGNOSIS — Z79.82 LONG TERM (CURRENT) USE OF ASPIRIN: ICD-10-CM

## 2021-08-05 DIAGNOSIS — E66.2 MORBID (SEVERE) OBESITY WITH ALVEOLAR HYPOVENTILATION: ICD-10-CM

## 2021-08-05 LAB
ALBUMIN SERPL ELPH-MCNC: 3.5 G/DL — SIGNIFICANT CHANGE UP (ref 3.3–5)
ALP SERPL-CCNC: 86 U/L — SIGNIFICANT CHANGE UP (ref 40–120)
ALT FLD-CCNC: 20 U/L — SIGNIFICANT CHANGE UP (ref 10–45)
ANION GAP SERPL CALC-SCNC: 7 MMOL/L — SIGNIFICANT CHANGE UP (ref 5–17)
AST SERPL-CCNC: 18 U/L — SIGNIFICANT CHANGE UP (ref 10–40)
BASE EXCESS BLDV CALC-SCNC: 2.1 MMOL/L — SIGNIFICANT CHANGE UP (ref -2–3)
BASOPHILS # BLD AUTO: 0.03 K/UL — SIGNIFICANT CHANGE UP (ref 0–0.2)
BASOPHILS NFR BLD AUTO: 0.3 % — SIGNIFICANT CHANGE UP (ref 0–2)
BILIRUB SERPL-MCNC: 0.4 MG/DL — SIGNIFICANT CHANGE UP (ref 0.2–1.2)
BUN SERPL-MCNC: 10 MG/DL — SIGNIFICANT CHANGE UP (ref 7–23)
CA-I SERPL-SCNC: 1.12 MMOL/L — LOW (ref 1.15–1.33)
CALCIUM SERPL-MCNC: 9.8 MG/DL — SIGNIFICANT CHANGE UP (ref 8.4–10.5)
CHLORIDE SERPL-SCNC: 104 MMOL/L — SIGNIFICANT CHANGE UP (ref 96–108)
CO2 BLDV-SCNC: 30.3 MMOL/L — HIGH (ref 22–26)
CO2 SERPL-SCNC: 30 MMOL/L — SIGNIFICANT CHANGE UP (ref 22–31)
CREAT SERPL-MCNC: 0.74 MG/DL — SIGNIFICANT CHANGE UP (ref 0.5–1.3)
D DIMER BLD IA.RAPID-MCNC: 163 NG/ML DDU — SIGNIFICANT CHANGE UP
EOSINOPHIL # BLD AUTO: 0.1 K/UL — SIGNIFICANT CHANGE UP (ref 0–0.5)
EOSINOPHIL NFR BLD AUTO: 0.9 % — SIGNIFICANT CHANGE UP (ref 0–6)
GAS PNL BLDV: 134 MMOL/L — LOW (ref 136–145)
GAS PNL BLDV: SIGNIFICANT CHANGE UP
GLUCOSE SERPL-MCNC: 99 MG/DL — SIGNIFICANT CHANGE UP (ref 70–99)
HCO3 BLDV-SCNC: 29 MMOL/L — SIGNIFICANT CHANGE UP (ref 22–29)
HCT VFR BLD CALC: 30.4 % — LOW (ref 34.5–45)
HGB BLD-MCNC: 8.2 G/DL — LOW (ref 11.5–15.5)
IMM GRANULOCYTES NFR BLD AUTO: 0.4 % — SIGNIFICANT CHANGE UP (ref 0–1.5)
LYMPHOCYTES # BLD AUTO: 19.6 % — SIGNIFICANT CHANGE UP (ref 13–44)
LYMPHOCYTES # BLD AUTO: 2.21 K/UL — SIGNIFICANT CHANGE UP (ref 1–3.3)
MAGNESIUM SERPL-MCNC: 1.6 MG/DL — SIGNIFICANT CHANGE UP (ref 1.6–2.6)
MCHC RBC-ENTMCNC: 22.8 PG — LOW (ref 27–34)
MCHC RBC-ENTMCNC: 27 GM/DL — LOW (ref 32–36)
MCV RBC AUTO: 84.7 FL — SIGNIFICANT CHANGE UP (ref 80–100)
MONOCYTES # BLD AUTO: 0.66 K/UL — SIGNIFICANT CHANGE UP (ref 0–0.9)
MONOCYTES NFR BLD AUTO: 5.9 % — SIGNIFICANT CHANGE UP (ref 2–14)
NEUTROPHILS # BLD AUTO: 8.21 K/UL — HIGH (ref 1.8–7.4)
NEUTROPHILS NFR BLD AUTO: 72.9 % — SIGNIFICANT CHANGE UP (ref 43–77)
NRBC # BLD: 0 /100 WBCS — SIGNIFICANT CHANGE UP (ref 0–0)
NT-PROBNP SERPL-SCNC: 196 PG/ML — SIGNIFICANT CHANGE UP (ref 0–300)
PCO2 BLDV: 52 MMHG — HIGH (ref 39–42)
PH BLDV: 7.35 — SIGNIFICANT CHANGE UP (ref 7.32–7.43)
PLATELET # BLD AUTO: 375 K/UL — SIGNIFICANT CHANGE UP (ref 150–400)
PO2 BLDV: 127 MMHG — SIGNIFICANT CHANGE UP
POTASSIUM BLDV-SCNC: 7.5 MMOL/L — CRITICAL HIGH (ref 3.5–5.1)
POTASSIUM SERPL-MCNC: 4.5 MMOL/L — SIGNIFICANT CHANGE UP (ref 3.5–5.3)
POTASSIUM SERPL-SCNC: 4.5 MMOL/L — SIGNIFICANT CHANGE UP (ref 3.5–5.3)
PROT SERPL-MCNC: 7.4 G/DL — SIGNIFICANT CHANGE UP (ref 6–8.3)
RBC # BLD: 3.59 M/UL — LOW (ref 3.8–5.2)
RBC # FLD: 18.8 % — HIGH (ref 10.3–14.5)
SAO2 % BLDV: 98.7 % — SIGNIFICANT CHANGE UP
SARS-COV-2 RNA SPEC QL NAA+PROBE: NEGATIVE — SIGNIFICANT CHANGE UP
SODIUM SERPL-SCNC: 141 MMOL/L — SIGNIFICANT CHANGE UP (ref 135–145)
TROPONIN T SERPL-MCNC: 0.01 NG/ML — SIGNIFICANT CHANGE UP (ref 0–0.01)
WBC # BLD: 11.26 K/UL — HIGH (ref 3.8–10.5)
WBC # FLD AUTO: 11.26 K/UL — HIGH (ref 3.8–10.5)

## 2021-08-05 PROCEDURE — 83880 ASSAY OF NATRIURETIC PEPTIDE: CPT

## 2021-08-05 PROCEDURE — 96374 THER/PROPH/DIAG INJ IV PUSH: CPT

## 2021-08-05 PROCEDURE — 83735 ASSAY OF MAGNESIUM: CPT

## 2021-08-05 PROCEDURE — 84484 ASSAY OF TROPONIN QUANT: CPT

## 2021-08-05 PROCEDURE — 82803 BLOOD GASES ANY COMBINATION: CPT

## 2021-08-05 PROCEDURE — 85025 COMPLETE CBC W/AUTO DIFF WBC: CPT

## 2021-08-05 PROCEDURE — 85379 FIBRIN DEGRADATION QUANT: CPT

## 2021-08-05 PROCEDURE — 36415 COLL VENOUS BLD VENIPUNCTURE: CPT

## 2021-08-05 PROCEDURE — 82330 ASSAY OF CALCIUM: CPT

## 2021-08-05 PROCEDURE — 99285 EMERGENCY DEPT VISIT HI MDM: CPT

## 2021-08-05 PROCEDURE — 99284 EMERGENCY DEPT VISIT MOD MDM: CPT | Mod: 25

## 2021-08-05 PROCEDURE — 80053 COMPREHEN METABOLIC PANEL: CPT

## 2021-08-05 PROCEDURE — 93005 ELECTROCARDIOGRAM TRACING: CPT

## 2021-08-05 PROCEDURE — 87635 SARS-COV-2 COVID-19 AMP PRB: CPT

## 2021-08-05 PROCEDURE — 84132 ASSAY OF SERUM POTASSIUM: CPT

## 2021-08-05 PROCEDURE — 71045 X-RAY EXAM CHEST 1 VIEW: CPT | Mod: 26

## 2021-08-05 PROCEDURE — 99215 OFFICE O/P EST HI 40 MIN: CPT | Mod: GC

## 2021-08-05 PROCEDURE — 71045 X-RAY EXAM CHEST 1 VIEW: CPT

## 2021-08-05 PROCEDURE — 84295 ASSAY OF SERUM SODIUM: CPT

## 2021-08-05 RX ORDER — FUROSEMIDE 40 MG
40 TABLET ORAL ONCE
Refills: 0 | Status: COMPLETED | OUTPATIENT
Start: 2021-08-05 | End: 2021-08-05

## 2021-08-05 RX ORDER — FUROSEMIDE 40 MG
60 TABLET ORAL ONCE
Refills: 0 | Status: DISCONTINUED | OUTPATIENT
Start: 2021-08-05 | End: 2021-08-05

## 2021-08-05 RX ADMIN — Medication 40 MILLIGRAM(S): at 15:23

## 2021-08-05 NOTE — ED PROVIDER NOTE - PHYSICAL EXAMINATION
CONST: morbid obesity nontoxic NAD speaking in full sentences  HEAD: atraumatic  EYES: conjunctivae clear, PERRL, EOMI  ENT: mmm  NECK: supple/FROM, nttp, no jvd  CARD: rrr no murmurs  CHEST: ctab no r/r/w, no stridor/retractions/tripoding  ABD: soft, nd, nttp, no rebound/guarding  EXT: FROM, symmetric distal pulses intact  SKIN: warm, dry, no rash, +scant ble edema, cap refill <2sec  NEURO: a+ox3, 5/5 strength x4, gross sensation intact x4

## 2021-08-05 NOTE — ED PROVIDER NOTE - PROGRESS NOTE DETAILS
dr dash contacted. updated and agrees w/ plan. if pt is at baseline o2 requirements and asymptomatic s/p lasix, okay to dc home w/ outpatient fu. pt reports completely resolved sx s/p lasix. per sw, pt to go home by ambulance for o2 supplementation, and then pt will refill her o2 tank at home.

## 2021-08-05 NOTE — ED ADULT NURSE NOTE - NSIMPLEMENTINTERV_GEN_ALL_ED
Implemented All Fall Risk Interventions:  [place] to call system. Call bell, personal items and telephone within reach. Instruct patient to call for assistance. Room bathroom lighting operational. Non-slip footwear when patient is off stretcher. Physically safe environment: no spills, clutter or unnecessary equipment. Stretcher in lowest position, wheels locked, appropriate side rails in place. Provide visual cue, wrist band, yellow gown, etc. Monitor gait and stability. Monitor for mental status changes and reorient to person, place, and time. Review medications for side effects contributing to fall risk. Reinforce activity limits and safety measures with patient and family.

## 2021-08-05 NOTE — ED PROVIDER NOTE - CLINICAL SUMMARY MEDICAL DECISION MAKING FREE TEXT BOX
afebrile. hds. no increased o2 requirement. nontoxic. NAD. no systemic sx. no active cp. no acute resp distress. no leukocytosis vs significant anemia vs electrolyte abnl. ddimer neg. trop wnl. ekg w/o significant st/t changes. cxr w/o acute focal consol vs ptx vs pulm edema vs widened mediastinum vs widened mediastinum. sx resolved sp lasix. per sw, pt already w/ o2 supply at home and will just need ambulance transport for o2 while en route home and then pt will refill her own tanks upon arrival home. pt feels safe going home. dr dash contacted. no indication for inpatient hospitalization at this time. will dc w/ outpatient pcp fu. strict return precautions. pt agrees w/ plan. questions answered.

## 2021-08-05 NOTE — ED CLERICAL - NS ED CLERK NOTE PRE-ARRIVAL INFORMATION; ADDITIONAL PRE-ARRIVAL INFORMATION
[facility] Shortness of breath, Acute heart failure. Skipped meds "Torefmide 40MG'

## 2021-08-05 NOTE — ED PROVIDER NOTE - PATIENT PORTAL LINK FT
You can access the FollowMyHealth Patient Portal offered by [facility] by registering at the following website: [email or web address]. By joining [facility]’s FollowMyHealth portal, you will also be able to view your health information using other applications (apps) compatible with our system.

## 2021-08-05 NOTE — REVIEW OF SYSTEMS
[Recent Change In Weight] : ~T recent weight change [Shortness Of Breath] : shortness of breath [Negative] : Genitourinary

## 2021-08-05 NOTE — ED ADULT TRIAGE NOTE - CHIEF COMPLAINT QUOTE
Pt BIBA CO SOB s/t CHF.  Pt on 3L O2 via NC at baseline.  Denies N/V/D, Fevers, CP and Dizziness.  EKG in progress.

## 2021-08-05 NOTE — ED PROVIDER NOTE - OBJECTIVE STATEMENT
46F morbid obesity, dchf (EF 55%), pericardial effusion (8/2018) s/p drainage, obesity hypoventilation syndrome, chronic hypoxemia on 2L nc at rest/3L when mobile, htn, hld, johnnie, recently hospitalized for sob/increased o2 req (6/2/21-6/8/21), referred in from scheduled primary clinic for RODRIGUEZ. pt states her o2 tank was running low while in the clinic, sx resolved now while on supplemental o2 here. pt also admits to torsemide noncompliance while on her menses. +scant ble edema. no fever/chills, no uri/cough, no cp, no abd pain/n/v, no dysuria, no pedal pain/rash, no recent travel, no trauma, no etoh-dpt/ivdu.     pulm: hardy

## 2021-08-05 NOTE — ED PROVIDER NOTE - NSFOLLOWUPINSTRUCTIONS_ED_ALL_ED_FT
PLEASE FOLLOW-UP WITH YOUR PCP IN 1-2 DAYS.    ANY XRAY IMAGING RESULTS GIVEN IN THE EMERGENCY DEPARTMENT ARE PRELIMINARY UNTIL FORMALLY READ BY A RADIOLOGIST. YOU WILL BE CONTACTED IF THERE ARE ANY SIGNIFICANT CHANGES IN THE FINAL READ.    PLEASE RETURN TO THE EMERGENCY DEPARTMENT IF FEVER, CHEST PAIN, SHORTNESS OF BREATH, ABDOMINAL PAIN, VOMITING, OTHER CONCERNING SYMPTOMS.                  SHORTNESS OF BREATH - AfterCare(R) Instructions(ER/ED)           Shortness of Breath    WHAT YOU NEED TO KNOW:    Shortness of breath is a feeling that you cannot get enough air when you breathe in. You may have this feeling only during activity, or all the time. Your symptoms can range from mild to severe. Shortness of breath may be a sign of a serious health condition that needs immediate care.    DISCHARGE INSTRUCTIONS:    Return to the emergency department if:   •Your signs and symptoms are the same or worse within 24 hours of treatment.       •The skin over your ribs or on your neck sinks in when you breathe.       •You feel confused or dizzy.      Contact your healthcare provider if:   •You have new or worsening symptoms.      •You have questions or concerns about your condition or care.      Medicines:   •Medicines may be used to treat the cause of your symptoms. You may need medicine to treat a bacterial infection or reduce anxiety. Other medicines may be used to open your airway, reduce swelling, or remove extra fluid. If you have a heart condition, you may need medicine to help your heart beat more strongly or regularly.      •Take your medicine as directed. Contact your healthcare provider if you think your medicine is not helping or if you have side effects. Tell him or her if you are allergic to any medicine. Keep a list of the medicines, vitamins, and herbs you take. Include the amounts, and when and why you take them. Bring the list or the pill bottles to follow-up visits. Carry your medicine list with you in case of an emergency.      Manage shortness of breath:   •Create an action plan. You and your healthcare provider can work together to create a plan for how to handle shortness of breath. The plan can include daily activities, treatment changes, and what to do if you have severe breathing problems.      •Lean forward on your elbows when you sit. This helps your lungs expand and may make it easier to breathe.      •Use pursed-lip breathing any time you feel short of breath. Breathe in through your nose and then slowly breathe out through your mouth with your lips slightly puckered. It should take you twice as long to breathe out as it did to breathe in.  Breathe in Breathe out           •Do not smoke. Nicotine and other chemicals in cigarettes and cigars can cause lung damage and make shortness of breath worse. Ask your healthcare provider for information if you currently smoke and need help to quit. E-cigarettes or smokeless tobacco still contain nicotine. Talk to your healthcare provider before you use these products.      •Reach or maintain a healthy weight. Your healthcare provider can help you create a safe weight loss plan if you are overweight.      •Exercise as directed. Exercise can help your lungs work more easily. Exercise can also help you lose weight if needed. Try to get at least 30 minutes of exercise most days of the week.      Follow up with your healthcare provider or specialist as directed: Write down your questions so you remember to ask them during your visits.       © Copyright [email or web address] 2021           back to top                          © Copyright [email or web address] 2021

## 2021-08-06 NOTE — PHYSICAL EXAM
[Normal] : affect was normal and insight and judgment were intact [FreeTextEntry1] : mobidly obese. Right of of abdomen with paude orange like skin dimpling due to swelling. no obvious erythema. no obvious open sores or drainage.

## 2021-08-06 NOTE — HISTORY OF PRESENT ILLNESS
[FreeTextEntry1] : post discharge follow up  [de-identified] : Pt is a 44yo F PMH morbid obesity, HTN, NIDDM, HFpEF, anxiety, MANUEL/OHS on NC all day (3-4L) and CPAP at night presenting for a post discharge f/u and f/u after starting Victoza. Patient was last seen in june for CPE. at that time for was started on victoza 0.6 which has since been increased to 1.2QD. on 6/2 patient was seen by pulm, at that time was found to have RODRIGUEZ, decreased flow volumes and decreased diffusion capacity in addition to LE swelling R>L. there was concern for DVT/PE vs HF exacerbation. patient was sent to the ED. w/u was negative for DVT/PE however patient was found to be fluid overloaded w effusions on cxr and was admitted for chf exacerbation. ECHO showed pulm A pressure of 65. compared to last echo from 2018 patient was found to have newly diagnosed mod-severe pulmonary hypertension. patient was seen by cardiology inpatient s/p diuresis w/ IV lasix w removal of 4L. patient weight 409 at discharge and was weaned down to her home 3/4L NC. patient was discharged on 40 torsemide qd.\par \par today patient is presenting to the office with RODRIGUEZ and increased WOB w ambulation. upon walking 20ft on home 3L NC patient w/ desaturation to mid 70s, improved to 98 at rest after approximately 10 mins. Patient was discharged on home torsemide 40mg qd, states shes been non complaint with her medications over the past week due to being on her menstrual. Patient states shes been home and did not realize how dysemic she was until leaving the house. Patient also ran out of O2 while in the clinic requiring supplemental O2 provided by the office. the decision was made to send patient to the ED for sob likely 2/2 to fluid overload in setting of CHF exacerbation due to medication non compliance.  \par

## 2021-08-06 NOTE — END OF VISIT
[FreeTextEntry3] : I saw and evaluated the patient.  The findings and assessment were discussed with the resident and I agree with resident’s plan as documented in the above, in the resident’s note.\par \par Pertinent exam findings: Obese female w/ walker and O2.\par \par HFrEF: Has not been taking diuretics as they are uncomfortable during menses.  Desatted to 70s on ambulation w/ tachycardia.  Stable at rest.  Unable to assess volume status.  Initially recommended to go to ED, patient declined -- agreed to go home and restart diuretics.  Patient ran out of O2 while in office and was referred to the ED.  Ambulance called for transport.\par \par DMII: Titrate up Victoza to 1.8mg daily x1 week, then 2.4mg daily then 3.0.  Monitor dry weight (409 upon hospital dc).  C/w metformin.  Given heart disease, would also benefit from SGLT2 blocker but will titrate GLP-1 first for weight benefits.\par \par HTN: C/w losartan, amlodipine, diuresis\par C/w potassium supp and monitor.\par \par C/w statin\par \par RTC 1 week\par \par I spent more than 40 minutes for the total time of this encounter, including time spent face-to-face with the patient, chart review, coordinating care, and documentation.

## 2021-08-06 NOTE — ASSESSMENT
[FreeTextEntry1] : \par Pt is a 44yo F PMH morbid obesity, HTN, NIDDM, HFpEF, anxiety, MANUEL/OHS on NC all day (3-4L) and CPAP at night presenting for post hospital d/c f/u

## 2021-08-06 NOTE — PLAN
[FreeTextEntry1] : #HFpEF \par previously followed w/ Dr. Oscar, no recent appt. Echo with EF 50-55% in 8/2018. most recent echo for 7/2021 showed EF 55% pulm A pressure 65-newly diagnosed mod-sever pulm hypertension. patient referred to Dr Jamison by pulm for cardiology f/u\par -patient reporting poor medication compliance w sings of overload in office today w desaturations to the mid 70s. sent to ED for diuresis and oxygen as patient also ran out while in office.\par - C/w Torsemide 40mg qd, Losartan 25mg qd \par - repeat CMP in 1 week\par - RTC for f/u in 1 week \par \par #RLE swelling \par Likely chronic 2/2 HF in setting of noncompliance with Torsemide vs lymphedema although DVT should be ruled out, as pt is high risk for DVT with decreased mobility. Exam as above. \par - RLE doppler US negative from recent hospitalization \par - Torsemide 40mg qd \par \par #DM \par On Metformin 1000mg BID. HgbA1c 7.% 5/2021. \par - recently started on Liraglutide. increased from 0.6mg to 1.2. tolerating well with minimal side effects. increase to 1.8 with plans to increase to 2.3 next week  (see below) \par \par #Morbid obesity \par BMI 67. Pt with poor diet and exercise regimen due to impaired mobility \par - Discussed with pt and HHA the need for less fast food and replace with home cooked meals which includes more vegetables \par - Pt agreeable to restart home exercise routine (seated exercises from prior PT, has weights) \par - c/w Liraglutide increased from 1.2 to 1.8 today  qd, if tolerating can increase to 2.4mg next week. Common SEs (eg, GI) discussed \par \par \par #MANUEL/OHS \par On O2 (3-4L NC during day, CPAP at night). Follows with Dr. Luo \par - c/w pulm f/u 9last visit 5/2021)\par \par #HTN \par Well controlled. See HPI \par - C/w Amlodipine 5mg qd, Losartan 25mg qd \par \par #Anxiety \par Not on any medication. Previously spoke to HERIBERTO Urrutia \par - Would like to speak with a therapist. Will task HERIBERTO Urrutia for resources \par \par #HCM \par - Refer for cervical cancer screening. No recent pap smear. Denies h/o abnormal pap smear\par - Refer for mammogram. Pt with Fhx breast ca \par - UTD on vaccines \par - Plans to get COVID vaccine at pharmacy \par \par \par RTC 1 week after for follow up of acute on chrnic HF exacerbation and to f/u on initiation of Liraglutide for weight loss. If tolerating, consider increasing dose further.

## 2021-08-12 ENCOUNTER — APPOINTMENT (OUTPATIENT)
Dept: INTERNAL MEDICINE | Facility: CLINIC | Age: 46
End: 2021-08-12
Payer: MEDICARE

## 2021-08-12 PROCEDURE — 99213 OFFICE O/P EST LOW 20 MIN: CPT | Mod: GC,95

## 2021-09-14 NOTE — ED PROVIDER NOTE - CROS ED CONS ALL NEG
RX refill request sent via e-scribe from [facility] pharmacy on [address]. Requesting a refill on Metformin 500 mg tablet,  with 3 refills sent in today.    Next appointment scheduled on 11/16/21 with Dr Boucher.   
negative...

## 2021-10-07 ENCOUNTER — APPOINTMENT (OUTPATIENT)
Dept: INTERNAL MEDICINE | Facility: CLINIC | Age: 46
End: 2021-10-07

## 2021-10-11 ENCOUNTER — RX RENEWAL (OUTPATIENT)
Age: 46
End: 2021-10-11

## 2021-10-21 ENCOUNTER — APPOINTMENT (OUTPATIENT)
Dept: INTERNAL MEDICINE | Facility: CLINIC | Age: 46
End: 2021-10-21
Payer: MEDICARE

## 2021-10-21 PROCEDURE — 99442: CPT | Mod: GC

## 2021-10-21 RX ORDER — LIRAGLUTIDE 6 MG/ML
18 INJECTION SUBCUTANEOUS DAILY
Qty: 1 | Refills: 2 | Status: DISCONTINUED | COMMUNITY
Start: 2021-05-06 | End: 2021-10-21

## 2021-10-22 NOTE — END OF VISIT
[FreeTextEntry3] : I was present with the Resident during the key portions of this encounter and provided supervision via audio/visual technology.  I agree with the findings and plan as documented in the Resident's note, unless noted below. \par \par HFrEF: Not adheren to diuretics.  Skipped 2 weeks of medication.  Mildly increased O2 use.  Counseled on risks of non-adherence.  Encouraged her to take meds which will help with breathing.\par \par DMII w/ obesity: Maxed out on Victoza dosing for DM.  Does not qualify for Saxenda due to insurance.  D/c Victoza -- Last dose today.  Skip Friday dosing.  Start Trulicity on Saturday at 1.5mg qWeekly.  Inc to 3mg in 4 weeks then 4.5 mg.  This is for tight control of DMII and weight loss benefit.  Also so that patient does not have to inject daily.\par \par RTC 4 weeks for labs and med titration.\par \par I spent more than 11 minutes for the total time of this encounter, including time spent on phone w/ patient, chart review, and documentation.

## 2021-10-22 NOTE — ASSESSMENT
[FreeTextEntry1] : 47 y/o F with hx of morbid obesity, HTN, NIDDM, HFpEF, anxiety, MANUEL/OHS on 3-4LNC and CPAP overnight presenting for follow up.

## 2021-10-22 NOTE — PLAN
[FreeTextEntry1] : #DM\par Pt was recently started on Victoza for diabetes management with plans to increase dose based on A1c. Currently on Metformin 1000mg BID as well.\par - For optimization of medication adherence, d/c Victoza and switch to Trulicity 1.5mg injection 1x/week \par - Refill Metformin 1000mg BID\par - Follow up A1c at next visit\par \par #HFrEF\par Pt has a hx of HFrEF, EF 7/2021 showed 55% and pHTN \par -Cont torsemide 40mg qd\par - Cont Losartan 25mg qd, refill today\par \par #Morbid obesity\par - d/c Victoza and switch to Trulicity 1.5mg injection 1x/week \par #HTN\par -Continue home amlodipine 5mg qd and Losartan 25mg qd\par #MANUEL\par Continue CPAP overnight

## 2021-10-22 NOTE — HISTORY OF PRESENT ILLNESS
[FreeTextEntry1] : follow up [de-identified] : Discussed with Pt: You have chosen to receive care via tele-health, this is a billable encounter. There are associated risks in that we are not able to visibly or physically assess the patient and she may need to visit in person to have appropriate lab work, physical exam and assessment. Pt verbally acknowledged the risks and agrees to proceed with visit.\par \par 45 y/o F with hx of morbid obesity, HTN, NIDDM, HFpEF, anxiety, MANUEL/OHS on 3-4LNC and CPAP overnight presenting for follow up. Pt was recently seen via video-telehealth for post ED follow up where she was newly diagnosed with pHTN. Pt communicated with over phone, states that she has missed 2 weeks of her usual torsemide 40mg dose. She states that she has otherwise been adherent to other medications and is very interested in increasing Victoza dosing to maximize benefit to weight loss. Pt denies SOB, chest pain, palpitations, new swelling of LE.

## 2021-11-01 ENCOUNTER — RX RENEWAL (OUTPATIENT)
Age: 46
End: 2021-11-01

## 2021-11-09 NOTE — ED ADULT NURSE NOTE - PAIN RATING/NUMBER SCALE (0-10): ACTIVITY
Let Christen know I saw that she was d/c from PT. I went ahead and placed an order for MRI cervical spine. 4

## 2021-11-15 ENCOUNTER — APPOINTMENT (OUTPATIENT)
Dept: PULMONOLOGY | Facility: CLINIC | Age: 46
End: 2021-11-15

## 2021-11-16 ENCOUNTER — RX RENEWAL (OUTPATIENT)
Age: 46
End: 2021-11-16

## 2021-11-18 ENCOUNTER — RX RENEWAL (OUTPATIENT)
Age: 46
End: 2021-11-18

## 2021-11-22 ENCOUNTER — APPOINTMENT (OUTPATIENT)
Dept: INTERNAL MEDICINE | Facility: CLINIC | Age: 46
End: 2021-11-22

## 2021-11-24 ENCOUNTER — APPOINTMENT (OUTPATIENT)
Dept: HEART AND VASCULAR | Facility: CLINIC | Age: 46
End: 2021-11-24

## 2021-12-20 ENCOUNTER — RX RENEWAL (OUTPATIENT)
Age: 46
End: 2021-12-20

## 2021-12-21 ENCOUNTER — APPOINTMENT (OUTPATIENT)
Dept: INTERNAL MEDICINE | Facility: CLINIC | Age: 46
End: 2021-12-21

## 2022-01-04 ENCOUNTER — APPOINTMENT (OUTPATIENT)
Dept: INTERNAL MEDICINE | Facility: CLINIC | Age: 47
End: 2022-01-04
Payer: MEDICARE

## 2022-01-04 ENCOUNTER — RX RENEWAL (OUTPATIENT)
Age: 47
End: 2022-01-04

## 2022-01-04 PROCEDURE — 99213 OFFICE O/P EST LOW 20 MIN: CPT | Mod: GC,95

## 2022-01-04 RX ORDER — POTASSIUM CHLORIDE 1500 MG/1
20 TABLET, EXTENDED RELEASE ORAL
Qty: 90 | Refills: 1 | Status: DISCONTINUED | COMMUNITY
Start: 2019-11-27 | End: 2022-01-04

## 2022-01-04 RX ORDER — DULAGLUTIDE 1.5 MG/.5ML
1.5 INJECTION, SOLUTION SUBCUTANEOUS
Qty: 1 | Refills: 0 | Status: DISCONTINUED | COMMUNITY
Start: 2021-10-21 | End: 2022-01-04

## 2022-01-05 NOTE — HISTORY OF PRESENT ILLNESS
[Home] : at home, [unfilled] , at the time of the visit. [Medical Office: ([facility])___] : at the medical office located in  [Verbal consent obtained from patient] : the patient, [unfilled] [FreeTextEntry1] : Follow up visit  [de-identified] : Discussed with patient: You have chosen to receive care through the use of tele-media. Tele-media enables health care providers at different locations to provide safe, effective, and convenient care through the use of technology. Please note this is a billable encounter. As with any health care service, there are risks associated with the use of tele-media, including equipment failure, poor image and/or resolution, and  issues. You understand that I cannot physically examine you and that you may need to come to the clinic to complete the assessment. Patient agreed verbally to understanding the risks and benefits of tele-media as explained. All questions regarding tele-media encounters were answered. \par \par Patient is a 45 y/o F with hx of morbid obesity, HTN, NIDDM, HFpEF, anxiety, MANUEL/OHS on 3-4LN, and CPAP overnight, and pulmonary hypertension presenting for follow up. She is asking for refills on Trulicty. She states she has not noticed much weight loss but is taking as prescribed. She has been compliant with her other medications, including torsemide, and states she does not feel increased dyspnea. She is on her baseline oxygen and feels good overall. \par

## 2022-01-05 NOTE — REVIEW OF SYSTEMS
[Fever] : no fever [Chills] : no chills [Chest Pain] : no chest pain [Palpitations] : no palpitations [Shortness Of Breath] : no shortness of breath [Wheezing] : no wheezing [Cough] : no cough [Abdominal Pain] : no abdominal pain [Dysuria] : no dysuria [Vomiting] : no vomiting

## 2022-01-05 NOTE — ASSESSMENT
[FreeTextEntry1] : Patient is a 45 y/o F with hx of morbid obesity, HTN, NIDDM, HFpEF, anxiety, MANUEL/OHS on 3-4LN, and CPAP overnight, and pulmonary hypertension presenting for follow up.\par \par \par #DM type 2\par - Patient currently on Trulicty 1.5 q week injections and Metformin 1000 mg BID \par - last A1c 7.6 in Oct 2021 \par - Will increase Trulicy to 3mg weekly\par - Patient reports she was able to get labs done at home last visit, will pursue home phlebotomy and if not have patient come back in clinic to get A1C and BMP done \par \par #HFpEF\par - patient is compliant with torsemide\par - Advised to make cardiology apt, and will order BMP for either home labs or in clinic \par \par #HTN \par - continue losartan 25 and amlodipine 5 daily \par \par #MANUEL/OHS\par - continue with nasal cannula and CPAP at night \par - patient will make pulm apt, due for PFT testing \par \par #Pulm HTN \par - likely group 3, 2/2 MANUEL and OHS, \par - advised to make apt with Pulm \par - Echo Normal RV and LV function \par - has not had RHC done yet \par \par \par HCM \par - Pending labs, will task and ask about home labs

## 2022-01-05 NOTE — END OF VISIT
[FreeTextEntry3] : I was present with the Resident during the key portions of this encounter and provided supervision via audio/visual technology.  I agree with the findings and plan as documented in the Resident's note, unless noted below. \par \par DMII: Titrate trulicity.\par HF: Encouraged torsemide use.  She has improved her adherence.  \par Stable.  Needs CBC and A1c, will try to arrange for home labs.\par \par I spent more than 20 minutes for the total time of this encounter, including time spent face-to-face with the patient, chart review, coordinating care, and documentation.

## 2022-02-01 ENCOUNTER — APPOINTMENT (OUTPATIENT)
Dept: INTERNAL MEDICINE | Facility: CLINIC | Age: 47
End: 2022-02-01
Payer: MEDICARE

## 2022-02-01 ENCOUNTER — NON-APPOINTMENT (OUTPATIENT)
Age: 47
End: 2022-02-01

## 2022-02-01 PROCEDURE — 99442: CPT | Mod: GC,95

## 2022-02-04 ENCOUNTER — NON-APPOINTMENT (OUTPATIENT)
Age: 47
End: 2022-02-04

## 2022-02-07 RX ORDER — ASPIRIN ENTERIC COATED TABLETS 81 MG 81 MG/1
81 TABLET, DELAYED RELEASE ORAL
Qty: 90 | Refills: 0 | Status: DISCONTINUED | COMMUNITY
Start: 2019-01-14 | End: 2022-02-07

## 2022-02-10 ENCOUNTER — RX RENEWAL (OUTPATIENT)
Age: 47
End: 2022-02-10

## 2022-02-10 ENCOUNTER — APPOINTMENT (OUTPATIENT)
Dept: INTERNAL MEDICINE | Facility: CLINIC | Age: 47
End: 2022-02-10
Payer: MEDICARE

## 2022-02-10 VITALS
HEIGHT: 66 IN | SYSTOLIC BLOOD PRESSURE: 133 MMHG | DIASTOLIC BLOOD PRESSURE: 77 MMHG | HEART RATE: 108 BPM | OXYGEN SATURATION: 78 % | TEMPERATURE: 98.5 F

## 2022-02-10 VITALS
RESPIRATION RATE: 18 BRPM | HEART RATE: 96 BPM | OXYGEN SATURATION: 92 % | DIASTOLIC BLOOD PRESSURE: 77 MMHG | SYSTOLIC BLOOD PRESSURE: 134 MMHG | BODY MASS INDEX: 66.34 KG/M2 | WEIGHT: 293 LBS | TEMPERATURE: 98.5 F

## 2022-02-10 DIAGNOSIS — E66.01 MORBID (SEVERE) OBESITY DUE TO EXCESS CALORIES: ICD-10-CM

## 2022-02-10 PROCEDURE — 99214 OFFICE O/P EST MOD 30 MIN: CPT | Mod: GC,25

## 2022-02-10 PROCEDURE — 36415 COLL VENOUS BLD VENIPUNCTURE: CPT

## 2022-02-11 PROBLEM — E66.01 MORBID OBESITY: Status: ACTIVE | Noted: 2018-12-18

## 2022-02-11 NOTE — HISTORY OF PRESENT ILLNESS
[FreeTextEntry1] : follow up regarding RLE swelling [de-identified] : 47 y/o F with PMH of morbid obesity, HTN, NIDDM, HFpEF, anxiety, MANUEL/OHS (on 2-4L, and CPAP overnight), and pulmonary hypertension presenting for in-person visit for RLE swelling x 1.5 months that has been improving since taking Eliquis 10mg BID & Torsemide 40mg Qd, however she still endorses that her RLE looks darker in color. As of yesterday she has reduced her Eliquis dose to 5mg BID. She intends to schedule her RLE Doppler next week. She endorses SOB at baseline and uses 1-4L NC for goal SpO2 >95%. Other ROS negative.\par \par She endorses checking her O2 sat at home, but did not bring her pulse oximeter to this visit. On entering exam room she was sating 78% on 1L, then 92% on 2L. SHe was reminded to carry her pulse oximeter with her and check her oxygen saturation frequently to titrate her oxygen rate.\par

## 2022-02-11 NOTE — PHYSICAL EXAM
[Normal] : affect was normal and insight and judgment were intact [de-identified] : Obese [de-identified] : Soft breath sounds diffusely, CTA [de-identified] : R calf darker than left

## 2022-02-11 NOTE — END OF VISIT
[id number].  [FreeTextEntry3] : I saw and evaluated the patient.  The findings and assessment were discussed with the resident and I agree with resident’s plan as documented in the above, in the resident’s note.\par \par Pertinent exam findings: Well-appearing, NAD. \par \par Hypoxic, ok on O2 via NC.\par LE edema is likely fluid overload but will still r/o DVT - US ordered, given inability for patient to get to radiology, will get d-dimer, c/w eliquis empircally.  \par Increase trulicity to max dose for DM and weight loss benefit.  Tolerating well.\par \par C/w diuretics and other meds.\par \par Telehealth follow up in 6 weeks.

## 2022-02-11 NOTE — ASSESSMENT
[FreeTextEntry1] : Patient is a 45 y/o F with hx of morbid obesity, HTN, NIDDM, HFpEF, anxiety, MANUEL/OHS on 3-4LN, and CPAP overnight, and pulmonary hypertension presenting for follow up regarding RUE swelling.\par \par #RLE swelling\par x1.5 months ago with increased O2 requirement. High risk for DVT with 6 points on Wells Score for DVT (immobile, calf swelling > 3cm compared to other leg, collateral superficial veins present, entire leg swollen, localized tenderness, pitting edema R>L). \par - s/p Eliquis 10mg BID x7 days, now on 5mg BID [id number] while awaiting RLE dopplers\par - held ASA, monitor for leeding\par - f/u D-dimer & CBC\par \par #Morbid obesity\par Keen for gastric bypass, reject in the past d/t her oxygen requirements d/t MANUEL which is caused by her obesity\par \par #DM type 2\par - Increased Trulicty 1.5 to 4.5 q week injections and remaining on Metformin 1000 mg BID \par - last A1c 7.6 in Oct 2021 \par - f/u A1c\par \par #HFpEF\par - patient is compliant with torsemide\par - f/u BMP, proBNP\par \par #HTN \par - continue losartan 25 and amlodipine 5 daily \par \par #MANUEL/OHS\par - continue with nasal cannula and CPAP at night \par - patient will make pulm apt, due for PFT testing\par \par #Pulm HTN \par likely group 3, 2/2 MANUEL and OHS, \par - advised to make apt with Pulm \par - Echo Normal RV and LV function \par - has not had RHC done yet \par \par #HCM \par - RTC 6 weeks for televisit

## 2022-02-15 LAB
ANION GAP SERPL CALC-SCNC: 18 MMOL/L
BASOPHILS # BLD AUTO: 0.03 K/UL
BASOPHILS NFR BLD AUTO: 0.3 %
BUN SERPL-MCNC: 18 MG/DL
CALCIUM SERPL-MCNC: 9.9 MG/DL
CHLORIDE SERPL-SCNC: 97 MMOL/L
CO2 SERPL-SCNC: 27 MMOL/L
CREAT SERPL-MCNC: 0.98 MG/DL
EOSINOPHIL # BLD AUTO: 0.08 K/UL
EOSINOPHIL NFR BLD AUTO: 0.8 %
ESTIMATED AVERAGE GLUCOSE: 151 MG/DL
GLUCOSE SERPL-MCNC: 161 MG/DL
HBA1C MFR BLD HPLC: 6.9 %
HCT VFR BLD CALC: 37 %
HGB BLD-MCNC: 9.8 G/DL
IMM GRANULOCYTES NFR BLD AUTO: 0.2 %
LYMPHOCYTES # BLD AUTO: 1.93 K/UL
LYMPHOCYTES NFR BLD AUTO: 18.5 %
MAN DIFF?: NORMAL
MCHC RBC-ENTMCNC: 23 PG
MCHC RBC-ENTMCNC: 26.5 GM/DL
MCV RBC AUTO: 86.9 FL
MONOCYTES # BLD AUTO: 0.75 K/UL
MONOCYTES NFR BLD AUTO: 7.2 %
NEUTROPHILS # BLD AUTO: 7.63 K/UL
NEUTROPHILS NFR BLD AUTO: 73 %
PLATELET # BLD AUTO: 416 K/UL
POTASSIUM SERPL-SCNC: 4 MMOL/L
RBC # BLD: 4.26 M/UL
RBC # FLD: 19.5 %
SODIUM SERPL-SCNC: 141 MMOL/L
WBC # FLD AUTO: 10.44 K/UL

## 2022-02-28 ENCOUNTER — APPOINTMENT (OUTPATIENT)
Dept: INTERNAL MEDICINE | Facility: CLINIC | Age: 47
End: 2022-02-28
Payer: MEDICARE

## 2022-02-28 PROCEDURE — 99214 OFFICE O/P EST MOD 30 MIN: CPT | Mod: GC,95

## 2022-03-07 ENCOUNTER — RX RENEWAL (OUTPATIENT)
Age: 47
End: 2022-03-07

## 2022-03-10 ENCOUNTER — APPOINTMENT (OUTPATIENT)
Dept: ULTRASOUND IMAGING | Facility: HOSPITAL | Age: 47
End: 2022-03-10
Payer: MEDICARE

## 2022-03-10 ENCOUNTER — OUTPATIENT (OUTPATIENT)
Dept: OUTPATIENT SERVICES | Facility: HOSPITAL | Age: 47
LOS: 1 days | End: 2022-03-10
Payer: COMMERCIAL

## 2022-03-10 PROCEDURE — 93971 EXTREMITY STUDY: CPT

## 2022-03-10 PROCEDURE — 93971 EXTREMITY STUDY: CPT | Mod: 26,RT

## 2022-03-11 RX ORDER — APIXABAN 5 MG/1
5 TABLET, FILM COATED ORAL
Qty: 60 | Refills: 0 | Status: DISCONTINUED | COMMUNITY
Start: 2022-02-01 | End: 2022-03-11

## 2022-03-18 ENCOUNTER — APPOINTMENT (OUTPATIENT)
Dept: INTERNAL MEDICINE | Facility: CLINIC | Age: 47
End: 2022-03-18
Payer: MEDICARE

## 2022-03-18 VITALS
HEART RATE: 107 BPM | BODY MASS INDEX: 47.09 KG/M2 | OXYGEN SATURATION: 94 % | DIASTOLIC BLOOD PRESSURE: 66 MMHG | HEIGHT: 66 IN | WEIGHT: 293 LBS | TEMPERATURE: 97.9 F | SYSTOLIC BLOOD PRESSURE: 115 MMHG

## 2022-03-18 PROCEDURE — 99214 OFFICE O/P EST MOD 30 MIN: CPT | Mod: GC

## 2022-03-21 ENCOUNTER — NON-APPOINTMENT (OUTPATIENT)
Age: 47
End: 2022-03-21

## 2022-03-22 ENCOUNTER — RX RENEWAL (OUTPATIENT)
Age: 47
End: 2022-03-22

## 2022-03-24 ENCOUNTER — APPOINTMENT (OUTPATIENT)
Dept: INTERNAL MEDICINE | Facility: CLINIC | Age: 47
End: 2022-03-24
Payer: MEDICARE

## 2022-03-24 PROCEDURE — 99213 OFFICE O/P EST LOW 20 MIN: CPT | Mod: GC,95

## 2022-03-27 NOTE — ASSESSMENT
[FreeTextEntry1] : 47yo F w/ a PMHx of obesity, HTN, T2DM, HFpEF, MANUEL, pHTN, multple recent office visits b/t Jan-Feb due to concern for asymmetric RLE edema > LLE edema s/p negative work-up for DVT, who presents for televideo visit due to worsening skin changes of her RLE. \par \par #Cellulitis\par Pt w/ RLE skin findings consistent w/ stasis dermatitis in the setting of underlying vascular insufficiency progressively worsening over past 2 months. Patient requested televideo visit today due to worsening skin changes where there is an area on the anterior right shin that has a circular rash that she reports is painful, erythematous, and warm. No fevers and patient reports no purulence. Skin changes possibly 2/2 cellulitis superimposed over existing LE edema and stasis dermatitis.\par -cephalexin 500mg q8h x5d\par -f/u in 1 week if no improvement. \par \par \par #LE edema b/l\par Pt w/ known hx HFpEF on PO torsemide 40mg qd. Patient reports compliance with medication but states her RLE edema is not improving. \par -advised patient to increase torsemide to 60mg today and tomorrow and then to resume her normal home dose of 40mg qd.\par -f/u cardiology referral. Patient reports she received referral but has not made appointment yet\par - will have pt f/u in 3mo to evaluate improvement in LE edema\par \par *RTC for televideo appointment in 1 week if no improvement in symptoms

## 2022-03-27 NOTE — END OF VISIT
[FreeTextEntry3] : I was present with the Resident during the key portions of this encounter and provided supervision via audio/visual technology.  I agree with the findings and plan as documented in the Resident's note, unless noted below.\par \par On video , the skin appears erythematous and has local soft tissue edema, pt reports tenderness, no apparent fluctuant mass.  Cellulitis suspected - keflex 5- 7 day course.  [Time Spent: ___ minutes] : I have spent [unfilled] minutes of time on the encounter.

## 2022-03-27 NOTE — HISTORY OF PRESENT ILLNESS
[Home] : at home, [unfilled] , at the time of the visit. [Medical Office: ([facility])___] : at the medical office located in  [Verbal consent obtained from patient] : the patient, [unfilled] [de-identified] : 45yo F w/ a PMHx of obesity, HTN, T2DM, HFpEF, MANUEL, pHTN, multple recent office visits b/t Jan-Feb due to concern for asymmetric RLE edema > LLE edema s/p negative work-up for DVT, who presents for televideo visit due to worsening skin changes of her RLE. Pt w/ RLE skin findings consistent w/ stasis dermatitis in the setting of underlying vascular insufficiency progressively worsening over past 2 months. Patient requested televideo visit today due to worsening skin changes where there is an area on the anterior right shin that has a circular rash that she reports is painful, erythematous, and warm. No fevers and patient reports no purulence. Pt w/ known hx HFpEF on PO torsemide 40mg qd. Patient reports compliance with medication but states her RLE edema is not improving. She reports she received a referral to cardiology at her last appointment but has not gone yet. Denies fevers, chest pain, sob, abdominal pain, n/v/d.

## 2022-04-04 ENCOUNTER — RX RENEWAL (OUTPATIENT)
Age: 47
End: 2022-04-04

## 2022-04-05 ENCOUNTER — RX RENEWAL (OUTPATIENT)
Age: 47
End: 2022-04-05

## 2022-04-05 ENCOUNTER — APPOINTMENT (OUTPATIENT)
Dept: INTERNAL MEDICINE | Facility: CLINIC | Age: 47
End: 2022-04-05
Payer: MEDICARE

## 2022-04-05 DIAGNOSIS — L03.119 CELLULITIS OF UNSPECIFIED PART OF LIMB: ICD-10-CM

## 2022-04-05 PROCEDURE — 99441: CPT | Mod: GC

## 2022-04-05 RX ORDER — CEPHALEXIN 500 MG/1
500 TABLET ORAL 3 TIMES DAILY
Qty: 15 | Refills: 0 | Status: DISCONTINUED | COMMUNITY
Start: 2022-03-24 | End: 2022-04-05

## 2022-04-06 PROBLEM — L03.119 CELLULITIS OF LOWER LEG: Status: ACTIVE | Noted: 2020-09-25

## 2022-04-11 PROBLEM — Z11.59 SCREENING FOR VIRAL DISEASE: Status: ACTIVE | Noted: 2020-08-28

## 2022-04-20 ENCOUNTER — RX RENEWAL (OUTPATIENT)
Age: 47
End: 2022-04-20

## 2022-05-02 ENCOUNTER — APPOINTMENT (OUTPATIENT)
Dept: INTERNAL MEDICINE | Facility: CLINIC | Age: 47
End: 2022-05-02
Payer: MEDICARE

## 2022-05-02 DIAGNOSIS — I87.2 VENOUS INSUFFICIENCY (CHRONIC) (PERIPHERAL): ICD-10-CM

## 2022-05-02 PROCEDURE — 99213 OFFICE O/P EST LOW 20 MIN: CPT | Mod: GC,95

## 2022-05-16 NOTE — PHYSICAL EXAM
[JVD] : no jugular venous distention  [Normal Thyroid] : the thyroid was normal [Carotid Bruits] : no carotid bruits [Normal Breath Sounds] : Normal breath sounds [Respiratory Effort] : normal respiratory effort [Normal Heart Sounds] : normal heart sounds [Normal Rate and Rhythm] : normal rate and rhythm [2+] : left 2+ [Ankle Swelling (On Exam)] : present [Ankle Swelling Bilaterally] : bilaterally  [Ankle Swelling On The Right] : mild [Varicose Veins Of Lower Extremities] : not present [] : not present [Abdomen Masses] : No abdominal masses [Abdomen Tenderness] : ~T ~M No abdominal tenderness [No HSM] : no hepatosplenomegaly [Purpura] : no purpura  [Petechiae] : no petechiae [Skin Ulcer] : no ulcer [Skin Induration] : no induration [Alert] : alert [Calm] : calm [de-identified] : Well-nourished, NAD [de-identified] : NC/AT, anicteric [de-identified] : FROM throughout, strength 5/5x4, no palpable cords in LEs b/l

## 2022-05-16 NOTE — ASSESSMENT
[FreeTextEntry1] : 46yoF w/multiple comorbidities including morbid obesity, [id number] (uncontrolled), HFpEF on home O2, MANUEL on CPAP, seen by her PCP in April 2022 for persistent R>LLE swelling and redness of the legs, ruled out for b/l LE DVT and treated for cellulitis w/PO abx.  Pt seen again by Dr. Aguilar 3wks prior for recurrent swelling and redness, now w/dark, dry patches of the LEs, and under her breast and abdomen.  She denies fevers/chills, and states she is compliant w/all medications.

## 2022-05-16 NOTE — HISTORY OF PRESENT ILLNESS
[FreeTextEntry1] : 46yoF w/multiple comorbidities including morbid obesity, [id number] (uncontrolled), HFpEF on home O2, MANUEL on CPAP, seen by her PCP in April 2022 for persistent R>LLE swelling and redness of the legs, ruled out for b/l LE DVT and treated for cellulitis w/PO abx.  Pt seen again by Dr. Aguilar 3wks prior for recurrent swelling and redness, now w/dark, dry patches of the LEs, and under her breast and abdomen.  She denies fevers/chills, and states she is compliant w/all medications.

## 2022-05-16 NOTE — ADDENDUM
[FreeTextEntry1] : This note was written by Loy Pearce, acting as a scribe for Dr. Nas Patrick.  I, Dr. Nas Patrick, have read and attest that all the information, medical decision-making, and discharge instructions within are true and accurate.\par \par I, Dr. Nas Patrick, personally performed the evaluation and management (E/M) services for this new patient.  That E/M includes conducting the initial examination, assessing all conditions, and establishing the plan of care.  Today, my ACP, Loy Pearce, was here to observe my evaluation and management services for this patient to be followed going forward.

## 2022-05-17 ENCOUNTER — APPOINTMENT (OUTPATIENT)
Dept: VASCULAR SURGERY | Facility: CLINIC | Age: 47
End: 2022-05-17

## 2022-06-30 ENCOUNTER — NON-APPOINTMENT (OUTPATIENT)
Age: 47
End: 2022-06-30

## 2022-07-01 ENCOUNTER — RX RENEWAL (OUTPATIENT)
Age: 47
End: 2022-07-01

## 2022-07-18 NOTE — ED PROVIDER NOTE - GASTROINTESTINAL, MLM
Abdomen soft, non-tender, no guarding. Cyclophosphamide Pregnancy And Lactation Text: This medication is Pregnancy Category D and it isn't considered safe during pregnancy. This medication is excreted in breast milk.

## 2022-07-27 ENCOUNTER — NON-APPOINTMENT (OUTPATIENT)
Age: 47
End: 2022-07-27

## 2022-09-13 ENCOUNTER — APPOINTMENT (OUTPATIENT)
Dept: INTERNAL MEDICINE | Facility: CLINIC | Age: 47
End: 2022-09-13

## 2022-09-13 PROCEDURE — 99213 OFFICE O/P EST LOW 20 MIN: CPT | Mod: GC,95

## 2022-09-16 NOTE — HISTORY OF PRESENT ILLNESS
[FreeTextEntry1] : med refill [de-identified] : Discussed with patient: You have chosen to receive care through the use of tele-media. Tele-media enables health care providers at different locations to provide safe, effective, and convenient care through the use of technology. Please note this is a billable encounter. As with any health care service, there are risks associated with the use of tele-media, including equipment failure, poor image and/or resolution, and  issues. You understand that I cannot physically examine you and that you may need to come to the clinic to complete the assessment. Patient agreed verbally to understanding the risks and benefits of tele-media as explained. All questions regarding tele-media encounters were answered. \par \par 46yo F w/ a PMHx of obesity, HTN, T2DM (A1C 6.9%), HFpEF on home 4L O2, MANUEL on CPAP, pHTN presents for trulicity refill.  she said that she missed her dose last week.  the pharmacy tried to contact our office but did not get a response.  she does not check her sugars daily.  has noticed increased consumption of liquids X2 weeks with mild increase in appetite but denies any increased urination, which she says usually happens when her sugars are high.  denies any symptoms of low blood sugar including lightheadedness, nausea, dizziness.  denies any abdominal pain.  she is planning on coming in for a f/u appointment but needs her accessaride renewed.  \par \par ROS negative except as above.\par \par Total time spent ~20 min

## 2022-09-16 NOTE — ADDENDUM
[FreeTextEntry1] : I was present with the Resident during the key portions of this encounter and provided supervision via audio/visual technology.  I agree with the findings and plan as documented in the Resident's note, unless noted below.\par DM - on trulicity for meds refill.  Taking the medication with good effect, no ASE reported.  RTO 3 months if stable on this dose. or sooner if needed.

## 2022-09-16 NOTE — PLAN
[FreeTextEntry1] : #T2DM\par Most recent a1c 6.9 in Feb 2022.  patient out of trulicity.\par - refill trulicity 4.5mg qweekly\par - c/w metformin 100mg bid\par - encouraged home glucose monitoring\par - encouraged monitoring symptoms of hyper and hypoglycemia\par \par \par Recheck in 1 month once accessaride available.

## 2022-09-16 NOTE — ASSESSMENT
[FreeTextEntry1] : 48yo F w/ a PMHx of obesity, HTN, T2DM (A1C 6.9%), HFpEF on home 4L O2, MANUEL on CPAP, pHTN presents for trulicity refill.

## 2022-09-18 NOTE — DIETITIAN INITIAL EVALUATION ADULT. - OTHER INFO
44yo female with pmhx of obesity hypoventilation syndrome and chronic hypoxemia (per pt uses 2L O2 via NC at rest, 3L when mobile and bipap at night), HFpEF, pericardial effusion in 8/2018 requiring IR drainage, chronic diastolic CHF (EF 55%),  DM2, HTN, HLD, MANUEL presents from Dr. Luo's office with shortness of breath and increased O2 requirement, found to be hypoxic on room air    Pt seen in room, sitting up in chair. Pt reports good appetite PTA and currently, states she usually eats fast food or mom cooks for her. RD provided written (handouts) and verbal consistent carbohydrate and low sodium diet education. Discussed carbohydrate sources and high sodium foods. Pt expressed understanding. Pt reports stable wt ~400lbs, EMR wts fluctuating 2/2 changes in fluid. Pt with 2+edema to right and left leg. Pt denies N/V/D/C at present. No pain noted. Please see recs below. Will continue to follow per RD protocol.
English

## 2022-09-27 ENCOUNTER — APPOINTMENT (OUTPATIENT)
Dept: VASCULAR SURGERY | Facility: CLINIC | Age: 47
End: 2022-09-27

## 2022-10-11 ENCOUNTER — APPOINTMENT (OUTPATIENT)
Dept: PULMONOLOGY | Facility: CLINIC | Age: 47
End: 2022-10-11

## 2022-10-11 RX ORDER — ASPIRIN ENTERIC COATED TABLETS 81 MG 81 MG/1
81 TABLET, DELAYED RELEASE ORAL DAILY
Qty: 30 | Refills: 3 | Status: ACTIVE | COMMUNITY
Start: 2022-04-05 | End: 1900-01-01

## 2022-10-19 ENCOUNTER — RX RENEWAL (OUTPATIENT)
Age: 47
End: 2022-10-19

## 2022-12-01 NOTE — ED ADULT NURSE NOTE - DISCHARGE DATE/TIME
Living Kidney Donor Consent per OPTN Policy 14.2 for Independent Living Donor Advocate (KHALIDA)    Organ Type: unrelated, kidney  Presenting Information:  Lian presents to [facility] to complete a living donor evaluation since she is interested in becoming a kidney donor for her friend.      Written assurance has been obtained from the potential donor that he/she:   Is willing to donate  Is free from inducement and coercion  Has been informed that the he/she may decline to donate at any time  Has been informed that transplant centers must:   A) Offer donors an opportunity to discontinue the donor consent or evaluation process in a way that is protected and confidential  B) Provide an independent living donor advocate (KHALIDA) to assist the potential donor during this process    The following was presented to the potential donor in a language in which the potential donor is able to engage in meaningful dialogue:   Education and instruction about all phases of the living donation process including:   Consent  Medical and psychosocial evaluation  Information about the surgical procedure  Pre and post operative care  Benefits of post operative follow up  Disclosure that the recovery hospital will take all reasonable precautions to provide confidentiality for the donor/recipient  Disclosure that it is a federal crime for any person to knowingly acquire, obtain or otherwise transfer any human organ for valuable consideration  Disclosure that the [facility] hospital must provide an independent living donor advocate (KHALIDA)  Disclosure that health information obtained during the evaluation is subject to the same regulations as all records and could reveal conditions that must be reported to local, state, or federal public health authorities  Disclosure that the [facility] hospital is required to report living donor follow up information at 6 months, 1  year, and 2 years, and that the potential donor must commit to post operative follow up testing coordinated by the [facility]    Disclosure has been provided that these risks may be transient or permanent & include but are not limited to:  Potential psychosocial risks:  Problems with body image  Post-surgery depression or anxiety  Feelings of emotional distress or bereavement if recipient experiences any recurrent disease or in the event of the recipient s death  Impact of donation on the donor s lifestyle, such as limited ability to exercise in the short term post operative recovery period, no driving for the first 2 weeks post op or until the donor is no longer needing pain medications that impair the ability to drive.      Potential financial impacts:  Personal expenses of travel, housing, , lost wages related to donation might not be reimbursed. However, resources may be available to defray some donation-related expenses.   Need for life-long follow up at the donor s expense  Loss of employment or income  Negative impact on the ability to obtain future employment  Negative impact on the ability to obtain, maintain, or afford health, disability, and life insurance  Future health problems experienced by living donors following donation may not be covered by the recipient s insurance      PREPARATION FOR DONATION, RECOVERY, AND POTENTIAL SHORT-LONG-TERM OUTCOMES:  Understanding of the Living Donation Process:  We discussed the role of Independent Living Donor Advocate.  Short and long term medical and psychosocial risks to both, donor and recipient were reviewed and she is able to teach back to me these risks.  Post surgical restrictions (2 weeks no driving, 6 weeks no lifting over 10 lbs) were reviewed and she appears capable of adhering to the post surgical requirements. The need for a caregiver was discussed and she has a care giver for her post op needs .  The risk of poor psychosocial outcome  including problems with body image, post-surgery depression or anxiety, or feelings of emotional distress or bereavement if recipient experiences any recurrent disease, poor outcome or death was reviewed.  Additionally, potential financial implications, including the risk of having difficulty obtaining health care insurance, life insurance, disability insurance, or long term care insurance were reviewed, as were available donor grants to assist with donor related expenses.      IMPRESSIONS/RECOMMENDATIONS:  Lian Garcia appears highly motivated to donate a kidney to a friend.  She appears capable of understanding this information and making an informed medical decision.  As KHALIDA, no concerns were identified today.  She has my contact information and is aware that I am available thoughout the donation process.    Contact Information:  TIMOTHY Gallegos, [facility]  Independent Living Donor Advocate  [facility], [facility], [facility]  Pager:  [phone]  Direct:  [phone] Cell Phone  E-Mail:  [email or web address]      Time Spent: 30 minutes   05-Aug-2021 20:33

## 2023-01-24 ENCOUNTER — APPOINTMENT (OUTPATIENT)
Dept: INTERNAL MEDICINE | Facility: CLINIC | Age: 48
End: 2023-01-24

## 2023-02-01 ENCOUNTER — APPOINTMENT (OUTPATIENT)
Dept: INTERNAL MEDICINE | Facility: CLINIC | Age: 48
End: 2023-02-01
Payer: MEDICARE

## 2023-02-01 VITALS
RESPIRATION RATE: 16 BRPM | HEART RATE: 92 BPM | DIASTOLIC BLOOD PRESSURE: 80 MMHG | SYSTOLIC BLOOD PRESSURE: 111 MMHG | TEMPERATURE: 98 F | OXYGEN SATURATION: 100 %

## 2023-02-01 DIAGNOSIS — M79.89 OTHER SPECIFIED SOFT TISSUE DISORDERS: ICD-10-CM

## 2023-02-01 DIAGNOSIS — B34.9 VIRAL INFECTION, UNSPECIFIED: ICD-10-CM

## 2023-02-01 DIAGNOSIS — Z86.19 PERSONAL HISTORY OF OTHER INFECTIOUS AND PARASITIC DISEASES: ICD-10-CM

## 2023-02-01 DIAGNOSIS — M75.22 BICIPITAL TENDINITIS, LEFT SHOULDER: ICD-10-CM

## 2023-02-01 DIAGNOSIS — M79.673 PAIN IN UNSPECIFIED FOOT: ICD-10-CM

## 2023-02-01 DIAGNOSIS — R21 RASH AND OTHER NONSPECIFIC SKIN ERUPTION: ICD-10-CM

## 2023-02-01 DIAGNOSIS — L03.116 CELLULITIS OF LEFT LOWER LIMB: ICD-10-CM

## 2023-02-01 DIAGNOSIS — Z87.898 PERSONAL HISTORY OF OTHER SPECIFIED CONDITIONS: ICD-10-CM

## 2023-02-01 DIAGNOSIS — M79.662 PAIN IN LEFT LOWER LEG: ICD-10-CM

## 2023-02-01 DIAGNOSIS — R26.81 UNSTEADINESS ON FEET: ICD-10-CM

## 2023-02-01 DIAGNOSIS — R29.898 OTHER SYMPTOMS AND SIGNS INVOLVING THE MUSCULOSKELETAL SYSTEM: ICD-10-CM

## 2023-02-01 DIAGNOSIS — H10.32 UNSPECIFIED ACUTE CONJUNCTIVITIS, LEFT EYE: ICD-10-CM

## 2023-02-01 DIAGNOSIS — R09.89 OTHER SPECIFIED SYMPTOMS AND SIGNS INVOLVING THE CIRCULATORY AND RESPIRATORY SYSTEMS: ICD-10-CM

## 2023-02-01 DIAGNOSIS — H61.22 IMPACTED CERUMEN, LEFT EAR: ICD-10-CM

## 2023-02-01 DIAGNOSIS — R25.2 CRAMP AND SPASM: ICD-10-CM

## 2023-02-01 DIAGNOSIS — Z12.11 ENCOUNTER FOR SCREENING FOR MALIGNANT NEOPLASM OF COLON: ICD-10-CM

## 2023-02-01 DIAGNOSIS — E78.5 HYPERLIPIDEMIA, UNSPECIFIED: ICD-10-CM

## 2023-02-01 DIAGNOSIS — Z56.0 UNEMPLOYMENT, UNSPECIFIED: ICD-10-CM

## 2023-02-01 DIAGNOSIS — I10 ESSENTIAL (PRIMARY) HYPERTENSION: ICD-10-CM

## 2023-02-01 DIAGNOSIS — H67.9 VIRAL INFECTION, UNSPECIFIED: ICD-10-CM

## 2023-02-01 DIAGNOSIS — L03.319 CELLULITIS OF TRUNK, UNSPECIFIED: ICD-10-CM

## 2023-02-01 DIAGNOSIS — E11.9 TYPE 2 DIABETES MELLITUS W/OUT COMPLICATIONS: ICD-10-CM

## 2023-02-01 DIAGNOSIS — Z86.39 PERSONAL HISTORY OF OTHER ENDOCRINE, NUTRITIONAL AND METABOLIC DISEASE: ICD-10-CM

## 2023-02-01 PROCEDURE — 99215 OFFICE O/P EST HI 40 MIN: CPT | Mod: GC,25

## 2023-02-01 PROCEDURE — 36415 COLL VENOUS BLD VENIPUNCTURE: CPT

## 2023-02-01 RX ORDER — LOSARTAN POTASSIUM 50 MG/1
50 TABLET, FILM COATED ORAL TWICE DAILY
Qty: 180 | Refills: 0 | Status: DISCONTINUED | COMMUNITY
End: 2023-02-01

## 2023-02-01 RX ORDER — ASPIRIN ENTERIC COATED TABLETS 81 MG 81 MG/1
81 TABLET, DELAYED RELEASE ORAL
Qty: 30 | Refills: 3 | Status: ACTIVE | COMMUNITY
Start: 2023-02-01 | End: 1900-01-01

## 2023-02-01 RX ORDER — TORSEMIDE 20 MG/1
20 TABLET ORAL
Qty: 180 | Refills: 0 | Status: ACTIVE | COMMUNITY
Start: 2019-12-12 | End: 1900-01-01

## 2023-02-01 RX ORDER — GABAPENTIN 600 MG/1
600 TABLET, COATED ORAL
Qty: 60 | Refills: 0 | Status: ACTIVE | COMMUNITY
Start: 2023-02-01 | End: 1900-01-01

## 2023-02-01 RX ORDER — SPIRONOLACTONE 25 MG/1
25 TABLET ORAL DAILY
Qty: 90 | Refills: 3 | Status: ACTIVE | COMMUNITY
Start: 2023-02-01 | End: 1900-01-01

## 2023-02-01 RX ORDER — ATORVASTATIN CALCIUM 80 MG/1
80 TABLET, FILM COATED ORAL
Qty: 90 | Refills: 0 | Status: ACTIVE | COMMUNITY
Start: 2018-03-08 | End: 1900-01-01

## 2023-02-01 RX ORDER — POTASSIUM CHLORIDE 1500 MG/1
20 TABLET, EXTENDED RELEASE ORAL
Qty: 30 | Refills: 0 | Status: DISCONTINUED | COMMUNITY
Start: 2017-05-22 | End: 2023-02-01

## 2023-02-01 RX ORDER — LOSARTAN POTASSIUM 50 MG/1
50 TABLET, FILM COATED ORAL
Qty: 30 | Refills: 2 | Status: DISCONTINUED | COMMUNITY
Start: 2017-05-22 | End: 2023-02-01

## 2023-02-01 RX ORDER — DULAGLUTIDE 4.5 MG/.5ML
4.5 INJECTION, SOLUTION SUBCUTANEOUS
Qty: 1 | Refills: 2 | Status: ACTIVE | COMMUNITY
Start: 2022-01-04 | End: 1900-01-01

## 2023-02-01 RX ORDER — CICLOPIROX OLAMINE 7.7 MG/G
0.77 CREAM TOPICAL TWICE DAILY
Qty: 1 | Refills: 2 | Status: ACTIVE | COMMUNITY
Start: 2023-02-01 | End: 1900-01-01

## 2023-02-01 RX ORDER — TRIAMCINOLONE ACETONIDE 1 MG/G
0.1 CREAM TOPICAL TWICE DAILY
Qty: 1 | Refills: 1 | Status: DISCONTINUED | COMMUNITY
Start: 2019-10-22 | End: 2023-02-01

## 2023-02-01 RX ORDER — DAPAGLIFLOZIN 10 MG/1
10 TABLET, FILM COATED ORAL DAILY
Qty: 90 | Refills: 0 | Status: ACTIVE | COMMUNITY
Start: 2023-02-01 | End: 1900-01-01

## 2023-02-01 RX ORDER — METFORMIN HYDROCHLORIDE 1000 MG/1
1000 TABLET, COATED ORAL
Qty: 180 | Refills: 1 | Status: ACTIVE | COMMUNITY
Start: 2019-12-12 | End: 1900-01-01

## 2023-02-01 SDOH — ECONOMIC STABILITY - INCOME SECURITY: UNEMPLOYMENT, UNSPECIFIED: Z56.0

## 2023-02-02 ENCOUNTER — NON-APPOINTMENT (OUTPATIENT)
Age: 48
End: 2023-02-02

## 2023-02-02 LAB
ALBUMIN SERPL ELPH-MCNC: 3.8 G/DL
ALP BLD-CCNC: 207 U/L
ALT SERPL-CCNC: 84 U/L
ANION GAP SERPL CALC-SCNC: 15 MMOL/L
AST SERPL-CCNC: 89 U/L
BILIRUB SERPL-MCNC: 1.6 MG/DL
BUN SERPL-MCNC: 34 MG/DL
CALCIUM SERPL-MCNC: 9.7 MG/DL
CHLORIDE SERPL-SCNC: 98 MMOL/L
CO2 SERPL-SCNC: 26 MMOL/L
CREAT SERPL-MCNC: 1.38 MG/DL
EGFR: 48 ML/MIN/1.73M2
GLUCOSE SERPL-MCNC: 147 MG/DL
POTASSIUM SERPL-SCNC: 3.9 MMOL/L
PROT SERPL-MCNC: 7.9 G/DL
SODIUM SERPL-SCNC: 139 MMOL/L

## 2023-02-03 ENCOUNTER — NON-APPOINTMENT (OUTPATIENT)
Age: 48
End: 2023-02-03

## 2023-02-06 ENCOUNTER — NON-APPOINTMENT (OUTPATIENT)
Age: 48
End: 2023-02-06

## 2023-02-13 ENCOUNTER — NON-APPOINTMENT (OUTPATIENT)
Age: 48
End: 2023-02-13

## 2023-02-13 LAB
BASOPHILS # BLD AUTO: 0.04 K/UL
BASOPHILS NFR BLD AUTO: 0.5 %
CHOLEST SERPL-MCNC: 61 MG/DL
EOSINOPHIL # BLD AUTO: 0.02 K/UL
EOSINOPHIL NFR BLD AUTO: 0.3 %
ESTIMATED AVERAGE GLUCOSE: 163 MG/DL
HBA1C MFR BLD HPLC: 7.3 %
HCT VFR BLD CALC: 35 %
HDLC SERPL-MCNC: 25 MG/DL
HGB BLD-MCNC: 10 G/DL
IMM GRANULOCYTES NFR BLD AUTO: 0.3 %
LDLC SERPL CALC-MCNC: 24 MG/DL
LYMPHOCYTES # BLD AUTO: 1.26 K/UL
LYMPHOCYTES NFR BLD AUTO: 16.1 %
MAN DIFF?: NORMAL
MCHC RBC-ENTMCNC: 22.8 PG
MCHC RBC-ENTMCNC: 28.6 GM/DL
MCV RBC AUTO: 79.9 FL
MONOCYTES # BLD AUTO: 0.72 K/UL
MONOCYTES NFR BLD AUTO: 9.2 %
NEUTROPHILS # BLD AUTO: 5.79 K/UL
NEUTROPHILS NFR BLD AUTO: 73.6 %
NONHDLC SERPL-MCNC: 36 MG/DL
PLATELET # BLD AUTO: 369 K/UL
RBC # BLD: 4.38 M/UL
RBC # FLD: 21.8 %
TRIGL SERPL-MCNC: 65 MG/DL
WBC # FLD AUTO: 7.85 K/UL

## 2023-03-06 ENCOUNTER — APPOINTMENT (OUTPATIENT)
Dept: INTERNAL MEDICINE | Facility: CLINIC | Age: 48
End: 2023-03-06
Payer: MEDICARE

## 2023-03-06 DIAGNOSIS — I87.2 VENOUS INSUFFICIENCY (CHRONIC) (PERIPHERAL): ICD-10-CM

## 2023-03-06 DIAGNOSIS — I50.9 HEART FAILURE, UNSPECIFIED: ICD-10-CM

## 2023-03-06 DIAGNOSIS — I50.30 UNSPECIFIED DIASTOLIC (CONGESTIVE) HEART FAILURE: ICD-10-CM

## 2023-03-06 PROCEDURE — 99214 OFFICE O/P EST MOD 30 MIN: CPT | Mod: GC,95

## 2023-03-06 RX ORDER — FLUOCINONIDE 0.5 MG/G
0.05 CREAM TOPICAL
Qty: 1 | Refills: 2 | Status: ACTIVE | COMMUNITY
Start: 2023-02-01 | End: 1900-01-01

## 2023-03-06 NOTE — HISTORY OF PRESENT ILLNESS
[Home] : at home, [unfilled] , at the time of the visit. [Medical Office: ([place] [facility])___] : at the medical office located in  [Verbal consent obtained from patient] : the patient, [unfilled] [FreeTextEntry1] : Follow up [de-identified] : Verbal consent given on 03/06/2023 at 10:17 by EVONNE JACKSON, [].\par \par Discussed with patient: You have chosen to receive care\par through the use of tele-media. Tele-media enables health\par care providers at different locations to provide safe,\par effective and convenient care through the use of\par technology. Please note that this is a billable encounter.\par As with any health care service, there are risks associated\par with the use of tele-media, including equipment failure,\par poor image and/or resolution, and \par issues. You understand that I cannot physically examine\par you and that you may need to come to the clinic to\par complete the assessment. Patient agreed verbally to\par understanding the risks and benefits of tele-media as\par explained. All questions regarding tele-media encounters\par were answered.\par Total time spent: [40 minutes ]\par \par 48yo F w/ a PMHx of morbid obesity (wheelchair bound), HTN, T2DM (A1C 6.9%), HFpEF on home 4L O2, MANUEL on CPAP, severe pHTN who presents for routine follow up. She was last seen by us in person in February 2023 for post-discharge f/u after LE edema cellulitis, course c/b AHRF 2/2 fluid overload due to pHTN s/p RHC. She is pending follow up from pulmonology and cardiology at [place] [facility] (where she has established care with Dr. Luo).\par \par  In the interim, she's received mobile lab services for repeat CMP labwork for transaminitis and THAO which have since been stable. She does not report SOB, cough, chest pain, fatigue, dizziness, palpitations, or other related symptoms. She continues to take farxiga and has been having constant urination and changes her pads 8 times throughout 24 hours (which is chronic for her).\par \par Her only complaint today is intolerance to her new CPAP settings, which were changed after her recent hospitalization. She notes the settings are too high and she's uncomfortable sleeping which causes her to wake up in the middle of the night. She gets 4hrs of sleep total per night as a result, and naps during the day. This has been a concern for her and her caretakers (son, mother), who are afraid she will fall out of her wheelchair while asleep. \par \par She is also requesting refills of pull ups, sanitary napkins, and flucinonide cream today for her lower extremities.

## 2023-03-06 NOTE — ASSESSMENT
[FreeTextEntry1] : #HCM\par - Labs UTD 2/2023 (mobile lab services)\par - Med refills given today\par - Cardiology referral given, pending appt set up\par - F/u podiatry, PT/OT via home services\par \par **RTC with [facility] or [facility] resident within 1 month via TTM

## 2023-03-06 NOTE — END OF VISIT
Called patient and got her rescheduled for her appointment. She expressed that she wanted her appointment [place]. She was grateful for the phone call.   [FreeTextEntry3] : I was present with the Resident during the key portions of this encounter and provided supervision via audio/visual technology.  I agree with the findings and plan as documented in the Resident's note, unless noted below. 47F complex medical hx including pHTN on home O2,HFpEF, DM2, HTN presenting via telehealth for f/u. Since last visit, CPAP company changed her CPAP settings and she is not tolerating them well, only sleeps a few hours per night, falls asleep in her wheelchair during the day. Pt and family unsure of how to adjust settings. She also requests a different size for her pull ups. Dr. Florence communicated with Dr. Luo, pt's pulmonologist who will reach out to pt this week regarding issue w/ CPAP. Will coordinate w/ [facility] SUBHASH Brunner regarding home supplies. Reviewed recent home drawn labs w/ pt which were stable. RTC 1 month for f/u above or earlier prn

## 2023-03-06 NOTE — REVIEW OF SYSTEMS
[Negative] : Gastrointestinal [Lower Ext Edema] : lower extremity edema [Chest Pain] : no chest pain [Palpitations] : no palpitations [Leg Claudication] : no leg claudication [Shortness Of Breath] : no shortness of breath [Wheezing] : no wheezing [Cough] : no cough [Dyspnea on Exertion] : no dyspnea on exertion [Insomnia] : no insomnia

## 2023-03-07 ENCOUNTER — APPOINTMENT (OUTPATIENT)
Dept: HEART AND VASCULAR | Facility: CLINIC | Age: 48
End: 2023-03-07

## 2023-03-09 ENCOUNTER — APPOINTMENT (OUTPATIENT)
Dept: PULMONOLOGY | Facility: CLINIC | Age: 48
End: 2023-03-09
Payer: MEDICARE

## 2023-03-09 PROCEDURE — 99443: CPT

## 2023-03-09 NOTE — REASON FOR VISIT
[Home] : at home, [unfilled] , at the time of the visit. [Medical Office: ([facility])___] : at the medical office located in  [Verbal consent obtained from patient] : the patient, [unfilled] [Follow-Up] : a follow-up visit

## 2023-03-09 NOTE — HISTORY OF PRESENT ILLNESS
[Former] : former [Never] : never [TextBox_4] : she was in the hospital in October as the leg turned blue.  It was oozing.  The oxygen level was low at that time and she was admitted to the nearest hospital.  She was admitted to [facility].  Her labs were abnormal.  She was treated for cellulitis.  her oxygen was low and was intubated for 9 days.  She had COvid and pneumonia.  ? she had a stroke.  No she can ot lift the left arm over the head.  She had a test for RHC.  She was told she has high pressures in lung.  She is doing better and the settings were changed and it a new machine.  She is sob with exertion.  She letter to carry the portable oxygen.  She is on oxygen. She is on nebs twice

## 2023-03-09 NOTE — ASSESSMENT
[FreeTextEntry1] : Hypoxic respiratory failure secondary to morbid obesity obstructive sleep apnea COPD and pulmonary hypertension\par \par The patient was recently hospitalized at [facility] for acute respiratory failure on top of chronic secondary to COVID and bacterial pneumonia.  There was concern that the had a.  The patient had a CVA.  Patient was intubated for 9 days.  The patient had a right heart catheterization at [facility] which revealed pulmonary hypertension.  The patient might have a copy of the report.  The patient currently seems to be on BiPAP with 24-hour oxygen.  The patient only nebulizer treatment twice a day.\par \par Patient is compliant with the noninvasive ventilation\par \par We will follow-up in the office and we will do oxygen evaluation for portable compressor.  Patient will require PFT.  Patient will obtain the records from the other facility

## 2023-03-16 ENCOUNTER — NON-APPOINTMENT (OUTPATIENT)
Age: 48
End: 2023-03-16

## 2023-03-27 ENCOUNTER — APPOINTMENT (OUTPATIENT)
Dept: PULMONOLOGY | Facility: CLINIC | Age: 48
End: 2023-03-27
Payer: MEDICARE

## 2023-03-27 VITALS
DIASTOLIC BLOOD PRESSURE: 70 MMHG | TEMPERATURE: 97.2 F | HEART RATE: 93 BPM | SYSTOLIC BLOOD PRESSURE: 102 MMHG | BODY MASS INDEX: 47.09 KG/M2 | HEIGHT: 66 IN | OXYGEN SATURATION: 94 % | WEIGHT: 293 LBS

## 2023-03-27 DIAGNOSIS — E87.2 ACIDOSIS: ICD-10-CM

## 2023-03-27 DIAGNOSIS — G47.33 OBSTRUCTIVE SLEEP APNEA (ADULT) (PEDIATRIC): ICD-10-CM

## 2023-03-27 DIAGNOSIS — J44.9 CHRONIC OBSTRUCTIVE PULMONARY DISEASE, UNSPECIFIED: ICD-10-CM

## 2023-03-27 DIAGNOSIS — Z99.89 OBSTRUCTIVE SLEEP APNEA (ADULT) (PEDIATRIC): ICD-10-CM

## 2023-03-27 DIAGNOSIS — I27.20 PULMONARY HYPERTENSION, UNSPECIFIED: ICD-10-CM

## 2023-03-27 DIAGNOSIS — J96.10 CHRONIC RESPIRATORY FAILURE, UNSPECIFIED WHETHER WITH HYPOXIA OR HYPERCAPNIA: ICD-10-CM

## 2023-03-27 PROCEDURE — 99215 OFFICE O/P EST HI 40 MIN: CPT

## 2023-03-28 PROBLEM — J44.9 CHRONIC OBSTRUCTIVE PULMONARY DISEASE, UNSPECIFIED COPD TYPE: Status: ACTIVE | Noted: 2023-02-01

## 2023-03-28 PROBLEM — J96.10 CHRONIC RESPIRATORY FAILURE: Status: ACTIVE | Noted: 2019-05-16

## 2023-03-28 PROBLEM — E87.2 CHRONIC RESPIRATORY ACIDOSIS: Status: ACTIVE | Noted: 2018-03-08

## 2023-03-28 PROBLEM — I27.20 PULMONARY HYPERTENSION: Status: ACTIVE | Noted: 2022-03-18

## 2023-03-28 NOTE — ASSESSMENT
[FreeTextEntry1] : Patient has a history of chronic hypoxic and hypercapnic respiratory failure secondary to COPD pickwickian/possible obesity hypoventilation syndrome, pulmonary hypertension and also related to her cardiac status.  The patient was recently admitted to [facility] and was treated with acute on top of chronic respiratory failure secondary due to pneumonia which was bacterial and COVID.  The patient was successfully extubated and was maintained on noninvasive ventilation.  Patient finished initial course of pulmonary rehab\par \par Required readmission due to her deterioration of her status.\par \par The patient on the right heart catheterization which revealed elevated filling pressures with severe pulmonary hypertension.  The patient is class [id number], pulmonary hypertension.\par \par \par The patient is compliant with the noninvasive ventilation but still symptomatic from that point review.\par \par Recommendation\par \par 1. Titration sleep study with noninvasive ventilation with measuring end-tidal CO2 and oxygen saturation and bilevel ventilation\par \par 2.  Continue diuresis with furosemide 20 mg twice daily\par \par 3.  Restart Farxiga.\par \par 4 continue bronchodilator.\par \par 5 continue weight loss.\par \par 6. Once reached adequate diuresis patient will require evaluation by by the pulmonary hypertension program evaluate if she is indicated for vasodilator therapy\par \par Follow-up in 1 month\par \par

## 2023-03-28 NOTE — HISTORY OF PRESENT ILLNESS
[TextBox_4] : Patient will stable but she started feeling short of breath and was admitted emergently to [facility] at that time the patient was found to have both bacterial and and COVID infection and was intubated.  The patient was on the ventilator for 9 days and was extubated after.  After she was extubated she was told she has a mild stroke.  Patient was discharged home on oxygen and BiPAP.  The patient did not do well and went back to the hospital.  The patient was referred to [facility] for right heart catheterization.  She had the catheterization there was sent back to [facility] and then that they sent her back to rehab.  The patient is following up from her discharge.  She still short of breath she lost a lot of weight.  She using the BiPAP at night.  And she is on oxygen during the day.  She is short of breath when she exerts herself. [ESS] : 0

## 2023-03-29 LAB
ALBUMIN SERPL ELPH-MCNC: 3.8 G/DL
ALP BLD-CCNC: 154 U/L
ALT SERPL-CCNC: 82 U/L
ANION GAP SERPL CALC-SCNC: 13 MMOL/L
AST SERPL-CCNC: 110 U/L
BILIRUB SERPL-MCNC: 1.6 MG/DL
BUN SERPL-MCNC: 26 MG/DL
CALCIUM SERPL-MCNC: 9.9 MG/DL
CHLORIDE SERPL-SCNC: 99 MMOL/L
CO2 SERPL-SCNC: 26 MMOL/L
CREAT SERPL-MCNC: 1.56 MG/DL
EGFR: 41 ML/MIN/1.73M2
GLUCOSE SERPL-MCNC: 108 MG/DL
POTASSIUM SERPL-SCNC: 4.6 MMOL/L
PROT SERPL-MCNC: 7.8 G/DL
SODIUM SERPL-SCNC: 139 MMOL/L

## 2023-04-13 ENCOUNTER — APPOINTMENT (OUTPATIENT)
Dept: SLEEP CENTER | Facility: HOSPITAL | Age: 48
End: 2023-04-13

## 2023-04-13 NOTE — DISCHARGE NOTE ADULT - FUNCTIONAL SCREEN CURRENT LEVEL: AMBULATION, MLM
(1) assistive equipment (2) assistive person Dupixent Counseling: I discussed with the patient the risks of dupilumab including but not limited to eye infection and irritation, cold sores, injection site reactions, worsening of asthma, allergic reactions and increased risk of parasitic infection.  Live vaccines should be avoided while taking dupilumab. Dupilumab will also interact with certain medications such as warfarin and cyclosporine. The patient understands that monitoring is required and they must alert us or the primary physician if symptoms of infection or other concerning signs are noted.

## 2023-04-27 ENCOUNTER — APPOINTMENT (OUTPATIENT)
Dept: PULMONOLOGY | Facility: CLINIC | Age: 48
End: 2023-04-27

## 2023-05-23 ENCOUNTER — NON-APPOINTMENT (OUTPATIENT)
Age: 48
End: 2023-05-23

## 2023-10-12 NOTE — DIETITIAN INITIAL EVALUATION ADULT. - PROBLEM SELECTOR PLAN 3
Patient w/ swelling of b/l LE, R>>L. RLE ttp. Chronic venous stasis skin changes noted on exam.  - LE doppler negative for DVT show

## 2024-01-19 NOTE — ED ADULT TRIAGE NOTE - ACCOMPANIED BY
gastrointestinal endoscopy (November 2013,2021); Colonoscopy (July 2013,2021); Appendectomy (2000); Anus surgery (2000); and other surgical history.    Family/Social History:     Her family history includes Breast Cancer in her maternal aunt; Cervical Cancer in her maternal grandmother; Colon Cancer in her paternal aunt; Colon Polyps in her father; Esophageal Cancer in her maternal grandmother; High Cholesterol in her father; Lung Cancer in her maternal grandmother; Rectal Cancer in her child; Uterine Cancer in her maternal aunt.  She  reports that she has never smoked. She has never used smokeless tobacco. She reports that she does not drink alcohol and does not use drugs.    Medications/Allergies/Immunizations:     Her current medication(s) include   Current Outpatient Medications:     Dihydroberberine (BERBERINE ES-5 PO), Take 600 mg by mouth 2 times daily (with meals) [employer], Disp: , Rfl:     PARoxetine (PAXIL) 20 MG tablet, Take 1.5 tablets by mouth every morning, Disp: 135 tablet, Rfl: 0    hydrOXYzine HCl (ATARAX) 25 MG tablet, TAKE 1 TABLET BY MOUTH 3 TIMES DAILY AS NEEDED FOR ANXIETY (AND INSOMNIA), Disp: 90 tablet, Rfl: 2    calcium carbonate 600 MG TABS tablet, Take 1 tablet by mouth in the morning and 1 tablet in the evening. [employer] calcium., Disp: , Rfl:     B Complex-Folic Acid (B-100 BALANCED TR PO), Take 1 tablet by mouth every morning (before breakfast) B-100 TR [place], Disp: , Rfl:     Cholecalciferol (VITAMIN D3) 50 MCG (2000 UT) CAPS, Take 2 capsules by mouth daily, Disp: , Rfl:     clindamycin (CLEOCIN T) 1 % external solution, Apply topically as needed, Disp: , Rfl: 3    Lysine 500 MG TABS, Take 500 mg by mouth 4 times daily, Disp: , Rfl:     Ascorbic Acid (VITAMIN C) 1000 MG tablet, Take 1,000 mg by mouth 4 times daily , Disp: , Rfl:     TAZORAC 0.1 % cream, Apply as needed nightly, Disp: , Rfl:     NONFORMULARY, Progesterone compounded capsule- 1 capsule daily on last half  Aide

## 2024-04-08 NOTE — REVIEW OF SYSTEMS
I acted within this role throughout the entirety of the procedure performed by the primary surgeon [Recent Wt Gain (___ Lbs)] : recent [unfilled] ~Ulb weight gain [Dyspnea] : dyspnea [Negative] : Sleep Disorder

## 2024-04-29 NOTE — PROGRESS NOTE ADULT - PROBLEM SELECTOR PROBLEM 2
Medication refilled up until OV 10/17/24.   R/O Acute on chronic congestive heart failure, unspecified heart failure type

## 2024-09-12 NOTE — PATIENT PROFILE ADULT. - SURGICAL SITE INCISION
September 12, 2024      Ema Rojas  [address]   [place] [address]        To Whom It May Concern:    Ema Rojas  was seen on September 12, 2024.  Please excuse her from work until September 19th due to illness.        Sincerely,        YOJANA Andre CNP    
no

## 2025-05-30 NOTE — CONSULT NOTE ADULT - CONSULT REQUESTED DATE/TIME
PROCEDURE NOTE  Date: 5/30/2025   Name: Rosalind Judd  YOB: 1964    Procedures        BRIEF PROCEDURE NOTE    Date of Procedure: 5/30/2025   Preoperative Diagnosis: chest pain  Postoperative Diagnosis: no significant obstructive cad    Procedure: Left heart cath, coronary angiography, moderate sedation  Interventional Cardiologist: Omar Key DO  Assistant: None  Anesthesia: local + IV moderate sedation   I administered moderate sedation throughout this procedure. An independent trained observer pushed medications at my direction, and monitored the patient’s level of consciousness and physiological status throughout.  Estimated Blood Loss: Minimal    Access: right radial artery, 6F  Catheters:  Left coronary:JL 3.5, 5f  Right coronary: JR 4, 5f    Findings:   L Main:large caliber, angiographically normal  LAD: large caliber, type 4, no meaningful diagonals, mild calcification with mild obstructive disease  Ramus: small caliber  LCx:large caliber, high takeoff of branching OM tortuous no significant disease, small distal av groove lcx  RCA: moderate caliber, calcified, mid vessel with tubular moderate stenosis, small dual pdas    LVEDP:  <10 mmhg        Specimens Removed: None    Implants: n/a    Closure Device: radial TR band    See full cath note.    Complications: none      Findings:  1. Calcified CAD without any critical stenosis  2. Normal lvedp    Plan:  Cont aggressive lipid lowering therapy    [place]  [address], [address]  [place] [address]                                              Office [phone],Fax [phone]    09-Sep-2017 19:56

## 2025-07-10 NOTE — ED ADULT NURSE NOTE - ED CARDIAC RHYTHM
Patient is scheduled for a Colonoscopy on 8/15/25 with Dr. NARESH Hodge  Referral for procedure from PAT appointment  
regular
